# Patient Record
Sex: MALE | Race: WHITE | NOT HISPANIC OR LATINO | Employment: OTHER | URBAN - METROPOLITAN AREA
[De-identification: names, ages, dates, MRNs, and addresses within clinical notes are randomized per-mention and may not be internally consistent; named-entity substitution may affect disease eponyms.]

---

## 2021-02-02 ENCOUNTER — RECORDS - HEALTHEAST (OUTPATIENT)
Dept: LAB | Facility: CLINIC | Age: 86
End: 2021-02-02

## 2021-02-04 LAB — BACTERIA SPEC CULT: ABNORMAL

## 2021-05-25 ENCOUNTER — RECORDS - HEALTHEAST (OUTPATIENT)
Dept: ADMINISTRATIVE | Facility: CLINIC | Age: 86
End: 2021-05-25

## 2021-06-02 ENCOUNTER — RECORDS - HEALTHEAST (OUTPATIENT)
Dept: ADMINISTRATIVE | Facility: CLINIC | Age: 86
End: 2021-06-02

## 2021-07-07 ENCOUNTER — RECORDS - HEALTHEAST (OUTPATIENT)
Dept: LAB | Facility: CLINIC | Age: 86
End: 2021-07-07

## 2021-07-10 LAB — BACTERIA SPEC CULT: ABNORMAL

## 2021-07-23 ENCOUNTER — HOSPITAL ENCOUNTER (OUTPATIENT)
Facility: CLINIC | Age: 86
End: 2021-07-23
Attending: SPECIALIST | Admitting: SPECIALIST
Payer: MEDICARE

## 2021-07-23 DIAGNOSIS — Z11.59 ENCOUNTER FOR SCREENING FOR OTHER VIRAL DISEASES: ICD-10-CM

## 2021-07-29 ENCOUNTER — LAB REQUISITION (OUTPATIENT)
Dept: LAB | Facility: CLINIC | Age: 86
End: 2021-07-29
Payer: MEDICARE

## 2021-07-29 DIAGNOSIS — Z01.818 ENCOUNTER FOR OTHER PREPROCEDURAL EXAMINATION: ICD-10-CM

## 2021-07-29 LAB
ANION GAP SERPL CALCULATED.3IONS-SCNC: 9 MMOL/L (ref 5–18)
BUN SERPL-MCNC: 13 MG/DL (ref 8–28)
CALCIUM SERPL-MCNC: 9.3 MG/DL (ref 8.5–10.5)
CHLORIDE BLD-SCNC: 101 MMOL/L (ref 98–107)
CO2 SERPL-SCNC: 26 MMOL/L (ref 22–31)
CREAT SERPL-MCNC: 0.95 MG/DL (ref 0.7–1.3)
GFR SERPL CREATININE-BSD FRML MDRD: 73 ML/MIN/1.73M2
GLUCOSE BLD-MCNC: 80 MG/DL (ref 70–125)
POTASSIUM BLD-SCNC: 4.7 MMOL/L (ref 3.5–5)
SODIUM SERPL-SCNC: 136 MMOL/L (ref 136–145)

## 2021-07-29 PROCEDURE — 80048 BASIC METABOLIC PNL TOTAL CA: CPT | Mod: ORL | Performed by: NURSE PRACTITIONER

## 2021-08-02 ENCOUNTER — ANESTHESIA EVENT (OUTPATIENT)
Dept: SURGERY | Facility: HOSPITAL | Age: 86
End: 2021-08-02

## 2021-08-02 RX ORDER — LIDOCAINE 40 MG/G
CREAM TOPICAL
Status: CANCELLED | OUTPATIENT
Start: 2021-08-02

## 2021-08-02 RX ORDER — CEFAZOLIN SODIUM 2 G/100ML
2 INJECTION, SOLUTION INTRAVENOUS SEE ADMIN INSTRUCTIONS
Status: DISCONTINUED | OUTPATIENT
Start: 2021-08-03 | End: 2021-08-02 | Stop reason: HOSPADM

## 2021-08-02 RX ORDER — SODIUM CHLORIDE, SODIUM LACTATE, POTASSIUM CHLORIDE, CALCIUM CHLORIDE 600; 310; 30; 20 MG/100ML; MG/100ML; MG/100ML; MG/100ML
INJECTION, SOLUTION INTRAVENOUS CONTINUOUS
Status: CANCELLED | OUTPATIENT
Start: 2021-08-02

## 2021-08-02 RX ORDER — CEFAZOLIN SODIUM 2 G/100ML
2 INJECTION, SOLUTION INTRAVENOUS
Status: DISCONTINUED | OUTPATIENT
Start: 2021-08-03 | End: 2021-08-02 | Stop reason: HOSPADM

## 2021-08-03 ENCOUNTER — ANESTHESIA (OUTPATIENT)
Dept: SURGERY | Facility: HOSPITAL | Age: 86
End: 2021-08-03

## 2021-09-08 ENCOUNTER — LAB REQUISITION (OUTPATIENT)
Dept: LAB | Facility: CLINIC | Age: 86
End: 2021-09-08
Payer: MEDICARE

## 2021-09-08 DIAGNOSIS — R31.0 GROSS HEMATURIA: ICD-10-CM

## 2021-09-08 LAB
ANION GAP SERPL CALCULATED.3IONS-SCNC: 10 MMOL/L (ref 5–18)
BUN SERPL-MCNC: 16 MG/DL (ref 8–28)
CALCIUM SERPL-MCNC: 8.7 MG/DL (ref 8.5–10.5)
CHLORIDE BLD-SCNC: 103 MMOL/L (ref 98–107)
CO2 SERPL-SCNC: 27 MMOL/L (ref 22–31)
CREAT SERPL-MCNC: 0.87 MG/DL (ref 0.7–1.3)
GFR SERPL CREATININE-BSD FRML MDRD: 79 ML/MIN/1.73M2
GLUCOSE BLD-MCNC: 89 MG/DL (ref 70–125)
POTASSIUM BLD-SCNC: 4.4 MMOL/L (ref 3.5–5)
SODIUM SERPL-SCNC: 140 MMOL/L (ref 136–145)

## 2021-09-08 PROCEDURE — 80048 BASIC METABOLIC PNL TOTAL CA: CPT | Mod: ORL | Performed by: NURSE PRACTITIONER

## 2021-09-08 PROCEDURE — 87086 URINE CULTURE/COLONY COUNT: CPT | Mod: ORL | Performed by: NURSE PRACTITIONER

## 2021-09-11 LAB — BACTERIA UR CULT: ABNORMAL

## 2021-09-27 DIAGNOSIS — Z11.59 ENCOUNTER FOR SCREENING FOR OTHER VIRAL DISEASES: ICD-10-CM

## 2021-10-07 NOTE — OR NURSING
Writer spoke with Valentina. She is Denton's daughter and caregiver. She stated complete understanding of all pre op info and repeated back the arrival, and NPO times correctly. She was unable to verify med list d/t not being at home and didn't have the current list with her. She was asked to bring a current list for the PharmD DOS.

## 2021-10-11 ENCOUNTER — ANESTHESIA EVENT (OUTPATIENT)
Dept: SURGERY | Facility: CLINIC | Age: 86
End: 2021-10-11
Payer: MEDICARE

## 2021-10-11 ENCOUNTER — ANESTHESIA (OUTPATIENT)
Dept: SURGERY | Facility: CLINIC | Age: 86
End: 2021-10-11
Payer: MEDICARE

## 2021-10-11 ENCOUNTER — HOSPITAL ENCOUNTER (OUTPATIENT)
Facility: CLINIC | Age: 86
Discharge: HOME | End: 2021-10-11
Attending: SPECIALIST | Admitting: SPECIALIST
Payer: MEDICARE

## 2021-10-11 VITALS
BODY MASS INDEX: 21.01 KG/M2 | HEART RATE: 80 BPM | DIASTOLIC BLOOD PRESSURE: 81 MMHG | OXYGEN SATURATION: 95 % | SYSTOLIC BLOOD PRESSURE: 179 MMHG | WEIGHT: 138.6 LBS | HEIGHT: 68 IN | TEMPERATURE: 97.9 F | RESPIRATION RATE: 18 BRPM

## 2021-10-11 DIAGNOSIS — K40.30 INGUINAL HERNIA OF LEFT SIDE WITH OBSTRUCTION: Primary | ICD-10-CM

## 2021-10-11 PROCEDURE — 250N000011 HC RX IP 250 OP 636: Performed by: NURSE ANESTHETIST, CERTIFIED REGISTERED

## 2021-10-11 PROCEDURE — 250N000011 HC RX IP 250 OP 636: Performed by: SPECIALIST

## 2021-10-11 PROCEDURE — 360N000075 HC SURGERY LEVEL 2, PER MIN: Performed by: SPECIALIST

## 2021-10-11 PROCEDURE — 258N000003 HC RX IP 258 OP 636: Performed by: ANESTHESIOLOGY

## 2021-10-11 PROCEDURE — 370N000017 HC ANESTHESIA TECHNICAL FEE, PER MIN: Performed by: SPECIALIST

## 2021-10-11 PROCEDURE — 999N000141 HC STATISTIC PRE-PROCEDURE NURSING ASSESSMENT: Performed by: SPECIALIST

## 2021-10-11 PROCEDURE — 272N000001 HC OR GENERAL SUPPLY STERILE: Performed by: SPECIALIST

## 2021-10-11 PROCEDURE — 250N000009 HC RX 250: Performed by: NURSE ANESTHETIST, CERTIFIED REGISTERED

## 2021-10-11 PROCEDURE — C1781 MESH (IMPLANTABLE): HCPCS | Performed by: SPECIALIST

## 2021-10-11 PROCEDURE — 250N000013 HC RX MED GY IP 250 OP 250 PS 637: Performed by: SPECIALIST

## 2021-10-11 PROCEDURE — 710N000012 HC RECOVERY PHASE 2, PER MINUTE: Performed by: SPECIALIST

## 2021-10-11 PROCEDURE — 710N000010 HC RECOVERY PHASE 1, LEVEL 2, PER MIN: Performed by: SPECIALIST

## 2021-10-11 PROCEDURE — 250N000025 HC SEVOFLURANE, PER MIN: Performed by: SPECIALIST

## 2021-10-11 PROCEDURE — 250N000009 HC RX 250: Performed by: SPECIALIST

## 2021-10-11 DEVICE — MESH KNITTED POLYPROPYLENE 02X4" 0112650: Type: IMPLANTABLE DEVICE | Site: ABDOMEN | Status: FUNCTIONAL

## 2021-10-11 RX ORDER — FENTANYL CITRATE 50 UG/ML
25 INJECTION, SOLUTION INTRAMUSCULAR; INTRAVENOUS
Status: DISCONTINUED | OUTPATIENT
Start: 2021-10-11 | End: 2021-10-11 | Stop reason: HOSPADM

## 2021-10-11 RX ORDER — OXYCODONE AND ACETAMINOPHEN 5; 325 MG/1; MG/1
1 TABLET ORAL EVERY 6 HOURS PRN
Qty: 12 TABLET | Refills: 0 | Status: SHIPPED | OUTPATIENT
Start: 2021-10-11 | End: 2021-10-14

## 2021-10-11 RX ORDER — ONDANSETRON 4 MG/1
4 TABLET, ORALLY DISINTEGRATING ORAL EVERY 30 MIN PRN
Status: DISCONTINUED | OUTPATIENT
Start: 2021-10-11 | End: 2021-10-11 | Stop reason: HOSPADM

## 2021-10-11 RX ORDER — OXYCODONE HYDROCHLORIDE 5 MG/1
5 TABLET ORAL EVERY 4 HOURS PRN
Status: DISCONTINUED | OUTPATIENT
Start: 2021-10-11 | End: 2021-10-11 | Stop reason: HOSPADM

## 2021-10-11 RX ORDER — CEFAZOLIN SODIUM 2 G/100ML
2 INJECTION, SOLUTION INTRAVENOUS
Status: COMPLETED | OUTPATIENT
Start: 2021-10-11 | End: 2021-10-11

## 2021-10-11 RX ORDER — CEFAZOLIN SODIUM 2 G/100ML
2 INJECTION, SOLUTION INTRAVENOUS SEE ADMIN INSTRUCTIONS
Status: DISCONTINUED | OUTPATIENT
Start: 2021-10-11 | End: 2021-10-11 | Stop reason: HOSPADM

## 2021-10-11 RX ORDER — LIDOCAINE 40 MG/G
CREAM TOPICAL
Status: DISCONTINUED | OUTPATIENT
Start: 2021-10-11 | End: 2021-10-11 | Stop reason: HOSPADM

## 2021-10-11 RX ORDER — SODIUM CHLORIDE, SODIUM LACTATE, POTASSIUM CHLORIDE, CALCIUM CHLORIDE 600; 310; 30; 20 MG/100ML; MG/100ML; MG/100ML; MG/100ML
INJECTION, SOLUTION INTRAVENOUS CONTINUOUS
Status: DISCONTINUED | OUTPATIENT
Start: 2021-10-11 | End: 2021-10-11 | Stop reason: HOSPADM

## 2021-10-11 RX ORDER — OXYCODONE AND ACETAMINOPHEN 5; 325 MG/1; MG/1
1 TABLET ORAL ONCE
Status: COMPLETED | OUTPATIENT
Start: 2021-10-11 | End: 2021-10-11

## 2021-10-11 RX ORDER — MEPERIDINE HYDROCHLORIDE 25 MG/ML
12.5 INJECTION INTRAMUSCULAR; INTRAVENOUS; SUBCUTANEOUS
Status: DISCONTINUED | OUTPATIENT
Start: 2021-10-11 | End: 2021-10-11 | Stop reason: HOSPADM

## 2021-10-11 RX ORDER — ONDANSETRON 2 MG/ML
4 INJECTION INTRAMUSCULAR; INTRAVENOUS EVERY 30 MIN PRN
Status: DISCONTINUED | OUTPATIENT
Start: 2021-10-11 | End: 2021-10-11 | Stop reason: HOSPADM

## 2021-10-11 RX ORDER — LIDOCAINE HYDROCHLORIDE 10 MG/ML
INJECTION, SOLUTION INFILTRATION; PERINEURAL PRN
Status: DISCONTINUED | OUTPATIENT
Start: 2021-10-11 | End: 2021-10-11

## 2021-10-11 RX ORDER — SODIUM CHLORIDE, SODIUM LACTATE, POTASSIUM CHLORIDE, CALCIUM CHLORIDE 600; 310; 30; 20 MG/100ML; MG/100ML; MG/100ML; MG/100ML
INJECTION, SOLUTION INTRAVENOUS CONTINUOUS PRN
Status: DISCONTINUED | OUTPATIENT
Start: 2021-10-11 | End: 2021-10-11

## 2021-10-11 RX ORDER — ONDANSETRON 2 MG/ML
INJECTION INTRAMUSCULAR; INTRAVENOUS PRN
Status: DISCONTINUED | OUTPATIENT
Start: 2021-10-11 | End: 2021-10-11

## 2021-10-11 RX ORDER — MAGNESIUM HYDROXIDE 1200 MG/15ML
LIQUID ORAL PRN
Status: DISCONTINUED | OUTPATIENT
Start: 2021-10-11 | End: 2021-10-11 | Stop reason: HOSPADM

## 2021-10-11 RX ORDER — HYDROMORPHONE HCL IN WATER/PF 6 MG/30 ML
0.2 PATIENT CONTROLLED ANALGESIA SYRINGE INTRAVENOUS EVERY 5 MIN PRN
Status: DISCONTINUED | OUTPATIENT
Start: 2021-10-11 | End: 2021-10-11 | Stop reason: HOSPADM

## 2021-10-11 RX ORDER — PROPOFOL 10 MG/ML
INJECTION, EMULSION INTRAVENOUS PRN
Status: DISCONTINUED | OUTPATIENT
Start: 2021-10-11 | End: 2021-10-11

## 2021-10-11 RX ORDER — FENTANYL CITRATE 50 UG/ML
INJECTION, SOLUTION INTRAMUSCULAR; INTRAVENOUS PRN
Status: DISCONTINUED | OUTPATIENT
Start: 2021-10-11 | End: 2021-10-11

## 2021-10-11 RX ORDER — FENTANYL CITRATE 50 UG/ML
25 INJECTION, SOLUTION INTRAMUSCULAR; INTRAVENOUS EVERY 5 MIN PRN
Status: DISCONTINUED | OUTPATIENT
Start: 2021-10-11 | End: 2021-10-11 | Stop reason: HOSPADM

## 2021-10-11 RX ORDER — BUPIVACAINE HYDROCHLORIDE 2.5 MG/ML
INJECTION, SOLUTION INFILTRATION; PERINEURAL PRN
Status: DISCONTINUED | OUTPATIENT
Start: 2021-10-11 | End: 2021-10-11 | Stop reason: HOSPADM

## 2021-10-11 RX ORDER — DEXAMETHASONE SODIUM PHOSPHATE 4 MG/ML
INJECTION, SOLUTION INTRA-ARTICULAR; INTRALESIONAL; INTRAMUSCULAR; INTRAVENOUS; SOFT TISSUE PRN
Status: DISCONTINUED | OUTPATIENT
Start: 2021-10-11 | End: 2021-10-11

## 2021-10-11 RX ADMIN — ONDANSETRON 4 MG: 2 INJECTION INTRAMUSCULAR; INTRAVENOUS at 10:24

## 2021-10-11 RX ADMIN — FENTANYL CITRATE 50 MCG: 50 INJECTION, SOLUTION INTRAMUSCULAR; INTRAVENOUS at 10:43

## 2021-10-11 RX ADMIN — SODIUM CHLORIDE, POTASSIUM CHLORIDE, SODIUM LACTATE AND CALCIUM CHLORIDE: 600; 310; 30; 20 INJECTION, SOLUTION INTRAVENOUS at 08:45

## 2021-10-11 RX ADMIN — PROPOFOL 150 MG: 10 INJECTION, EMULSION INTRAVENOUS at 10:24

## 2021-10-11 RX ADMIN — LIDOCAINE HYDROCHLORIDE 5 ML: 10 INJECTION, SOLUTION INFILTRATION; PERINEURAL at 10:24

## 2021-10-11 RX ADMIN — OXYCODONE HYDROCHLORIDE AND ACETAMINOPHEN 1 TABLET: 5; 325 TABLET ORAL at 08:47

## 2021-10-11 RX ADMIN — CEFAZOLIN SODIUM 2 G: 2 INJECTION, SOLUTION INTRAVENOUS at 10:28

## 2021-10-11 RX ADMIN — FENTANYL CITRATE 50 MCG: 50 INJECTION, SOLUTION INTRAMUSCULAR; INTRAVENOUS at 10:24

## 2021-10-11 RX ADMIN — DEXAMETHASONE SODIUM PHOSPHATE 10 MG: 4 INJECTION, SOLUTION INTRA-ARTICULAR; INTRALESIONAL; INTRAMUSCULAR; INTRAVENOUS; SOFT TISSUE at 10:24

## 2021-10-11 ASSESSMENT — MIFFLIN-ST. JEOR: SCORE: 1285.25

## 2021-10-11 ASSESSMENT — COPD QUESTIONNAIRES: COPD: 1

## 2021-10-11 NOTE — OP NOTE
"[facility]  Operative Note    Pre-operative diagnosis: Obstructed inguinal hernia [K40.30]   Post-operative diagnosis * No post-op diagnosis entered *   Procedure: Procedure(s):  LEFT INGUINAL HERNIA REPAIR WITH MESH , excision spermatic cord lipoma   Surgeon: Puma Preston MD, MD   Assistants(s): None   Anesthesia: General    Estimated blood loss:  5 mL    Total IV fluids: (See anesthesia record)   Blood transfusion: No transfusion was given during surgery   Total urine output: (See anesthesia record)   Drains:  None   Specimens: * No specimens in log *    Implants: Implant Name Type Inv. Item Serial No.  Lot No. LRB No. Used Action   MESH KNITTED POLYPROPYLENE 02X4\" [id number] - [id number] Mesh MESH KNITTED POLYPROPYLENE 02X4\" [id number]  [facility] [id number] Left 1 Implanted       Findings:  Hernia   Complications: None.   Condition: Stable       Description of procedure: Patient was prepped draped in usual fashion after induction of a general anesthetic the left lower quadrant incision was made down to the external oblique the fascia was opened secured with several clamps the cord was then mobilized and comes with a Penrose drain there was a large direct inguinal hernia sac which was incarcerated the sac was freed from the posterior aspect of the cord and reduced there was also a 4 cm spermatic cord lipoma this was dissected off the cord bluntly and then amputated off and discarded the conjoined tendon and shelving edge of the inguinal ligament repaired with interrupted 0 Ethibond a patch of Marlex mesh was then tapered to the dimensions of the inguinal floor and sewn in onlay fashion with a running 3-0 Prolene the cord was returned to inguinal floor the external oblique and Eugenia's fascia closed with 2-0 Vicryl skin closed with 4-0 Vicryl.    Puma Preston MD, MD     "

## 2021-10-11 NOTE — ANESTHESIA CARE TRANSFER NOTE
Patient: Denton Hobbs    Procedure: Procedure(s):  LEFT INGUINAL HERNIA REPAIR WITH MESH       Diagnosis: Obstructed inguinal hernia [K40.30]  Diagnosis Additional Information: No value filed.    Anesthesia Type:   General     Note:    Oropharynx: oral airway in place  Level of Consciousness: drowsy  Oxygen Supplementation: face mask  Level of Supplemental Oxygen (L/min / FiO2): 6  Independent Airway: airway patency satisfactory and stable  Dentition: dentition unchanged  Vital Signs Stable: post-procedure vital signs reviewed and stable  Report to RN Given: handoff report given  Patient transferred to: PACU    Handoff Report: Identifed the Patient, Identified the Reponsible Provider, Reviewed the pertinent medical history, Discussed the surgical course, Reviewed Intra-OP anesthesia mangement and issues during anesthesia, Set expectations for post-procedure period and Allowed opportunity for questions and acknowledgement of understanding      Vitals:  Vitals Value Taken Time   BP     Temp     Pulse 62 10/11/21 1111   Resp 35 10/11/21 1111   SpO2 100 % 10/11/21 1111   Vitals shown include unvalidated device data.    Electronically Signed By: DONALD Smith CRNA  October 11, 2021  11:12 AM

## 2021-10-11 NOTE — ANESTHESIA PROCEDURE NOTES
Airway       Patient location during procedure: OR  Staff -        CRNA: Tyler Johnson APRN CRNA       Performed By: CRNAIndications and Patient Condition       Indications for airway management: paul-procedural        Final Airway Details       Final airway type: supraglottic airway    Supraglottic Airway Details        Type: LMA       LMA size: 5    Post intubation assessment        Placement verified by: capnometry, equal breath sounds and chest rise        Number of attempts at approach: 1       Ease of procedure: easy

## 2021-10-11 NOTE — ANESTHESIA POSTPROCEDURE EVALUATION
Patient: Denton Hobbs    Procedure: Procedure(s):  LEFT INGUINAL HERNIA REPAIR WITH MESH       Diagnosis:Obstructed inguinal hernia [K40.30]  Diagnosis Additional Information: No value filed.    Anesthesia Type:  General    Note:     Postop Pain Control: Uneventful            Sign Out: Well controlled pain   PONV: No   Neuro/Psych: Uneventful            Sign Out: Acceptable/Baseline neuro status   Airway/Respiratory: Uneventful            Sign Out: Acceptable/Baseline resp. status   CV/Hemodynamics: Uneventful            Sign Out: Acceptable CV status; No obvious hypovolemia; No obvious fluid overload   Other NRE: NONE   DID A NON-ROUTINE EVENT OCCUR? No           Last vitals:  Vitals Value Taken Time   /67 10/11/21 1150   Temp 36.6  C (97.9  F) 10/11/21 1110   Pulse 93 10/11/21 1201   Resp 26 10/11/21 1201   SpO2 100 % 10/11/21 1201   Vitals shown include unvalidated device data.    Electronically Signed By: Karson Epps MD  October 11, 2021  12:45 PM

## 2021-10-11 NOTE — ANESTHESIA PREPROCEDURE EVALUATION
Anesthesia Pre-Procedure Evaluation    Patient: Denton Hobbs   MRN: [id number] : 1935        Preoperative Diagnosis: Obstructed inguinal hernia [K40.30]    Procedure : Procedure(s):  LEFT INGUINAL HERNIA REPAIR WITH MESH          Past Medical History:   Diagnosis Date     Arthritis      COPD (chronic obstructive pulmonary disease) (H)      Hypertension      Prostate hypertrophy       Past Surgical History:   Procedure Laterality Date     GENITOURINARY SURGERY       TRANSRECTAL ULTRASONIC, TRANSURETHRAL RESECTION (TUR) OF PROSTATE CYST      helped temporarily      Allergies   Allergen Reactions     Sulfa Drugs Rash     Info obtained off of 10/4/21 Entira pre op.      Social History     Tobacco Use     Smoking status: Former Smoker     Packs/day: 2.00     Years: 25.00     Pack years: 50.00     Start date: 1990     Smokeless tobacco: Never Used   Substance Use Topics     Alcohol use: No     Comment: Alcoholic Drinks/day: Quit       Wt Readings from Last 1 Encounters:   10/11/21 62.9 kg (138 lb 9.6 oz)        Anesthesia Evaluation   Pt has not had prior anesthetic         ROS/MED HX  ENT/Pulmonary:     (+) COPD (stable),     Neurologic:  - neg neurologic ROS     Cardiovascular:     (+) hypertension-----    METS/Exercise Tolerance:     Hematologic:  - neg hematologic  ROS     Musculoskeletal:       GI/Hepatic: Comment: BPH      Renal/Genitourinary:  - neg Renal ROS     Endo:  - neg endo ROS     Psychiatric/Substance Use:       Infectious Disease:       Malignancy:       Other:            Physical Exam    Airway        Mallampati: II   TM distance: > 3 FB      Respiratory Devices and Support         Dental  no notable dental history         Cardiovascular   cardiovascular exam normal          Pulmonary   pulmonary exam normal                OUTSIDE LABS:  CBC: No results found for: WBC, HGB, HCT, PLT  BMP:   Lab Results   Component Value Date     2021     2021    POTASSIUM  4.4 09/08/2021    POTASSIUM 4.7 07/29/2021    CHLORIDE 103 09/08/2021    CHLORIDE 101 07/29/2021    CO2 27 09/08/2021    CO2 26 07/29/2021    BUN 16 09/08/2021    BUN 13 07/29/2021    CR 0.87 09/08/2021    CR 0.95 07/29/2021    GLC 89 09/08/2021    GLC 80 07/29/2021     COAGS: No results found for: PTT, INR, FIBR  POC: No results found for: BGM, HCG, HCGS  HEPATIC: No results found for: ALBUMIN, PROTTOTAL, ALT, AST, GGT, ALKPHOS, BILITOTAL, BILIDIRECT, FRANCESCO  OTHER:   Lab Results   Component Value Date    GINETTE 8.7 09/08/2021       Anesthesia Plan    ASA Status:  3   NPO Status:  NPO Appropriate    Anesthesia Type: General.     - Airway: LMA   Induction: Intravenous.           Consents    Anesthesia Plan(s) and associated risks, benefits, and realistic alternatives discussed. Questions answered and patient/representative(s) expressed understanding.     - Discussed with:  Patient         Postoperative Care    Pain management: IV analgesics, Oral pain medications.   PONV prophylaxis: Dexamethasone or Solumedrol, Ondansetron (or other 5HT-3)     Comments:                Karson Epps MD

## 2021-10-11 NOTE — BRIEF OP NOTE
"[facility]    Brief Operative Note    Pre-operative diagnosis: Obstructed inguinal hernia [K40.30]  Post-operative diagnosis same  Procedure: Procedure(s):  LEFT INGUINAL HERNIA REPAIR WITH MESH.  Excision spermatic cord lipoma  Surgeon: Surgeon(s) and Role:     * Puma Preston MD - Primary  Anesthesia: General   Estimated blood loss: 5 mL  Drains: None  Specimens: * No specimens in log *  Findings:   Incarcerated direct inguinal hernia  Complications: None.  Implants:   Implant Name Type Inv. Item Serial No.  Lot No. LRB No. Used Action   MESH KNITTED POLYPROPYLENE 02X4\" [id number] - [id number] Mesh MESH KNITTED POLYPROPYLENE 02X4\" [id number]  [place] [id number] Left 1 Implanted           "

## 2021-12-17 ENCOUNTER — LAB REQUISITION (OUTPATIENT)
Dept: LAB | Facility: CLINIC | Age: 86
End: 2021-12-17
Payer: MEDICARE

## 2021-12-17 ENCOUNTER — TRANSFERRED RECORDS (OUTPATIENT)
Dept: HEALTH INFORMATION MANAGEMENT | Facility: CLINIC | Age: 86
End: 2021-12-17
Payer: MEDICARE

## 2021-12-17 DIAGNOSIS — N39.0 URINARY TRACT INFECTION, SITE NOT SPECIFIED: ICD-10-CM

## 2021-12-17 PROCEDURE — 87186 SC STD MICRODIL/AGAR DIL: CPT | Mod: ORL,91 | Performed by: NURSE PRACTITIONER

## 2021-12-20 LAB
BACTERIA UR CULT: ABNORMAL
BACTERIA UR CULT: ABNORMAL

## 2021-12-30 ENCOUNTER — TRANSFERRED RECORDS (OUTPATIENT)
Dept: HEALTH INFORMATION MANAGEMENT | Facility: CLINIC | Age: 86
End: 2021-12-30
Payer: MEDICARE

## 2022-01-25 ENCOUNTER — MEDICAL CORRESPONDENCE (OUTPATIENT)
Dept: HEALTH INFORMATION MANAGEMENT | Facility: CLINIC | Age: 87
End: 2022-01-25
Payer: MEDICARE

## 2022-01-25 ENCOUNTER — TRANSCRIBE ORDERS (OUTPATIENT)
Dept: OTHER | Age: 87
End: 2022-01-25

## 2022-01-25 DIAGNOSIS — K40.90 RIGHT INGUINAL HERNIA: Primary | ICD-10-CM

## 2022-01-25 PROBLEM — R47.01 APHASIA: Status: ACTIVE | Noted: 2018-12-04

## 2022-01-25 PROBLEM — N30.90 CYSTITIS: Status: ACTIVE | Noted: 2018-12-04

## 2022-01-25 PROBLEM — R26.89 BALANCE PROBLEMS: Status: ACTIVE | Noted: 2018-12-04

## 2022-01-25 PROBLEM — Z97.8 INDWELLING FOLEY CATHETER PRESENT: Status: ACTIVE | Noted: 2018-12-07

## 2022-01-25 PROBLEM — I63.9 CVA (CEREBRAL VASCULAR ACCIDENT) (H): Status: ACTIVE | Noted: 2018-12-04

## 2022-01-25 PROBLEM — R97.20 ELEVATED PSA: Status: ACTIVE | Noted: 2018-12-04

## 2022-01-25 PROBLEM — R53.1 LEFT-SIDED WEAKNESS: Status: ACTIVE | Noted: 2018-12-04

## 2022-01-25 NOTE — PATIENT INSTRUCTIONS
Hernia education & surgery packet provided to pt.    Carlito OSMAN [facility]  Surgery Clinic [facility] [place]  [facility] - [address] [address]  Office: [phone]  Fax: [phone]

## 2022-01-27 ENCOUNTER — OFFICE VISIT (OUTPATIENT)
Dept: SURGERY | Facility: CLINIC | Age: 87
End: 2022-01-27
Attending: FAMILY MEDICINE
Payer: MEDICARE

## 2022-01-27 DIAGNOSIS — K40.90 NON-RECURRENT UNILATERAL INGUINAL HERNIA WITHOUT OBSTRUCTION OR GANGRENE: Primary | ICD-10-CM

## 2022-01-27 DIAGNOSIS — K40.90 RIGHT INGUINAL HERNIA: ICD-10-CM

## 2022-01-27 PROCEDURE — 99203 OFFICE O/P NEW LOW 30 MIN: CPT | Performed by: SURGERY

## 2022-01-27 RX ORDER — ACETAMINOPHEN 325 MG/1
975 TABLET ORAL ONCE
Status: CANCELLED | OUTPATIENT
Start: 2022-01-27 | End: 2022-01-27

## 2022-01-27 NOTE — PROGRESS NOTES
HPI:  Denton Hobbs is a 86 year old male who was referred to me by Jarad Prater for an inguinal hernia. He  presents today with complaints of an uncomfortable bulge in his right groin. He actually had a left-sided hernia repair just 3 months ago and is doing well with regard to that surgery.    Allergies:Sulfa drugs    Past Medical History:   Diagnosis Date     Arthritis      COPD (chronic obstructive pulmonary disease) (H)      CVA (cerebral vascular accident) (H) 12/4/2018     Hypertension      Prostate hypertrophy        Past Surgical History:   Procedure Laterality Date     GENITOURINARY SURGERY       HERNIORRHAPHY INGUINAL Left 10/11/2021    Procedure: LEFT INGUINAL HERNIA REPAIR WITH MESH;  Surgeon: Puma Preston MD;  Location: [facility] OR     TRANSRECTAL ULTRASONIC, TRANSURETHRAL RESECTION (TUR) OF PROSTATE CYST  2004    helped temporarily       CURRENT MEDS:  No current outpatient medications on file.       Family History   Problem Relation Age of Onset     No Known Problems Other         No heart, DM, HTN, or CA     No Known Problems Mother      No Known Problems Father         reports that he has quit smoking. He started smoking about 32 years ago. He has a 50.00 pack-year smoking history. He has never used smokeless tobacco. He reports that he does not drink alcohol and does not use drugs.    Review of Systems -   The 10 point review of systems  is within normal limits except for as mentioned above in the HPI.  General ROS: No complaints or constitutional symptoms  Skin: No complaints or symptoms   Hematologic/Lymphatic: No symptoms or complaints  Psychiatric: No symptoms or complaints  Endocrine: No excessive fatigue, no hypermetabolic symptoms reported  Respiratory ROS: no cough, shortness of breath, or wheezing  Cardiovascular ROS: no chest pain or dyspnea on exertion  Gastrointestinal ROS: As per HPI  Musculoskeletal ROS: no recent injuries reported  Neurological ROS: no focal  neurologic defects reported.        There were no vitals taken for this visit.  There is no height or weight on file to calculate BMI.    EXAM:  General : Alert, cooperative, appears stated age   Skin: Skin color, texture, turgor normal, no rashes or lesions   Lymphatic: No obvious adenopathy, no swelling   Eyes: No scleral icterus, pupils equal  HENT: no traumatic injury to the head or face, no gross abnormalities  Lungs: Normal respiratory effort, breath sounds equal bilaterally  Heart: Regular rate and rhythm  Abdomen: Visible and palpable right inguinal hernia. No evidence of recurrence on the left side  Musculoskeletal: No obvious swelling,  Neurologic: Grossly intact    Assessment/Plan:   1. Non-recurrent unilateral inguinal hernia without obstruction or gangrene    2. Right inguinal hernia        Denton Hobbs is a 86 year old male with a right inguinal hernia.  I have discussed the pathophysiology of inguinal hernias at length as well as the  surgical and non-operative management strategies.      In particular, the risks and benefits of laparoscopic vs. open inguinal hernia surgery were explained in detail which include, but are not limited to, bleeding, infection of the mesh, recurrence of the hernia, chronic pain, poor cosmesis, the need for reoperative intervention, the possibility of conversion from a laparoscopic approach to an open approach, subcutaneous emphysema, injury to vital structures,  blood clots, heart attack, stroke and death.  Additionally, the risks of observation were also discussed in detail which include, but are not limited to, chronic pain, enlargement of the hernia, incarceration, strangulation and death.      He understands everything that was discussed and has consented to proceed with surgery.   We will plan on scheduling a open right inguinal hernia repair at his desired date.       lFo Keyes MD, FACS  Office: [phone]  [facility]   General and Bariatric  Surgery

## 2022-01-28 ENCOUNTER — TELEPHONE (OUTPATIENT)
Dept: SURGERY | Facility: CLINIC | Age: 87
End: 2022-01-28
Payer: MEDICARE

## 2022-01-28 NOTE — TELEPHONE ENCOUNTER
VM was left for  Denton to let him know that a letter is being sent out to replace the the letter he took home with him on his visit of 1/27. A new surgery date for him of 2/23 at 2 pm arrival and covid on 2/19.     Please call surgery scheduling if he needs further assistance [phone].

## 2022-01-31 ENCOUNTER — HOSPITAL ENCOUNTER (OUTPATIENT)
Facility: AMBULATORY SURGERY CENTER | Age: 87
End: 2022-01-31
Attending: SURGERY
Payer: MEDICARE

## 2022-02-07 DIAGNOSIS — Z11.59 ENCOUNTER FOR SCREENING FOR OTHER VIRAL DISEASES: Primary | ICD-10-CM

## 2022-02-18 ENCOUNTER — TELEPHONE (OUTPATIENT)
Dept: SURGERY | Facility: CLINIC | Age: 87
End: 2022-02-18
Payer: MEDICARE

## 2022-02-18 NOTE — TELEPHONE ENCOUNTER
Daughter (Valentina) calling in regarding Covid test and possibly having to r/s surgery out to March/April.  If he takes covid and still does not feel well then  We will r/s or if covid comes back positive shedule out and he will need to be symptom free 3 days prior to his surgery.

## 2022-02-24 ENCOUNTER — HOSPITAL ENCOUNTER (OUTPATIENT)
Facility: AMBULATORY SURGERY CENTER | Age: 87
End: 2022-02-24
Attending: SURGERY
Payer: MEDICARE

## 2022-02-24 DIAGNOSIS — K40.90 NON-RECURRENT UNILATERAL INGUINAL HERNIA WITHOUT OBSTRUCTION OR GANGRENE: ICD-10-CM

## 2022-03-14 ENCOUNTER — LAB REQUISITION (OUTPATIENT)
Dept: LAB | Facility: CLINIC | Age: 87
End: 2022-03-14
Payer: MEDICARE

## 2022-03-14 DIAGNOSIS — M54.50 LOW BACK PAIN, UNSPECIFIED: ICD-10-CM

## 2022-03-14 DIAGNOSIS — D64.9 ANEMIA, UNSPECIFIED: ICD-10-CM

## 2022-03-14 LAB
BASOPHILS # BLD AUTO: 0.1 10E3/UL (ref 0–0.2)
BASOPHILS NFR BLD AUTO: 1 %
EOSINOPHIL # BLD AUTO: 0.1 10E3/UL (ref 0–0.7)
EOSINOPHIL NFR BLD AUTO: 2 %
ERYTHROCYTE [DISTWIDTH] IN BLOOD BY AUTOMATED COUNT: 17.4 % (ref 10–15)
HCT VFR BLD AUTO: 27.3 % (ref 40–53)
HGB BLD-MCNC: 7.8 G/DL (ref 13.3–17.7)
IMM GRANULOCYTES # BLD: 0 10E3/UL
IMM GRANULOCYTES NFR BLD: 0 %
IRON SATN MFR SERPL: 3 % (ref 20–50)
IRON SERPL-MCNC: 10 UG/DL (ref 42–175)
LYMPHOCYTES # BLD AUTO: 1.7 10E3/UL (ref 0.8–5.3)
LYMPHOCYTES NFR BLD AUTO: 21 %
MCH RBC QN AUTO: 20.5 PG (ref 26.5–33)
MCHC RBC AUTO-ENTMCNC: 28.6 G/DL (ref 31.5–36.5)
MCV RBC AUTO: 72 FL (ref 78–100)
MONOCYTES # BLD AUTO: 1 10E3/UL (ref 0–1.3)
MONOCYTES NFR BLD AUTO: 12 %
NEUTROPHILS # BLD AUTO: 5.3 10E3/UL (ref 1.6–8.3)
NEUTROPHILS NFR BLD AUTO: 64 %
NRBC # BLD AUTO: 0 10E3/UL
NRBC BLD AUTO-RTO: 0 /100
PLATELET # BLD AUTO: 667 10E3/UL (ref 150–450)
RBC # BLD AUTO: 3.81 10E6/UL (ref 4.4–5.9)
RETICS # AUTO: 0.05 10E6/UL (ref 0.01–0.11)
RETICS # AUTO: 0.06 10E6/UL (ref 0.01–0.11)
RETICS/RBC NFR AUTO: 1.2 % (ref 0.8–2.7)
RETICS/RBC NFR AUTO: 1.6 % (ref 0.8–2.7)
TIBC SERPL-MCNC: 341 UG/DL (ref 313–563)
TRANSFERRIN SERPL-MCNC: 273 MG/DL (ref 212–360)
WBC # BLD AUTO: 8.3 10E3/UL (ref 4–11)

## 2022-03-14 PROCEDURE — 85045 AUTOMATED RETICULOCYTE COUNT: CPT | Mod: ORL | Performed by: FAMILY MEDICINE

## 2022-03-14 PROCEDURE — 87086 URINE CULTURE/COLONY COUNT: CPT | Mod: ORL | Performed by: FAMILY MEDICINE

## 2022-03-14 PROCEDURE — 84466 ASSAY OF TRANSFERRIN: CPT | Mod: ORL | Performed by: FAMILY MEDICINE

## 2022-03-14 PROCEDURE — 85025 COMPLETE CBC W/AUTO DIFF WBC: CPT | Mod: ORL | Performed by: FAMILY MEDICINE

## 2022-03-15 LAB
PATH REPORT.COMMENTS IMP SPEC: NORMAL
PATH REPORT.COMMENTS IMP SPEC: NORMAL
PATH REPORT.FINAL DX SPEC: NORMAL
PATH REPORT.MICROSCOPIC SPEC OTHER STN: NORMAL

## 2022-03-15 PROCEDURE — 99207 BLOOD MORPHOLOGY PATHOLOGIST REVIEW: CPT | Performed by: PATHOLOGY

## 2022-03-16 LAB — BACTERIA UR CULT: NORMAL

## 2022-03-18 ENCOUNTER — TELEPHONE (OUTPATIENT)
Dept: SURGERY | Facility: CLINIC | Age: 87
End: 2022-03-18
Payer: MEDICARE

## 2022-03-18 NOTE — TELEPHONE ENCOUNTER
Spoke with Arminda today regarding Denton's surgery on 3/23. His surgery has to be canceled because he did not pass his pre op physical. He has a follow up visit with the PCP on 3/28.  We have postponed Denton's surgery to 4/13. Arminda will contact us after the PCP follow up on 3/28 to let us know if he was cleared or not. If he is cleared we will set up the covid testing at that time, if not we will postpone the surgery.    VANESA notified

## 2022-03-21 ENCOUNTER — HOSPITAL ENCOUNTER (OUTPATIENT)
Facility: AMBULATORY SURGERY CENTER | Age: 87
End: 2022-03-21
Attending: SURGERY
Payer: MEDICARE

## 2022-03-21 ENCOUNTER — LAB REQUISITION (OUTPATIENT)
Dept: LAB | Facility: CLINIC | Age: 87
End: 2022-03-21
Payer: MEDICARE

## 2022-03-21 DIAGNOSIS — Z01.812 ENCOUNTER FOR PREPROCEDURAL LABORATORY EXAMINATION: ICD-10-CM

## 2022-03-21 DIAGNOSIS — Z11.59 ENCOUNTER FOR SCREENING FOR OTHER VIRAL DISEASES: Primary | ICD-10-CM

## 2022-03-21 PROCEDURE — U0005 INFEC AGEN DETEC AMPLI PROBE: HCPCS | Mod: ORL | Performed by: FAMILY MEDICINE

## 2022-03-22 LAB — SARS-COV-2 RNA RESP QL NAA+PROBE: NEGATIVE

## 2022-03-28 ENCOUNTER — TRANSFERRED RECORDS (OUTPATIENT)
Dept: MULTI SPECIALTY CLINIC | Facility: CLINIC | Age: 87
End: 2022-03-28
Payer: MEDICARE

## 2022-03-29 ENCOUNTER — TRANSFERRED RECORDS (OUTPATIENT)
Dept: HEALTH INFORMATION MANAGEMENT | Facility: CLINIC | Age: 87
End: 2022-03-29
Payer: MEDICARE

## 2022-03-30 ENCOUNTER — MEDICAL CORRESPONDENCE (OUTPATIENT)
Dept: HEALTH INFORMATION MANAGEMENT | Facility: CLINIC | Age: 87
End: 2022-03-30
Payer: MEDICARE

## 2022-04-01 ENCOUNTER — TRANSFERRED RECORDS (OUTPATIENT)
Dept: HEALTH INFORMATION MANAGEMENT | Facility: CLINIC | Age: 87
End: 2022-04-01
Payer: MEDICARE

## 2022-04-06 ENCOUNTER — TRANSCRIBE ORDERS (OUTPATIENT)
Dept: OTHER | Age: 87
End: 2022-04-06

## 2022-04-06 ENCOUNTER — PATIENT OUTREACH (OUTPATIENT)
Dept: SURGERY | Facility: CLINIC | Age: 87
End: 2022-04-06
Payer: MEDICARE

## 2022-04-06 DIAGNOSIS — D13.2 DUODENAL ADENOMA: Primary | ICD-10-CM

## 2022-04-06 DIAGNOSIS — D13.30 ADENOMA OF SMALL INTESTINE: Primary | ICD-10-CM

## 2022-04-06 NOTE — PROGRESS NOTES
New Patient Oncology Nurse Navigator Note     Referring provider: Dr. 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      Clinical History (per Nurse review of records provided):      See book marked documents:       Referring MD office note    Pathology report    Imaging reports     Procedure reports     Lab Results   Component Value Date/Time    WBC 8.3 03/14/2022 03:54 PM          Past Medical History:   Diagnosis Date     Arthritis      COPD (chronic obstructive pulmonary disease) (H)      CVA (cerebral vascular accident) (H) 12/4/2018     Hypertension      Prostate hypertrophy        Past Surgical History:   Procedure Laterality Date     GENITOURINARY SURGERY       HERNIORRHAPHY INGUINAL Left 10/11/2021    Procedure: LEFT INGUINAL HERNIA REPAIR WITH MESH;  Surgeon: Puma Preston MD;  Location: [facility] OR     TRANSRECTAL ULTRASONIC, TRANSURETHRAL RESECTION (TUR) OF PROSTATE CYST  2004    helped temporarily       No current outpatient medications on file.           Allergies   Allergen Reactions     Sulfa Drugs Rash     Info obtained off of 10/4/21 Entira pre op.          Patient Active Problem List   Diagnosis     Orchitis and epididymitis     Benign prostatic hyperplasia with urinary retention     Sepsis (Leukocytosis, Tachycardia)     CVA (cerebral vascular accident) (H)     Left-sided weakness     Indwelling Cash catheter present     Elevated PSA     Cystitis     Balance problems     Aphasia     Non-recurrent unilateral inguinal hernia without obstruction or gangrene         Clinical Assessment / Barriers to Care (Per Nurse):    None at this time.     Records Location:     [facility] Everywhere     Records Needed:     ABDOMINAL IMAGING (CT SCANS, MRI, US)  DATING BACK TO  2014      Additional testing needed prior to consult:     NONE AT THIS TIME    Referral updates and Plan:       Consult with Surgical Oncology     04/06/2022 4:39 PM - Called and spoke with patients daughter regarding scheduling, informed her of plan for CT scan prior to consult. Informed her that we would coordinate this to be done on the same day as consult to minimize trips. She agreed with plan and is aware our scheduling team will reach out.     04/07/2022 9:36 AM - patients daughter called back and confirmed patient has had CT scan that included the abdomen and pelvis. She will call back with location of imaging, we will ensure outside imaging is obtained. CT scan scheduled at [place] will be canceled.       04/07/2022 10:07 AM - patients daughter called and reported that the imaging was done at [facility], message sent to records team to obtain.         Brook Epps, RN, BSN   Surgical Oncology New Patient Nurse Navigator  [facility]  [phone]

## 2022-04-07 ENCOUNTER — TELEPHONE (OUTPATIENT)
Dept: PLASTIC SURGERY | Facility: AMBULATORY SURGERY CENTER | Age: 87
End: 2022-04-07
Payer: MEDICARE

## 2022-04-07 ENCOUNTER — DOCUMENTATION ONLY (OUTPATIENT)
Dept: ONCOLOGY | Facility: CLINIC | Age: 87
End: 2022-04-07
Payer: MEDICARE

## 2022-04-07 NOTE — PROGRESS NOTES
Action April 7, 2022 12:29 PM ABT   Action Taken Image and imaging report request sent to [facility]     Action April 8, 2022 7:19 PM ABT   Action Taken Image from [facility] received and resolved to PACS, image report received and sent to HIM for upload

## 2022-04-07 NOTE — TELEPHONE ENCOUNTER
Called to follow up with Arminda regarding Denton's surgery on 4/13. We had to postpone his surgery due to health concerns, he was scheduled to follow up for a new pre op per Arminda on 3/28 and was going to call me after that appointment to let me know if he was cleared.    Unfortunately when I spoke with Arminda today she stated that he was not cleared and since we last spoke they found a mass on his intestines that will need to be removed. She stated that she believes the hernia will be repaired at the time of the mass excision.    Surgery for 4/13 with  has been canceled at this time and [facility] was notified.    I let [facility] know if there is any changes or if they do need Dr. Keyes to take care of the hernia to feel free to reach out to us.

## 2022-04-15 ENCOUNTER — OFFICE VISIT (OUTPATIENT)
Dept: SURGERY | Facility: CLINIC | Age: 87
End: 2022-04-15
Attending: INTERNAL MEDICINE
Payer: MEDICARE

## 2022-04-15 ENCOUNTER — TELEPHONE (OUTPATIENT)
Dept: GASTROENTEROLOGY | Facility: CLINIC | Age: 87
End: 2022-04-15

## 2022-04-15 VITALS
HEIGHT: 69 IN | SYSTOLIC BLOOD PRESSURE: 167 MMHG | BODY MASS INDEX: 20.94 KG/M2 | RESPIRATION RATE: 18 BRPM | WEIGHT: 141.4 LBS | DIASTOLIC BLOOD PRESSURE: 71 MMHG | HEART RATE: 94 BPM | OXYGEN SATURATION: 98 % | TEMPERATURE: 97 F

## 2022-04-15 DIAGNOSIS — D13.30 ADENOMA OF SMALL INTESTINE: ICD-10-CM

## 2022-04-15 PROCEDURE — G0463 HOSPITAL OUTPT CLINIC VISIT: HCPCS

## 2022-04-15 PROCEDURE — 99215 OFFICE O/P EST HI 40 MIN: CPT | Performed by: SURGERY

## 2022-04-15 ASSESSMENT — PAIN SCALES - GENERAL: PAINLEVEL: NO PAIN (0)

## 2022-04-15 NOTE — NURSING NOTE
"Oncology Rooming Note    April 15, 2022 9:37 AM   Denton Hobbs is a 86 year old male who presents for:    Chief Complaint   Patient presents with     New Patient     ADENOMA OF SMALL INTESTINE     Initial Vitals: BP (!) 167/71   Pulse 94   Temp 97  F (36.1  C) (Tympanic)   Resp 18   Ht 1.743 m (5' 8.62\")   Wt 64.1 kg (141 lb 6.4 oz)   SpO2 98%   BMI 21.11 kg/m   Estimated body mass index is 21.11 kg/m  as calculated from the following:    Height as of this encounter: 1.743 m (5' 8.62\").    Weight as of this encounter: 64.1 kg (141 lb 6.4 oz). Body surface area is 1.76 meters squared.  No Pain (0) Comment: Data Unavailable   No LMP for male patient.  Allergies reviewed: Yes  Medications reviewed: Yes    Medications: Medication refills not needed today.  Pharmacy name entered into Single[employer]: [id number] - [place], [place] - [address] [place] [address]    Clinical concerns: New patient.       Sanjuanita Aranda CMA            "

## 2022-04-15 NOTE — CONFIDENTIAL NOTE
Advanced Endoscopy     Referring provider: Pedro Ramirez MD    Referred to: Advanced End[facility] Provider [facility]     Provider Requested: Doc     Referral Received: 4/15/22     Records received: [facility] and [facility]    CT CAP (3/29/2022): IMPRESSION:  1. No CT evidence of acute process in the chest, abdomen, or pelvis.    EGD (3/28/2022):   FINDINGS:   - [place]: GE junction at 38 cm. Mild erythematous changes   - Stomach: Small hiatal hernia with Diaphragamatic hiatus at 42 cm. No abnormal mucosa folds.   - Duodenum: In the periampullar region there was a large 2-3 cm pedunculated mass that circumferentially narrowed the duodenal lumen. There was an ulcer at one location. Using a cold forceps the ulcer and surrounding tissue was biopsied. Given inability to locate the ampulla, biopsies were not obtained from every aspect of the mass.   IMPRESSION:   - Periampullary mass with associated ulcer. Multiple biopsies obtained. The ulcer does explain patient's anemia. The mass is not endoscopically resectable. 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     Pathology (3/28/2022): Adenomatous polyp with focal areas suspicious for high grade dysplasia.   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  This was felt to be endoscopically unresectable.  He has had recent cross sectional imaging without acute findings and the duodenal lesion is not clearly visible.  We had a discussion regarding the situation and the diagnosis.  At this point, there is always a concern that there is an underlying cancer or that this lesion could progress to malignancy.  Aside from that, untreated polyps (when large), can lead to further bleeding or blockage of the duodenum/bile duct.  So ideally, if these can be treated safely, then  that is the best option.  If this is truly not endoscopically resectable, the ideal treatment would be a Whipple resection as it would provide the most definitive treatment.  However, I discussed with the patient that I am very concerned that his outcome in terms of life quality following a Whipple would be poor.  This is in the face of the fact that we don't know for sure that this adenoma will be his life limiting issue.  In hearing my concerns, the patient decided that he would not want poor life quality as a result of a major surgery such as a Whipple.  Thus, we took that option off the table (mutually) at this time.  The alternative is that I could have our GI team review his case and determine if it was worth pursuing endoscopic resection, as that would be a good option if it were possible.  Thus, we will refer him to advanced GI for this purpose.  In the event endoscopic resection is not possible, then the main treatment options would be for palliation of symptoms if/when they developed (i.e. hemostasis, stenting, etc.).  The patient was in complete understanding and he wanted to move forward with the GI referral.    MD review date:   MD Decision for clinic consultation/Orders:            Referral updates/Patient contacted:

## 2022-04-15 NOTE — PROGRESS NOTES
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  He has been dealing with a symptomatic right inguinal hernia that he has been trying to have repaired.  During this time, he was found to be anemic, for which he underwent colonoscopy and EGD.  On EGD, he was found to have a large periampullary polyp with ulceration and bleeding.  This was not thought to be endoscopically resectable and biopsies were taken.  Pathology showed adenoma with a regional of high grade dysplasia.  Given these findings, he has been sent for recommendations on management.    Of Note: He is feeling relatively well, but sort of minimizes his symptoms.  His biggest life quality issue seems to be the inguinal hernia.  He endorses he is relatively healthy and does not have significant lung or cardiac problems.  He does use a walker and is limited in activity to some extent.  He has not had prior abdominal surgery and does not take blood thinning medications.  Another issue for him is that he will have to be the one caring for his wife in the near future, which may be a challenge with his current limitations.    BP (!) 167/71   Pulse 94   Temp 97  F (36.1  C) (Tympanic)   Resp 18   Ht 1.743 m (5' 8.62\")   Wt 64.1 kg (141 lb 6.4 oz)   SpO2 98%   BMI 21.11 kg/m      EGD (3/28/2022): FINDINGS:   - Esopahgus: GE junction at 38 cm. Mild erythematous changes   - Stomach: Small hiatal hernia with Diaphragamatic hiatus at 42 cm. No abnormal mucosa folds.   - Duodenum: In the periampullar region there was a large 2-3 cm pedunculated mass that circumferentially narrowed the duodenal lumen. There was an ulcer at one location. Using a cold forceps the ulcer and surrounding tissue was biopsied. Given inability to locate the ampulla, biopsies were not obtained from every aspect of the mass.   IMPRESSION:   - Periampullary mass with associated ulcer. Multiple biopsies obtained. The " ulcer does explain patient's anemia. The mass is not endoscopically resectable. 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    Pathology (3/28/2022): Adenomatous polyp with focal areas suspicious for high grade dysplasia.    CT CAP (3/29/2022): IMPRESSION:  1. No CT evidence of acute process in the chest, abdomen, or pelvis.    No labs in our system since his endoscopy.    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  This was felt to be endoscopically unresectable.  He has had recent cross sectional imaging without acute findings and the duodenal lesion is not clearly visible.  We had a discussion regarding the situation and the diagnosis.  At this point, there is always a concern that there is an underlying cancer or that this lesion could progress to malignancy.  Aside from that, untreated polyps (when large), can lead to further bleeding or blockage of the duodenum/bile duct.  So ideally, if these can be treated safely, then that is the best option.  If this is truly not endoscopically resectable, the ideal treatment would be a Whipple resection as it would provide the most definitive treatment.  However, I discussed with the patient that I am very concerned that his outcome in terms of life quality following a Whipple would be poor.  This is in the face of the fact that we don't know for sure that this adenoma will be his life limiting issue.  In hearing my concerns, the patient decided that he would not want poor life quality as a result of a major surgery such as a Whipple.  Thus, we took that option off the table (mutually) at this time.  The alternative is that I could have our GI team review his case and determine if it was worth pursuing endoscopic resection, as that would be a good option if it were possible.  Thus, we will refer him to advanced GI for this purpose.  In the event endoscopic resection is not  possible, then the main treatment options would be for palliation of symptoms if/when they developed (i.e. hemostasis, stenting, etc.).  The patient was in complete understanding and he wanted to move forward with the GI referral.  His questions were answered and he was in agreement with and understanding of the plan.    A total of 55 minutes were spent on this encounter including more than 50% in face to face time and the remainder in imaging review, chart review, documentation, and coordination of care.

## 2022-04-19 ENCOUNTER — PATIENT OUTREACH (OUTPATIENT)
Dept: GASTROENTEROLOGY | Facility: CLINIC | Age: 87
End: 2022-04-19
Payer: MEDICARE

## 2022-04-19 DIAGNOSIS — K31.7 POLYP OF DUODENUM: Primary | ICD-10-CM

## 2022-04-19 NOTE — CONFIDENTIAL NOTE
Called pt to discuss referral and Dr Roach's recommendations  MRI/MRCP and then can see in clinic    Pt daughter reports that pt recently had an MRCP abdomen at [facility] #[phone], upon calling [facility] they state he had a CT scan not an MRI.    Called daughter back and left VM to scheduled MRCP    Opted for in person clinic on 5/11 at 10:20am. Message routed to clinic coordinators    1305  Valentina called back, does voice concern about her father's anemia and whether the MRI will be able to assess for any site of bleeding. Last hgb in [facility] was 7.4 on 3/28, advised that he follow up with his PCP to see if more lab work should be done.     Daughters will arrange for MRCP at outside facility, [facility] in [place]. Will fax order and ask that this be scheduled one week prior to clinic.     Sharlene Carlos, RN, BSN,   [facility]  Care coordinator

## 2022-04-21 ENCOUNTER — DOCUMENTATION ONLY (OUTPATIENT)
Dept: GASTROENTEROLOGY | Facility: CLINIC | Age: 87
End: 2022-04-21
Payer: MEDICARE

## 2022-04-21 NOTE — CONFIDENTIAL NOTE
MRCP order faxed to [facility] #[phone]. Daughter Arminda will be calling [facility] to schedule    Sharlene Carlos RN, BSN,   [facility]  Care coordinator

## 2022-04-25 ENCOUNTER — TRANSFERRED RECORDS (OUTPATIENT)
Dept: HEALTH INFORMATION MANAGEMENT | Facility: CLINIC | Age: 87
End: 2022-04-25
Payer: MEDICARE

## 2022-05-11 ENCOUNTER — OFFICE VISIT (OUTPATIENT)
Dept: GASTROENTEROLOGY | Facility: CLINIC | Age: 87
End: 2022-05-11
Attending: SURGERY
Payer: MEDICARE

## 2022-05-11 ENCOUNTER — PATIENT OUTREACH (OUTPATIENT)
Dept: GASTROENTEROLOGY | Facility: CLINIC | Age: 87
End: 2022-05-11
Payer: MEDICARE

## 2022-05-11 VITALS
DIASTOLIC BLOOD PRESSURE: 73 MMHG | OXYGEN SATURATION: 99 % | BODY MASS INDEX: 20.59 KG/M2 | WEIGHT: 139 LBS | HEART RATE: 93 BPM | HEIGHT: 69 IN | SYSTOLIC BLOOD PRESSURE: 168 MMHG | TEMPERATURE: 98.1 F

## 2022-05-11 DIAGNOSIS — D50.0 IRON DEFICIENCY ANEMIA DUE TO CHRONIC BLOOD LOSS: Primary | ICD-10-CM

## 2022-05-11 DIAGNOSIS — D13.30 ADENOMA OF SMALL INTESTINE: ICD-10-CM

## 2022-05-11 PROCEDURE — G0463 HOSPITAL OUTPT CLINIC VISIT: HCPCS

## 2022-05-11 PROCEDURE — 99205 OFFICE O/P NEW HI 60 MIN: CPT | Performed by: INTERNAL MEDICINE

## 2022-05-11 RX ORDER — FERROUS SULFATE 325(65) MG
325 TABLET ORAL
Qty: 90 TABLET | Refills: 4 | Status: SHIPPED | OUTPATIENT
Start: 2022-05-11 | End: 2022-08-09

## 2022-05-11 ASSESSMENT — PAIN SCALES - GENERAL: PAINLEVEL: NO PAIN (0)

## 2022-05-11 NOTE — LETTER
5/11/2022         RE: Denton Hobbs  [address]  [address]  [facility] [address]        Dear Colleague,    Thank you for referring your patient, Denton Hobbs, to the [facility]. Please see a copy of my visit note below.    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 He is accompanied by his daughter. I reviewed his outside EGD report, pathology, and MRI/MRCP images. There appears to be a fairly large duodenal adenoma. On the EGD they could not tell if the major papilla was involved but by the MRI that does appear to be the case as well as extending circumferentially along the wall of the duodenum.     I explained the adenoma-adenocarcinoma sequence. I explained that thus far there is no overt evidence that this polyp has turned into an invasive malignancy. If we were to attempt endoscopic resection, I explained that first we will need to assess for obvious transformation into invasive malignancy both endoscopically and by EUS. Assuming no worrisome findings, we would then proceed with endoscopic resection which would include a papillectomy followed by biliary and pancreatic stenting. This carries an increased risk of pancreatitis, bleeding, and perforation. I explained that if the final pathology ultimately shows invasive adenocarcinoma then the resection was not curative. If we do achieve a curative resection, then the CBD/PD stents we placed will need to be removed in 1 month. I also suspect that the periampullary spread will need a fair amount of piecemeal resection which would then necessitate follow up surveillance endoscopy to ensure complete removal.      I then explained that the alternative to all this is to leave it alone and not take on the risks of the procedure. I explained that while the risks are much lower than surgery there are potential complications. There is also the issue that duodenal adenomas are generally slow growing lesions and given his advanced age it's difficult to say progression of this will lead to limiting his life expectancy. After discussion, Denton would like not to pursue endoscopic resection to avoid the potential risks and just monitor and palliate symptoms as they may occur.     In that respect, Denton continues to be anemic and fatigued from this. We will start PO iron supplementation. I explained the side effects associated and that it will turn his stools black. He is eager to get his inguinal hernia repaired and would like to recheck his Hgb after 2 weeks to see if it is going up. This is fine but I explained that it will likely take longer than that to see a significant improvement in his anemia. I told him I wasn't sure at what level his surgeon will be comfortable doing his hernia surgery but I suspect they will want it to be higher than his current Hgb of 7.8.     Incidentally, a 3.6 cm pancreatic cyst with internal debris in his pancreatic body was noted on his MRI. 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 This was seen on his 2014 CT and apparently a 2011 CT they refer to in that report. Patient is asymptomatic from this and I would therefore leave it alone.    RECOMMENDATIONS:  - No endoscopic resection of ampullary adenoma per patient's wishes  - Watchful waiting. If gastric outlet obstruction or jaundice, patient will contact us for palliative stenting.  - Start PO iron supplementation  - Recheck Hgb and Ferritin in 2 weeks (per pts wishes) and again in ~1 month  - Follow up in 1 month    Thank you for this consultation.  It was a pleasure to participate in the care of this patient; please  contact us with any further questions.  A total of 60 minutes was spent in face to face evaluation with this patient, of which was included chart review, history and exam, documentation, counseling, coordinating a management plan and further activities as noted above for this patient on the date of the encounter.     Franklin Roach MD  [facility]  Division of Gastroenterology and Hepatology  [facility] [address]  [address], [place] [address]    ________________________________________________________________  HPI:  Denton is an 86 year old male with COPD referred for recently diagnosed ampullary/periampullary adenoma. He is accompanied by his daughter. This was found upon work up of anemia which in turn was found on work up for surgical repair of a symptomatic right inguinal hernia. He underwent an EGD/colonoscopy by Dr. Krueger. He described a 2-3 cm pedunculated mass that circumferentially narrowed the duodenal lumen and was ulcerated in one location. 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 Per Dr. Krueger, he did not think it was endoscopically resectable. He note being overall fatigued and easily tired. His last Hgb check in March showed iron deficiency anemia with a Hgb of 7.8. he has not been on any iron supplementation. He denies seen blood in his stool and reports normal brown stool. He denies any abdominal pain. He thinks he might have lost some weight over the past year and typically is 150 lbs. Overall his appetite is normal and denies early satiety, nausea, vomiting. Denies jaundice. He saw Dr. Varner and they decided that a Whipple surgery is too high risk at his age and functional status.    PMHx:  Past Medical History:   Diagnosis Date     Arthritis      COPD (chronic obstructive pulmonary disease) (H)      CVA (cerebral vascular accident) (H) 12/4/2018     Hypertension      Prostate hypertrophy      Patient Active Problem  List   Diagnosis     Orchitis and epididymitis     Benign prostatic hyperplasia with urinary retention     Sepsis (Leukocytosis, Tachycardia)     CVA (cerebral vascular accident) (H)     Left-sided weakness     Indwelling Cash catheter present     Elevated PSA     Cystitis     Balance problems     Aphasia     Non-recurrent unilateral inguinal hernia without obstruction or gangrene       PSurgHx:  Past Surgical History:   Procedure Laterality Date     GENITOURINARY SURGERY       HERNIORRHAPHY INGUINAL Left 10/11/2021    Procedure: LEFT INGUINAL HERNIA REPAIR WITH MESH;  Surgeon: Puma Preston MD;  Location: [facility] OR     TRANSRECTAL ULTRASONIC, TRANSURETHRAL RESECTION (TUR) OF PROSTATE CYST  2004    helped temporarily       MEDS:  No current outpatient medications on file.     No current facility-administered medications for this visit.     ALLERGIES:    Allergies   Allergen Reactions     Sulfa Drugs Rash     Info obtained off of 10/4/21 Entira pre op.     FHx:  Family History   Problem Relation Age of Onset     No Known Problems Other         No heart, DM, HTN, or CA     No Known Problems Mother      No Known Problems Father        SOCIAL Hx:  Social History     Socioeconomic History     Marital status:      Spouse name: Not on file     Number of children: 6     Years of education: Not on file     Highest education level: Not on file   Occupational History     Not on file   Tobacco Use     Smoking status: Former Smoker     Packs/day: 2.00     Years: 25.00     Pack years: 50.00     Start date: 1/1/1990     Smokeless tobacco: Never Used   Substance and Sexual Activity     Alcohol use: No     Comment: Alcoholic Drinks/day: Quit 1990     Drug use: Never     Sexual activity: Not on file   Other Topics Concern     Parent/sibling w/ CABG, MI or angioplasty before 65F 55M? Not Asked   Social History Narrative     Not on file     Social Determinants of Health     Financial Resource Strain: Not on file  "  Food Insecurity: Not on file   Transportation Needs: Not on file   Physical Activity: Not on file   Stress: Not on file   Social Connections: Not on file   Intimate Partner Violence: Not on file   Housing Stability: Not on file       ROS: A comprehensive Review of Systems was asked and answered in the negative unless specifically commented upon in the HPI    Physical Exam  BP (!) 168/73   Pulse 93   Temp 98.1  F (36.7  C) (Oral)   Ht 1.743 m (5' 8.62\")   Wt 63 kg (139 lb)   SpO2 99%   BMI 20.75 kg/m    Body mass index is 20.75 kg/m .  Gen: A&Ox3, NAD  HEENT: Moist mucus membranes, no scleral icterus.  Lungs: no respiratory distress  Abd: soft, non-tender, non-distended.  No guarding/rigidity/rebound.  Skin: no jaundice, no stigmata of chronic liver disease  Ext: warm, dry, no evidence of edema    LABS:  Lab Requisition on 2022   Component Date Value Ref Range Status     SARS CoV2 PCR 2022 Negative  Negative Final    NEGATIVE: SARS-CoV-2 (COVID-19) RNA not detected, presumed negative.       Pathology (3/28/2022): Adenomatous polyp with focal areas suspicious for high grade dysplasia.    IMAGIN22 MRI:  - Irregular nodular thickening of the descending duodenum. No obvious invasion. No CBD/PD dilation  - 3.6 cm likely pancreatic pseudocyst in the body with internal debris    Endoscopies:  EGD (3/28/2022): FINDINGS:   - Esopahgus: GE junction at 38 cm. Mild erythematous changes   - Stomach: Small hiatal hernia with Diaphragamatic hiatus at 42 cm. No abnormal mucosa folds.   - Duodenum: In the periampullar region there was a large 2-3 cm pedunculated mass that circumferentially narrowed the duodenal lumen. There was an ulcer at one location. Using a cold forceps the ulcer and surrounding tissue was biopsied. Given inability to locate the ampulla, biopsies were not obtained from every aspect of the mass.   IMPRESSION:   - Periampullary mass with associated ulcer. Multiple biopsies obtained. The " ulcer does explain patient's anemia. The mass is not endoscopically resectable. 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        Sincerely,    Franklin Roach MD

## 2022-05-11 NOTE — PROGRESS NOTES
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 He is accompanied by his daughter. I reviewed his outside EGD report, pathology, and MRI/MRCP images. There appears to be a fairly large duodenal adenoma. On the EGD they could not tell if the major papilla was involved but by the MRI that does appear to be the case as well as extending circumferentially along the wall of the duodenum.     I explained the adenoma-adenocarcinoma sequence. I explained that thus far there is no overt evidence that this polyp has turned into an invasive malignancy. If we were to attempt endoscopic resection, I explained that first we will need to assess for obvious transformation into invasive malignancy both endoscopically and by EUS. Assuming no worrisome findings, we would then proceed with endoscopic resection which would include a papillectomy followed by biliary and pancreatic stenting. This carries an increased risk of pancreatitis, bleeding, and perforation. I explained that if the final pathology ultimately shows invasive adenocarcinoma then the resection was not curative. If we do achieve a curative resection, then the CBD/PD stents we placed will need to be removed in 1 month. I also suspect that the periampullary spread will need a fair amount of piecemeal resection which would then necessitate follow up surveillance endoscopy to ensure complete removal.     I then explained that the alternative to all this is to leave it alone and not take on the risks of the procedure. I explained that while the risks are much lower than surgery there are potential complications. There is also the issue that duodenal adenomas are  generally slow growing lesions and given his advanced age it's difficult to say progression of this will lead to limiting his life expectancy. After discussion, Denton would like not to pursue endoscopic resection to avoid the potential risks and just monitor and palliate symptoms as they may occur.     In that respect, Denton continues to be anemic and fatigued from this. We will start PO iron supplementation. I explained the side effects associated and that it will turn his stools black. He is eager to get his inguinal hernia repaired and would like to recheck his Hgb after 2 weeks to see if it is going up. This is fine but I explained that it will likely take longer than that to see a significant improvement in his anemia. I told him I wasn't sure at what level his surgeon will be comfortable doing his hernia surgery but I suspect they will want it to be higher than his current Hgb of 7.8.     Incidentally, a 3.6 cm pancreatic cyst with internal debris in his pancreatic body was noted on his MRI. 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 This was seen on his 2014 CT and apparently a 2011 CT they refer to in that report. Patient is asymptomatic from this and I would therefore leave it alone.    RECOMMENDATIONS:  - No endoscopic resection of ampullary adenoma per patient's wishes  - Watchful waiting. If gastric outlet obstruction or jaundice, patient will contact us for palliative stenting.  - Start PO iron supplementation  - Recheck Hgb and Ferritin in 2 weeks (per pts wishes) and again in ~1 month  - Follow up in 1 month    Thank you for this consultation.  It was a pleasure to participate in the care of this patient; please contact us with any further questions.  A total of 60 minutes was spent in face to face evaluation with this patient, of which was included chart review, history and exam, documentation, counseling, coordinating a management plan and further activities as noted above  for this patient on the date of the encounter.     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 He is accompanied by his daughter. This was found upon work up of anemia which in turn was found on work up for surgical repair of a symptomatic right inguinal hernia. He underwent an EGD/colonoscopy by Dr. Krueger. He described a 2-3 cm pedunculated mass that circumferentially narrowed the duodenal lumen and was ulcerated in one location. 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 Per Dr. Krueger, he did not think it was endoscopically resectable. He note being overall fatigued and easily tired. His last Hgb check in March showed iron deficiency anemia with a Hgb of 7.8. he has not been on any iron supplementation. He denies seen blood in his stool and reports normal brown stool. He denies any abdominal pain. He thinks he might have lost some weight over the past year and typically is 150 lbs. Overall his appetite is normal and denies early satiety, nausea, vomiting. Denies jaundice. He saw Dr. Varner and they decided that a Whipple surgery is too high risk at his age and functional status.    PMHx:  Past Medical History:   Diagnosis Date     Arthritis      COPD (chronic obstructive pulmonary disease) (H)      CVA (cerebral vascular accident) (H) 12/4/2018     Hypertension      Prostate hypertrophy      Patient Active Problem List   Diagnosis     Orchitis and epididymitis     Benign prostatic hyperplasia with urinary retention     Sepsis (Leukocytosis, Tachycardia)     CVA (cerebral vascular accident) (H)     Left-sided weakness     Indwelling Cash catheter present     Elevated PSA      Cystitis     Balance problems     Aphasia     Non-recurrent unilateral inguinal hernia without obstruction or gangrene       PSurgHx:  Past Surgical History:   Procedure Laterality Date     GENITOURINARY SURGERY       HERNIORRHAPHY INGUINAL Left 10/11/2021    Procedure: LEFT INGUINAL HERNIA REPAIR WITH MESH;  Surgeon: Puma Preston MD;  Location: [facility] OR     TRANSRECTAL ULTRASONIC, TRANSURETHRAL RESECTION (TUR) OF PROSTATE CYST  2004    helped temporarily       MEDS:  No current outpatient medications on file.     No current facility-administered medications for this visit.     ALLERGIES:    Allergies   Allergen Reactions     Sulfa Drugs Rash     Info obtained off of 10/4/21 Entira pre op.     FHx:  Family History   Problem Relation Age of Onset     No Known Problems Other         No heart, DM, HTN, or CA     No Known Problems Mother      No Known Problems Father        SOCIAL Hx:  Social History     Socioeconomic History     Marital status:      Spouse name: Not on file     Number of children: 6     Years of education: Not on file     Highest education level: Not on file   Occupational History     Not on file   Tobacco Use     Smoking status: Former Smoker     Packs/day: 2.00     Years: 25.00     Pack years: 50.00     Start date: 1/1/1990     Smokeless tobacco: Never Used   Substance and Sexual Activity     Alcohol use: No     Comment: Alcoholic Drinks/day: Quit 1990     Drug use: Never     Sexual activity: Not on file   Other Topics Concern     Parent/sibling w/ CABG, MI or angioplasty before 65F 55M? Not Asked   Social History Narrative     Not on file     Social Determinants of Health     Financial Resource Strain: Not on file   Food Insecurity: Not on file   Transportation Needs: Not on file   Physical Activity: Not on file   Stress: Not on file   Social Connections: Not on file   Intimate Partner Violence: Not on file   Housing Stability: Not on file       ROS: A comprehensive Review of  "Systems was asked and answered in the negative unless specifically commented upon in the HPI    Physical Exam  BP (!) 168/73   Pulse 93   Temp 98.1  F (36.7  C) (Oral)   Ht 1.743 m (5' 8.62\")   Wt 63 kg (139 lb)   SpO2 99%   BMI 20.75 kg/m    Body mass index is 20.75 kg/m .  Gen: A&Ox3, NAD  HEENT: Moist mucus membranes, no scleral icterus.  Lungs: no respiratory distress  Abd: soft, non-tender, non-distended.  No guarding/rigidity/rebound.  Skin: no jaundice, no stigmata of chronic liver disease  Ext: warm, dry, no evidence of edema    LABS:  Lab Requisition on 2022   Component Date Value Ref Range Status     SARS CoV2 PCR 2022 Negative  Negative Final    NEGATIVE: SARS-CoV-2 (COVID-19) RNA not detected, presumed negative.       Pathology (3/28/2022): Adenomatous polyp with focal areas suspicious for high grade dysplasia.    IMAGIN22 MRI:  - Irregular nodular thickening of the descending duodenum. No obvious invasion. No CBD/PD dilation  - 3.6 cm likely pancreatic pseudocyst in the body with internal debris    Endoscopies:  EGD (3/28/2022): FINDINGS:   - Esopahgus: GE junction at 38 cm. Mild erythematous changes   - Stomach: Small hiatal hernia with Diaphragamatic hiatus at 42 cm. No abnormal mucosa folds.   - Duodenum: In the periampullar region there was a large 2-3 cm pedunculated mass that circumferentially narrowed the duodenal lumen. There was an ulcer at one location. Using a cold forceps the ulcer and surrounding tissue was biopsied. Given inability to locate the ampulla, biopsies were not obtained from every aspect of the mass.   IMPRESSION:   - Periampullary mass with associated ulcer. Multiple biopsies obtained. The ulcer does explain patient's anemia. The mass is not endoscopically resectable. 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    "

## 2022-05-11 NOTE — NURSING NOTE
"Oncology Rooming Note    May 11, 2022 10:18 AM   Denton Hobbs is a 86 year old male who presents for:    Chief Complaint   Patient presents with     Oncology Clinic Visit     Adenoma of small intestine     Initial Vitals: BP (!) 168/73   Pulse 93   Temp 98.1  F (36.7  C) (Oral)   Ht 1.743 m (5' 8.62\")   Wt 63 kg (139 lb)   SpO2 99%   BMI 20.75 kg/m   Estimated body mass index is 20.75 kg/m  as calculated from the following:    Height as of this encounter: 1.743 m (5' 8.62\").    Weight as of this encounter: 63 kg (139 lb). Body surface area is 1.75 meters squared.  No Pain (0) Comment: Data Unavailable   No LMP for male patient.  Allergies reviewed: Yes  Medications reviewed: Yes    Medications: Medication refills not needed today.  Pharmacy name entered into [employer]: [id number] - [place], [place] - [address]    Clinical concerns: none       Bre Moser CMA            "

## 2022-05-11 NOTE — CONFIDENTIAL NOTE
Called daughter Valentina to discuss follow up to Dr Roach's clinic visit today.  CBC, ferritin in 2 weeks to be done locally at [facility]. Will fax orders to [phone]. And then again Another CBC and ferritin in 1 month with a clinic follow up with Dr Roach  Agreed upon 6/29 11am in person visit with Dr Roach, message routed to clinic coordinators. Lab appt arranged at the [facility] for 10:15am    Sharlene Carlos RN, BSN,   [facility]  Care coordinator

## 2022-05-16 NOTE — CONFIDENTIAL NOTE
Called daughter to update that [facility] is not taking the lab orders d/t pt is inactive in their system. Left VM for Valentina to address this, or to just schedule within  health system.

## 2022-05-18 NOTE — CONFIDENTIAL NOTE
Katiuska Berg called with update that [facility] has now changed the pt to active status and that the lab orders can be processed there now. Will refax orders to fax #[phone]

## 2022-05-27 ENCOUNTER — LAB REQUISITION (OUTPATIENT)
Dept: LAB | Facility: CLINIC | Age: 87
End: 2022-05-27
Payer: MEDICARE

## 2022-05-27 ENCOUNTER — TRANSFERRED RECORDS (OUTPATIENT)
Dept: HEALTH INFORMATION MANAGEMENT | Facility: CLINIC | Age: 87
End: 2022-05-27

## 2022-05-27 DIAGNOSIS — D50.0 IRON DEFICIENCY ANEMIA SECONDARY TO BLOOD LOSS (CHRONIC): ICD-10-CM

## 2022-05-27 LAB
BASOPHILS # BLD AUTO: 0.1 10E3/UL (ref 0–0.2)
BASOPHILS NFR BLD AUTO: 1 %
EOSINOPHIL # BLD AUTO: 0.1 10E3/UL (ref 0–0.7)
EOSINOPHIL NFR BLD AUTO: 1 %
ERYTHROCYTE [DISTWIDTH] IN BLOOD BY AUTOMATED COUNT: 26.5 % (ref 10–15)
FERRITIN SERPL-MCNC: 47 NG/ML (ref 27–300)
HCT VFR BLD AUTO: 35.4 % (ref 40–53)
HGB BLD-MCNC: 10 G/DL (ref 13.3–17.7)
IMM GRANULOCYTES # BLD: 0 10E3/UL
IMM GRANULOCYTES NFR BLD: 0 %
LYMPHOCYTES # BLD AUTO: 1.7 10E3/UL (ref 0.8–5.3)
LYMPHOCYTES NFR BLD AUTO: 19 %
MCH RBC QN AUTO: 21.8 PG (ref 26.5–33)
MCHC RBC AUTO-ENTMCNC: 28.2 G/DL (ref 31.5–36.5)
MCV RBC AUTO: 77 FL (ref 78–100)
MONOCYTES # BLD AUTO: 1 10E3/UL (ref 0–1.3)
MONOCYTES NFR BLD AUTO: 11 %
NEUTROPHILS # BLD AUTO: 6.3 10E3/UL (ref 1.6–8.3)
NEUTROPHILS NFR BLD AUTO: 68 %
NRBC # BLD AUTO: 0 10E3/UL
NRBC BLD AUTO-RTO: 0 /100
PLATELET # BLD AUTO: 428 10E3/UL (ref 150–450)
RBC # BLD AUTO: 4.58 10E6/UL (ref 4.4–5.9)
WBC # BLD AUTO: 9.3 10E3/UL (ref 4–11)

## 2022-05-27 PROCEDURE — 82728 ASSAY OF FERRITIN: CPT | Mod: ORL | Performed by: FAMILY MEDICINE

## 2022-05-27 PROCEDURE — 85025 COMPLETE CBC W/AUTO DIFF WBC: CPT | Mod: ORL | Performed by: FAMILY MEDICINE

## 2022-06-29 ENCOUNTER — LAB (OUTPATIENT)
Dept: LAB | Facility: CLINIC | Age: 87
End: 2022-06-29
Payer: MEDICARE

## 2022-06-29 ENCOUNTER — OFFICE VISIT (OUTPATIENT)
Dept: GASTROENTEROLOGY | Facility: CLINIC | Age: 87
End: 2022-06-29
Payer: MEDICARE

## 2022-06-29 VITALS
WEIGHT: 141 LBS | OXYGEN SATURATION: 97 % | DIASTOLIC BLOOD PRESSURE: 64 MMHG | BODY MASS INDEX: 21.05 KG/M2 | RESPIRATION RATE: 18 BRPM | SYSTOLIC BLOOD PRESSURE: 163 MMHG | HEART RATE: 75 BPM | TEMPERATURE: 97.9 F

## 2022-06-29 DIAGNOSIS — D50.0 IRON DEFICIENCY ANEMIA DUE TO CHRONIC BLOOD LOSS: ICD-10-CM

## 2022-06-29 DIAGNOSIS — D13.5 AMPULLARY ADENOMA: Primary | ICD-10-CM

## 2022-06-29 LAB
ERYTHROCYTE [DISTWIDTH] IN BLOOD BY AUTOMATED COUNT: ABNORMAL %
FERRITIN SERPL-MCNC: 25 NG/ML (ref 26–388)
HCT VFR BLD AUTO: 37.8 % (ref 40–53)
HGB BLD-MCNC: 11.3 G/DL (ref 13.3–17.7)
MCH RBC QN AUTO: 24.9 PG (ref 26.5–33)
MCHC RBC AUTO-ENTMCNC: 29.9 G/DL (ref 31.5–36.5)
MCV RBC AUTO: 83 FL (ref 78–100)
PLATELET # BLD AUTO: 386 10E3/UL (ref 150–450)
RBC # BLD AUTO: 4.53 10E6/UL (ref 4.4–5.9)
WBC # BLD AUTO: 8.3 10E3/UL (ref 4–11)

## 2022-06-29 PROCEDURE — G0463 HOSPITAL OUTPT CLINIC VISIT: HCPCS

## 2022-06-29 PROCEDURE — 36415 COLL VENOUS BLD VENIPUNCTURE: CPT | Performed by: PATHOLOGY

## 2022-06-29 PROCEDURE — 85027 COMPLETE CBC AUTOMATED: CPT | Performed by: PATHOLOGY

## 2022-06-29 PROCEDURE — 82728 ASSAY OF FERRITIN: CPT | Performed by: PATHOLOGY

## 2022-06-29 PROCEDURE — 99213 OFFICE O/P EST LOW 20 MIN: CPT | Performed by: INTERNAL MEDICINE

## 2022-06-29 ASSESSMENT — PAIN SCALES - GENERAL: PAINLEVEL: NO PAIN (0)

## 2022-06-29 NOTE — PROGRESS NOTES
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 He is accompanied by his daughter. Dr. Ramirez had seen the patient and decided surgery was too high risk. At his last visit, we also decided that endoscopic resection is too high risk given his age and general slow progression of these types of lesions. Denton is still in agreement that he would not like to pursue endoscopic resection to avoid the potential risks and just monitor and palliate symptoms as they may occur.     His iron deficiency anemia has almost completely resolved on oral iron supplementation and his fatigue has resolved with it. He should continue on oral iron supplementation indefinitely.     He is eager to get his inguinal hernia repaired. I will send a message to Dr. Keyes who he was previously schedule with to see if this can be done now.    Incidentally, a 3.6 cm pancreatic cyst with internal debris in his pancreatic body was noted on his MRI. 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 This was seen on his 2014 CT and apparently a 2011 CT they refer to in that report. Patient is asymptomatic from this and I would therefore leave it alone.    RECOMMENDATIONS:  - No endoscopic resection of ampullary adenoma per patient's wishes  - Watchful waiting. If gastric outlet obstruction or jaundice, patient will contact us for palliative stenting.  - Continue PO iron supplementation  - Follow up as needed    Thank you for this consultation.  It was a pleasure to participate in the care of this patient; please contact us with any further questions.  A total of 15 minutes was spent in face to face evaluation with this patient, of  which was included chart review, history and exam, documentation, counseling, coordinating a management plan and further activities as noted above for this patient on the date of the encounter.     Franklin Roach MD  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 He is accompanied by his daughter. Dr. Ramirez had seen the patient and decided surgery was too high risk. At his last visit, we also decided that endoscopic resection is too high risk given his age and general slow progression of these types of lesions. We started him on iron supplementation and he has done well with just PO once daily iron. His hgb has improved from 7.8 to 11.3 today.    He denies seen blood in his stool and reports normal brown stool. He denies any abdominal pain.  Overall his appetite is normal and denies early satiety, nausea, vomiting. Denies jaundice.    PMHx:  Past Medical History:   Diagnosis Date     Arthritis      COPD (chronic obstructive pulmonary disease) (H)      CVA (cerebral vascular accident) (H) 12/4/2018     Hypertension      Prostate hypertrophy      Patient Active Problem List   Diagnosis     Orchitis and epididymitis     Benign prostatic hyperplasia with urinary retention     Sepsis (Leukocytosis, Tachycardia)     CVA (cerebral vascular accident) (H)     Left-sided weakness     Indwelling Cash catheter present     Elevated PSA     Cystitis     Balance problems     Aphasia     Non-recurrent unilateral inguinal hernia without obstruction or gangrene       PSurgHx:  Past Surgical History:   Procedure Laterality Date     GENITOURINARY SURGERY       HERNIORRHAPHY INGUINAL Left  10/11/2021    Procedure: LEFT INGUINAL HERNIA REPAIR WITH MESH;  Surgeon: Puma Preston MD;  Location: [facility] OR     TRANSRECTAL ULTRASONIC, TRANSURETHRAL RESECTION (TUR) OF PROSTATE CYST  2004    helped temporarily       MEDS:  Current Outpatient Medications   Medication     ferrous sulfate (FEROSUL) 325 (65 Fe) MG tablet     No current facility-administered medications for this visit.     ALLERGIES:    Allergies   Allergen Reactions     Sulfa Drugs Rash     Info obtained off of 10/4/21 Entira pre op.     FHx:  Family History   Problem Relation Age of Onset     No Known Problems Other         No heart, DM, HTN, or CA     No Known Problems Mother      No Known Problems Father        SOCIAL Hx:  Social History     Socioeconomic History     Marital status:      Spouse name: Not on file     Number of children: 6     Years of education: Not on file     Highest education level: Not on file   Occupational History     Not on file   Tobacco Use     Smoking status: Former Smoker     Packs/day: 2.00     Years: 25.00     Pack years: 50.00     Start date: 1/1/1990     Smokeless tobacco: Never Used   Substance and Sexual Activity     Alcohol use: No     Comment: Alcoholic Drinks/day: Quit 1990     Drug use: Never     Sexual activity: Not on file   Other Topics Concern     Parent/sibling w/ CABG, MI or angioplasty before 65F 55M? Not Asked   Social History Narrative     Not on file     Social Determinants of Health     Financial Resource Strain: Not on file   Food Insecurity: Not on file   Transportation Needs: Not on file   Physical Activity: Not on file   Stress: Not on file   Social Connections: Not on file   Intimate Partner Violence: Not on file   Housing Stability: Not on file       ROS: A comprehensive Review of Systems was asked and answered in the negative unless specifically commented upon in the HPI    Physical Exam  BP (!) 163/64   Pulse 75   Temp 97.9  F (36.6  C) (Oral)   Resp 18   Wt 64 kg  (141 lb)   SpO2 97%   BMI 21.05 kg/m    Body mass index is 21.05 kg/m .  Gen: A&Ox3, NAD  HEENT: Moist mucus membranes, no scleral icterus.  Lungs: no respiratory distress  Abd: soft, non-tender, non-distended.  No guarding/rigidity/rebound.  Skin: no jaundice, no stigmata of chronic liver disease  Ext: warm, dry, no evidence of edema    LABS:  Lab Requisition on 2022   Component Date Value Ref Range Status     SARS CoV2 PCR 2022 Negative  Negative Final    NEGATIVE: SARS-CoV-2 (COVID-19) RNA not detected, presumed negative.     Lab on 2022   Component Date Value Ref Range Status     WBC Count 2022 8.3  4.0 - 11.0 10e3/uL Final     RBC Count 2022 4.53  4.40 - 5.90 10e6/uL Final     Hemoglobin 2022 11.3 (A) 13.3 - 17.7 g/dL Final     Hematocrit 2022 37.8 (A) 40.0 - 53.0 % Final     MCV 2022 83  78 - 100 fL Final     MCH 2022 24.9 (A) 26.5 - 33.0 pg Final     MCHC 2022 29.9 (A) 31.5 - 36.5 g/dL Final     RDW 2022    Final    Dimorphic Population - Unable to Calculate     Platelet Count 2022 386  150 - 450 10e3/uL Final     Ferritin 2022 25 (A) 26 - 388 ng/mL Final       Pathology (3/28/2022): Adenomatous polyp with focal areas suspicious for high grade dysplasia.    IMAGIN22 MRI:  - Irregular nodular thickening of the descending duodenum. No obvious invasion. No CBD/PD dilation  - 3.6 cm likely pancreatic pseudocyst in the body with internal debris    Endoscopies:  EGD (3/28/2022): FINDINGS:   - Esopahgus: GE junction at 38 cm. Mild erythematous changes   - Stomach: Small hiatal hernia with Diaphragamatic hiatus at 42 cm. No abnormal mucosa folds.   - Duodenum: In the periampullar region there was a large 2-3 cm pedunculated mass that circumferentially narrowed the duodenal lumen. There was an ulcer at one location. Using a cold forceps the ulcer and surrounding tissue was biopsied. Given inability to locate the ampulla, biopsies  were not obtained from every aspect of the mass.   IMPRESSION:   - Periampullary mass with associated ulcer. Multiple biopsies obtained. The ulcer does explain patient's anemia. The mass is not endoscopically resectable. 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

## 2022-06-30 ENCOUNTER — OFFICE VISIT (OUTPATIENT)
Dept: SURGERY | Facility: CLINIC | Age: 87
End: 2022-06-30
Payer: MEDICARE

## 2022-06-30 DIAGNOSIS — K40.90 NON-RECURRENT UNILATERAL INGUINAL HERNIA WITHOUT OBSTRUCTION OR GANGRENE: Primary | ICD-10-CM

## 2022-06-30 PROCEDURE — 99213 OFFICE O/P EST LOW 20 MIN: CPT | Performed by: SURGERY

## 2022-06-30 RX ORDER — CEFAZOLIN SODIUM/WATER 2 G/20 ML
2 SYRINGE (ML) INTRAVENOUS SEE ADMIN INSTRUCTIONS
Status: CANCELLED | OUTPATIENT
Start: 2022-08-19

## 2022-06-30 RX ORDER — ACETAMINOPHEN 325 MG/1
975 TABLET ORAL ONCE
Status: CANCELLED | OUTPATIENT
Start: 2022-08-19 | End: 2022-06-30

## 2022-06-30 RX ORDER — CEFAZOLIN SODIUM/WATER 2 G/20 ML
2 SYRINGE (ML) INTRAVENOUS
Status: CANCELLED | OUTPATIENT
Start: 2022-08-19

## 2022-06-30 NOTE — PROGRESS NOTES
HPI:  Denton Hobbs is a 86 year old male who was referred to me by Jarad Prater for an inguinal hernia. 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  The anemia is now under better control on iron supplementation, and the duodenal adenoma was deemed worthy of observation given his overall health status.  He notes that he is still dealing with pain in the right groin on a daily basis, having to frequently reduce it manually.  Is here for a renewed discussion regarding repair, as it has been almost 6 months since we last spoke.      Allergies:Sulfa drugs    Past Medical History:   Diagnosis Date     Arthritis      COPD (chronic obstructive pulmonary disease) (H)      CVA (cerebral vascular accident) (H) 12/4/2018     Hypertension      Prostate hypertrophy        Past Surgical History:   Procedure Laterality Date     GENITOURINARY SURGERY       HERNIORRHAPHY INGUINAL Left 10/11/2021    Procedure: LEFT INGUINAL HERNIA REPAIR WITH MESH;  Surgeon: Puma Preston MD;  Location: [facility] OR     TRANSRECTAL ULTRASONIC, TRANSURETHRAL RESECTION (TUR) OF PROSTATE CYST  2004    helped temporarily       CURRENT MEDS:  Current Outpatient Medications   Medication Sig Dispense Refill     ferrous sulfate (FEROSUL) 325 (65 Fe) MG tablet Take 1 tablet (325 mg) by mouth daily (with breakfast) 90 tablet 4       Family History   Problem Relation Age of Onset     No Known Problems Other         No heart, DM, HTN, or CA     No Known Problems Mother      No Known Problems Father         reports that he has quit smoking. He started smoking about 32 years ago. He has a 50.00 pack-year smoking history. He has never used smokeless tobacco. He reports that he does not drink alcohol and does not use drugs.    Review of Systems -   The 10 point review of systems  is within normal limits except for as mentioned above in the HPI.  General  ROS: No complaints or constitutional symptoms  Skin: No complaints or symptoms   Hematologic/Lymphatic: No symptoms or complaints  Psychiatric: No symptoms or complaints  Endocrine: No excessive fatigue, no hypermetabolic symptoms reported  Respiratory ROS: no cough, shortness of breath, or wheezing  Cardiovascular ROS: no chest pain or dyspnea on exertion  Gastrointestinal ROS: As per HPI  Musculoskeletal ROS: no recent injuries reported  Neurological ROS: no focal neurologic defects reported.        There were no vitals taken for this visit.  There is no height or weight on file to calculate BMI.    EXAM:  Deferred today.      Assessment/Plan:   1. Non-recurrent unilateral inguinal hernia without obstruction or gangrene        Denton Hobbs is a 86 year old male with a right inguinal hernia.  We again discussed the surgical and non-operative management strategies, particularly regarding an open inguinal hernia repair.  We discussed the risks and benefits, as well as his anticipated perioperative course.      He understands everything that was discussed and has consented to proceed with surgery.   We will plan on scheduling a open right inguinal hernia repair at his desired date.       Flo Keyes MD, FACS  Office: [phone]  [facility]   General and Bariatric Surgery

## 2022-06-30 NOTE — PATIENT INSTRUCTIONS
Hernia education & surgery packet provided to pt.    Carlito OSMAN [facility]  Surgery Clinic [facility]  [facility] Management Clinic - [address] [address]  Office: [phone]  Fax: [phone]

## 2022-06-30 NOTE — LETTER
6/30/2022         RE: Denton Hobbs  [address]  [address]  [facility] [address]        Dear Colleague,    Thank you for referring your patient, Denton Hobbs, to the [facility] AND [facility]ATR[facility] [place]. Please see a copy of my visit note below.    HPI:  Denton Hobbs is a 86 year old male who was referred to me by Jarad Prater for an inguinal hernia. 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  The anemia is now under better control on iron supplementation, and the duodenal adenoma was deemed worthy of observation given his overall health status.  He notes that he is still dealing with pain in the right groin on a daily basis, having to frequently reduce it manually.  Is here for a renewed discussion regarding repair, as it has been almost 6 months since we last spoke.      Allergies:Sulfa drugs    Past Medical History:   Diagnosis Date     Arthritis      COPD (chronic obstructive pulmonary disease) (H)      CVA (cerebral vascular accident) (H) 12/4/2018     Hypertension      Prostate hypertrophy        Past Surgical History:   Procedure Laterality Date     GENITOURINARY SURGERY       HERNIORRHAPHY INGUINAL Left 10/11/2021    Procedure: LEFT INGUINAL HERNIA REPAIR WITH MESH;  Surgeon: Puma Preston MD;  Location: [facility] OR     TRANSRECTAL ULTRASONIC, TRANSURETHRAL RESECTION (TUR) OF PROSTATE CYST  2004    helped temporarily       CURRENT MEDS:  Current Outpatient Medications   Medication Sig Dispense Refill     ferrous sulfate (FEROSUL) 325 (65 Fe) MG tablet Take 1 tablet (325 mg) by mouth daily (with breakfast) 90 tablet 4       Family History   Problem Relation Age of Onset     No Known Problems Other         No heart, DM, HTN, or CA     No Known Problems Mother      No Known Problems Father         reports that he has quit smoking. He started smoking about 32  years ago. He has a 50.00 pack-year smoking history. He has never used smokeless tobacco. He reports that he does not drink alcohol and does not use drugs.    Review of Systems -   The 10 point review of systems  is within normal limits except for as mentioned above in the HPI.  General ROS: No complaints or constitutional symptoms  Skin: No complaints or symptoms   Hematologic/Lymphatic: No symptoms or complaints  Psychiatric: No symptoms or complaints  Endocrine: No excessive fatigue, no hypermetabolic symptoms reported  Respiratory ROS: no cough, shortness of breath, or wheezing  Cardiovascular ROS: no chest pain or dyspnea on exertion  Gastrointestinal ROS: As per HPI  Musculoskeletal ROS: no recent injuries reported  Neurological ROS: no focal neurologic defects reported.        There were no vitals taken for this visit.  There is no height or weight on file to calculate BMI.    EXAM:  Deferred today.      Assessment/Plan:   1. Non-recurrent unilateral inguinal hernia without obstruction or gangrene        Denton Hobbs is a 86 year old male with a right inguinal hernia.  We again discussed the surgical and non-operative management strategies, particularly regarding an open inguinal hernia repair.  We discussed the risks and benefits, as well as his anticipated perioperative course.      He understands everything that was discussed and has consented to proceed with surgery.   We will plan on scheduling a open right inguinal hernia repair at his desired date.       Flo Keyes MD, FACS  Office: [phone]  [facility]   General and Bariatric Surgery      Again, thank you for allowing me to participate in the care of your patient.        Sincerely,        Flo Keyes MD

## 2022-07-07 ENCOUNTER — LAB REQUISITION (OUTPATIENT)
Dept: LAB | Facility: CLINIC | Age: 87
End: 2022-07-07
Payer: MEDICARE

## 2022-07-07 DIAGNOSIS — N30.01 ACUTE CYSTITIS WITH HEMATURIA: ICD-10-CM

## 2022-07-07 PROCEDURE — 87086 URINE CULTURE/COLONY COUNT: CPT | Mod: ORL | Performed by: FAMILY MEDICINE

## 2022-07-09 LAB — BACTERIA UR CULT: ABNORMAL

## 2022-07-21 ENCOUNTER — LAB REQUISITION (OUTPATIENT)
Dept: LAB | Facility: CLINIC | Age: 87
End: 2022-07-21
Payer: MEDICARE

## 2022-07-21 DIAGNOSIS — R31.9 HEMATURIA, UNSPECIFIED: ICD-10-CM

## 2022-07-21 PROCEDURE — 87086 URINE CULTURE/COLONY COUNT: CPT | Mod: ORL | Performed by: FAMILY MEDICINE

## 2022-07-23 LAB — BACTERIA UR CULT: NORMAL

## 2022-08-19 ENCOUNTER — ANESTHESIA (OUTPATIENT)
Dept: SURGERY | Facility: HOSPITAL | Age: 87
End: 2022-08-19
Payer: MEDICARE

## 2022-08-19 ENCOUNTER — HOSPITAL ENCOUNTER (OUTPATIENT)
Facility: HOSPITAL | Age: 87
Discharge: HOME | End: 2022-08-19
Attending: SURGERY | Admitting: SURGERY
Payer: MEDICARE

## 2022-08-19 ENCOUNTER — ANESTHESIA EVENT (OUTPATIENT)
Dept: SURGERY | Facility: HOSPITAL | Age: 87
End: 2022-08-19
Payer: MEDICARE

## 2022-08-19 VITALS
BODY MASS INDEX: 21.65 KG/M2 | DIASTOLIC BLOOD PRESSURE: 72 MMHG | RESPIRATION RATE: 16 BRPM | WEIGHT: 145 LBS | SYSTOLIC BLOOD PRESSURE: 162 MMHG | TEMPERATURE: 97.3 F | HEART RATE: 68 BPM | OXYGEN SATURATION: 96 %

## 2022-08-19 DIAGNOSIS — K40.90 NON-RECURRENT UNILATERAL INGUINAL HERNIA WITHOUT OBSTRUCTION OR GANGRENE: Primary | ICD-10-CM

## 2022-08-19 LAB
ATRIAL RATE - MUSE: 64 BPM
DIASTOLIC BLOOD PRESSURE - MUSE: NORMAL MMHG
INTERPRETATION ECG - MUSE: NORMAL
P AXIS - MUSE: 60 DEGREES
PR INTERVAL - MUSE: 212 MS
QRS DURATION - MUSE: 132 MS
QT - MUSE: 454 MS
QTC - MUSE: 468 MS
R AXIS - MUSE: -69 DEGREES
SYSTOLIC BLOOD PRESSURE - MUSE: NORMAL MMHG
T AXIS - MUSE: 95 DEGREES
VENTRICULAR RATE- MUSE: 64 BPM

## 2022-08-19 PROCEDURE — 258N000003 HC RX IP 258 OP 636: Performed by: STUDENT IN AN ORGANIZED HEALTH CARE EDUCATION/TRAINING PROGRAM

## 2022-08-19 PROCEDURE — 360N000075 HC SURGERY LEVEL 2, PER MIN: Performed by: SURGERY

## 2022-08-19 PROCEDURE — 250N000011 HC RX IP 250 OP 636: Performed by: STUDENT IN AN ORGANIZED HEALTH CARE EDUCATION/TRAINING PROGRAM

## 2022-08-19 PROCEDURE — 710N000009 HC RECOVERY PHASE 1, LEVEL 1, PER MIN: Performed by: SURGERY

## 2022-08-19 PROCEDURE — 250N000013 HC RX MED GY IP 250 OP 250 PS 637: Performed by: ANESTHESIOLOGY

## 2022-08-19 PROCEDURE — 93010 ELECTROCARDIOGRAM REPORT: CPT | Performed by: INTERNAL MEDICINE

## 2022-08-19 PROCEDURE — 250N000009 HC RX 250: Performed by: SURGERY

## 2022-08-19 PROCEDURE — 250N000025 HC SEVOFLURANE, PER MIN: Performed by: SURGERY

## 2022-08-19 PROCEDURE — 250N000013 HC RX MED GY IP 250 OP 250 PS 637: Performed by: SURGERY

## 2022-08-19 PROCEDURE — 258N000003 HC RX IP 258 OP 636: Performed by: ANESTHESIOLOGY

## 2022-08-19 PROCEDURE — C1781 MESH (IMPLANTABLE): HCPCS | Performed by: SURGERY

## 2022-08-19 PROCEDURE — 999N000141 HC STATISTIC PRE-PROCEDURE NURSING ASSESSMENT: Performed by: SURGERY

## 2022-08-19 PROCEDURE — 250N000011 HC RX IP 250 OP 636: Performed by: SURGERY

## 2022-08-19 PROCEDURE — 272N000001 HC OR GENERAL SUPPLY STERILE: Performed by: SURGERY

## 2022-08-19 PROCEDURE — 710N000012 HC RECOVERY PHASE 2, PER MINUTE: Performed by: SURGERY

## 2022-08-19 PROCEDURE — 370N000017 HC ANESTHESIA TECHNICAL FEE, PER MIN: Performed by: SURGERY

## 2022-08-19 PROCEDURE — 49505 PRP I/HERN INIT REDUC >5 YR: CPT | Mod: RT | Performed by: SURGERY

## 2022-08-19 PROCEDURE — 93005 ELECTROCARDIOGRAM TRACING: CPT

## 2022-08-19 PROCEDURE — 250N000009 HC RX 250: Performed by: STUDENT IN AN ORGANIZED HEALTH CARE EDUCATION/TRAINING PROGRAM

## 2022-08-19 DEVICE — BARD MESH, 2" X 4" (5 CM X 10 CM)
Type: IMPLANTABLE DEVICE | Site: ABDOMEN | Status: FUNCTIONAL
Brand: BARD

## 2022-08-19 RX ORDER — OXYCODONE HYDROCHLORIDE 5 MG/1
5 TABLET ORAL EVERY 4 HOURS PRN
Status: DISCONTINUED | OUTPATIENT
Start: 2022-08-19 | End: 2022-08-19 | Stop reason: HOSPADM

## 2022-08-19 RX ORDER — FENTANYL CITRATE 50 UG/ML
25 INJECTION, SOLUTION INTRAMUSCULAR; INTRAVENOUS
Status: DISCONTINUED | OUTPATIENT
Start: 2022-08-19 | End: 2022-08-19 | Stop reason: HOSPADM

## 2022-08-19 RX ORDER — PROPOFOL 10 MG/ML
INJECTION, EMULSION INTRAVENOUS PRN
Status: DISCONTINUED | OUTPATIENT
Start: 2022-08-19 | End: 2022-08-19

## 2022-08-19 RX ORDER — TRAMADOL HYDROCHLORIDE 50 MG/1
50 TABLET ORAL EVERY 6 HOURS PRN
Qty: 10 TABLET | Refills: 0 | Status: SHIPPED | OUTPATIENT
Start: 2022-08-19 | End: 2022-08-22

## 2022-08-19 RX ORDER — HALOPERIDOL 5 MG/ML
1 INJECTION INTRAMUSCULAR
Status: DISCONTINUED | OUTPATIENT
Start: 2022-08-19 | End: 2022-08-19 | Stop reason: HOSPADM

## 2022-08-19 RX ORDER — CEFAZOLIN SODIUM/WATER 2 G/20 ML
2 SYRINGE (ML) INTRAVENOUS
Status: COMPLETED | OUTPATIENT
Start: 2022-08-19 | End: 2022-08-19

## 2022-08-19 RX ORDER — ONDANSETRON 4 MG/1
4 TABLET, ORALLY DISINTEGRATING ORAL EVERY 30 MIN PRN
Status: DISCONTINUED | OUTPATIENT
Start: 2022-08-19 | End: 2022-08-19 | Stop reason: HOSPADM

## 2022-08-19 RX ORDER — LIDOCAINE HYDROCHLORIDE 20 MG/ML
INJECTION, SOLUTION INFILTRATION; PERINEURAL PRN
Status: DISCONTINUED | OUTPATIENT
Start: 2022-08-19 | End: 2022-08-19

## 2022-08-19 RX ORDER — MAGNESIUM HYDROXIDE 1200 MG/15ML
LIQUID ORAL PRN
Status: DISCONTINUED | OUTPATIENT
Start: 2022-08-19 | End: 2022-08-19 | Stop reason: HOSPADM

## 2022-08-19 RX ORDER — FENTANYL CITRATE 50 UG/ML
INJECTION, SOLUTION INTRAMUSCULAR; INTRAVENOUS PRN
Status: DISCONTINUED | OUTPATIENT
Start: 2022-08-19 | End: 2022-08-19

## 2022-08-19 RX ORDER — ACETAMINOPHEN 325 MG/1
975 TABLET ORAL ONCE
Status: DISCONTINUED | OUTPATIENT
Start: 2022-08-19 | End: 2022-08-19

## 2022-08-19 RX ORDER — ACETAMINOPHEN 325 MG/1
650 TABLET ORAL
Status: DISCONTINUED | OUTPATIENT
Start: 2022-08-19 | End: 2022-08-19 | Stop reason: HOSPADM

## 2022-08-19 RX ORDER — NALOXONE HYDROCHLORIDE 1 MG/ML
0.4 INJECTION INTRAMUSCULAR; INTRAVENOUS; SUBCUTANEOUS
Status: DISCONTINUED | OUTPATIENT
Start: 2022-08-19 | End: 2022-08-19 | Stop reason: HOSPADM

## 2022-08-19 RX ORDER — NALOXONE HYDROCHLORIDE 1 MG/ML
0.2 INJECTION INTRAMUSCULAR; INTRAVENOUS; SUBCUTANEOUS
Status: DISCONTINUED | OUTPATIENT
Start: 2022-08-19 | End: 2022-08-19 | Stop reason: HOSPADM

## 2022-08-19 RX ORDER — BUPIVACAINE HYDROCHLORIDE AND EPINEPHRINE 2.5; 5 MG/ML; UG/ML
INJECTION, SOLUTION INFILTRATION; PERINEURAL PRN
Status: DISCONTINUED | OUTPATIENT
Start: 2022-08-19 | End: 2022-08-19 | Stop reason: HOSPADM

## 2022-08-19 RX ORDER — FERROUS SULFATE 325(65) MG
2 TABLET ORAL
Status: ON HOLD | COMMUNITY
Start: 2022-08-11 | End: 2023-01-01

## 2022-08-19 RX ORDER — SODIUM CHLORIDE, SODIUM LACTATE, POTASSIUM CHLORIDE, CALCIUM CHLORIDE 600; 310; 30; 20 MG/100ML; MG/100ML; MG/100ML; MG/100ML
INJECTION, SOLUTION INTRAVENOUS CONTINUOUS
Status: DISCONTINUED | OUTPATIENT
Start: 2022-08-19 | End: 2022-08-19 | Stop reason: HOSPADM

## 2022-08-19 RX ORDER — CEFAZOLIN SODIUM/WATER 2 G/20 ML
2 SYRINGE (ML) INTRAVENOUS SEE ADMIN INSTRUCTIONS
Status: DISCONTINUED | OUTPATIENT
Start: 2022-08-19 | End: 2022-08-19 | Stop reason: HOSPADM

## 2022-08-19 RX ORDER — FENTANYL CITRATE 50 UG/ML
25 INJECTION, SOLUTION INTRAMUSCULAR; INTRAVENOUS EVERY 5 MIN PRN
Status: DISCONTINUED | OUTPATIENT
Start: 2022-08-19 | End: 2022-08-19 | Stop reason: HOSPADM

## 2022-08-19 RX ORDER — ONDANSETRON 2 MG/ML
4 INJECTION INTRAMUSCULAR; INTRAVENOUS EVERY 30 MIN PRN
Status: DISCONTINUED | OUTPATIENT
Start: 2022-08-19 | End: 2022-08-19 | Stop reason: HOSPADM

## 2022-08-19 RX ORDER — ONDANSETRON 2 MG/ML
INJECTION INTRAMUSCULAR; INTRAVENOUS PRN
Status: DISCONTINUED | OUTPATIENT
Start: 2022-08-19 | End: 2022-08-19

## 2022-08-19 RX ORDER — DEXAMETHASONE SODIUM PHOSPHATE 10 MG/ML
INJECTION, SOLUTION INTRAMUSCULAR; INTRAVENOUS PRN
Status: DISCONTINUED | OUTPATIENT
Start: 2022-08-19 | End: 2022-08-19

## 2022-08-19 RX ORDER — MEPERIDINE HYDROCHLORIDE 25 MG/ML
12.5 INJECTION INTRAMUSCULAR; INTRAVENOUS; SUBCUTANEOUS
Status: DISCONTINUED | OUTPATIENT
Start: 2022-08-19 | End: 2022-08-19 | Stop reason: HOSPADM

## 2022-08-19 RX ORDER — ACETAMINOPHEN 325 MG/1
975 TABLET ORAL ONCE
Status: DISCONTINUED | OUTPATIENT
Start: 2022-08-19 | End: 2022-08-19 | Stop reason: HOSPADM

## 2022-08-19 RX ORDER — OXYCODONE HYDROCHLORIDE 5 MG/1
5 TABLET ORAL
Status: DISCONTINUED | OUTPATIENT
Start: 2022-08-19 | End: 2022-08-19 | Stop reason: HOSPADM

## 2022-08-19 RX ORDER — LIDOCAINE 40 MG/G
CREAM TOPICAL
Status: DISCONTINUED | OUTPATIENT
Start: 2022-08-19 | End: 2022-08-19 | Stop reason: HOSPADM

## 2022-08-19 RX ORDER — HYDROMORPHONE HYDROCHLORIDE 1 MG/ML
0.2 INJECTION, SOLUTION INTRAMUSCULAR; INTRAVENOUS; SUBCUTANEOUS EVERY 5 MIN PRN
Status: DISCONTINUED | OUTPATIENT
Start: 2022-08-19 | End: 2022-08-19 | Stop reason: HOSPADM

## 2022-08-19 RX ADMIN — FENTANYL CITRATE 50 MCG: 50 INJECTION, SOLUTION INTRAMUSCULAR; INTRAVENOUS at 10:11

## 2022-08-19 RX ADMIN — PHENYLEPHRINE HYDROCHLORIDE 100 MCG: 10 INJECTION INTRAVENOUS at 10:11

## 2022-08-19 RX ADMIN — PHENYLEPHRINE HYDROCHLORIDE 100 MCG: 10 INJECTION INTRAVENOUS at 10:18

## 2022-08-19 RX ADMIN — LIDOCAINE HYDROCHLORIDE 40 MG: 20 INJECTION, SOLUTION INFILTRATION; PERINEURAL at 10:11

## 2022-08-19 RX ADMIN — OXYCODONE HYDROCHLORIDE 5 MG: 5 TABLET ORAL at 12:22

## 2022-08-19 RX ADMIN — DEXAMETHASONE SODIUM PHOSPHATE 10 MG: 10 INJECTION, SOLUTION INTRAMUSCULAR; INTRAVENOUS at 10:21

## 2022-08-19 RX ADMIN — PHENYLEPHRINE HYDROCHLORIDE 0.3 MCG/KG/MIN: 10 INJECTION INTRAVENOUS at 10:10

## 2022-08-19 RX ADMIN — PROPOFOL 150 MG: 10 INJECTION, EMULSION INTRAVENOUS at 10:11

## 2022-08-19 RX ADMIN — Medication 2 G: at 10:05

## 2022-08-19 RX ADMIN — ONDANSETRON 4 MG: 2 INJECTION INTRAMUSCULAR; INTRAVENOUS at 10:46

## 2022-08-19 RX ADMIN — SODIUM CHLORIDE, POTASSIUM CHLORIDE, SODIUM LACTATE AND CALCIUM CHLORIDE 100 ML/HR: 600; 310; 30; 20 INJECTION, SOLUTION INTRAVENOUS at 09:01

## 2022-08-19 ASSESSMENT — ACTIVITIES OF DAILY LIVING (ADL)
ADLS_ACUITY_SCORE: 35

## 2022-08-19 ASSESSMENT — COPD QUESTIONNAIRES
COPD: 1
CAT_SEVERITY: MILD

## 2022-08-19 ASSESSMENT — LIFESTYLE VARIABLES: TOBACCO_USE: 1

## 2022-08-19 NOTE — H&P
Denton Hobbs is a 86 year old male here for a right inguinal hernia.  History of COPD, anemia which are both under stable control.       Allergies:Sulfa drugs     Past Medical History        Past Medical History:   Diagnosis Date     Arthritis       COPD (chronic obstructive pulmonary disease) (H)       CVA (cerebral vascular accident) (H) 12/4/2018     Hypertension       Prostate hypertrophy              Past Surgical History         Past Surgical History:   Procedure Laterality Date     GENITOURINARY SURGERY         HERNIORRHAPHY INGUINAL Left 10/11/2021     Procedure: LEFT INGUINAL HERNIA REPAIR WITH MESH;  Surgeon: Puma Preston MD;  Location: [facility] Main OR     TRANSRECTAL ULTRASONIC, TRANSURETHRAL RESECTION (TUR) OF PROSTATE CYST   2004     helped temporarily            CURRENT MEDS:  Current Outpatient Prescriptions          Current Outpatient Medications   Medication Sig Dispense Refill     ferrous sulfate (FEROSUL) 325 (65 Fe) MG tablet Take 1 tablet (325 mg) by mouth daily (with breakfast) 90 tablet 4            Family History         Family History   Problem Relation Age of Onset     No Known Problems Other           No heart, DM, HTN, or CA     No Known Problems Mother       No Known Problems Father               reports that he has quit smoking. He started smoking about 32 years ago. He has a 50.00 pack-year smoking history. He has never used smokeless tobacco. He reports that he does not drink alcohol and does not use drugs.     Review of Systems -   The 10 point review of systems  is within normal limits except for as mentioned above in the HPI.  General ROS: No complaints or constitutional symptoms  Skin: No complaints or symptoms   Hematologic/Lymphatic: No symptoms or complaints  Psychiatric: No symptoms or complaints  Endocrine: No excessive fatigue, no hypermetabolic symptoms reported  Respiratory ROS: no cough, shortness of breath, or wheezing  Cardiovascular ROS: no chest pain or  dyspnea on exertion  Gastrointestinal ROS: As per HPI  Musculoskeletal ROS: no recent injuries reported  Neurological ROS: no focal neurologic defects reported.           EXAM:  Heart: regular rate and rhythm  Lungs: normalr espiratory effort       Assessment/Plan:   1. Non-recurrent unilateral inguinal hernia without obstruction or gangrene          Denton Hobbs is a 86 year old male ok for MAC, open inguinal hernia.     Flo Keyes MD, FACS  Office: [phone]  [facility]   General and Bariatric Surgery

## 2022-08-19 NOTE — ANESTHESIA PREPROCEDURE EVALUATION
Anesthesia Pre-Procedure Evaluation    Patient: Denton Hobbs   MRN: [id number] : 1935        Procedure : Procedure(s):  HERNIORRHAPHY, INGUINAL, OPEN, RIGHT          Past Medical History:   Diagnosis Date     Arthritis      COPD (chronic obstructive pulmonary disease) (H)      CVA (cerebral vascular accident) (H) 2018     Hypertension      Prostate hypertrophy       Past Surgical History:   Procedure Laterality Date     GENITOURINARY SURGERY       HERNIORRHAPHY INGUINAL Left 10/11/2021    Procedure: LEFT INGUINAL HERNIA REPAIR WITH MESH;  Surgeon: Puma Preston MD;  Location: [facility] Main OR     TRANSRECTAL ULTRASONIC, TRANSURETHRAL RESECTION (TUR) OF PROSTATE CYST      helped temporarily      Allergies   Allergen Reactions     Sulfa Drugs Rash     Info obtained off of 10/4/21 Entira pre op.      Social History     Tobacco Use     Smoking status: Former Smoker     Packs/day: 2.00     Years: 25.00     Pack years: 50.00     Start date: 1990     Smokeless tobacco: Never Used   Substance Use Topics     Alcohol use: No     Comment: Alcoholic Drinks/day: Quit       Wt Readings from Last 1 Encounters:   22 64 kg (141 lb)        Anesthesia Evaluation   Pt has had prior anesthetic. Type: General.    No history of anesthetic complications       ROS/MED HX  ENT/Pulmonary:  - neg pulmonary ROS   (+) tobacco use, Past use, mild,  COPD,     Neurologic:  - neg neurologic ROS   (+) CVA, with deficits, - residual left leg weakness.     Cardiovascular:  - neg cardiovascular ROS   (+) hypertension-----    METS/Exercise Tolerance:     Hematologic:  - neg hematologic  ROS   (+) anemia (iron-deficiency, resolved),     Musculoskeletal:  - neg musculoskeletal ROS     GI/Hepatic: Comment: Ampullary adenoma - neg GI/hepatic ROS     Renal/Genitourinary:  - neg Renal ROS   (+) BPH,     Endo:  - neg endo ROS     Psychiatric/Substance Use:  - neg psychiatric ROS     Infectious Disease:  - neg infectious  disease ROS     Malignancy:  - neg malignancy ROS     Other:  - neg other ROS          Physical Exam    Airway        Mallampati: II   TM distance: > 3 FB   Neck ROM: full   Mouth opening: > 3 cm    Respiratory Devices and Support         Dental         B=Bridge, C=Chipped, L=Loose, M=Missing    Cardiovascular   cardiovascular exam normal       Rhythm and rate: regular and normal     Pulmonary   pulmonary exam normal        breath sounds clear to auscultation           OUTSIDE LABS:  CBC:   Lab Results   Component Value Date    WBC 8.3 06/29/2022    WBC 9.3 05/27/2022    HGB 11.3 (L) 06/29/2022    HGB 10.0 (L) 05/27/2022    HCT 37.8 (L) 06/29/2022    HCT 35.4 (L) 05/27/2022     06/29/2022     05/27/2022     BMP:   Lab Results   Component Value Date     09/08/2021     07/29/2021    POTASSIUM 4.4 09/08/2021    POTASSIUM 4.7 07/29/2021    CHLORIDE 103 09/08/2021    CHLORIDE 101 07/29/2021    CO2 27 09/08/2021    CO2 26 07/29/2021    BUN 16 09/08/2021    BUN 13 07/29/2021    CR 0.87 09/08/2021    CR 0.95 07/29/2021    GLC 89 09/08/2021    GLC 80 07/29/2021     COAGS: No results found for: PTT, INR, FIBR  POC: No results found for: BGM, HCG, HCGS  HEPATIC: No results found for: ALBUMIN, PROTTOTAL, ALT, AST, GGT, ALKPHOS, BILITOTAL, BILIDIRECT, FRANCESCO  OTHER:   Lab Results   Component Value Date    GINETTE 8.7 09/08/2021       Anesthesia Plan    ASA Status:  3   NPO Status:  NPO Appropriate    Anesthesia Type: General.     - Airway: LMA   Induction: Propofol.   Maintenance: TIVA.        Consents    Anesthesia Plan(s) and associated risks, benefits, and realistic alternatives discussed. Questions answered and patient/representative(s) expressed understanding.     - Discussed: Risks, Benefits and Alternatives for the PROCEDURE were discussed     - Discussed with:  Patient         Postoperative Care    Pain management: Multi-modal analgesia.   PONV prophylaxis: Ondansetron (or other 5HT-3), Dexamethasone  or Solumedrol     Comments:    Other Comments: Reviewed anesthetic options and risks, including risk of dental trauma. Patient agrees to proceed.     Avoid versed due to age              Eric Lynn MD

## 2022-08-19 NOTE — ANESTHESIA PROCEDURE NOTES
Airway       Patient location during procedure: OR  Staff -        Anesthesiologist:  Marie Wade MD       CRNA: Taryn Juan APRN CRNA       Performed By: CRNA  Consent for Airway        Urgency: elective  Indications and Patient Condition       Indications for airway management: paul-procedural       Induction type:intravenous       Mask difficulty assessment: 0 - not attempted    Final Airway Details       Final airway type: supraglottic airway    Supraglottic Airway Details        Type: LMA       LMA size: 4    Post intubation assessment        Placement verified by: capnometry, equal breath sounds and chest rise        Number of attempts at approach: 1       Number of other approaches attempted: 0       Secured with: silk tape       Ease of procedure: easy       Dentition: Intact and Unchanged

## 2022-08-19 NOTE — ANESTHESIA CARE TRANSFER NOTE
Patient: Denton Hobbs    Procedure: Procedure(s):  HERNIORRHAPHY, INGUINAL, OPEN, RIGHT       Diagnosis: Non-recurrent unilateral inguinal hernia without obstruction or gangrene [K40.90]  Diagnosis Additional Information: No value filed.    Anesthesia Type:   General     Note:    Oropharynx: oropharynx clear of all foreign objects and spontaneously breathing  Level of Consciousness: drowsy and awake  Oxygen Supplementation: face mask  Level of Supplemental Oxygen (L/min / FiO2): 6  Independent Airway: airway patency satisfactory and stable  Dentition: dentition unchanged  Vital Signs Stable: post-procedure vital signs reviewed and stable  Report to RN Given: handoff report given  Patient transferred to: PACU    Handoff Report: Identifed the Patient, Identified the Reponsible Provider, Reviewed the pertinent medical history, Discussed the surgical course, Reviewed Intra-OP anesthesia mangement and issues during anesthesia, Set expectations for post-procedure period and Allowed opportunity for questions and acknowledgement of understanding      Vitals:  Vitals Value Taken Time   /74 08/19/22 1100   Temp 37.1  C (98.7  F) 08/19/22 1058   Pulse 64 08/19/22 1100   Resp 12 08/19/22 1100   SpO2 100 % 08/19/22 1100   Vitals shown include unvalidated device data.    Electronically Signed By: DONALD Boone CRNA  August 19, 2022  11:02 AM

## 2022-08-19 NOTE — ANESTHESIA POSTPROCEDURE EVALUATION
Patient: Denton Hobbs    Procedure: Procedure(s):  HERNIORRHAPHY, INGUINAL, OPEN, RIGHT       Anesthesia Type:  General    Note:  Disposition: Outpatient   Postop Pain Control: Uneventful            Sign Out: Well controlled pain   PONV: No   Neuro/Psych: Uneventful            Sign Out: Acceptable/Baseline neuro status   Airway/Respiratory: Uneventful            Sign Out: Acceptable/Baseline resp. status   CV/Hemodynamics: Uneventful            Sign Out: Acceptable CV status; No obvious hypovolemia; No obvious fluid overload   Other NRE: NONE   DID A NON-ROUTINE EVENT OCCUR? No           Last vitals:  Vitals Value Taken Time   /74 08/19/22 1130   Temp 36.3  C (97.3  F) 08/19/22 1130   Pulse 64 08/19/22 1136   Resp 29 08/19/22 1136   SpO2 97 % 08/19/22 1136   Vitals shown include unvalidated device data.    Electronically Signed By: Eric Lynn MD  August 19, 2022  12:33 PM

## 2022-08-19 NOTE — OP NOTE
[facility]  Operative Note    Pre-operative diagnosis: Non-recurrent unilateral inguinal hernia without obstruction or gangrene [K40.90]   Post-operative diagnosis Right inguinal hernia   Procedure: Procedure(s):  HERNIORRHAPHY, INGUINAL, OPEN, RIGHT   Surgeon: Flo Keyes MD   Assistants(s):  None   Anesthesia: Choice    Estimated blood loss: Less than 50 ml                Drains: None   Specimens: None       Findings: None.   Complications: None.           Description of procedure:  The patient was brought to the operating room where after initiation of monitored anesthetic care he was positioned with both arms out.  He was then prepped and draped in sterile fashion.  After procedural timeout, we began by injecting local anesthetic into the skin and right groin along the planned incision.  This was then sharply incised and dissection carried down to the external oblique fascia.  Additional local anesthetic was infiltrated below the external oblique fascia.  This was then sharply incised down to the level of the external inguinal ring and extending laterally towards the anterior superior iliac spine.  The inferior shelving edge was developed down to the pubic tubercle, where the spermatic cord was then encircled with a Penrose drain and retracted out of the operative field.  Cremasteric fibers were divided with electrocautery.  The indirect inguinal hernia sac was identified, stripped away from the spermatic cord and isolated back to the level of the internal inguinal ring.  The hernia sac was then dunked back into the intra-abdominal space.  After ensuring no bleeding, a large pore lightweight mesh was selected for repair.  This was affixed to the pubic tubercle and the inferior shelving edge in running fashion with a 2-0 Prolene suture.  A slit was made in the mesh to accommodate passage of the spermatic cord.  The medial and superior aspects of the mesh were then affixed to the  rectus musculature and conjoined tendon respectively with interrupted 2-0 Prolene sutures.  Lateral to the internal inguinal ring the 2 tails were reapproximated with interrupted 2-0 Prolene sutures and the tails were then tucked underneath the external oblique fascia.  The external oblique fascia was then closed over the spermatic cord with a 2-0 Vicryl suture.  The overlying fatty tissues were reapproximated with a running 3-0 Vicryl suture and the skin was closed with running 4-0 Vicryl subcuticular stitch.      Flo Keyes MD, FACS  Office: [phone]  [facility]   General and Bariatric Surgery

## 2022-08-19 NOTE — ANESTHESIA POSTPROCEDURE EVALUATION
Patient: Denton Hobbs    Procedure: Procedure(s):  HERNIORRHAPHY, INGUINAL, OPEN, RIGHT       Anesthesia Type:  General    Note:  Disposition: Outpatient   Postop Pain Control: Uneventful            Sign Out: Well controlled pain   PONV: No   Neuro/Psych: Uneventful            Sign Out: Acceptable/Baseline neuro status   Airway/Respiratory: Uneventful            Sign Out: Acceptable/Baseline resp. status   CV/Hemodynamics: Uneventful            Sign Out: Acceptable CV status; No obvious hypovolemia; No obvious fluid overload   Other NRE: NONE   DID A NON-ROUTINE EVENT OCCUR? No           Last vitals:  Vitals Value Taken Time   /74 08/19/22 1130   Temp 36.3  C (97.3  F) 08/19/22 1130   Pulse 64 08/19/22 1136   Resp 29 08/19/22 1136   SpO2 97 % 08/19/22 1136   Vitals shown include unvalidated device data.    Electronically Signed By: Eric Lynn MD  August 19, 2022  11:56 AM

## 2022-09-01 ENCOUNTER — VIRTUAL VISIT (OUTPATIENT)
Dept: SURGERY | Facility: CLINIC | Age: 87
End: 2022-09-01
Payer: MEDICARE

## 2022-09-01 DIAGNOSIS — Z48.89 ENCOUNTER FOR POSTOPERATIVE CARE: Primary | ICD-10-CM

## 2022-09-01 PROCEDURE — 99024 POSTOP FOLLOW-UP VISIT: CPT | Performed by: PHYSICIAN ASSISTANT

## 2022-09-01 NOTE — LETTER
"    9/1/2022         RE: Denton Hobbs  [address]  [address]  [facility] [address]        Dear Colleague,    Thank you for referring your patient, Denton Hobbs, to the [facility] SURGERY CLINIC AND BARIATRICS [facility]. Please see a copy of my visit note below.    The patient has been notified of following:     \"This telephone visit will be conducted via a call between you and your physician/provider. We have found that certain health care needs can be provided without the need for a physical exam.  This service lets us provide the care you need with a short phone conversation.  If a prescription is necessary we can send it directly to your pharmacy.  If lab work is needed we can place an order for that and you can then stop by our lab to have the test done at a later time.    Telephone visits are billed at different rates depending on your insurance coverage. During this emergency period, for some insurers they may be billed the same as an in-person visit.  Please reach out to your insurance provider with any questions.    If during the course of the call the physician/provider feels a telephone visit is not appropriate, you will not be charged for this service.\"    Patient has given verbal consent for Telephone visit?  Yes    What phone number would you like to be contacted at? home    How would you like to obtain your AVS? Mail a copy   HPI: 87-year-old male underwent open right inguinal hernia repair with Dr. Keyes on 8/19/2022.  Patient stated he is doing very well with minimal discomfort.  He states that he feels like he can run a mile and a half.  His wound is healing nicely but he does feel a bump under the skin.  No swelling, bruising or fevers.  No other concerns.    There were no vitals taken for this visit.    EXAM:  N/A    Assessment/Plan: . Doing well after surgery and should follow up as needed.    Mohan Bartholomew MPA-C              Again, thank you for allowing me to participate in " the care of your patient.        Sincerely,        Mohan Bartholomew PA-C

## 2022-09-01 NOTE — PROGRESS NOTES
"The patient has been notified of following:     \"This telephone visit will be conducted via a call between you and your physician/provider. We have found that certain health care needs can be provided without the need for a physical exam.  This service lets us provide the care you need with a short phone conversation.  If a prescription is necessary we can send it directly to your pharmacy.  If lab work is needed we can place an order for that and you can then stop by our lab to have the test done at a later time.    Telephone visits are billed at different rates depending on your insurance coverage. During this emergency period, for some insurers they may be billed the same as an in-person visit.  Please reach out to your insurance provider with any questions.    If during the course of the call the physician/provider feels a telephone visit is not appropriate, you will not be charged for this service.\"    Patient has given verbal consent for Telephone visit?  Yes    What phone number would you like to be contacted at? home    How would you like to obtain your AVS? Mail a copy   HPI: 87-year-old male underwent open right inguinal hernia repair with Dr. Keyes on 8/19/2022.  Patient stated he is doing very well with minimal discomfort.  He states that he feels like he can run a mile and a half.  His wound is healing nicely but he does feel a bump under the skin.  No swelling, bruising or fevers.  No other concerns.    There were no vitals taken for this visit.    EXAM:  N/A    Assessment/Plan: . Doing well after surgery and should follow up as needed.    Mohan Bartholomew MPA-C          "

## 2022-09-11 ENCOUNTER — APPOINTMENT (OUTPATIENT)
Dept: CT IMAGING | Facility: HOSPITAL | Age: 87
DRG: 356 | End: 2022-09-11
Attending: EMERGENCY MEDICINE
Payer: COMMERCIAL

## 2022-09-11 ENCOUNTER — APPOINTMENT (OUTPATIENT)
Dept: RADIOLOGY | Facility: HOSPITAL | Age: 87
DRG: 356 | End: 2022-09-11
Attending: EMERGENCY MEDICINE
Payer: COMMERCIAL

## 2022-09-11 ENCOUNTER — HOSPITAL ENCOUNTER (INPATIENT)
Facility: HOSPITAL | Age: 87
LOS: 6 days | Discharge: NURSING, REHAB OR LONG-TERM CARE | DRG: 356 | End: 2022-09-17
Attending: EMERGENCY MEDICINE | Admitting: INTERNAL MEDICINE
Payer: COMMERCIAL

## 2022-09-11 DIAGNOSIS — K92.2 UPPER GI BLEED: ICD-10-CM

## 2022-09-11 DIAGNOSIS — R52 PAIN: Primary | ICD-10-CM

## 2022-09-11 DIAGNOSIS — N40.0 ENLARGED PROSTATE: ICD-10-CM

## 2022-09-11 LAB
ABO/RH(D): NORMAL
ALBUMIN SERPL-MCNC: 3.1 G/DL (ref 3.5–5)
ALBUMIN UR-MCNC: NEGATIVE MG/DL
ALP SERPL-CCNC: 48 U/L (ref 45–120)
ALT SERPL W P-5'-P-CCNC: <9 U/L (ref 0–45)
ANION GAP SERPL CALCULATED.3IONS-SCNC: 5 MMOL/L (ref 5–18)
ANTIBODY SCREEN: NEGATIVE
APPEARANCE UR: CLEAR
APTT PPP: 29 SECONDS (ref 22–38)
AST SERPL W P-5'-P-CCNC: 10 U/L (ref 0–40)
BASOPHILS # BLD AUTO: 0.1 10E3/UL (ref 0–0.2)
BASOPHILS # BLD AUTO: 0.1 10E3/UL (ref 0–0.2)
BASOPHILS NFR BLD AUTO: 0 %
BASOPHILS NFR BLD AUTO: 1 %
BILIRUB DIRECT SERPL-MCNC: 0.1 MG/DL
BILIRUB SERPL-MCNC: 0.4 MG/DL (ref 0–1)
BILIRUB UR QL STRIP: NEGATIVE
BLD PROD TYP BPU: NORMAL
BLOOD COMPONENT TYPE: NORMAL
BUN SERPL-MCNC: 28 MG/DL (ref 8–28)
CALCIUM SERPL-MCNC: 8.3 MG/DL (ref 8.5–10.5)
CHLORIDE BLD-SCNC: 102 MMOL/L (ref 98–107)
CO2 SERPL-SCNC: 26 MMOL/L (ref 22–31)
CODING SYSTEM: NORMAL
COLOR UR AUTO: ABNORMAL
CREAT SERPL-MCNC: 0.79 MG/DL (ref 0.7–1.3)
CROSSMATCH: NORMAL
EOSINOPHIL # BLD AUTO: 0 10E3/UL (ref 0–0.7)
EOSINOPHIL # BLD AUTO: 0 10E3/UL (ref 0–0.7)
EOSINOPHIL NFR BLD AUTO: 0 %
EOSINOPHIL NFR BLD AUTO: 0 %
ERYTHROCYTE [DISTWIDTH] IN BLOOD BY AUTOMATED COUNT: 14.4 % (ref 10–15)
ERYTHROCYTE [DISTWIDTH] IN BLOOD BY AUTOMATED COUNT: 14.6 % (ref 10–15)
GFR SERPL CREATININE-BSD FRML MDRD: 87 ML/MIN/1.73M2
GLUCOSE BLD-MCNC: 94 MG/DL (ref 70–125)
GLUCOSE UR STRIP-MCNC: NEGATIVE MG/DL
HCT VFR BLD AUTO: 25.5 % (ref 40–53)
HCT VFR BLD AUTO: 29.1 % (ref 40–53)
HGB BLD-MCNC: 7.9 G/DL (ref 13.3–17.7)
HGB BLD-MCNC: 9.2 G/DL (ref 13.3–17.7)
HGB UR QL STRIP: ABNORMAL
HOLD SPECIMEN: NORMAL
IMM GRANULOCYTES # BLD: 0.1 10E3/UL
IMM GRANULOCYTES # BLD: 0.2 10E3/UL
IMM GRANULOCYTES NFR BLD: 1 %
IMM GRANULOCYTES NFR BLD: 1 %
INR PPP: 1.14 (ref 0.85–1.15)
ISSUE DATE AND TIME: NORMAL
KETONES UR STRIP-MCNC: NEGATIVE MG/DL
LEUKOCYTE ESTERASE UR QL STRIP: ABNORMAL
LIPASE SERPL-CCNC: 16 U/L (ref 0–52)
LYMPHOCYTES # BLD AUTO: 1.4 10E3/UL (ref 0.8–5.3)
LYMPHOCYTES # BLD AUTO: 1.8 10E3/UL (ref 0.8–5.3)
LYMPHOCYTES NFR BLD AUTO: 11 %
LYMPHOCYTES NFR BLD AUTO: 9 %
MCH RBC QN AUTO: 28.7 PG (ref 26.5–33)
MCH RBC QN AUTO: 29 PG (ref 26.5–33)
MCHC RBC AUTO-ENTMCNC: 31 G/DL (ref 31.5–36.5)
MCHC RBC AUTO-ENTMCNC: 31.6 G/DL (ref 31.5–36.5)
MCV RBC AUTO: 92 FL (ref 78–100)
MCV RBC AUTO: 93 FL (ref 78–100)
MONOCYTES # BLD AUTO: 1 10E3/UL (ref 0–1.3)
MONOCYTES # BLD AUTO: 1 10E3/UL (ref 0–1.3)
MONOCYTES NFR BLD AUTO: 6 %
MONOCYTES NFR BLD AUTO: 7 %
MUCOUS THREADS #/AREA URNS LPF: PRESENT /LPF
NEUTROPHILS # BLD AUTO: 12.5 10E3/UL (ref 1.6–8.3)
NEUTROPHILS # BLD AUTO: 14.1 10E3/UL (ref 1.6–8.3)
NEUTROPHILS NFR BLD AUTO: 82 %
NEUTROPHILS NFR BLD AUTO: 82 %
NITRATE UR QL: POSITIVE
NRBC # BLD AUTO: 0 10E3/UL
NRBC # BLD AUTO: 0 10E3/UL
NRBC BLD AUTO-RTO: 0 /100
NRBC BLD AUTO-RTO: 0 /100
PH UR STRIP: 7 [PH] (ref 5–7)
PLATELET # BLD AUTO: 458 10E3/UL (ref 150–450)
PLATELET # BLD AUTO: 503 10E3/UL (ref 150–450)
POTASSIUM BLD-SCNC: 5.2 MMOL/L (ref 3.5–5)
PROT SERPL-MCNC: 5.5 G/DL (ref 6–8)
RBC # BLD AUTO: 2.75 10E6/UL (ref 4.4–5.9)
RBC # BLD AUTO: 3.17 10E6/UL (ref 4.4–5.9)
RBC URINE: 32 /HPF
SARS-COV-2 RNA RESP QL NAA+PROBE: NEGATIVE
SODIUM SERPL-SCNC: 133 MMOL/L (ref 136–145)
SP GR UR STRIP: 1.03 (ref 1–1.03)
SPECIMEN EXPIRATION DATE: NORMAL
TROPONIN I SERPL-MCNC: 0.03 NG/ML (ref 0–0.29)
UNIT ABO/RH: NORMAL
UNIT NUMBER: NORMAL
UNIT STATUS: NORMAL
UNIT TYPE ISBT: 5100
UROBILINOGEN UR STRIP-MCNC: <2 MG/DL
WBC # BLD AUTO: 15 10E3/UL (ref 4–11)
WBC # BLD AUTO: 17 10E3/UL (ref 4–11)
WBC URINE: 16 /HPF

## 2022-09-11 PROCEDURE — 85610 PROTHROMBIN TIME: CPT | Performed by: EMERGENCY MEDICINE

## 2022-09-11 PROCEDURE — 250N000011 HC RX IP 250 OP 636: Performed by: EMERGENCY MEDICINE

## 2022-09-11 PROCEDURE — C9113 INJ PANTOPRAZOLE SODIUM, VIA: HCPCS | Performed by: EMERGENCY MEDICINE

## 2022-09-11 PROCEDURE — 96361 HYDRATE IV INFUSION ADD-ON: CPT

## 2022-09-11 PROCEDURE — 82248 BILIRUBIN DIRECT: CPT | Performed by: EMERGENCY MEDICINE

## 2022-09-11 PROCEDURE — 96366 THER/PROPH/DIAG IV INF ADDON: CPT

## 2022-09-11 PROCEDURE — 0T9B70Z DRAINAGE OF BLADDER WITH DRAINAGE DEVICE, VIA NATURAL OR ARTIFICIAL OPENING: ICD-10-PCS | Performed by: PHYSICIAN ASSISTANT

## 2022-09-11 PROCEDURE — 96376 TX/PRO/DX INJ SAME DRUG ADON: CPT

## 2022-09-11 PROCEDURE — 86901 BLOOD TYPING SEROLOGIC RH(D): CPT | Performed by: INTERNAL MEDICINE

## 2022-09-11 PROCEDURE — 86923 COMPATIBILITY TEST ELECTRIC: CPT | Performed by: EMERGENCY MEDICINE

## 2022-09-11 PROCEDURE — 86850 RBC ANTIBODY SCREEN: CPT | Performed by: INTERNAL MEDICINE

## 2022-09-11 PROCEDURE — 96365 THER/PROPH/DIAG IV INF INIT: CPT | Mod: 59

## 2022-09-11 PROCEDURE — 99223 1ST HOSP IP/OBS HIGH 75: CPT | Mod: AI | Performed by: INTERNAL MEDICINE

## 2022-09-11 PROCEDURE — 81001 URINALYSIS AUTO W/SCOPE: CPT | Performed by: INTERNAL MEDICINE

## 2022-09-11 PROCEDURE — C9803 HOPD COVID-19 SPEC COLLECT: HCPCS

## 2022-09-11 PROCEDURE — 258N000003 HC RX IP 258 OP 636: Performed by: EMERGENCY MEDICINE

## 2022-09-11 PROCEDURE — 86923 COMPATIBILITY TEST ELECTRIC: CPT | Performed by: ANESTHESIOLOGY

## 2022-09-11 PROCEDURE — 36415 COLL VENOUS BLD VENIPUNCTURE: CPT | Performed by: EMERGENCY MEDICINE

## 2022-09-11 PROCEDURE — G1010 CDSM STANSON: HCPCS

## 2022-09-11 PROCEDURE — 86923 COMPATIBILITY TEST ELECTRIC: CPT | Performed by: INTERNAL MEDICINE

## 2022-09-11 PROCEDURE — U0005 INFEC AGEN DETEC AMPLI PROBE: HCPCS | Performed by: EMERGENCY MEDICINE

## 2022-09-11 PROCEDURE — 85004 AUTOMATED DIFF WBC COUNT: CPT | Performed by: EMERGENCY MEDICINE

## 2022-09-11 PROCEDURE — 84484 ASSAY OF TROPONIN QUANT: CPT | Performed by: EMERGENCY MEDICINE

## 2022-09-11 PROCEDURE — 71045 X-RAY EXAM CHEST 1 VIEW: CPT

## 2022-09-11 PROCEDURE — 83690 ASSAY OF LIPASE: CPT | Performed by: EMERGENCY MEDICINE

## 2022-09-11 PROCEDURE — 85730 THROMBOPLASTIN TIME PARTIAL: CPT | Performed by: EMERGENCY MEDICINE

## 2022-09-11 PROCEDURE — 120N000001 HC R&B MED SURG/OB

## 2022-09-11 PROCEDURE — 93005 ELECTROCARDIOGRAM TRACING: CPT | Performed by: EMERGENCY MEDICINE

## 2022-09-11 PROCEDURE — 82310 ASSAY OF CALCIUM: CPT | Performed by: EMERGENCY MEDICINE

## 2022-09-11 PROCEDURE — P9016 RBC LEUKOCYTES REDUCED: HCPCS | Performed by: EMERGENCY MEDICINE

## 2022-09-11 PROCEDURE — 99285 EMERGENCY DEPT VISIT HI MDM: CPT | Mod: 25

## 2022-09-11 PROCEDURE — 258N000003 HC RX IP 258 OP 636: Performed by: INTERNAL MEDICINE

## 2022-09-11 PROCEDURE — 87088 URINE BACTERIA CULTURE: CPT | Performed by: INTERNAL MEDICINE

## 2022-09-11 RX ORDER — SODIUM CHLORIDE, SODIUM LACTATE, POTASSIUM CHLORIDE, CALCIUM CHLORIDE 600; 310; 30; 20 MG/100ML; MG/100ML; MG/100ML; MG/100ML
INJECTION, SOLUTION INTRAVENOUS CONTINUOUS
Status: DISCONTINUED | OUTPATIENT
Start: 2022-09-11 | End: 2022-09-11

## 2022-09-11 RX ORDER — ASPIRIN 81 MG/1
81 TABLET, CHEWABLE ORAL ONCE
Status: DISCONTINUED | OUTPATIENT
Start: 2022-09-11 | End: 2022-09-11

## 2022-09-11 RX ORDER — ONDANSETRON 2 MG/ML
4 INJECTION INTRAMUSCULAR; INTRAVENOUS EVERY 6 HOURS PRN
Status: DISCONTINUED | OUTPATIENT
Start: 2022-09-11 | End: 2022-09-17 | Stop reason: HOSPADM

## 2022-09-11 RX ORDER — LANOLIN ALCOHOL/MO/W.PET/CERES
1000 CREAM (GRAM) TOPICAL DAILY
COMMUNITY
End: 2023-01-01

## 2022-09-11 RX ORDER — AMOXICILLIN 250 MG
2 CAPSULE ORAL 2 TIMES DAILY PRN
Status: DISCONTINUED | OUTPATIENT
Start: 2022-09-11 | End: 2022-09-17 | Stop reason: HOSPADM

## 2022-09-11 RX ORDER — CALCIUM CARBONATE 500 MG/1
1000 TABLET, CHEWABLE ORAL 4 TIMES DAILY PRN
Status: DISCONTINUED | OUTPATIENT
Start: 2022-09-11 | End: 2022-09-17 | Stop reason: HOSPADM

## 2022-09-11 RX ORDER — IOPAMIDOL 755 MG/ML
100 INJECTION, SOLUTION INTRAVASCULAR ONCE
Status: COMPLETED | OUTPATIENT
Start: 2022-09-11 | End: 2022-09-11

## 2022-09-11 RX ORDER — LIDOCAINE 40 MG/G
CREAM TOPICAL
Status: DISCONTINUED | OUTPATIENT
Start: 2022-09-11 | End: 2022-09-17 | Stop reason: HOSPADM

## 2022-09-11 RX ORDER — AMOXICILLIN 250 MG
1 CAPSULE ORAL 2 TIMES DAILY PRN
Status: DISCONTINUED | OUTPATIENT
Start: 2022-09-11 | End: 2022-09-17 | Stop reason: HOSPADM

## 2022-09-11 RX ORDER — LANOLIN ALCOHOL/MO/W.PET/CERES
1000 CREAM (GRAM) TOPICAL DAILY
Status: DISCONTINUED | OUTPATIENT
Start: 2022-09-12 | End: 2022-09-17 | Stop reason: HOSPADM

## 2022-09-11 RX ORDER — ONDANSETRON 4 MG/1
4 TABLET, ORALLY DISINTEGRATING ORAL EVERY 6 HOURS PRN
Status: DISCONTINUED | OUTPATIENT
Start: 2022-09-11 | End: 2022-09-17 | Stop reason: HOSPADM

## 2022-09-11 RX ORDER — SODIUM CHLORIDE 9 MG/ML
INJECTION, SOLUTION INTRAVENOUS CONTINUOUS
Status: DISCONTINUED | OUTPATIENT
Start: 2022-09-11 | End: 2022-09-12

## 2022-09-11 RX ORDER — FERROUS SULFATE 325(65) MG
325 TABLET ORAL DAILY
Status: DISCONTINUED | OUTPATIENT
Start: 2022-09-12 | End: 2022-09-17 | Stop reason: HOSPADM

## 2022-09-11 RX ADMIN — SODIUM CHLORIDE 8 MG/HR: 9 INJECTION, SOLUTION INTRAVENOUS at 17:17

## 2022-09-11 RX ADMIN — IOPAMIDOL 100 ML: 755 INJECTION, SOLUTION INTRAVENOUS at 13:49

## 2022-09-11 RX ADMIN — SODIUM CHLORIDE 1000 ML: 9 INJECTION, SOLUTION INTRAVENOUS at 12:35

## 2022-09-11 RX ADMIN — PANTOPRAZOLE SODIUM 40 MG: 40 INJECTION, POWDER, FOR SOLUTION INTRAVENOUS at 12:29

## 2022-09-11 RX ADMIN — SODIUM CHLORIDE, POTASSIUM CHLORIDE, SODIUM LACTATE AND CALCIUM CHLORIDE: 600; 310; 30; 20 INJECTION, SOLUTION INTRAVENOUS at 17:17

## 2022-09-11 ASSESSMENT — ACTIVITIES OF DAILY LIVING (ADL)
ADLS_ACUITY_SCORE: 35
WALKING_OR_CLIMBING_STAIRS_DIFFICULTY: NO
CONCENTRATING,_REMEMBERING_OR_MAKING_DECISIONS_DIFFICULTY: NO
DOING_ERRANDS_INDEPENDENTLY_DIFFICULTY: YES
ADLS_ACUITY_SCORE: 35
CHANGE_IN_FUNCTIONAL_STATUS_SINCE_ONSET_OF_CURRENT_ILLNESS/INJURY: NO
ADLS_ACUITY_SCORE: 35
TOILETING_ISSUES: NO
DIFFICULTY_COMMUNICATING: NO
DIFFICULTY_EATING/SWALLOWING: NO
HEARING_DIFFICULTY_OR_DEAF: NO
ADLS_ACUITY_SCORE: 35
FALL_HISTORY_WITHIN_LAST_SIX_MONTHS: NO
WEAR_GLASSES_OR_BLIND: YES
ADLS_ACUITY_SCORE: 22
ADLS_ACUITY_SCORE: 35
DEPENDENT_IADLS:: TRANSPORTATION;SHOPPING
DRESSING/BATHING_DIFFICULTY: NO

## 2022-09-11 NOTE — ED PROVIDER NOTES
EMERGENCY DEPARTMENT ENCOUNTER      NAME: Denton Hobbs  AGE: 87 year old male  YOB: 1935  MRN: [id number]  EVALUATION DATE & TIME: 9/11/2022 12:06 PM    PCP: Jarad Prater    ED PROVIDER: Levon Donahue M.D.      Chief Complaint   Patient presents with     GI Bleeding         FINAL IMPRESSION:  1. Upper GI bleed    2. Enlarged prostate          ED COURSE & MEDICAL DECISION MAKING:    Pertinent Labs & Imaging studies reviewed. (See chart for details)  12:06 PM I met with the patient for the initial interview and physical examination. Discussed plan for treatment and workup in the ED.    12:10 PM Nurse paged STEMI overhead, plan to consult with cardiologist to discuss further plan for patient treatment  12:26 PM I spoke with Dr. uGevara, cardiology. Agrees to cancel STEMI Activation.   2:46 PM Staff reports patient while standing was lightheaded and dizzy and was put back on bed. ECG repeated   2:56 PM repeat exam benign, discussed findings and need for admission, tachycardia had improved.  3:04 PM I spoke with Dr. Humphreys, [facility]  3:35 PM I discussed the case with hospitalist, Dr Mccormack, who accepts the patient   5:20 PM was called to the room, patient had large tarry bowel movement.  He denied any new pain, slight tachycardia but otherwise blood pressure within acceptable range.  Order repeat hemoglobin and had nursing staff alert the hospitalist service.  5:45 PM I spoke with Danna Ellis PA-C, Urology   5:52 PM Repeat exam, benign. I discussed plan with patients' daughters who are now present at bedside  6:13 PM Consent for blood transfusion   6:32 PM updated hospitalist on patient drop in hemoglobin.      87 year old male presents to the Emergency Department for evaluation of bloody emesis, weakness. Patient appears non toxic with stable vitals signs, patient afebrile with no tachycardia or hypoxia, no increased work of breathing.  Lungs are clear and abdomen benign.  Per EMS report,  concern for ST elevation changes on ECG, we did obtain bedside ECG which reported criteria for STEMI and STEMI activation was initiated however I had doubt secondary to patient denying any chest pain or shortness of breath whatsoever, ST changes appear to be secondary to left bundle branch block.  I did urgently speak with cardiology who agreed with interpretation that ST changes secondary to left bundle branch block and agreed to cancel STEMI activation.  Patient endorses episode of dark black emesis concerning for upper GI bleed.  Review of the medical record shows patient has history of ampullary/periampullary adenoma, he takes no medications for anticoagulation but is on iron supplements secondary to anemia.  Suspect that these findings could be related to his dark emesis.  Abdomen is otherwise benign with no focal tenderness to suggest appendicitis, diverticulitis, pancreatitis or cholecystitis.  We will obtain screening labs, chest x-ray and CT imaging the abdomen pelvis.  Patient was given IV fluids and Protonix.    Reassessment: Troponin negative and again ECG showed likely ST changes secondary to left bundle branch block.  Patient denying chest pain again with negative troponin nothing to suggest cardiac ischemia.  Did note potassium of 5.2 but again no ECG changes when compared to prior.  COVID-negative, chest x-ray reported no acute concerning findings.  CT imaging concerning for wall thickening of second portion of the duodenum, concern for GI bleed likely due to peptic ulcer disease.  Discussed patient case with GI who recommends continue Protonix, IV fluids and likely GI intervention.  CT also reported severe enlargement of the prostate gland and we were unable to pass Cash catheter here, discussed patient case with urology who will come to the bedside and help place Cash catheter.  I was called to room as the patient had large tarry bowel movement.  Recheck hemoglobin went from 9.2-7.9.  Consented  the patient for PRBC transfusion I did place order for transfusion.  Discussed these new findings with hospital service.    At the conclusion of the encounter I discussed the results of all of the tests and the disposition. The questions were answered and return precautions provided. The patient or family acknowledged understanding and was agreeable with the care plan.         MEDICATIONS GIVEN IN THE EMERGENCY:  Medications   0.9% sodium chloride BOLUS (0 mLs Intravenous Stopped 9/11/22 1428)     Followed by   sodium chloride 0.9% infusion ( Intravenous Not Given 9/11/22 1630)   pantoprazole (PROTONIX) 80 mg in sodium chloride 0.9 % 100 mL infusion (8 mg/hr Intravenous New Bag 9/11/22 1717)   cyanocobalamin (VITAMIN B-12) tablet 1,000 mcg (has no administration in time range)   ferrous sulfate (FEROSUL) tablet 325 mg (has no administration in time range)   lidocaine 1 % 0.1-1 mL (has no administration in time range)   lidocaine (LMX4) cream (has no administration in time range)   sodium chloride (PF) 0.9% PF flush 3 mL (3 mLs Intracatheter Not Given 9/11/22 1629)   sodium chloride (PF) 0.9% PF flush 3 mL (has no administration in time range)   melatonin tablet 1 mg (has no administration in time range)   lactated ringers infusion ( Intravenous New Bag 9/11/22 1717)   senna-docusate (SENOKOT-S/PERICOLACE) 8.6-50 MG per tablet 1 tablet (has no administration in time range)     Or   senna-docusate (SENOKOT-S/PERICOLACE) 8.6-50 MG per tablet 2 tablet (has no administration in time range)   ondansetron (ZOFRAN ODT) ODT tab 4 mg (has no administration in time range)     Or   ondansetron (ZOFRAN) injection 4 mg (has no administration in time range)   calcium carbonate (TUMS) chewable tablet 1,000 mg (has no administration in time range)   pantoprazole (PROTONIX) IV push injection 40 mg (40 mg Intravenous Given 9/11/22 1229)   iopamidol (ISOVUE-370) solution 100 mL (100 mLs Intravenous Given 9/11/22 1349)       NEW  PRESCRIPTIONS STARTED AT TODAY'S ER VISIT  New Prescriptions    No medications on file            =================================================================    HPI    Patient information was obtained from: Patient     Use of Intrepreter: N/A        Denton Hobbs is a 87 year old male with a pertinent medical history of hypertension, COPD, and anemia who presents to this ED by ambulance for evaluation of blood in stools and in vomit.     Per EMS, patient called for blood present in both stool and vomit. The patient was found to have an irregular heartbeat.     Patient reports that he started to experience generalized weakness and diaphoresis at 9 AM before and after emptying his catheter. He notes that he had a single episode of vomiting with a large globule of black-tinged blood following a stomach ache that he attributed to spoiled food that he had eaten yesterday. Patient endorses shortness of breath upon physical exertion. He currently takes iron supplements for anemia. Patient denies chest pain, fever, or additional symptoms or complaints at this time.     REVIEW OF SYSTEMS   Constitutional:  Denies fever, chills. Positive for generalized weakness  Respiratory:  Denies productive cough. Positive for shortness of breath (upon exertion)  Cardiovascular:  Denies chest pain, palpitations  GI:  Denies nausea. Positive for abdominal pain (resolved), vomiting (single episode with black blood present), blood present in stool  Musculoskeletal:  Denies any new muscle/joint swelling  Skin:  Denies rash   Neurologic:  Denies focal weakness  All systems negative except as marked.     PAST MEDICAL HISTORY:  Past Medical History:   Diagnosis Date     Arthritis      COPD (chronic obstructive pulmonary disease) (H)      CVA (cerebral vascular accident) (H) 12/4/2018     Hypertension      Prostate hypertrophy        PAST SURGICAL HISTORY:  Past Surgical History:   Procedure Laterality Date     GENITOURINARY SURGERY        HERNIORRHAPHY INGUINAL Left 10/11/2021    Procedure: LEFT INGUINAL HERNIA REPAIR WITH MESH;  Surgeon: Puma Preston MD;  Location: [facility] OR     HERNIORRHAPHY INGUINAL Right 8/19/2022    Procedure: HERNIORRHAPHY, INGUINAL, OPEN, RIGHT;  Surgeon: Flo Keyes MD;  Location: [facility] OR     TRANSRECTAL ULTRASONIC, TRANSURETHRAL RESECTION (TUR) OF PROSTATE CYST  2004    helped temporarily         CURRENT MEDICATIONS:    Prior to Admission medications    Medication Sig Start Date End Date Taking? Authorizing Provider   FEROSUL 325 (65 Fe) MG tablet TAKE 1 TABLET (325 MG) BY MOUTH DAILY (WITH BREAKFAST) 8/11/22   Reported, Patient        ALLERGIES:  Allergies   Allergen Reactions     Sulfa Drugs Rash     Info obtained off of 10/4/21 Entira pre op.       FAMILY HISTORY:  Family History   Problem Relation Age of Onset     No Known Problems Other         No heart, DM, HTN, or CA     No Known Problems Mother      No Known Problems Father        SOCIAL HISTORY:   Social History     Socioeconomic History     Marital status:      Number of children: 6   Tobacco Use     Smoking status: Former Smoker     Packs/day: 2.00     Years: 25.00     Pack years: 50.00     Start date: 1/1/1990     Smokeless tobacco: Never Used   Substance and Sexual Activity     Alcohol use: No     Comment: Alcoholic Drinks/day: Quit 1990     Drug use: Never       VITALS:  Patient Vitals for the past 24 hrs:   BP Temp Temp src Pulse Resp SpO2 Height Weight   09/11/22 1815 (!) 148/67 -- -- 107 28 98 % -- --   09/11/22 1800 134/67 -- -- 118 (!) 31 96 % -- --   09/11/22 1745 122/60 -- -- 110 29 99 % -- --   09/11/22 1730 115/66 -- -- 112 25 (!) 87 % -- --   09/11/22 1715 (!) 140/101 -- -- 111 (!) 32 98 % -- --   09/11/22 1700 132/84 -- -- 105 27 96 % -- --   09/11/22 1645 (!) 164/76 -- -- (!) 123 (!) 32 100 % -- --   09/11/22 1515 117/58 -- -- 102 (!) 31 100 % -- --   09/11/22 1500 114/55 -- -- 93 27 97 % -- --   09/11/22 1445  "120/56 -- -- 98 29 98 % -- --   09/11/22 1437 113/54 -- -- 85 24 97 % -- --   09/11/22 1330 (!) 151/68 -- -- 76 23 96 % -- --   09/11/22 1325 -- -- -- 78 18 97 % -- --   09/11/22 1315 (!) 155/70 -- -- 78 20 99 % -- --   09/11/22 1300 133/61 -- -- 77 19 96 % -- --   09/11/22 1245 135/60 -- -- 79 18 96 % -- --   09/11/22 1230 135/60 -- -- 84 26 97 % -- --   09/11/22 1220 -- -- -- -- -- -- 1.727 m (5' 8\") --   09/11/22 1209 (!) 153/60 98  F (36.7  C) Temporal 95 18 99 % -- 65.8 kg (145 lb)        PHYSICAL EXAM    Constitutional:  Awake, alert, in no apparent distress   HENT:  Normocephalic, Atraumatic. Bilateral external ears normal. Oropharynx moist. Nose normal. Neck- Normal range of motion with no guarding, No midline cervical tenderness, Supple, No stridor.   Eyes:  PERRL, EOMI with no signs of entrapment, Conjunctiva normal, No discharge.   Respiratory:  Normal breath sounds, No respiratory distress, No wheezing.    Cardiovascular: Tachycardia, Normal rhythm, No appreciable rubs or gallops.   GI:  Soft, No tenderness, No distension, No palpable masses  Musculoskeletal:  Intact distal pulses, No edema. Good range of motion in all major joints. No tenderness to palpation or major deformities noted.  Integument: Patient appears pale, no erythema or rash  Neurologic:  Alert & oriented, Normal motor function, Normal sensory function, No focal deficits noted.   Psychiatric:  Affect normal, Judgment normal, Mood normal.     LAB:  All pertinent labs reviewed and interpreted.  Results for orders placed or performed during the hospital encounter of 09/11/22   XR Chest Port 1 View    Impression    IMPRESSION:     Lucent, hyperinflated lungs with flattening of diaphragmatic curvature consistent with obstructive lung disease/air-trapping. No superimposed airspace opacities or interstitial thickening.    The cardiac silhouette is not enlarged. Wall calcification of the aortic arch and descending thoracic aorta as before. No new " mediastinal contour abnormality.    No pleural effusion or pneumothorax.   CTA Abdomen Pelvis with Contrast    Impression    IMPRESSION:    1.  Focal wall thickening of the second portion of the duodenum. Arterial blush within the lumen and accumulation on portal venous phase images in this location consistent with a source of GI bleed likely due to peptic ulcer disease. GI consultation for   potential upper endoscopy is suggested.  2.  Severe enlargement of the prostate gland which indents the bladder base. Bladder wall thickening and patulous ureters, likely due to chronic outlet obstruction. No hydronephrosis.  3.  Cystic lesion in the superior pancreas body measuring 14 x 28 mm with clear communication to the main pancreas duct, consistent with IPMN. Based on size, recommend evaluation with endoscopic ultrasound in 3-6 months as per guidelines below      REFERENCE:  Revisions of international consensus [place] guidelines for the management of IPMN of the pancreas. Pancreatology 2017;17(5):738-753.    Largest cyst between 20-30 mm: EUS in 3-6 months and then annual surveillance alternating between MRI and EUS as appropriate.     Result Value Ref Range    INR 1.14 0.85 - 1.15   Partial thromboplastin time   Result Value Ref Range    aPTT 29 22 - 38 Seconds   Basic metabolic panel   Result Value Ref Range    Sodium 133 (L) 136 - 145 mmol/L    Potassium 5.2 (H) 3.5 - 5.0 mmol/L    Chloride 102 98 - 107 mmol/L    Carbon Dioxide (CO2) 26 22 - 31 mmol/L    Anion Gap 5 5 - 18 mmol/L    Urea Nitrogen 28 8 - 28 mg/dL    Creatinine 0.79 0.70 - 1.30 mg/dL    Calcium 8.3 (L) 8.5 - 10.5 mg/dL    Glucose 94 70 - 125 mg/dL    GFR Estimate 87 >60 mL/min/1.73m2   Result Value Ref Range    Troponin I 0.03 0.00 - 0.29 ng/mL   Extra Blue Top Tube   Result Value Ref Range    Hold Specimen JIC    Extra Red Top Tube   Result Value Ref Range    Hold Specimen JIC    Extra Green Top (Lithium Heparin) Tube   Result Value Ref Range    Hold  Specimen JIC    Extra Purple Top Tube   Result Value Ref Range    Hold Specimen JIC    Extra Green Top (Lithium Heparin) ON ICE   Result Value Ref Range    Hold Specimen JIC    CBC with platelets and differential   Result Value Ref Range    WBC Count 15.0 (H) 4.0 - 11.0 10e3/uL    RBC Count 3.17 (L) 4.40 - 5.90 10e6/uL    Hemoglobin 9.2 (L) 13.3 - 17.7 g/dL    Hematocrit 29.1 (L) 40.0 - 53.0 %    MCV 92 78 - 100 fL    MCH 29.0 26.5 - 33.0 pg    MCHC 31.6 31.5 - 36.5 g/dL    RDW 14.4 10.0 - 15.0 %    Platelet Count 458 (H) 150 - 450 10e3/uL    % Neutrophils 82 %    % Lymphocytes 9 %    % Monocytes 7 %    % Eosinophils 0 %    % Basophils 1 %    % Immature Granulocytes 1 %    NRBCs per 100 WBC 0 <1 /100    Absolute Neutrophils 12.5 (H) 1.6 - 8.3 10e3/uL    Absolute Lymphocytes 1.4 0.8 - 5.3 10e3/uL    Absolute Monocytes 1.0 0.0 - 1.3 10e3/uL    Absolute Eosinophils 0.0 0.0 - 0.7 10e3/uL    Absolute Basophils 0.1 0.0 - 0.2 10e3/uL    Absolute Immature Granulocytes 0.1 <=0.4 10e3/uL    Absolute NRBCs 0.0 10e3/uL   Result Value Ref Range    Lipase 16 0 - 52 U/L   Hepatic function panel   Result Value Ref Range    Bilirubin Total 0.4 0.0 - 1.0 mg/dL    Bilirubin Direct 0.1 <=0.5 mg/dL    Protein Total 5.5 (L) 6.0 - 8.0 g/dL    Albumin 3.1 (L) 3.5 - 5.0 g/dL    Alkaline Phosphatase 48 45 - 120 U/L    AST 10 0 - 40 U/L    ALT <9 0 - 45 U/L   Asymptomatic COVID-19 Virus (Coronavirus) by PCR Nasopharyngeal    Specimen: Nasopharyngeal; Swab   Result Value Ref Range    SARS CoV2 PCR Negative Negative   CBC with platelets and differential   Result Value Ref Range    WBC Count 17.0 (H) 4.0 - 11.0 10e3/uL    RBC Count 2.75 (L) 4.40 - 5.90 10e6/uL    Hemoglobin 7.9 (L) 13.3 - 17.7 g/dL    Hematocrit 25.5 (L) 40.0 - 53.0 %    MCV 93 78 - 100 fL    MCH 28.7 26.5 - 33.0 pg    MCHC 31.0 (L) 31.5 - 36.5 g/dL    RDW 14.6 10.0 - 15.0 %    Platelet Count 503 (H) 150 - 450 10e3/uL    % Neutrophils 82 %    % Lymphocytes 11 %    % Monocytes 6  %    % Eosinophils 0 %    % Basophils 0 %    % Immature Granulocytes 1 %    NRBCs per 100 WBC 0 <1 /100    Absolute Neutrophils 14.1 (H) 1.6 - 8.3 10e3/uL    Absolute Lymphocytes 1.8 0.8 - 5.3 10e3/uL    Absolute Monocytes 1.0 0.0 - 1.3 10e3/uL    Absolute Eosinophils 0.0 0.0 - 0.7 10e3/uL    Absolute Basophils 0.1 0.0 - 0.2 10e3/uL    Absolute Immature Granulocytes 0.2 <=0.4 10e3/uL    Absolute NRBCs 0.0 10e3/uL       RADIOLOGY:  CTA Abdomen Pelvis with Contrast   Final Result   IMPRESSION:      1.  Focal wall thickening of the second portion of the duodenum. Arterial blush within the lumen and accumulation on portal venous phase images in this location consistent with a source of GI bleed likely due to peptic ulcer disease. GI consultation for    potential upper endoscopy is suggested.   2.  Severe enlargement of the prostate gland which indents the bladder base. Bladder wall thickening and patulous ureters, likely due to chronic outlet obstruction. No hydronephrosis.   3.  Cystic lesion in the superior pancreas body measuring 14 x 28 mm with clear communication to the main pancreas duct, consistent with IPMN. Based on size, recommend evaluation with endoscopic ultrasound in 3-6 months as per guidelines below         REFERENCE:   Revisions of international consensus [place] guidelines for the management of IPMN of the pancreas. Pancreatology 2017;17(5):738-753.      Largest cyst between 20-30 mm: EUS in 3-6 months and then annual surveillance alternating between MRI and EUS as appropriate.         XR Chest Port 1 View   Final Result   IMPRESSION:       Lucent, hyperinflated lungs with flattening of diaphragmatic curvature consistent with obstructive lung disease/air-trapping. No superimposed airspace opacities or interstitial thickening.      The cardiac silhouette is not enlarged. Wall calcification of the aortic arch and descending thoracic aorta as before. No new mediastinal contour abnormality.      No pleural  effusion or pneumothorax.             EKG:    Sinus rhythm, left bundle branch block, interpreted as acute STEMI however ECG does not meet Sgarbossa's Criteria and ST changes appear similar to prior ECG of August 19, 2022, no other specific ischemic changes or concerning dysrhythmias    Repeat ECG shows sinus rhythm, no specific ST acute ischemic changes, no concerning dysrhythmias or interval prolongation    I have independently reviewed and interpreted the EKG(s) documented above.    PROCEDURES    CRITICAL CARE NOTE:  Indication: GI bleed, anemia  Interventions: Concern for endorgan dysfunction from GI bleed, anemia, noted persistent tachycardia.  Patient was treated with Protonix infusion, IV fluids and repeat hemoglobin showed drop so PRBC transfusion.  Treatments: PRBC transfusion, Protonix infusion, IV fluids, GI consultation  Critical Care time excluding procedures: 90 minutes          I, Solomon Dudley, am serving as a scribe to document services personally performed by Levon Donahue MD, based on my observation and the provider's statements to me. I, Levon Donahue MD attest that Solomon Dudley is acting in a scribe capacity, has observed my performance of the services and has documented them in accordance with my direction.    Levon Donahue M.D.  Emergency Medicine  [facility] EMERGENCY DEPARTMENT  [facility] [facility] [place] [address] [phone]  Dept: [phone]     Levon Donahue MD  09/11/22 [id number]

## 2022-09-11 NOTE — ED NOTES
Bed: JNED-02  Expected date: 9/11/22  Expected time: 12:01 PM  Means of arrival: Ambulance  Comments:  [facility]  87yoM  Vomiting blood

## 2022-09-11 NOTE — H&P
[facility]    History and Physical - Hospitalist Service       Date of Admission:  9/11/2022    Assessment & Plan      Denton Hobbs is 87 year old male admitted on 9/11/2022 due to hematemesis and lightheadedness.  CT angiogram showed duodenal bleed, severe prostatic enlargement and suspected IPMN.  He was placed on IV fluid and pantoprazole on admission.    Suspected upper GI bleed  Duodenal bleed  Hematemesis  CT angiogram of the abdomen revealed focal wall thickening of the second portion of the duodenum with arterial blush within the lumen and accumulation on portal venous phase images in this location consistent with a source of GI bleed likely due to peptic ulcer disease.     N.p.o. for now  Continue IV fluid  He received pantoprazole 40 mg in the ER IV; continue pantoprazole drip  Cash catheter Cash urinary retention and urine output monitoring  Possible EGD tomorrow  Follow-up GI consult    Addendum  Patient reportedly had 1 episode of large melanotic stool.  Hemoglobin dropped to 7.9.  1 unit of PRBC ordered in the ER.  Will continue to monitor hemoglobin and transfuse as needed    Acute blood loss anemia  Hemoglobin dropped from 11.3 on 6/29/22 to 9.2 admission.  Possibly due to GI bleed  Monitor hemoglobin and transfuse as needed; he is agreeable to blood transfusion if necessary  EGD tomorrow as per GI  Continue PTA iron supplement    BPH  Chronic urinary retention requiring self intermittent catheterization  CT showed severe enlargement of the prostate gland which indents the bladder base with bladder wall thickening and patulous ureters, likely due to chronic outlet obstruction     Place Cash catheter for urinary retention  Cash catheter care  Monitor urine output  Send urine for urinalysis given elevated white cell count.     Suspected IPMN  CT showed cystic lesion in the superior pancreas body measuring 14 x 28 mm with clear communication to the main pancreas duct,  "consistent with IPMN for which endoscopic ultrasound in 3-6 months is recommended.    Repeat endoscopic ultrasound in the next 3 to 6 months  Follow-up GI consult      CVA with residual left-sided weakness  He uses walker at baseline.  Hold antiplatelet and prophylactic anticoagulation for now  PT/OT       Diet: NPO for Medical/Clinical Reasons Except for: Meds, Ice Chips    DVT Prophylaxis: Pneumatic Compression Devices  Cash Catheter: Not present  Central Lines: None  Cardiac Monitoring: None  Code Status: Full Code    Clinically Significant Risk Factors Present on Admission             # Hypoalbuminemia: Albumin = 3.1 g/dL (Ref range: 3.5 - 5.0 g/dL) on admission, will monitor as appropriate              Disposition Plan      Expected Discharge Date: 09/14/2022                The patient's care was discussed with the Patient.    Annalee Mccormack MD  Hospitalist Service  [facility]  Securely message with the Vocera Web Console (learn more here)  Text page via [employer] Paging/Directory         ______________________________________________________________________    Chief Complaint   Lightheadedness, hematemesis and melena.    History is obtained from the patient    History of Present Illness   Denton Hobbs is 87 year old male with history of BPH with indwelling Cash catheter, CVA, with residual left-sided weakness who presents to the ER due to lightheadedness, hematemesis and melena.    He was in his usual state of health until around 9 AM today when he suddenly developed lightheadedness associated with diaphoresis while attempting to self catheterize himself.  He passed melanotic stool described as \"dark black softer than usual\" and also had 1 episode of hematemesis afterwards.  He takes iron supplement at baseline for chronic anemia.  He denies fever, however he endorsed chills as well as exertional dyspnea.  He denies chest pain, palpitation, or syncope.  He denies orthopnea, PND, " cough or leg swelling. .    In the ER, initial blood pressure was 117/78, pulse 102, respiratory rate 31, temperature 36.7  C and oxygen saturation 100% on room air.  Significant laboratory findings include sodium 133, potassium 5.2, WBC 15.0, hemoglobin 9.2 with MCV of 92 and platelet 458.  Chest x-ray showed lucent, hyperinflated lungs with flattening of diaphragmatic curvature consistent with obstructive lung disease/air-trapping.  EKG in ER showed left bundle branch block.  As per cardiologist no evidence of new myocardial ischemia on EKG.  CT angiogram of the abdomen revealed focal wall thickening of the second portion of the duodenum with arterial blush within the lumen and accumulation on portal venous phase images in this location consistent with a source of GI bleed likely due to peptic ulcer disease, severe enlargement of the prostate gland which indents the bladder base with bladder wall thickening and patulous ureters, likely due to chronic outlet obstruction as well as cystic lesion in the superior pancreas body measuring 14 x 28 mm with clear communication to the main pancreas duct, consistent with IPMN for which endoscopic ultrasound in 3-6 months is recommended.     He received normal saline 1 L bolus followed by 125 mL/h, aspirin, IV pantoprazole.  He wants to be full code.  Review of Systems    The 10 point Review of Systems is negative other than noted in the HPI     Past Medical History    I have reviewed this patient's medical history and updated it with pertinent information if needed.   Past Medical History:   Diagnosis Date     Arthritis      COPD (chronic obstructive pulmonary disease) (H)      CVA (cerebral vascular accident) (H) 12/4/2018     Hypertension      Prostate hypertrophy        Past Surgical History   I have reviewed this patient's surgical history and updated it with pertinent information if needed.  Past Surgical History:   Procedure Laterality Date     GENITOURINARY SURGERY        HERNIORRHAPHY INGUINAL Left 10/11/2021    Procedure: LEFT INGUINAL HERNIA REPAIR WITH MESH;  Surgeon: Puma Preston MD;  Location: [facility] OR     HERNIORRHAPHY INGUINAL Right 8/19/2022    Procedure: HERNIORRHAPHY, INGUINAL, OPEN, RIGHT;  Surgeon: Flo Keyes MD;  Location: [facility] OR     TRANSRECTAL ULTRASONIC, TRANSURETHRAL RESECTION (TUR) OF PROSTATE CYST  2004    helped temporarily       Social History   I have reviewed this patient's social history and updated it with pertinent information if needed.  Social History     Tobacco Use     Smoking status: Former Smoker     Packs/day: 2.00     Years: 25.00     Pack years: 50.00     Start date: 1/1/1990     Smokeless tobacco: Never Used   Substance Use Topics     Alcohol use: No     Comment: Alcoholic Drinks/day: Quit 1990     Drug use: Never       Family History   I have reviewed this patient's family history and updated it with pertinent information if needed.  Family History   Problem Relation Age of Onset     No Known Problems Other         No heart, DM, HTN, or CA     No Known Problems Mother      No Known Problems Father        Prior to Admission Medications   Prior to Admission Medications   Prescriptions Last Dose Informant Patient Reported? Taking?   FEROSUL 325 (65 Fe) MG tablet 9/10/2022 at Unknown time  Yes Yes   Sig: TAKE 1 TABLET (325 MG) BY MOUTH DAILY (WITH BREAKFAST)   cyanocobalamin (VITAMIN B-12) 1000 MCG tablet 9/10/2022 at Unknown time  Yes Yes   Sig: Take 1,000 mcg by mouth daily      Facility-Administered Medications: None     Allergies   Allergies   Allergen Reactions     Sulfa Drugs Rash     Info obtained off of 10/4/21 [facility] [place] pre op.       Physical Exam   Vital Signs: Temp: 98  F (36.7  C) Temp src: Temporal BP: 117/58 Pulse: 102   Resp: (!) 31 SpO2: 100 %      Weight: 145 lbs 0 oz    General appearance: Awake, Alert, Cooperative, not in any obvious distress and appears stated age   HEENT: Normocephalic,  atraumatic, conjunctiva clear without icterus and ears without discharge  Lungs: Clear to auscultation bilaterally, no wheezing, good air exchange, normal work of breathing  Cardiovascular: Regular Rate and Rythm, normal apical impulse, normal S1 and S2, no lower extremity edema bilaterally  Abdomen: Soft, mild periumbilical tenderness+, active bowel sounds  Skin: Skin color, texture normal and bruising or bleeding. No rashes or lesions over face, neck, arms and legs, turgor normal.  Musculoskeletal: No bony deformities or joint tenderness. Normal ROM upon flexion & extension.   Neurologic: Alert & Oriented X 3, Facial symmetry preserved and upper & lower extremities moving well with symmetry  Psychiatric: Calm, normal eye contact and normal affect      Data   Data reviewed today: I reviewed all medications, new labs and imaging results over the last 24 hours. I personally reviewed the EKG tracing showing LBBB.    Recent Results (from the past 24 hour(s))   XR Chest Port 1 View    Narrative    EXAM: XR CHEST PORT 1 VIEW  LOCATION: [facility]  DATE/TIME: 9/11/2022 12:32 PM    INDICATION: ST elevation myocardial infarction  COMPARISON: CT of the chest 03/29/2022      Impression    IMPRESSION:     Lucent, hyperinflated lungs with flattening of diaphragmatic curvature consistent with obstructive lung disease/air-trapping. No superimposed airspace opacities or interstitial thickening.    The cardiac silhouette is not enlarged. Wall calcification of the aortic arch and descending thoracic aorta as before. No new mediastinal contour abnormality.    No pleural effusion or pneumothorax.   CTA Abdomen Pelvis with Contrast    Narrative    EXAM: CTA ABDOMEN PELVIS WITH CONTRAST  LOCATION: [facility]  DATE/TIME: 9/11/2022 1:58 PM    INDICATION: Bloody emesis, concern for GI bleed  COMPARISON: CT of the chest, abdomen, and pelvis 03/29/2022  TECHNIQUE: CT angiogram abdomen pelvis  during arterial phase of injection of IV contrast. 2D and 3D MIP reconstructions were performed by the CT technologist. Dose reduction techniques were used.  CONTRAST: 100ml Isovue 370    FINDINGS:  ANGIOGRAM ABDOMEN/PELVIS: Mixed attenuation atheroma is present in the distal descending thoracic aorta but no plaque ulceration. Tortuous abdominal aorta with diffuse plaque. There is fusiform enlargement of the abdominal aorta which measures up to 2.8   cm in diameter. Atheromatous plaque extends to the iliac arteries but there is no iliac or common femoral aneurysm or flow-limiting stenosis. Celiac axis, SMA, and small caliber IVON are patent. There is predominantly noncalcified atheroma producing   luminal irregularity in the SMA and celiac axis. No proximal mesenteric branch truncation. Single right and left renal arteries. There is mild narrowing of the proximal left renal artery.    Focal endoluminal contrast within the second portion of the duodenum on the arterial phase images and accumulation on the portal venous phase consistent with a source of acute GI bleed. The wall of the duodenum at this level is thickened.    LOWER CHEST: Emphysema in the imaged lung bases associated with intervening interstitial opacities particularly in the posterior costophrenic sulci. No acute abnormalities in the basal lungs or pleural spaces.    HEPATOBILIARY: Normal.    PANCREAS: There is a 14 x 28 mm cyst arising from the superior pancreas body which has a clear communication with the pancreas duct consistent with an IPMN. The pancreas duct particularly in the body has mild dilation but tapers normally towards the   pancreas tail.    SPLEEN: Normal.    ADRENAL GLANDS: Normal.    KIDNEYS/BLADDER: No significant mass, stones, or hydronephrosis. There are simple or benign cysts. No follow up is needed. Both ureters are patulous. No ureteral calculi. There is diffuse bladder wall thickening. Small left lateral bladder  diverticulum.    BOWEL: Nondistended stomach. Duodenal abnormalities as described above. The third and fourth portions of the duodenum are normal caliber without wall thickening. Remainder of the small bowel is normal caliber. Sigmoid colonic diverticula are present. No   colonic wall thickening.    LYMPH NODES: Normal.    PELVIC ORGANS: Severely enlarged prostate gland which is heterogeneous attenuation. Transverse measurement of the prostate is 5.6 cm. Prostate gland indents the bladder base.    MUSCULOSKELETAL: Disc space narrowing is greatest at L2-L3 with there is slight retrolisthesis of L2 on L3. Degenerative osteophytes of the lower thoracic and lumbar spine. Unilateral left L5 pars defect.      Impression    IMPRESSION:    1.  Focal wall thickening of the second portion of the duodenum. Arterial blush within the lumen and accumulation on portal venous phase images in this location consistent with a source of GI bleed likely due to peptic ulcer disease. GI consultation for   potential upper endoscopy is suggested.  2.  Severe enlargement of the prostate gland which indents the bladder base. Bladder wall thickening and patulous ureters, likely due to chronic outlet obstruction. No hydronephrosis.  3.  Cystic lesion in the superior pancreas body measuring 14 x 28 mm with clear communication to the main pancreas duct, consistent with IPMN. Based on size, recommend evaluation with endoscopic ultrasound in 3-6 months as per guidelines below      REFERENCE:  Revisions of international consensus [place] guidelines for the management of IPMN of the pancreas. Pancreatology 2017;17(5):738-753.    Largest cyst between 20-30 mm: EUS in 3-6 months and then annual surveillance alternating between MRI and EUS as appropriate.

## 2022-09-11 NOTE — PHARMACY-ADMISSION MEDICATION HISTORY
Pharmacy Note - Admission Medication History    Pertinent Provider Information: none     ______________________________________________________________________    Prior To Admission (PTA) med list completed and updated in EMR.       PTA Med List   Medication Sig Last Dose     cyanocobalamin (VITAMIN B-12) 1000 MCG tablet Take 1,000 mcg by mouth daily 9/10/2022 at Unknown time     FEROSUL 325 (65 Fe) MG tablet TAKE 1 TABLET (325 MG) BY MOUTH DAILY (WITH BREAKFAST) 9/10/2022 at Unknown time       Information source(s): Patient and CareEverywhere/[facility]  Method of interview communication: in-person    Summary of Changes to PTA Med List  New: vitamin B12  Discontinued: none  Changed: none    Patient was asked about OTC/herbal products specifically.  PTA med list reflects this.    In the past week, patient estimated taking medication this percent of the time:  greater than 90%.    Allergies were reviewed, assessed, and updated with the patient.      Patient does not use any multi-dose medications prior to admission.    The information provided in this note is only as accurate as the sources available at the time of the update(s).    Thank you for the opportunity to participate in the care of this patient.    Jerri Ojeda  9/11/2022 12:43 PM

## 2022-09-11 NOTE — CONSULTS
"Care Management Initial Consult    General Information  Assessment completed with: Children, Arminda daughter via phone  Type of CM/SW Visit: Initial Assessment    Primary Care Provider verified and updated as needed: Yes   Readmission within the last 30 days: no previous admission in last 30 days      Reason for Consult: discharge planning  Advance Care Planning: Advance Care Planning Reviewed: no concerns identified          Communication Assessment  Patient's communication style: spoken language (English or Bilingual)                          Living Environment:   People in home: alone     Current living Arrangements: apartment (\"elevator access\")      Able to return to prior arrangements: yes       Family/Social Support:  Care provided by: self  Provides care for: no one     Children          Description of Support System: Supportive    Support Assessment: Adequate family and caregiver support, Adequate social supports, Patient communicates needs well met    Current Resources:   Patient receiving home care services: No     Community Resources: DME (self cath supplies)  Equipment currently used at home: walker, rolling (\"4ww\")  Supplies currently used at home: Incontinence Supplies, Other (\"self cath supplies, glasses\")    Employment/Financial:  Employment Status: retired     Employment/ Comments: \"no  benefits\"  Financial Concerns:     Referral to Financial Worker: No       Lifestyle & Psychosocial Needs:  Social Determinants of Health     Tobacco Use: Medium Risk     Smoking Tobacco Use: Former Smoker     Smokeless Tobacco Use: Never Used   Alcohol Use: Not on file   Financial Resource Strain: Not on file   Food Insecurity: Not on file   Transportation Needs: Not on file   Physical Activity: Not on file   Stress: Not on file   Social Connections: Not on file   Intimate Partner Violence: Not on file   Depression: Not on file   Housing Stability: Not on file       Functional Status:  Prior to " "admission patient needed assistance:   Dependent ADLs:: Ambulation-walker, Independent  Dependent IADLs:: Transportation, Shopping (\"daughter helps with transportation\")       Mental Health Status:          Chemical Dependency Status:                Values/Beliefs:  Spiritual, Cultural Beliefs, Buddhism Practices, Values that affect care:                 Additional Information:  Denton lives in an apartment alone. He is independent with most ADLs. He does not drive and his daughter helps with transportation and shopping needs.    He self caths at home. He uses a 4ww for mobility.    Unknown discharge needs at this time.    Daughter to transport at discharge.    Octavia Garcia RN      "

## 2022-09-11 NOTE — CONSULTS
[facility] DIGESTIVE HEALTH CONSULTATION    Denton Hobbs     [address]  [facility] [address]  87 year old male  Admission Date/Time: 9/11/2022 12:06 PM    Primary Care Provider:  Jarad Prater    Requesting Physician: Annalee Mccormack MD      CHIEF COMPLAINT:   Gi bleed    REASON FOR THE CONSULT:  Anemia, GI bleed    HPI:     This is an 88 yo man presenting with lightheadedness.  He usually straight caths himself and felt lightheaded and woozy when doing this.  He had an emesis this am which he feels was dark.  He also had a formed stool.  Nursing did not feel bloody. No output otherwise since 9 am    Pt has a complicated history.  He had anemia worked up prior to a hernia surgery.  EGD showed advanced adenoma with dysplasia.  Evaluated at tertiary hospital.  [place] too risky to remove.      Has felt generally well.  No nausea vomiting.  Usually no black or bloody stool.    No fevers or chills.  He has not been using NSAIDs      At this time, he denies chest pain, sob, cough, fevers, chills.      As stated no nausea or black stool since 9 am     CT angio showed focal wall thickening duodenum and bleed. Cystic pancreas lesion, IPMN     REVIEW OF SYSTEMS: 13 point review of systems is negative except that noted above.    PAST MEDICAL HISTORY:   Past Medical History:   Diagnosis Date     Arthritis      COPD (chronic obstructive pulmonary disease) (H)      CVA (cerebral vascular accident) (H) 12/4/2018     Hypertension      Prostate hypertrophy        PAST SURGICAL HISTORY:   Past Surgical History:   Procedure Laterality Date     GENITOURINARY SURGERY       HERNIORRHAPHY INGUINAL Left 10/11/2021    Procedure: LEFT INGUINAL HERNIA REPAIR WITH MESH;  Surgeon: Puma Preston MD;  Location: [facility] OR     HERNIORRHAPHY INGUINAL Right 8/19/2022    Procedure: HERNIORRHAPHY, INGUINAL, OPEN, RIGHT;  Surgeon: Flo Keyes MD;  Location: [facility] OR     TRANSRECTAL ULTRASONIC, TRANSURETHRAL  RESECTION (TUR) OF PROSTATE CYST  2004    helped temporarily       FAMILY HISTORY:   Family History   Problem Relation Age of Onset     No Known Problems Other         No heart, DM, HTN, or CA     No Known Problems Mother      No Known Problems Father        SOCIAL HISTORY:   Social History     Tobacco Use     Smoking status: Former Smoker     Packs/day: 2.00     Years: 25.00     Pack years: 50.00     Start date: 1/1/1990     Smokeless tobacco: Never Used   Substance Use Topics     Alcohol use: No     Comment: Alcoholic Drinks/day: Quit 1990        MEDS:  (Not in a hospital admission)      ALLERGIES/SENSITIVITIES: Sulfa drugs    MEDICATIONS:  Current Outpatient Medications   Medication Sig Dispense Refill     cyanocobalamin (VITAMIN B-12) 1000 MCG tablet Take 1,000 mcg by mouth daily       FEROSUL 325 (65 Fe) MG tablet TAKE 1 TABLET (325 MG) BY MOUTH DAILY (WITH BREAKFAST)         PHYSICAL EXAM:  Temp:  [98  F (36.7  C)] 98  F (36.7  C)  Pulse:  [] 102  Resp:  [18-31] 31  BP: (113-155)/(54-70) 117/58  SpO2:  [96 %-100 %] 100 %  Body mass index is 22.05 kg/m .  GEN: Well developed, well nourished 87 year old in no acute distress.  Pale  HEENT: sclera anicteric, moist mucous membranes.   LYMPH: No cervical lymphadenopathy  PULM: Nonlabored breathing. Breath sounds equal.   CARDIO: Regular rate  GI: Non-distended. Soft.  Non-tender to palpation.  No guarding. No rebound tenderness.  EXT: warm, no lower extremity edema  NEURO: Alert. No focal defects.   PSYCH: Mental status appropriate, mood and affect normal.    SKIN: No rashes  MSK: No joint abnormalities    LABORATORY DATA:  CBC RESULTS:   Recent Labs   Lab Test 09/11/22  1216   WBC 15.0*   RBC 3.17*   HGB 9.2*   HCT 29.1*   MCV 92   MCH 29.0   MCHC 31.6   RDW 14.4   *        CMP Results:   Recent Labs   Lab Test 09/11/22  1216   *   POTASSIUM 5.2*   CHLORIDE 102   CO2 26   ANIONGAP 5   GLC 94   BUN 28   CR 0.79   BILITOTAL 0.4   ALKPHOS 48   ALT  <9   AST 10        INR Results:   Recent Labs   Lab Test 09/11/22  1216   INR 1.14          RELEVANT IMAGING:      *as above    ASSESSMENT:     Abnormal imaging  Gi bleed  Suspect ? Adenoma.  Uncertain why he would also have PUD as no NSAIDs etc but certainly could  Malignancy should also be considered  Would normally EGD, but better with MAC  Stable at present with no recent bleed despite findings    PLAN:    IV PPI  IV fluids  Resuscitation Suspect will need transfusion  Will egd if urgent bleeding tonight  Hopefully can set up more electively.    Approximately *42 minutes of total time was spent providing patient care, including patient evaluation, reviewing documentation/test results and .           Stacy Humphreys MD  Thank you for the opportunity to participate in the care of this patient.   Please feel free to call me with any questions or concerns.  Phone number [phone].            CC: [facility], Jarad Prater

## 2022-09-11 NOTE — ED TRIAGE NOTES
Pt presents with shortness of breath with exertion and noted to have blood in stools and had a black colored vomit.  Pt is on iron.  Pt states that 0900 had a episode of diapiresis.        Triage Assessment     Row Name 09/11/22 1205       Triage Assessment (Adult)    Airway WDL WDL       Respiratory WDL    Respiratory WDL WDL       Skin Circulation/Temperature WDL    Skin Circulation/Temperature WDL WDL       Cardiac WDL    Cardiac WDL rhythm    Cardiac Rhythm radial pulse irregular       Peripheral/Neurovascular WDL    Peripheral Neurovascular WDL WDL       Cognitive/Neuro/Behavioral WDL    Cognitive/Neuro/Behavioral WDL WDL

## 2022-09-12 ENCOUNTER — ANESTHESIA EVENT (OUTPATIENT)
Dept: SURGERY | Facility: HOSPITAL | Age: 87
DRG: 356 | End: 2022-09-12
Payer: COMMERCIAL

## 2022-09-12 ENCOUNTER — ANESTHESIA (OUTPATIENT)
Dept: SURGERY | Facility: HOSPITAL | Age: 87
DRG: 356 | End: 2022-09-12
Payer: COMMERCIAL

## 2022-09-12 LAB
ALBUMIN SERPL-MCNC: 2.3 G/DL (ref 3.5–5)
ALP SERPL-CCNC: 35 U/L (ref 45–120)
ALT SERPL W P-5'-P-CCNC: <9 U/L (ref 0–45)
ANION GAP SERPL CALCULATED.3IONS-SCNC: 5 MMOL/L (ref 5–18)
AST SERPL W P-5'-P-CCNC: 9 U/L (ref 0–40)
BASOPHILS # BLD AUTO: 0.1 10E3/UL (ref 0–0.2)
BASOPHILS NFR BLD AUTO: 0 %
BILIRUB SERPL-MCNC: 1.1 MG/DL (ref 0–1)
BLD PROD TYP BPU: NORMAL
BLOOD COMPONENT TYPE: NORMAL
BUN SERPL-MCNC: 33 MG/DL (ref 8–28)
CALCIUM SERPL-MCNC: 7.4 MG/DL (ref 8.5–10.5)
CALCIUM, IONIZED MEASURED: 1.07 MMOL/L (ref 1.11–1.3)
CHLORIDE BLD-SCNC: 111 MMOL/L (ref 98–107)
CO2 SERPL-SCNC: 21 MMOL/L (ref 22–31)
CODING SYSTEM: NORMAL
CREAT SERPL-MCNC: 0.8 MG/DL (ref 0.7–1.3)
CROSSMATCH: NORMAL
EOSINOPHIL # BLD AUTO: 0 10E3/UL (ref 0–0.7)
EOSINOPHIL NFR BLD AUTO: 0 %
ERYTHROCYTE [DISTWIDTH] IN BLOOD BY AUTOMATED COUNT: 14.9 % (ref 10–15)
ERYTHROCYTE [DISTWIDTH] IN BLOOD BY AUTOMATED COUNT: 15.5 % (ref 10–15)
GFR SERPL CREATININE-BSD FRML MDRD: 87 ML/MIN/1.73M2
GLUCOSE BLD-MCNC: 113 MG/DL (ref 70–125)
HCT VFR BLD AUTO: 23.3 % (ref 40–53)
HCT VFR BLD AUTO: 25.2 % (ref 40–53)
HGB BLD-MCNC: 6.1 G/DL (ref 13.3–17.7)
HGB BLD-MCNC: 6.5 G/DL (ref 13.3–17.7)
HGB BLD-MCNC: 7.7 G/DL (ref 13.3–17.7)
HGB BLD-MCNC: 8.1 G/DL (ref 13.3–17.7)
HOLD SPECIMEN: NORMAL
IMM GRANULOCYTES # BLD: 0.2 10E3/UL
IMM GRANULOCYTES NFR BLD: 1 %
ION CA PH 7.4: 0.98 MMOL/L (ref 1.11–1.3)
ISSUE DATE AND TIME: NORMAL
LACTATE SERPL-SCNC: 1.4 MMOL/L (ref 0.7–2)
LYMPHOCYTES # BLD AUTO: 1.9 10E3/UL (ref 0.8–5.3)
LYMPHOCYTES NFR BLD AUTO: 11 %
MAGNESIUM SERPL-MCNC: 1.7 MG/DL (ref 1.8–2.6)
MCH RBC QN AUTO: 30 PG (ref 26.5–33)
MCH RBC QN AUTO: 30.2 PG (ref 26.5–33)
MCHC RBC AUTO-ENTMCNC: 32.1 G/DL (ref 31.5–36.5)
MCHC RBC AUTO-ENTMCNC: 33 G/DL (ref 31.5–36.5)
MCV RBC AUTO: 91 FL (ref 78–100)
MCV RBC AUTO: 93 FL (ref 78–100)
MONOCYTES # BLD AUTO: 2 10E3/UL (ref 0–1.3)
MONOCYTES NFR BLD AUTO: 11 %
NEUTROPHILS # BLD AUTO: 14.5 10E3/UL (ref 1.6–8.3)
NEUTROPHILS NFR BLD AUTO: 77 %
NRBC # BLD AUTO: 0 10E3/UL
NRBC BLD AUTO-RTO: 0 /100
PH: 7.24 (ref 7.35–7.45)
PLATELET # BLD AUTO: 277 10E3/UL (ref 150–450)
PLATELET # BLD AUTO: 351 10E3/UL (ref 150–450)
POTASSIUM BLD-SCNC: 4.6 MMOL/L (ref 3.5–5)
PROCALCITONIN SERPL-MCNC: 0.06 NG/ML (ref 0–0.49)
PROT SERPL-MCNC: 3.8 G/DL (ref 6–8)
RBC # BLD AUTO: 2.55 10E6/UL (ref 4.4–5.9)
RBC # BLD AUTO: 2.7 10E6/UL (ref 4.4–5.9)
SODIUM SERPL-SCNC: 137 MMOL/L (ref 136–145)
UNIT ABO/RH: NORMAL
UNIT NUMBER: NORMAL
UNIT STATUS: NORMAL
UNIT TYPE ISBT: 5100
UPPER GI ENDOSCOPY: NORMAL
WBC # BLD AUTO: 18.4 10E3/UL (ref 4–11)
WBC # BLD AUTO: 19.7 10E3/UL (ref 4–11)

## 2022-09-12 PROCEDURE — 36415 COLL VENOUS BLD VENIPUNCTURE: CPT | Performed by: INTERNAL MEDICINE

## 2022-09-12 PROCEDURE — 99233 SBSQ HOSP IP/OBS HIGH 50: CPT | Performed by: INTERNAL MEDICINE

## 2022-09-12 PROCEDURE — 85018 HEMOGLOBIN: CPT | Performed by: ANESTHESIOLOGY

## 2022-09-12 PROCEDURE — 250N000011 HC RX IP 250 OP 636: Performed by: INTERNAL MEDICINE

## 2022-09-12 PROCEDURE — 36415 COLL VENOUS BLD VENIPUNCTURE: CPT | Performed by: ANESTHESIOLOGY

## 2022-09-12 PROCEDURE — 85018 HEMOGLOBIN: CPT | Performed by: INTERNAL MEDICINE

## 2022-09-12 PROCEDURE — 710N000010 HC RECOVERY PHASE 1, LEVEL 2, PER MIN: Performed by: INTERNAL MEDICINE

## 2022-09-12 PROCEDURE — 278N000051 HC OR IMPLANT GENERAL: Performed by: INTERNAL MEDICINE

## 2022-09-12 PROCEDURE — 272N000001 HC OR GENERAL SUPPLY STERILE: Performed by: INTERNAL MEDICINE

## 2022-09-12 PROCEDURE — 999N000141 HC STATISTIC PRE-PROCEDURE NURSING ASSESSMENT: Performed by: INTERNAL MEDICINE

## 2022-09-12 PROCEDURE — 258N000003 HC RX IP 258 OP 636: Performed by: ANESTHESIOLOGY

## 2022-09-12 PROCEDURE — 250N000011 HC RX IP 250 OP 636: Performed by: NURSE ANESTHETIST, CERTIFIED REGISTERED

## 2022-09-12 PROCEDURE — 82330 ASSAY OF CALCIUM: CPT | Performed by: INTERNAL MEDICINE

## 2022-09-12 PROCEDURE — 83605 ASSAY OF LACTIC ACID: CPT | Performed by: INTERNAL MEDICINE

## 2022-09-12 PROCEDURE — 0W3P8ZZ CONTROL BLEEDING IN GASTROINTESTINAL TRACT, VIA NATURAL OR ARTIFICIAL OPENING ENDOSCOPIC: ICD-10-PCS | Performed by: INTERNAL MEDICINE

## 2022-09-12 PROCEDURE — 250N000011 HC RX IP 250 OP 636: Performed by: ANESTHESIOLOGY

## 2022-09-12 PROCEDURE — 250N000025 HC SEVOFLURANE, PER MIN: Performed by: INTERNAL MEDICINE

## 2022-09-12 PROCEDURE — P9045 ALBUMIN (HUMAN), 5%, 250 ML: HCPCS | Performed by: NURSE ANESTHETIST, CERTIFIED REGISTERED

## 2022-09-12 PROCEDURE — 83735 ASSAY OF MAGNESIUM: CPT | Performed by: INTERNAL MEDICINE

## 2022-09-12 PROCEDURE — C9113 INJ PANTOPRAZOLE SODIUM, VIA: HCPCS | Performed by: EMERGENCY MEDICINE

## 2022-09-12 PROCEDURE — 370N000017 HC ANESTHESIA TECHNICAL FEE, PER MIN: Performed by: INTERNAL MEDICINE

## 2022-09-12 PROCEDURE — 80053 COMPREHEN METABOLIC PANEL: CPT | Performed by: INTERNAL MEDICINE

## 2022-09-12 PROCEDURE — P9016 RBC LEUKOCYTES REDUCED: HCPCS | Performed by: INTERNAL MEDICINE

## 2022-09-12 PROCEDURE — 120N000001 HC R&B MED SURG/OB

## 2022-09-12 PROCEDURE — 84145 PROCALCITONIN (PCT): CPT | Performed by: INTERNAL MEDICINE

## 2022-09-12 PROCEDURE — 87040 BLOOD CULTURE FOR BACTERIA: CPT | Performed by: INTERNAL MEDICINE

## 2022-09-12 PROCEDURE — P9016 RBC LEUKOCYTES REDUCED: HCPCS | Performed by: ANESTHESIOLOGY

## 2022-09-12 PROCEDURE — 250N000009 HC RX 250: Performed by: NURSE ANESTHETIST, CERTIFIED REGISTERED

## 2022-09-12 PROCEDURE — 258N000003 HC RX IP 258 OP 636: Performed by: EMERGENCY MEDICINE

## 2022-09-12 PROCEDURE — 250N000011 HC RX IP 250 OP 636: Performed by: EMERGENCY MEDICINE

## 2022-09-12 PROCEDURE — 258N000003 HC RX IP 258 OP 636: Performed by: NURSE ANESTHETIST, CERTIFIED REGISTERED

## 2022-09-12 PROCEDURE — 360N000075 HC SURGERY LEVEL 2, PER MIN: Performed by: INTERNAL MEDICINE

## 2022-09-12 RX ORDER — ONDANSETRON 2 MG/ML
INJECTION INTRAMUSCULAR; INTRAVENOUS PRN
Status: DISCONTINUED | OUTPATIENT
Start: 2022-09-12 | End: 2022-09-12

## 2022-09-12 RX ORDER — CEFTRIAXONE 2 G/1
2 INJECTION, POWDER, FOR SOLUTION INTRAMUSCULAR; INTRAVENOUS EVERY 24 HOURS
Status: DISCONTINUED | OUTPATIENT
Start: 2022-09-12 | End: 2022-09-17 | Stop reason: HOSPADM

## 2022-09-12 RX ORDER — LIDOCAINE 40 MG/G
CREAM TOPICAL
Status: DISCONTINUED | OUTPATIENT
Start: 2022-09-12 | End: 2022-09-12 | Stop reason: HOSPADM

## 2022-09-12 RX ORDER — PROPOFOL 10 MG/ML
INJECTION, EMULSION INTRAVENOUS PRN
Status: DISCONTINUED | OUTPATIENT
Start: 2022-09-12 | End: 2022-09-12

## 2022-09-12 RX ORDER — ONDANSETRON 4 MG/1
4 TABLET, ORALLY DISINTEGRATING ORAL EVERY 30 MIN PRN
Status: DISCONTINUED | OUTPATIENT
Start: 2022-09-12 | End: 2022-09-12 | Stop reason: HOSPADM

## 2022-09-12 RX ORDER — CALCIUM CHLORIDE 100 MG/ML
INJECTION INTRAVENOUS; INTRAVENTRICULAR PRN
Status: DISCONTINUED | OUTPATIENT
Start: 2022-09-12 | End: 2022-09-12

## 2022-09-12 RX ORDER — MAGNESIUM SULFATE 4 G/50ML
4 INJECTION INTRAVENOUS ONCE
Status: COMPLETED | OUTPATIENT
Start: 2022-09-12 | End: 2022-09-13

## 2022-09-12 RX ORDER — FENTANYL CITRATE 50 UG/ML
25 INJECTION, SOLUTION INTRAMUSCULAR; INTRAVENOUS EVERY 5 MIN PRN
Status: DISCONTINUED | OUTPATIENT
Start: 2022-09-12 | End: 2022-09-12 | Stop reason: HOSPADM

## 2022-09-12 RX ORDER — ONDANSETRON 2 MG/ML
4 INJECTION INTRAMUSCULAR; INTRAVENOUS EVERY 30 MIN PRN
Status: DISCONTINUED | OUTPATIENT
Start: 2022-09-12 | End: 2022-09-12 | Stop reason: HOSPADM

## 2022-09-12 RX ORDER — KETAMINE HYDROCHLORIDE 10 MG/ML
INJECTION INTRAMUSCULAR; INTRAVENOUS PRN
Status: DISCONTINUED | OUTPATIENT
Start: 2022-09-12 | End: 2022-09-12

## 2022-09-12 RX ORDER — SODIUM CHLORIDE, SODIUM LACTATE, POTASSIUM CHLORIDE, CALCIUM CHLORIDE 600; 310; 30; 20 MG/100ML; MG/100ML; MG/100ML; MG/100ML
INJECTION, SOLUTION INTRAVENOUS CONTINUOUS
Status: DISCONTINUED | OUTPATIENT
Start: 2022-09-12 | End: 2022-09-12 | Stop reason: HOSPADM

## 2022-09-12 RX ORDER — MAGNESIUM SULFATE HEPTAHYDRATE 40 MG/ML
2 INJECTION, SOLUTION INTRAVENOUS ONCE
Status: DISCONTINUED | OUTPATIENT
Start: 2022-09-12 | End: 2022-09-12

## 2022-09-12 RX ORDER — LIDOCAINE HYDROCHLORIDE 10 MG/ML
INJECTION, SOLUTION INFILTRATION; PERINEURAL PRN
Status: DISCONTINUED | OUTPATIENT
Start: 2022-09-12 | End: 2022-09-12

## 2022-09-12 RX ORDER — GLYCOPYRROLATE 0.2 MG/ML
INJECTION, SOLUTION INTRAMUSCULAR; INTRAVENOUS PRN
Status: DISCONTINUED | OUTPATIENT
Start: 2022-09-12 | End: 2022-09-12

## 2022-09-12 RX ADMIN — KETAMINE HYDROCHLORIDE 50 MG: 10 INJECTION, SOLUTION INTRAMUSCULAR; INTRAVENOUS at 18:11

## 2022-09-12 RX ADMIN — PROPOFOL 100 MG: 10 INJECTION, EMULSION INTRAVENOUS at 18:11

## 2022-09-12 RX ADMIN — SODIUM CHLORIDE 8 MG/HR: 9 INJECTION, SOLUTION INTRAVENOUS at 13:27

## 2022-09-12 RX ADMIN — PHENYLEPHRINE HYDROCHLORIDE 100 MCG: 10 INJECTION INTRAVENOUS at 18:22

## 2022-09-12 RX ADMIN — SODIUM CHLORIDE, POTASSIUM CHLORIDE, SODIUM LACTATE AND CALCIUM CHLORIDE: 600; 310; 30; 20 INJECTION, SOLUTION INTRAVENOUS at 20:15

## 2022-09-12 RX ADMIN — SODIUM CHLORIDE 8 MG/HR: 9 INJECTION, SOLUTION INTRAVENOUS at 22:55

## 2022-09-12 RX ADMIN — LIDOCAINE HYDROCHLORIDE 2 ML: 10 INJECTION, SOLUTION INFILTRATION; PERINEURAL at 18:11

## 2022-09-12 RX ADMIN — ALBUMIN HUMAN: 0.05 INJECTION, SOLUTION INTRAVENOUS at 18:22

## 2022-09-12 RX ADMIN — GLYCOPYRROLATE 0.1 MG: 0.2 INJECTION, SOLUTION INTRAMUSCULAR; INTRAVENOUS at 18:11

## 2022-09-12 RX ADMIN — SUGAMMADEX 200 MG: 100 INJECTION, SOLUTION INTRAVENOUS at 18:37

## 2022-09-12 RX ADMIN — MAGNESIUM SULFATE HEPTAHYDRATE 4 G: 80 INJECTION, SOLUTION INTRAVENOUS at 20:11

## 2022-09-12 RX ADMIN — SODIUM CHLORIDE: 9 INJECTION, SOLUTION INTRAVENOUS at 00:21

## 2022-09-12 RX ADMIN — SODIUM CHLORIDE 8 MG/HR: 9 INJECTION, SOLUTION INTRAVENOUS at 03:00

## 2022-09-12 RX ADMIN — SODIUM CHLORIDE: 9 INJECTION, SOLUTION INTRAVENOUS at 13:28

## 2022-09-12 RX ADMIN — Medication 1 UNITS: at 18:23

## 2022-09-12 RX ADMIN — CEFTRIAXONE 2 G: 2 INJECTION, POWDER, FOR SOLUTION INTRAMUSCULAR; INTRAVENOUS at 14:57

## 2022-09-12 RX ADMIN — CALCIUM CHLORIDE 500 MG: 100 INJECTION INTRAVENOUS; INTRAVENTRICULAR at 18:35

## 2022-09-12 RX ADMIN — Medication 1 UNITS: at 18:22

## 2022-09-12 RX ADMIN — ROCURONIUM BROMIDE 50 MG: 50 INJECTION, SOLUTION INTRAVENOUS at 18:11

## 2022-09-12 RX ADMIN — ONDANSETRON 4 MG: 2 INJECTION INTRAMUSCULAR; INTRAVENOUS at 18:32

## 2022-09-12 ASSESSMENT — ACTIVITIES OF DAILY LIVING (ADL)
ADLS_ACUITY_SCORE: 28
ADLS_ACUITY_SCORE: 26
ADLS_ACUITY_SCORE: 26
ADLS_ACUITY_SCORE: 28
ADLS_ACUITY_SCORE: 28
ADLS_ACUITY_SCORE: 26
ADLS_ACUITY_SCORE: 28
ADLS_ACUITY_SCORE: 28
ADLS_ACUITY_SCORE: 26

## 2022-09-12 ASSESSMENT — COPD QUESTIONNAIRES: COPD: 1

## 2022-09-12 ASSESSMENT — LIFESTYLE VARIABLES: TOBACCO_USE: 1

## 2022-09-12 NOTE — ANESTHESIA CARE TRANSFER NOTE
Patient: Denton Hobbs    Procedure: Procedure(s):  ESOPHAGOGASTRODUODENOSCOPY (EGD)       Diagnosis: Upper GI bleed [K92.2]  Diagnosis Additional Information: No value filed.    Anesthesia Type:   General     Note:    Oropharynx: oropharynx clear of all foreign objects  Level of Consciousness: awake  Oxygen Supplementation: face mask  Level of Supplemental Oxygen (L/min / FiO2): 6  Independent Airway: airway patency satisfactory and stable  Dentition: dentition unchanged  Vital Signs Stable: post-procedure vital signs reviewed and stable  Report to RN Given: handoff report given  Patient transferred to: PACU    Handoff Report: Identifed the Patient, Identified the Reponsible Provider, Reviewed the pertinent medical history, Discussed the surgical course, Reviewed Intra-OP anesthesia mangement and issues during anesthesia, Set expectations for post-procedure period and Allowed opportunity for questions and acknowledgement of understanding      Vitals:  Vitals Value Taken Time   /77 1856   Temp 97f    Pulse 88    Resp 16    SpO2 97%        Electronically Signed By: DONALD Edmonds CRNA  September 12, 2022  6:55 PM

## 2022-09-12 NOTE — PROGRESS NOTES
[facility] Progress Note    Subjective:  Melena/hematochezia overnight. No pain. Appetite good. Required 1 U PRBC transfusion. 4 point ROS otherwise neg.    Objective:  Vital signs in last 24 hours  Temp:  [97.6  F (36.4  C)-98.2  F (36.8  C)] 97.6  F (36.4  C)  Pulse:  [] 97  Resp:  [16-32] 16  BP: ()/() 121/56  SpO2:  [87 %-100 %] 98 % O2 Device: None (Room air)      Gen: A&Ox3, NAD  Eyes: No icterus  Pulmonary: CTA bilaterally  Cardiovascular: RRR,S1, S2  Gastrointestinal: Soft, NTTP, BS+, no rebound or guarding  Extrem: PPI, no c/c/e    LABORATORY    ELECTROLYTE PANEL   Recent Labs   Lab 09/11/22  1216   *   POTASSIUM 5.2*   CHLORIDE 102   CO2 26   GLC 94   CR 0.79   BUN 28      HEMATOLOGY PANEL   Recent Labs   Lab 09/12/22  0617 09/12/22  0047 09/11/22  1723 09/11/22  1216   HGB 7.7* 6.5* 7.9* 9.2*   MCV 91  --  93 92   WBC 18.4*  --  17.0* 15.0*     --  503* 458*   INR  --   --   --  1.14      LIVER AND PANCREAS PANEL   Recent Labs   Lab 09/11/22  1216   AST 10   ALT <9   ALKPHOS 48   BILITOTAL 0.4   LIPASE 16     IMAGING STUDIES    XR Chest Port 1 View    Result Date: 9/11/2022  EXAM: XR CHEST PORT 1 VIEW LOCATION: [facility] DATE/TIME: 9/11/2022 12:32 PM INDICATION: ST elevation myocardial infarction COMPARISON: CT of the chest 03/29/2022     IMPRESSION: Lucent, hyperinflated lungs with flattening of diaphragmatic curvature consistent with obstructive lung disease/air-trapping. No superimposed airspace opacities or interstitial thickening. The cardiac silhouette is not enlarged. Wall calcification of the aortic arch and descending thoracic aorta as before. No new mediastinal contour abnormality. No pleural effusion or pneumothorax.    I have reviewed the current diagnostic and laboratory tests.              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 One episode of hematemesis, now with hematochezia/melean, suggestive of brisk upper.      May be related to adenoma vs PUD. CT read suggestive of PUD but may just be adenoma. If this is adenoma hemorrhage, management options may be limited. Plan for EGD today.     - tele  - PPI drip  - NPO  - EGD today in ER  - trend hemoglobin Q8, transfuse to keep >7    Approximately 30 minutes of total time was spent providing patient care including patient evaluation, reviewing documentation/test results, and .                                                  Conner Navarrete MD  Thank you for the opportunity to participate in the care of this patient.   Please feel free to call me with any questions or concerns.  Phone number [phone] or if after 5PM [phone].

## 2022-09-12 NOTE — ANESTHESIA PROCEDURE NOTES
Airway       Patient location during procedure: OR       Procedure Start/Stop Times: 9/12/2022 6:15 PM  Staff -        CRNA: Drea Ortiz APRN CRNA       Performed By: CRNAIndications and Patient Condition       Indications for airway management: paul-procedural       Induction type:intravenous       Mask difficulty assessment: 2 - vent by mask + OA or adjuvant +/- NMBA    Final Airway Details       Final airway type: endotracheal airway       Successful airway: ETT - single  Endotracheal Airway Details        ETT size (mm): 8.0       Cuffed: yes       Successful intubation technique: direct laryngoscopy       DL Blade Type: MAC 3       Grade View of Cords: 1       Adjucts: stylet and tooth guard       Position: Right       Measured from: lips       Secured at (cm): 22       Bite block used: None    Post intubation assessment        Placement verified by: capnometry, equal breath sounds and chest rise        Number of attempts at approach: 1       Number of other approaches attempted: 0       Secured with: silk tape       Ease of procedure: easy       Dentition: Intact and Unchanged    Medication(s) Administered   Medication Administration Time: 9/12/2022 6:15 PM

## 2022-09-12 NOTE — ED NOTES
"[facility] ED Handoff Report    ED Chief Complaint: GI bleed    ED Diagnosis:  (K92.2) Upper GI bleed  Comment: Multiple bloody stools & emesis  Plan: Waiting for blood..    (N40.0) Enlarged prostate  Comment:   Plan: Catheter placed by urology.       PMH:    Past Medical History:   Diagnosis Date     Arthritis      COPD (chronic obstructive pulmonary disease) (H)      CVA (cerebral vascular accident) (H) 12/4/2018     Hypertension      Prostate hypertrophy         Code Status:  Full Code     Falls Risk: Yes Band: Applied    Current Living Situation/Residence: lives with a significant other. Currently spouse in SNF from car accident. Patient alone in house. Daughter is nearby and helps patient.    Elimination Status: Continent: Yes     Activity Level: SBA w/ walker    Patients Preferred Language:  English     Needed: No    Vital Signs:  /53   Pulse 102   Temp 98  F (36.7  C) (Temporal)   Resp 26   Ht 1.727 m (5' 8\")   Wt 65.8 kg (145 lb)   SpO2 97%   BMI 22.05 kg/m       Cardiac Rhythm: Sinus tach    Pain Score: 0/10    Is the Patient Confused:  No    Last Food or Drink: 09/11/22     Focused Assessment:  Patient here for GI bleed. Has bloody stools and emesis. Hgb dropped full point and blood is ordered. Patient self caths at home but was unable to preform; catheter placed by urology in the ER tonight.     Tests Performed: Done: Labs and Imaging    Treatments Provided: IVF, Catheter, labs    Family Dynamics/Concerns: Yes; please explain: Pt currently lives alone with spouse in SNF from car accident. Daughter helps pt.     Family Updated On Visitor Policy: Yes    Plan of Care Communicated to Family: Yes    Who Was Updated about Plan of Care: Daughter    Belongings Checklist Done and Signed by Patient: Yes    Medications sent with patient: none    Covid: asymptomatic , negative    Additional Information:     RN: Allen Olivares RN   9/11/2022 7:29 PM     "

## 2022-09-12 NOTE — CONSULTS
[facility] CONSULTATION    Type of Consult: inpatient  Place of Service: [facility]   Reason for consult: Urinary retention, difficult catheter placement  Request for consult by: Dr. Mccormack    History of present illness:   Denton Hobbs is a 87 year old male that is admitted with a suspected upper GI bleed. Urology is being consulted for assistance with Cash catheter placement for management of urinary retention. History obtained through patient and chart review.     Patient presented to the emergency department today due to hematemesis and lightheadedness.  In the ED, vitals stable.  Labs notable for WBC 15-->17, hemoglobin 9.2-->7.9, creatinine 0.79, potassium 5.2.  CTA abdomen/pelvis urologically notable for severe enlargement of the prostate gland which indents the bladder base.  Bladder wall thickening and patulous ureters due to chronic outlet obstruction.  No hydronephrosis.  Performs straight catheterizations multiple times daily due to urinary retention issue secondary to his large prostate.  He has not been able to self cath since 9 AM this morning due to his current illness.  Unable to void in the emergency department.  Multiple ER staff attempted to place Cash catheter but were unsuccessful.  Urology consulted for assistance with catheter placement.  Patient currently admitted for further work-up of his suspected upper GI bleed, GI consulted.    Patient states he has been self catheterizing for 10 years now due to his large prostate.  He has not had any issues with self cathing at home.  He is not able to void at all in between catheterizations.  Patient states he does not follow with a urologist.  I do not see him with an our system.  Patient does not take finasteride or Flomax, as he has not been interested in these medications.  No known prostate cancer or elevated PSAs.  No history of recurrent UTIs.    At this time, patient feels weak and feels like his bladder is  full.    Past Medical History:  Past Medical History:   Diagnosis Date     Arthritis      COPD (chronic obstructive pulmonary disease) (H)      CVA (cerebral vascular accident) (H) 12/4/2018     Hypertension      Prostate hypertrophy        Past Surgical History:  Past Surgical History:   Procedure Laterality Date     GENITOURINARY SURGERY       HERNIORRHAPHY INGUINAL Left 10/11/2021    Procedure: LEFT INGUINAL HERNIA REPAIR WITH MESH;  Surgeon: Puma Preston MD;  Location: [facility] OR     HERNIORRHAPHY INGUINAL Right 8/19/2022    Procedure: HERNIORRHAPHY, INGUINAL, OPEN, RIGHT;  Surgeon: Flo Keyes MD;  Location: [facility] OR     TRANSRECTAL ULTRASONIC, TRANSURETHRAL RESECTION (TUR) OF PROSTATE CYST  2004    helped temporarily       Social History:  Social History     Socioeconomic History     Marital status:      Spouse name: Not on file     Number of children: 6     Years of education: Not on file     Highest education level: Not on file   Occupational History     Not on file   Tobacco Use     Smoking status: Former Smoker     Packs/day: 2.00     Years: 25.00     Pack years: 50.00     Start date: 1/1/1990     Smokeless tobacco: Never Used   Substance and Sexual Activity     Alcohol use: No     Comment: Alcoholic Drinks/day: Quit 1990     Drug use: Never     Sexual activity: Not on file   Other Topics Concern     Parent/sibling w/ CABG, MI or angioplasty before 65F 55M? Not Asked   Social History Narrative     Not on file     Social Determinants of Health     Financial Resource Strain: Not on file   Food Insecurity: Not on file   Transportation Needs: Not on file   Physical Activity: Not on file   Stress: Not on file   Social Connections: Not on file   Intimate Partner Violence: Not on file   Housing Stability: Not on file       Medications:  Current Facility-Administered Medications   Medication     calcium carbonate (TUMS) chewable tablet 1,000 mg     [START ON 9/12/2022]  cyanocobalamin (VITAMIN B-12) tablet 1,000 mcg     [START ON 9/12/2022] ferrous sulfate (FEROSUL) tablet 325 mg     lactated ringers infusion     lidocaine (LMX4) cream     lidocaine 1 % 0.1-1 mL     melatonin tablet 1 mg     ondansetron (ZOFRAN ODT) ODT tab 4 mg    Or     ondansetron (ZOFRAN) injection 4 mg     pantoprazole (PROTONIX) 80 mg in sodium chloride 0.9 % 100 mL infusion     senna-docusate (SENOKOT-S/PERICOLACE) 8.6-50 MG per tablet 1 tablet    Or     senna-docusate (SENOKOT-S/PERICOLACE) 8.6-50 MG per tablet 2 tablet     sodium chloride (PF) 0.9% PF flush 3 mL     sodium chloride (PF) 0.9% PF flush 3 mL     sodium chloride 0.9% infusion     Current Outpatient Medications   Medication     cyanocobalamin (VITAMIN B-12) 1000 MCG tablet     FEROSUL 325 (65 Fe) MG tablet       Allergies:   Allergies   Allergen Reactions     Sulfa Drugs Rash     Info obtained off of 10/4/21 [facility] pre op.       Review of Systems:   A full 12 point review of systems was taken and is negative aside from what is noted above in the HPI    Physical Exam:  Temp:  [98  F (36.7  C)] 98  F (36.7  C)  Pulse:  [] 98  Resp:  [16-32] 16  BP: ()/() 104/51  SpO2:  [87 %-100 %] 98 %  General: NAD, alert, cooperative, frail and pale  Head: normocephalic, without abnormality / atraumatic  Abdomen: soft, non tender, non distended. Some suprapubic fullness/tenderness. No CVA tenderness noted  Geniturinary: Circumcised penis with no external lesions. Normal urethral meatus.   Skin: no rashes or lesions  Musculoskeletal: moves all extremities equally; no calf edema or tenderness  Psychological: alert and oriented, answers questions appropriately      Labs:   Creatinine   Date Value Ref Range Status   09/11/2022 0.79 0.70 - 1.30 mg/dL Final       Lab Results   Component Value Date    WBC 17.0 09/11/2022     Lab Results   Component Value Date    HGB 7.9 09/11/2022     Lab Results   Component Value Date     09/11/2022     Lab  Results: personally reviewed     Imaging:  EXAM: CTA ABDOMEN PELVIS WITH CONTRAST  LOCATION: [facility]  DATE/TIME: 9/11/2022 1:58 PM     INDICATION: Bloody emesis, concern for GI bleed  COMPARISON: CT of the chest, abdomen, and pelvis 03/29/2022  TECHNIQUE: CT angiogram abdomen pelvis during arterial phase of injection of IV contrast. 2D and 3D MIP reconstructions were performed by the CT technologist. Dose reduction techniques were used.  CONTRAST: 100ml Isovue 370     FINDINGS:  ANGIOGRAM ABDOMEN/PELVIS: Mixed attenuation atheroma is present in the distal descending thoracic aorta but no plaque ulceration. Tortuous abdominal aorta with diffuse plaque. There is fusiform enlargement of the abdominal aorta which measures up to 2.8   cm in diameter. Atheromatous plaque extends to the iliac arteries but there is no iliac or common femoral aneurysm or flow-limiting stenosis. Celiac axis, SMA, and small caliber IVON are patent. There is predominantly noncalcified atheroma producing   luminal irregularity in the SMA and celiac axis. No proximal mesenteric branch truncation. Single right and left renal arteries. There is mild narrowing of the proximal left renal artery.     Focal endoluminal contrast within the second portion of the duodenum on the arterial phase images and accumulation on the portal venous phase consistent with a source of acute GI bleed. The wall of the duodenum at this level is thickened.     LOWER CHEST: Emphysema in the imaged lung bases associated with intervening interstitial opacities particularly in the posterior costophrenic sulci. No acute abnormalities in the basal lungs or pleural spaces.     HEPATOBILIARY: Normal.     PANCREAS: There is a 14 x 28 mm cyst arising from the superior pancreas body which has a clear communication with the pancreas duct consistent with an IPMN. The pancreas duct particularly in the body has mild dilation but tapers normally towards the    pancreas tail.     SPLEEN: Normal.     ADRENAL GLANDS: Normal.     KIDNEYS/BLADDER: No significant mass, stones, or hydronephrosis. There are simple or benign cysts. No follow up is needed. Both ureters are patulous. No ureteral calculi. There is diffuse bladder wall thickening. Small left lateral bladder diverticulum.     BOWEL: Nondistended stomach. Duodenal abnormalities as described above. The third and fourth portions of the duodenum are normal caliber without wall thickening. Remainder of the small bowel is normal caliber. Sigmoid colonic diverticula are present. No   colonic wall thickening.     LYMPH NODES: Normal.     PELVIC ORGANS: Severely enlarged prostate gland which is heterogeneous attenuation. Transverse measurement of the prostate is 5.6 cm. Prostate gland indents the bladder base.     MUSCULOSKELETAL: Disc space narrowing is greatest at L2-L3 with there is slight retrolisthesis of L2 on L3. Degenerative osteophytes of the lower thoracic and lumbar spine. Unilateral left L5 pars defect.                                                                      IMPRESSION:     1.  Focal wall thickening of the second portion of the duodenum. Arterial blush within the lumen and accumulation on portal venous phase images in this location consistent with a source of GI bleed likely due to peptic ulcer disease. GI consultation for   potential upper endoscopy is suggested.  2.  Severe enlargement of the prostate gland which indents the bladder base. Bladder wall thickening and patulous ureters, likely due to chronic outlet obstruction. No hydronephrosis.  3.  Cystic lesion in the superior pancreas body measuring 14 x 28 mm with clear communication to the main pancreas duct, consistent with IPMN. Based on size, recommend evaluation with endoscopic ultrasound in 3-6 months as per guidelines below       I have personally reviewed the imaging reports above.     Assessment / Plan : Denton Hobbs is being seen by  [facility] for urinary retention, assistance with mraino catheter placement.     - 87-year-old male admitted with a suspected upper GI bleed.   - History of severely enlarged prostate causing urinary retention, which is managed with self caths multiple times daily. Patient has been doing this for 10 years but does not follow with a urologist.  - Marino catheter requested for admission given current GI illness. Patient in acute retention and ER staff unable to place a marino.   - I successfully placed an 18fr coude catheter at the bedside with great urine output following.   - Maintain marino catheter during hospitalization. Okay to remove marino and resume self cath once patient has recovered from illness and is ready to discharge home.  - Patient not interested in finasteride or Flomax at this time.  - Will arrange for follow up in our office in the next couple of weeks to discuss management of very enlarged prostate (seen on CT today).   - Urology will sign off at this time. Please reach out with any further questions.        Thank you for consulting [facility] regarding this patient's care. Please contact us with questions or concerns.     Danna Ellis PA-C  [facility]   [phone]      [facility] - CATHETER PLACEMENT PROCEDURE NOTE      Procedure:  The patient's urethra was prepped with Betadine solution. A 18 Fr coude marino catheter was liberally lubricated and inserted with minimal difficulty due to enlarged prostate. The catheter successfully reached the bladder and the catheter balloon was inflated with 10 cc of sterile saline. Clear yellow colored urine returned. The patient appeared to tolerate the procedure minimal discomfort.  The catheter was connected to a gravity drainage bag and secured to the Right thigh.     Volume of urine drained following placement: 1000 cc    Plan for Catheter: May remove and resume self catheterization once recovered from illness and nearing  discharge..      Please contact [facility] with any questions or concerns [phone].      Danna Ellis PA-C  [facility]   [phone]

## 2022-09-12 NOTE — SIGNIFICANT EVENT
Significant Event Note    Time of event: 1:24 AM September 12, 2022    Description of event:  Patient mated earlier today with GI bleed, suspected from duodenal ulcer.   Return to bleeding, hemoglobin dropped from 9.2-7.9 in 5 hours, received 1 unit PRBCs.  Posttransfusion hemoglobin 6.5 at 00:47, with transient hypotension, which rebounds quickly.  Plan:  Ordered 1 unit per BC, hemoglobin check after transfusion, or sooner, if signs of active GIB.    Discussed with: bedside nurse    Tiffany Roque MD

## 2022-09-12 NOTE — ANESTHESIA PREPROCEDURE EVALUATION
Anesthesia Pre-Procedure Evaluation    Patient: Denton Hobbs   MRN: [id number] : 1935        Procedure : Procedure(s):  ESOPHAGOGASTRODUODENOSCOPY (EGD)          Past Medical History:   Diagnosis Date     Arthritis      COPD (chronic obstructive pulmonary disease) (H)      CVA (cerebral vascular accident) (H) 2018     Hypertension      Prostate hypertrophy       Past Surgical History:   Procedure Laterality Date     GENITOURINARY SURGERY       HERNIORRHAPHY INGUINAL Left 10/11/2021    Procedure: LEFT INGUINAL HERNIA REPAIR WITH MESH;  Surgeon: Puma Preston MD;  Location: [facility] OR     HERNIORRHAPHY INGUINAL Right 2022    Procedure: HERNIORRHAPHY, INGUINAL, OPEN, RIGHT;  Surgeon: Flo Keyes MD;  Location: [facility] [place] OR     TRANSRECTAL ULTRASONIC, TRANSURETHRAL RESECTION (TUR) OF PROSTATE CYST      helped temporarily      Allergies   Allergen Reactions     Sulfa Drugs Rash     Info obtained off of 10/4/21 [facility] pre op.      Social History     Tobacco Use     Smoking status: Former Smoker     Packs/day: 2.00     Years: 25.00     Pack years: 50.00     Start date: 1990     Smokeless tobacco: Never Used   Substance Use Topics     Alcohol use: No     Comment: Alcoholic Drinks/day: Quit       Wt Readings from Last 1 Encounters:   22 65.8 kg (145 lb)        Anesthesia Evaluation   Pt has had prior anesthetic.     No history of anesthetic complications       ROS/MED HX  ENT/Pulmonary:     (+) tobacco use, Past use, 50  Pack-Year Hx,  COPD,     Neurologic:     (+) CVA (residual left sided weakness),     Cardiovascular:     (+) hypertension-----    METS/Exercise Tolerance:  Comment: Walks with a walker at baseline.  Denies SOB or CP.   Hematologic:     (+) anemia (Acute blood loss),     Musculoskeletal:       GI/Hepatic: Comment: Upper GI bleed     (-) GERD   Renal/Genitourinary:     (+) BPH (self cath),     Endo:       Psychiatric/Substance Use:       Infectious  Disease:       Malignancy:       Other:            Physical Exam    Airway        Mallampati: II   TM distance: > 3 FB   Neck ROM: full   Mouth opening: > 3 cm    Respiratory Devices and Support         Dental     Comment: Fair        Cardiovascular          Rhythm and rate: irregular and tachycardia     Pulmonary   pulmonary exam normal                OUTSIDE LABS:  CBC:   Lab Results   Component Value Date    WBC 18.4 (H) 09/12/2022    WBC 17.0 (H) 09/11/2022    HGB 7.7 (L) 09/12/2022    HGB 6.5 (LL) 09/12/2022    HCT 23.3 (L) 09/12/2022    HCT 25.5 (L) 09/11/2022     09/12/2022     (H) 09/11/2022     BMP:   Lab Results   Component Value Date     09/12/2022     (L) 09/11/2022    POTASSIUM 4.6 09/12/2022    POTASSIUM 5.2 (H) 09/11/2022    CHLORIDE 111 (H) 09/12/2022    CHLORIDE 102 09/11/2022    CO2 21 (L) 09/12/2022    CO2 26 09/11/2022    BUN 33 (H) 09/12/2022    BUN 28 09/11/2022    CR 0.80 09/12/2022    CR 0.79 09/11/2022     09/12/2022    GLC 94 09/11/2022     COAGS:   Lab Results   Component Value Date    PTT 29 09/11/2022    INR 1.14 09/11/2022     POC: No results found for: BGM, HCG, HCGS  HEPATIC:   Lab Results   Component Value Date    ALBUMIN 2.3 (L) 09/12/2022    PROTTOTAL 3.8 (L) 09/12/2022    ALT <9 09/12/2022    AST 9 09/12/2022    ALKPHOS 35 (L) 09/12/2022    BILITOTAL 1.1 (H) 09/12/2022     OTHER:   Lab Results   Component Value Date    LACT 1.4 09/12/2022    GINETTE 7.4 (L) 09/12/2022    MAG 1.7 (L) 09/12/2022    LIPASE 16 09/11/2022       Anesthesia Plan    ASA Status:  4   NPO Status:  NPO Appropriate    Anesthesia Type: General.     - Airway: ETT   Induction: Intravenous, Etomidate.           Consents    Anesthesia Plan(s) and associated risks, benefits, and realistic alternatives discussed. Questions answered and patient/representative(s) expressed understanding.     - Discussed: Risks, Benefits and Alternatives for BOTH SEDATION and the PROCEDURE were discussed      - Discussed with:  Patient      - Extended Intubation/Ventilatory Support Discussed: No.      - Patient is DNR/DNI Status: No    Use of blood products discussed: Yes.     - Discussed with: Patient.     - Consented: consented to blood products            Reason for refusal: other.     Postoperative Care       PONV prophylaxis: Ondansetron (or other 5HT-3)     Comments:    Other Comments: HgB 6.1    Transfuse x 2 PRBC in preop, recheck HgB, Additional PRBC on standby in OR.                Leesa Grey MD

## 2022-09-12 NOTE — PLAN OF CARE
Problem: Plan of Care - These are the overarching goals to be used throughout the patient stay.    Goal: Plan of Care Review/Shift Note  Description: The Plan of Care Review/Shift note should be completed every shift.  The Outcome Evaluation is a brief statement about your assessment that the patient is improving, declining, or no change.  This information will be displayed automatically on your shift note.  Outcome: Ongoing, Progressing     Problem: Plan of Care - These are the overarching goals to be used throughout the patient stay.    Goal: Absence of Hospital-Acquired Illness or Injury  Intervention: Identify and Manage Fall Risk  Recent Flowsheet Documentation  Taken 9/12/2022 0252 by Jacey Gruber RN  Safety Promotion/Fall Prevention:   activity supervised   assistive device/personal items within reach   clutter free environment maintained   Goal Outcome Evaluation:  Pt tolerating IV fluids without adverse effect. Pantoprazole gtt tolerated. Received 2u blood without adverse effect. HGB trending down. Remains NPO with ice chips. Cash remains patent. Pt utilizes call light appropriately. Plan for EGD today

## 2022-09-13 ENCOUNTER — APPOINTMENT (OUTPATIENT)
Dept: PHYSICAL THERAPY | Facility: HOSPITAL | Age: 87
DRG: 356 | End: 2022-09-13
Attending: INTERNAL MEDICINE
Payer: COMMERCIAL

## 2022-09-13 ENCOUNTER — APPOINTMENT (OUTPATIENT)
Dept: INTERVENTIONAL RADIOLOGY/VASCULAR | Facility: HOSPITAL | Age: 87
DRG: 356 | End: 2022-09-13
Attending: NURSE PRACTITIONER
Payer: COMMERCIAL

## 2022-09-13 ENCOUNTER — APPOINTMENT (OUTPATIENT)
Dept: OCCUPATIONAL THERAPY | Facility: HOSPITAL | Age: 87
DRG: 356 | End: 2022-09-13
Attending: INTERNAL MEDICINE
Payer: COMMERCIAL

## 2022-09-13 LAB
ALBUMIN SERPL-MCNC: 2 G/DL (ref 3.5–5)
ALP SERPL-CCNC: 29 U/L (ref 45–120)
ALT SERPL W P-5'-P-CCNC: <9 U/L (ref 0–45)
ANION GAP SERPL CALCULATED.3IONS-SCNC: 1 MMOL/L (ref 5–18)
AST SERPL W P-5'-P-CCNC: 12 U/L (ref 0–40)
BILIRUB SERPL-MCNC: 0.5 MG/DL (ref 0–1)
BLD PROD TYP BPU: NORMAL
BLD PROD TYP BPU: NORMAL
BLOOD COMPONENT TYPE: NORMAL
BLOOD COMPONENT TYPE: NORMAL
BUN SERPL-MCNC: 32 MG/DL (ref 8–28)
CALCIUM SERPL-MCNC: 7.1 MG/DL (ref 8.5–10.5)
CALCIUM, IONIZED MEASURED: 1.13 MMOL/L (ref 1.11–1.3)
CHLORIDE BLD-SCNC: 115 MMOL/L (ref 98–107)
CO2 SERPL-SCNC: 22 MMOL/L (ref 22–31)
CODING SYSTEM: NORMAL
CODING SYSTEM: NORMAL
CREAT SERPL-MCNC: 0.84 MG/DL (ref 0.7–1.3)
CROSSMATCH: NORMAL
CROSSMATCH: NORMAL
ERYTHROCYTE [DISTWIDTH] IN BLOOD BY AUTOMATED COUNT: 15.7 % (ref 10–15)
GFR SERPL CREATININE-BSD FRML MDRD: 85 ML/MIN/1.73M2
GLUCOSE BLD-MCNC: 121 MG/DL (ref 70–125)
HCT VFR BLD AUTO: 21.8 % (ref 40–53)
HGB BLD-MCNC: 6.7 G/DL (ref 13.3–17.7)
HGB BLD-MCNC: 7.2 G/DL (ref 13.3–17.7)
HGB BLD-MCNC: 9.8 G/DL (ref 13.3–17.7)
ION CA PH 7.4: 1.07 MMOL/L (ref 1.11–1.3)
ISSUE DATE AND TIME: NORMAL
ISSUE DATE AND TIME: NORMAL
MAGNESIUM SERPL-MCNC: 2.1 MG/DL (ref 1.8–2.6)
MCH RBC QN AUTO: 30.6 PG (ref 26.5–33)
MCHC RBC AUTO-ENTMCNC: 33 G/DL (ref 31.5–36.5)
MCV RBC AUTO: 93 FL (ref 78–100)
PH: 7.3 (ref 7.35–7.45)
PLATELET # BLD AUTO: 207 10E3/UL (ref 150–450)
POTASSIUM BLD-SCNC: 4.1 MMOL/L (ref 3.5–5)
PROT SERPL-MCNC: 3.6 G/DL (ref 6–8)
RBC # BLD AUTO: 2.35 10E6/UL (ref 4.4–5.9)
SODIUM SERPL-SCNC: 138 MMOL/L (ref 136–145)
UNIT ABO/RH: NORMAL
UNIT ABO/RH: NORMAL
UNIT NUMBER: NORMAL
UNIT NUMBER: NORMAL
UNIT STATUS: NORMAL
UNIT STATUS: NORMAL
UNIT TYPE ISBT: 5100
UNIT TYPE ISBT: 5100
WBC # BLD AUTO: 18.4 10E3/UL (ref 4–11)

## 2022-09-13 PROCEDURE — 272N000500 HC NEEDLE CR2

## 2022-09-13 PROCEDURE — P9016 RBC LEUKOCYTES REDUCED: HCPCS | Performed by: INTERNAL MEDICINE

## 2022-09-13 PROCEDURE — 37244 VASC EMBOLIZE/OCCLUDE BLEED: CPT

## 2022-09-13 PROCEDURE — 272N000653 HC COIL/EMBOLIC DEVICE CR8

## 2022-09-13 PROCEDURE — 258N000003 HC RX IP 258 OP 636: Performed by: EMERGENCY MEDICINE

## 2022-09-13 PROCEDURE — 99233 SBSQ HOSP IP/OBS HIGH 50: CPT | Performed by: INTERNAL MEDICINE

## 2022-09-13 PROCEDURE — 250N000011 HC RX IP 250 OP 636: Performed by: INTERNAL MEDICINE

## 2022-09-13 PROCEDURE — C1769 GUIDE WIRE: HCPCS

## 2022-09-13 PROCEDURE — 97535 SELF CARE MNGMENT TRAINING: CPT | Mod: GO

## 2022-09-13 PROCEDURE — 272N000566 HC SHEATH CR3

## 2022-09-13 PROCEDURE — 258N000001 HC RX 258: Performed by: INTERNAL MEDICINE

## 2022-09-13 PROCEDURE — 76937 US GUIDE VASCULAR ACCESS: CPT

## 2022-09-13 PROCEDURE — 272N000117 HC CATH CR2

## 2022-09-13 PROCEDURE — 04L53DZ OCCLUSION OF SUPERIOR MESENTERIC ARTERY WITH INTRALUMINAL DEVICE, PERCUTANEOUS APPROACH: ICD-10-PCS | Performed by: RADIOLOGY

## 2022-09-13 PROCEDURE — 97162 PT EVAL MOD COMPLEX 30 MIN: CPT | Mod: GP

## 2022-09-13 PROCEDURE — 82330 ASSAY OF CALCIUM: CPT | Performed by: INTERNAL MEDICINE

## 2022-09-13 PROCEDURE — 85027 COMPLETE CBC AUTOMATED: CPT | Performed by: INTERNAL MEDICINE

## 2022-09-13 PROCEDURE — 97165 OT EVAL LOW COMPLEX 30 MIN: CPT | Mod: GO

## 2022-09-13 PROCEDURE — 250N000011 HC RX IP 250 OP 636: Performed by: EMERGENCY MEDICINE

## 2022-09-13 PROCEDURE — 75774 ARTERY X-RAY EACH VESSEL: CPT

## 2022-09-13 PROCEDURE — 82040 ASSAY OF SERUM ALBUMIN: CPT | Performed by: INTERNAL MEDICINE

## 2022-09-13 PROCEDURE — 120N000001 HC R&B MED SURG/OB

## 2022-09-13 PROCEDURE — 36248 INS CATH ABD/L-EXT ART ADDL: CPT

## 2022-09-13 PROCEDURE — 99152 MOD SED SAME PHYS/QHP 5/>YRS: CPT

## 2022-09-13 PROCEDURE — 250N000011 HC RX IP 250 OP 636: Performed by: RADIOLOGY

## 2022-09-13 PROCEDURE — 36415 COLL VENOUS BLD VENIPUNCTURE: CPT | Performed by: INTERNAL MEDICINE

## 2022-09-13 PROCEDURE — 85018 HEMOGLOBIN: CPT | Performed by: INTERNAL MEDICINE

## 2022-09-13 PROCEDURE — 80053 COMPREHEN METABOLIC PANEL: CPT | Performed by: INTERNAL MEDICINE

## 2022-09-13 PROCEDURE — 255N000002 HC RX 255 OP 636: Performed by: INTERNAL MEDICINE

## 2022-09-13 PROCEDURE — 36247 INS CATH ABD/L-EXT ART 3RD: CPT

## 2022-09-13 PROCEDURE — C9113 INJ PANTOPRAZOLE SODIUM, VIA: HCPCS | Performed by: EMERGENCY MEDICINE

## 2022-09-13 PROCEDURE — 83735 ASSAY OF MAGNESIUM: CPT | Performed by: INTERNAL MEDICINE

## 2022-09-13 PROCEDURE — 250N000009 HC RX 250: Performed by: RADIOLOGY

## 2022-09-13 RX ORDER — NALOXONE HYDROCHLORIDE 0.4 MG/ML
0.4 INJECTION, SOLUTION INTRAMUSCULAR; INTRAVENOUS; SUBCUTANEOUS
Status: DISCONTINUED | OUTPATIENT
Start: 2022-09-13 | End: 2022-09-17 | Stop reason: HOSPADM

## 2022-09-13 RX ORDER — ACETAMINOPHEN 325 MG/1
650 TABLET ORAL EVERY 4 HOURS PRN
Status: DISCONTINUED | OUTPATIENT
Start: 2022-09-13 | End: 2022-09-17 | Stop reason: HOSPADM

## 2022-09-13 RX ORDER — IODIXANOL 320 MG/ML
200 INJECTION, SOLUTION INTRAVASCULAR ONCE
Status: COMPLETED | OUTPATIENT
Start: 2022-09-13 | End: 2022-09-13

## 2022-09-13 RX ORDER — FENTANYL CITRATE 50 UG/ML
25-50 INJECTION, SOLUTION INTRAMUSCULAR; INTRAVENOUS EVERY 5 MIN PRN
Status: DISCONTINUED | OUTPATIENT
Start: 2022-09-13 | End: 2022-09-14

## 2022-09-13 RX ORDER — FLUMAZENIL 0.1 MG/ML
0.2 INJECTION, SOLUTION INTRAVENOUS
Status: DISCONTINUED | OUTPATIENT
Start: 2022-09-13 | End: 2022-09-17 | Stop reason: HOSPADM

## 2022-09-13 RX ORDER — NALOXONE HYDROCHLORIDE 0.4 MG/ML
0.2 INJECTION, SOLUTION INTRAMUSCULAR; INTRAVENOUS; SUBCUTANEOUS
Status: DISCONTINUED | OUTPATIENT
Start: 2022-09-13 | End: 2022-09-17 | Stop reason: HOSPADM

## 2022-09-13 RX ORDER — ACETAMINOPHEN 650 MG/1
650 SUPPOSITORY RECTAL EVERY 4 HOURS PRN
Status: DISCONTINUED | OUTPATIENT
Start: 2022-09-13 | End: 2022-09-17 | Stop reason: HOSPADM

## 2022-09-13 RX ORDER — HEPARIN SODIUM 200 [USP'U]/100ML
1 INJECTION, SOLUTION INTRAVENOUS CONTINUOUS PRN
Status: DISCONTINUED | OUTPATIENT
Start: 2022-09-13 | End: 2022-09-17 | Stop reason: HOSPADM

## 2022-09-13 RX ADMIN — LIDOCAINE HYDROCHLORIDE 10 ML: 10 INJECTION, SOLUTION EPIDURAL; INFILTRATION; INTRACAUDAL; PERINEURAL at 13:45

## 2022-09-13 RX ADMIN — MIDAZOLAM HYDROCHLORIDE 0.5 MG: 1 INJECTION, SOLUTION INTRAMUSCULAR; INTRAVENOUS at 13:46

## 2022-09-13 RX ADMIN — SODIUM CHLORIDE 8 MG/HR: 9 INJECTION, SOLUTION INTRAVENOUS at 09:20

## 2022-09-13 RX ADMIN — FENTANYL CITRATE 25 MCG: 50 INJECTION, SOLUTION INTRAMUSCULAR; INTRAVENOUS at 13:48

## 2022-09-13 RX ADMIN — CEFTRIAXONE 2 G: 2 INJECTION, POWDER, FOR SOLUTION INTRAMUSCULAR; INTRAVENOUS at 15:17

## 2022-09-13 RX ADMIN — DEXTROSE AND SODIUM CHLORIDE: 5; 450 INJECTION, SOLUTION INTRAVENOUS at 01:20

## 2022-09-13 RX ADMIN — SODIUM CHLORIDE 8 MG/HR: 9 INJECTION, SOLUTION INTRAVENOUS at 18:32

## 2022-09-13 RX ADMIN — IODIXANOL 80 ML: 320 INJECTION, SOLUTION INTRAVASCULAR at 14:33

## 2022-09-13 RX ADMIN — DEXTROSE AND SODIUM CHLORIDE: 5; 450 INJECTION, SOLUTION INTRAVENOUS at 15:16

## 2022-09-13 ASSESSMENT — ACTIVITIES OF DAILY LIVING (ADL)
ADLS_ACUITY_SCORE: 28
ADLS_ACUITY_SCORE: 24
ADLS_ACUITY_SCORE: 28
ADLS_ACUITY_SCORE: 26
ADLS_ACUITY_SCORE: 28
ADLS_ACUITY_SCORE: 26
ADLS_ACUITY_SCORE: 28

## 2022-09-13 NOTE — PROCEDURES
[facility]    Procedure: IR Procedure Note    Date/Time: 9/13/2022 2:38 PM  Performed by: Amaury Joyce MD  Authorized by: Amaury Joyce MD       UNIVERSAL PROTOCOL   Site Marked: NA  Prior Images Obtained and Reviewed:  Yes  Required items: Required blood products, implants, devices and special equipment available    Patient identity confirmed:  Verbally with patient, arm band, provided demographic data and hospital-assigned identification number  Patient was reevaluated immediately before administering moderate or deep sedation or anesthesia  Confirmation Checklist:  Patient's identity using two indicators, relevant allergies, procedure was appropriate and matched the consent or emergent situation and correct equipment/implants were available  Time out: Immediately prior to the procedure a time out was called    Universal Protocol: the Joint Commission Universal Protocol was followed    Preparation: Patient was prepped and draped in usual sterile fashion    ESBL (mL):  10     ANESTHESIA    Anesthesia: Local infiltration  Local Anesthetic:  Lidocaine 1% without epinephrine      SEDATION  Patient Sedated: Yes    Sedation Type:  Moderate (conscious) sedation  Sedation:  Fentanyl and midazolam  Vital signs: Vital signs monitored during sedation    See dictated procedure note for full details.  Findings: R CFA 5F sheath. Angiography of the celiac, GDA, SMA, and inferior pancreaticoduodenal arteries performed. Selective embolization of 4 arteries feeding the area of the endoscopic clips, performed using 2 mm microNester coils.     Specimens: none    Complications: None    Condition: Stable      PROCEDURE    Patient Tolerance:  Patient tolerated the procedure well with no immediate complications  Length of time physician/provider present for 1:1 monitoring during sedation: 60

## 2022-09-13 NOTE — PROGRESS NOTES
09/13/22 1005   Quick Adds   Type of Visit Initial PT Evaluation       Present no   Living Environment   People in Home spouse   Current Living Arrangements apartment   Home Accessibility no concerns  (elevator)   Transportation Anticipated family or friend will provide   Self-Care   Current Activity Tolerance fair   Equipment Currently Used at Home other (see comments)  (4WW)   Activity/Exercise/Self-Care Comment pt he does do the stairs int he building for exercise   General Information   Onset of Illness/Injury or Date of Surgery 09/11/22   Referring Physician Antonio James   Patient/Family Therapy Goals Statement (PT) none stated   Pertinent History of Current Problem (include personal factors and/or comorbidities that impact the POC) admit hematemesis, lightheaded found to have upper GI bleed. pt has h/o COPD, CVA, HTN   General Observations pt in bed   Cognition   Affect/Mental Status (Cognition) WFL   Range of Motion (ROM)   ROM Comment BLE WFL   Strength (Manual Muscle Testing)   Strength Comments BLE WFL   Bed Mobility   Impairments Contributing to Impaired Bed Mobility decreased strength   Assistive Device (Bed Mobility) bed rails;other (see comments)  (HOB elevated)   Comment, (Bed Mobility) no reports of dizziness   Transfers   Transfer Safety Concerns Noted other (see comments)  (dizziness)   Impairments Contributing to Impaired Transfers decreased strength;impaired balance   Comment, (Transfers) sit<>stand CGA/minAx1   Gait/Stairs (Locomotion)   Clarke Level (Gait) minimum assist (75% patient effort)   Assistive Device (Gait) walker, front-wheeled   Distance in Feet (Required for LE Total Joints) 8'   Pattern (Gait) step-to   Deviations/Abnormal Patterns (Gait) weight shifting decreased   Comment, (Gait/Stairs) 1 small LOB minAx1 to recover   Balance   Balance Comments unsteady   Clinical Impression   Criteria for Skilled Therapeutic Intervention Yes, treatment indicated    PT Diagnosis (PT) decreased functional mobility   Influenced by the following impairments fatigue, decreased strength and balance   Functional limitations due to impairments trasnfers and gait   Clinical Presentation (PT Evaluation Complexity) Stable/Uncomplicated   Clinical Presentation Rationale presents as medically diagnosed   Clinical Decision Making (Complexity) moderate complexity   Planned Therapy Interventions (PT) bed mobility training;gait training;transfer training;strengthening;balance training   Anticipated Equipment Needs at Discharge (PT)   (pt has 4WW a koby)   Risk & Benefits of therapy have been explained evaluation/treatment results reviewed   PT Discharge Planning   PT Discharge Recommendation (DC Rec) Transitional Care Facility;home with assist;home with home care physical therapy   PT Rationale for DC Rec pt demostrated decreased and endurence for functional mobility recommend TCU for further strengthening and mobiity training, if home would recommend home A for mobility and IADLS, home PT   Total Evaluation Time   Total Evaluation Time (Minutes) 15   Physical Therapy Goals   PT Frequency Daily   PT Predicted Duration/Target Date for Goal Attainment 09/19/22   PT Goals Bed Mobility;Transfers;Gait   PT: Bed Mobility Independent;Supine to/from sit   PT: Transfers Supervision/stand-by assist;Sit to/from stand;Bed to/from chair;Assistive device   PT: Gait 100 feet  (CGA, 4WW)

## 2022-09-13 NOTE — PROGRESS NOTES
A/P    87 year old male admitted on 9/11/2022 due to hematemesis and lightheadedness.  CT angiogram showed duodenal bleed, severe prostatic enlargement and suspected IPMN.  He was placed on IV fluid and pantoprazole on admission.     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  EGD (3/2022) for iron deficiency anemia showed periampullar region there was a large 2-3 cm pedunculated mass that circumferentially narrowed the duodenal lumen with ulceration.  Pathology of duodenum, biopsy showed adenomatous polyp with focal areas suspicious for high-grade dysplasia.  CT angiogram of the abdomen/pelvis (9/12/22) revealed focal wall thickening of the second portion of the duodenum with arterial blush within the lumen and accumulation on portal venous phase images in this location consistent with a source of GI bleed likely due to peptic ulcer disease. Celiac, SMA, IVON patent.    -EGD (9/12/22) showed normal esophagus and stomach.  large fungating, infiltrative and polypoid mass with bleeding was found in the second portion of the duodenum. For hemostasis, three hemostatic clips were successfully placed.No bleeding at end of procedure.  But post procedure appears to have restarted bleeding given ongoing melena and dropping Hgb.  -IR consulted (9/13/22) and performed Mesenteric Angiogram which showed no contrast extravasation. Based on the CTA and endoscopic findings, 4 pancreaticoduodenal branches were superselectively embolized.   -PPI gtt  -NPO  -defer diet to GI/IR on when to start    Duodenal adenoma:  Refer to above regarding 3/2022 pathology.  No biopsies taken during EGD this admission.  This is etiology for bleeding due to ulceration.  -will d/w oncology if any options such as radiation a consideration since he has previously been evaluated by surgery and no surgical interventions possible or recommended.     Acute blood loss anemia:  Due to #1.  Hemoglobin dropped from  11.3 on 6/29/22 to 9.2 admission.  Has received 6U PRBC transfusions most recent 2U PRBC's issued this morning for Hgb 6.7  -serial Hgb q6  -check Hgb 1hr after transfusions complete  -transfuse for Hgb < 7-8  -hold PO iron and B12 for now     Acute on Chronic urinary retention requiring chronic self intermittent catheterizations, BPH: CT showed severe enlargement of the prostate gland which indents the bladder base with bladder wall thickening and patulous ureters, likely due to chronic outlet obstruction   -Urology placed an 18fr coude catheter at the bedside in ED  - Maintain marino catheter during hospitalization.  Remove marino and resume self cath once patient has recovered from illness and is ready to discharge home.  - Patient not interested in finasteride or Flomax at this time.  - Urology will arrange for follow up in their office in the next couple of weeks to discuss management of very enlarged prostate (seen CT today).     Possible sepsis:  WBC 15->17->18.4.  Normal 6/29/22.  Reactive from acute blood loss vs possible infection from GI bleed vs UTI  -BC's x2 NGTD  -await UC  -IV CTX    UTI in setting of chronic recurrent UTI's and chronic SIC's: prior UC's Klebsiella oxytoca, E. Coli  -await UC finalization  -IV CTX    Suspected IPMN  -CT showed cystic lesion in the superior pancreas body measuring 14 x 28 mm with clear communication to the main pancreas duct, consistent with IPMN for which endoscopic ultrasound in 3-6 months is recommended.  -Endoscopic ultrasound as outpatient in 3-6 months. Will defer to GI.     CVA with residual left-sided weakness  -He uses walker at baseline.  -Hold antiplatelet and prophylactic anticoagulation for now  -PT/OT     Hypomagnesemia:  -replaced    Acute hyperkalemia: resolved    H/O colon polyps:  Colonoscopy (3/2022) - Cecum, ascending and transverse colon, polyp, biopsy - Sessile serrated lesion, tubular adenoma and fragments of colonic mucosa with prominent lymphoid  aggregate.  Descending, sigmoid and rectum, polyp, biopsy - Tubular adenomas. Hyperplastic polyps.    Full code    2d stay at minimum longer    SCD's    Daughter at bedside    Pt/ot        S:  Afebrile. Events overnight noted    O:  Temp: 98.9  F (37.2  C) Temp src: Oral BP: 136/61 Pulse: 87   Resp: 18 SpO2: 99 % O2 Device: None (Room air)    gen nad  cv rrr  Lungs cta  abd bs+, nd, no significant ttp  Neuro a&o    Lab/imaging reviewed

## 2022-09-13 NOTE — ANESTHESIA POSTPROCEDURE EVALUATION
Patient: Denton Hobbs    Procedure: Procedure(s):  ESOPHAGOGASTRODUODENOSCOPY (EGD)       Anesthesia Type:  General    Note:  Disposition: Inpatient   Postop Pain Control: Uneventful            Sign Out: Well controlled pain   PONV: No   Neuro/Psych: Uneventful            Sign Out: Acceptable/Baseline neuro status   Airway/Respiratory: Uneventful            Sign Out: Acceptable/Baseline resp. status   CV/Hemodynamics: Uneventful            Sign Out: Acceptable CV status; No obvious hypovolemia; No obvious fluid overload   Other NRE: NONE   DID A NON-ROUTINE EVENT OCCUR? No           Last vitals:  Vitals Value Taken Time   /63 09/12/22 1945   Temp 36.8  C (98.3  F) 09/12/22 1845   Pulse 89 09/12/22 1949   Resp 16 09/12/22 1949   SpO2 100 % 09/12/22 1949   Vitals shown include unvalidated device data.    Electronically Signed By: Kaylee Mcconnell MD  September 12, 2022  7:50 PM

## 2022-09-13 NOTE — CONSULTS
Interventional Radiology - Consultation/Chart Review Note:  Inpatient - [facility]: Interventional Radiology   [phone]  9/13/2022    Reason for Consult:  GI bleed   Requesting Provider:  Conner Navarrete MD    HPI:  Denton Hobbs is a 87 year old male with a history of a large ampullary adenoma, which was evaluated for endoscopic and surgical resection with the decision against both options in favor of ongoing monitoring who presented with GI hemorrhage. Per chart review patient has had an episode of hematemesis, now with hematochezia/melean, suggestive of brisk upper. Per GI; bleed may be related to adenoma vs PUD. CT read suggestive of PUD but may just be adenoma. If this is adenoma hemorrhage, management options may be limited. S/p EGD with clips on adenoma 9/12/22.     IR consulted for emoblization of bleeding duodenal polyp which is still bleeding despite endoscopic attempt at hemostasis (clips on adenoma).     Now with hgb dropping to 6.7 (from 8.1 last evening); patient currently being transfused.       IMAGING:    EXAM: CTA ABDOMEN PELVIS WITH CONTRAST  LOCATION: [facility]  DATE/TIME: 9/11/2022 1:58 PM     INDICATION: Bloody emesis, concern for GI bleed  COMPARISON: CT of the chest, abdomen, and pelvis 03/29/2022  TECHNIQUE: CT angiogram abdomen pelvis during arterial phase of injection of IV contrast. 2D and 3D MIP reconstructions were performed by the CT technologist. Dose reduction techniques were used.  CONTRAST: 100ml Isovue 370     FINDINGS:  ANGIOGRAM ABDOMEN/PELVIS: Mixed attenuation atheroma is present in the distal descending thoracic aorta but no plaque ulceration. Tortuous abdominal aorta with diffuse plaque. There is fusiform enlargement of the abdominal aorta which measures up to 2.8   cm in diameter. Atheromatous plaque extends to the iliac arteries but there is no iliac or common femoral aneurysm or flow-limiting stenosis. Celiac axis, SMA,  and small caliber IVON are patent. There is predominantly noncalcified atheroma producing   luminal irregularity in the SMA and celiac axis. No proximal mesenteric branch truncation. Single right and left renal arteries. There is mild narrowing of the proximal left renal artery.     Focal endoluminal contrast within the second portion of the duodenum on the arterial phase images and accumulation on the portal venous phase consistent with a source of acute GI bleed. The wall of the duodenum at this level is thickened.     LOWER CHEST: Emphysema in the imaged lung bases associated with intervening interstitial opacities particularly in the posterior costophrenic sulci. No acute abnormalities in the basal lungs or pleural spaces.     HEPATOBILIARY: Normal.     PANCREAS: There is a 14 x 28 mm cyst arising from the superior pancreas body which has a clear communication with the pancreas duct consistent with an IPMN. The pancreas duct particularly in the body has mild dilation but tapers normally towards the   pancreas tail.     SPLEEN: Normal.     ADRENAL GLANDS: Normal.     KIDNEYS/BLADDER: No significant mass, stones, or hydronephrosis. There are simple or benign cysts. No follow up is needed. Both ureters are patulous. No ureteral calculi. There is diffuse bladder wall thickening. Small left lateral bladder diverticulum.     BOWEL: Nondistended stomach. Duodenal abnormalities as described above. The third and fourth portions of the duodenum are normal caliber without wall thickening. Remainder of the small bowel is normal caliber. Sigmoid colonic diverticula are present. No   colonic wall thickening.     LYMPH NODES: Normal.     PELVIC ORGANS: Severely enlarged prostate gland which is heterogeneous attenuation. Transverse measurement of the prostate is 5.6 cm. Prostate gland indents the bladder base.     MUSCULOSKELETAL: Disc space narrowing is greatest at L2-L3 with there is slight retrolisthesis of L2 on L3.  Degenerative osteophytes of the lower thoracic and lumbar spine. Unilateral left L5 pars defect.                                                                      IMPRESSION:     1.  Focal wall thickening of the second portion of the duodenum. Arterial blush within the lumen and accumulation on portal venous phase images in this location consistent with a source of GI bleed likely due to peptic ulcer disease. GI consultation for   potential upper endoscopy is suggested.  2.  Severe enlargement of the prostate gland which indents the bladder base. Bladder wall thickening and patulous ureters, likely due to chronic outlet obstruction. No hydronephrosis.  3.  Cystic lesion in the superior pancreas body measuring 14 x 28 mm with clear communication to the main pancreas duct, consistent with IPMN. Based on size, recommend evaluation with endoscopic ultrasound in 3-6 months as per guidelines below        NPO Status:  midnight  Anticoagulation/Antiplatelets/Bleeding tendencies:  none  Antibiotics:  None for IR    REVIEW OF SYSTEMS:  A comprehensive 10-point review of systems was performed. All systems were reviewed and negative with exception to those reported in the HPI.     PAST MEDICAL HISTORY:  Past Medical History:   Diagnosis Date     Arthritis      COPD (chronic obstructive pulmonary disease) (H)      CVA (cerebral vascular accident) (H) 12/4/2018     Hypertension      Prostate hypertrophy      Patient Active Problem List    Diagnosis Date Noted     Upper GI bleed 09/11/2022     Priority: Medium     Enlarged prostate 09/11/2022     Priority: Medium     Non-recurrent unilateral inguinal hernia without obstruction or gangrene 02/24/2022     Priority: Medium     Added automatically from request for surgery [id number]       Indwelling Cash catheter present 12/07/2018     Priority: Medium     CVA (cerebral vascular accident) (H) 12/04/2018     Priority: Medium     Left-sided weakness 12/04/2018     Priority: Medium      Elevated PSA 12/04/2018     Priority: Medium     Cystitis 12/04/2018     Priority: Medium     Balance problems 12/04/2018     Priority: Medium     Aphasia 12/04/2018     Priority: Medium     Orchitis and epididymitis 07/19/2014     Priority: Medium     Benign prostatic hyperplasia with urinary retention 07/19/2014     Priority: Medium     Sepsis (Leukocytosis, Tachycardia) 07/19/2014     Priority: Medium         PAST SURGICAL HISTORY:  Past Surgical History:   Procedure Laterality Date     ESOPHAGOSCOPY, GASTROSCOPY, DUODENOSCOPY (EGD), COMBINED N/A 9/12/2022    Procedure: ESOPHAGOGASTRODUODENOSCOPY (EGD);  Surgeon: Conner Navarrete MD;  Location: [facility] [place], [place] OR     GENITOURINARY SURGERY       HERNIORRHAPHY INGUINAL Left 10/11/2021    Procedure: LEFT INGUINAL HERNIA REPAIR WITH MESH;  Surgeon: Puma Preston MD;  Location: [facility] OR     HERNIORRHAPHY INGUINAL Right 8/19/2022    Procedure: HERNIORRHAPHY, INGUINAL, OPEN, RIGHT;  Surgeon: Flo Keyes MD;  Location: [facility] [place] [place] OR     TRANSRECTAL ULTRASONIC, TRANSURETHRAL RESECTION (TUR) OF PROSTATE CYST  2004    helped temporarily       ALLERGIES:  Allergies   Allergen Reactions     Sulfa Drugs Rash     Info obtained off of 10/4/21 [facility] pre op.        MEDICATIONS:  Current Facility-Administered Medications   Medication     0.9% sodium chloride BOLUS     0.9% sodium chloride BOLUS     0.9% sodium chloride BOLUS     acetaminophen (TYLENOL) tablet 650 mg    Or     acetaminophen (TYLENOL) Suppository 650 mg     calcium carbonate (TUMS) chewable tablet 1,000 mg     cefTRIAXone (ROCEPHIN) 2 g vial to attach to  ml bag for ADULTS or NS 50 ml bag for PEDS     [Held by provider] cyanocobalamin (VITAMIN B-12) tablet 1,000 mcg     dextrose 5% and 0.45% NaCl infusion     [Held by provider] ferrous sulfate (FEROSUL) tablet 325 mg     lidocaine (LMX4) cream     lidocaine 1 % 0.1-1 mL     melatonin tablet 1 mg     ondansetron (ZOFRAN ODT) ODT tab 4  "mg    Or     ondansetron (ZOFRAN) injection 4 mg     pantoprazole (PROTONIX) 80 mg in sodium chloride 0.9 % 100 mL infusion     senna-docusate (SENOKOT-S/PERICOLACE) 8.6-50 MG per tablet 1 tablet    Or     senna-docusate (SENOKOT-S/PERICOLACE) 8.6-50 MG per tablet 2 tablet     sodium chloride (PF) 0.9% PF flush 3 mL     sodium chloride (PF) 0.9% PF flush 3 mL        LABS:  INR   Date Value Ref Range Status   09/11/2022 1.14 0.85 - 1.15 Final      Hemoglobin   Date Value Ref Range Status   09/13/2022 6.7 (LL) 13.3 - 17.7 g/dL Final   ]  Platelet Count   Date Value Ref Range Status   09/13/2022 207 150 - 450 10e3/uL Final     Creatinine   Date Value Ref Range Status   09/13/2022 0.84 0.70 - 1.30 mg/dL Final     Potassium   Date Value Ref Range Status   09/13/2022 4.1 3.5 - 5.0 mmol/L Final         EXAM:  /61 (BP Location: Right arm)   Pulse 87   Temp 98.9  F (37.2  C) (Oral)   Resp 18   Ht 1.727 m (5' 8\")   Wt 65.8 kg (145 lb)   SpO2 99%   BMI 22.05 kg/m    Per IR MD    Pre-Sedation Assessment:  Mallampati Airway Classification: per IR MD  Previous reaction to anesthesia/sedation:  unknown  Sedation plan based on assessment: Moderate (conscious) sedation  ASA Classification: Class 3 - SEVERE SYSTEMIC DISEASE, DEFINITE FUNCTIONAL LIMITATIONS.   Code Status/Advanced care directive: Full Code       ASSESSMENT:  GI bleed    PLAN:    Visceral angiogram with sedation and embolization.        Thank you for kindly for this consultation.       Chart review and note written by:  DONALD Hollingsworth CNP  Interventional Radiology  [phone]      Physical exam and consent to be obtained by: IR MD Dr. Joyce        "

## 2022-09-13 NOTE — PRE-PROCEDURE
GENERAL PRE-PROCEDURE:   Procedure:  Mesenteric angiogram with embolization  Date/Time:  9/13/2022 1:15 PM    Written consent obtained?: Yes    Risks and benefits: Risks, benefits and alternatives were discussed    DC Plan: Appropriate discharge home plan in place for patients who are going home after procedure   Consent given by:  Patient  Patient states understanding of procedure being performed: Yes    Patient's understanding of procedure matches consent: Yes    Procedure consent matches procedure scheduled: Yes    Expected level of sedation:  Moderate  Appropriately NPO:  Yes  ASA Class:  3  Mallampati  :  Grade 2- soft palate, base of uvula, tonsillar pillars, and portion of posterior pharyngeal wall visible  Lungs:  Lungs clear with good breath sounds bilaterally  Heart:  Normal heart sounds and rate  History & Physical reviewed:  History and physical reviewed and no updates needed  Statement of review:  I have reviewed the lab findings, diagnostic data, medications, and the plan for sedation

## 2022-09-13 NOTE — PROGRESS NOTES
Care Management Follow Up    Length of Stay (days): 2    Expected Discharge Date: 09/14/2022     Concerns to be Addressed: TBD      Patient plan of care discussed at interdisciplinary rounds: Yes    Anticipated Discharge Disposition: TBD     Anticipated Discharge Services: TBD   Anticipated Discharge DME:  None at this time    Education Provided on the Discharge Plan: Per Care Team   Patient/Family in Agreement with the Plan:  Yes    Referrals Placed by CM/SW:    Private pay costs discussed: Not applicable    Additional Information:  Chart reviewed.    HMS updated. Patient is getting follow my intervention RAD, along with GI. Patient is currently getting transfused 2 units of blood.     OT recs home with assist.  PT recs home with A-1, or TCU.    RNCM reached out to pts daughter Valentina, and left VM regarding wishes. She said she could help the patient with things througout the day, but could not be there 24 hours a day.    With the patient receiving blood the daughter would also like to the patient's progression medically before making decision, would possibly like a reassessment from PT tomorrow (9/14) after receiving blood.     CM will continue to follow plan of care, review recommendations, and assist with any discharge needs anticipated.       Madonna Wilson RN

## 2022-09-13 NOTE — PROGRESS NOTES
09/13/22 0955   Quick Adds   Type of Visit Initial Occupational Therapy Evaluation       Present no   Living Environment   People in Home spouse   Current Living Arrangements apartment   Home Accessibility no concerns   Transportation Anticipated family or friend will provide   Self-Care   Current Activity Tolerance fair   Equipment Currently Used at Home walker, rolling   Fall history within last six months no   Instrumental Activities of Daily Living (IADL)   IADL Comments Pt is independent with dressing and bathing, has assist with cleaning, cooking, laundry, shopping.  Pt does not drive.   General Information   Onset of Illness/Injury or Date of Surgery 09/11/22   Referring Physician [place]   Patient/Family Therapy Goal Statement (OT) home   Existing Precautions/Restrictions fall   Cognitive Status Examination   Orientation Status orientation to person, place and time   Visual Perception   Visual Impairment/Limitations WFL   Sensory   Sensory Quick Adds No deficits were identified   Range of Motion Comprehensive   General Range of Motion no range of motion deficits identified   Strength Comprehensive (MMT)   Comment, General Manual Muscle Testing (MMT) Assessment generalized weakness B UE's   Coordination   Upper Extremity Coordination No deficits were identified   Bed Mobility   Comment (Bed Mobility) Min A   Transfers   Transfer Comments CGA with FWW   Balance   Balance Comments CGA   Activities of Daily Living   BADL Assessment/Intervention   (unable to cross feet up for dressing)   Clinical Impression   Criteria for Skilled Therapeutic Interventions Met (OT) Yes, treatment indicated   OT Diagnosis Impaired ADL independence   OT Problem List-Impairments impacting ADL balance;flexibility;pain;mobility;strength   Assessment of Occupational Performance 3-5 Performance Deficits   Clinical Decision Making Complexity (OT) low complexity   Risk & Benefits of therapy have been explained  evaluation/treatment results reviewed;participants included;patient;daughter   Clinical Impression Comments Pt seen bedside for OT eval and treatment.  Pt demonstrates decreased actitivty tolerance for ADLs and moiblity.  OT to continue to address.  Recommend home with assist pending progress.   OT Discharge Planning   OT Discharge Recommendation (DC Rec) home with assist   OT Rationale for DC Rec Pt will need Min A iwth ADLs and CGA with moiblity.   Total Evaluation Time (Minutes)   Total Evaluation Time (Minutes) 15   OT Goals   Therapy Frequency (OT) Daily   OT Predicted Duration/Target Date for Goal Attainment 09/20/22   OT Goals Lower Body Dressing;Transfers;Toilet Transfer/Toileting   OT: Lower Body Dressing Modified independent   OT: Transfer Modified independent   OT: Toilet Transfer/Toileting Modified independent

## 2022-09-13 NOTE — SEDATION DOCUMENTATION
Patient Name: Denton Hobbs  Medical Record Number: [id number]  Today's Date: 9/13/2022    Procedure: Mesenteric angiogram with embolization  Proceduralist: Dr Joyce    Procedure Start: 1343  Procedure end: 1431  Sedation medications administered: 0.5 mg midazolam and 25 mcg fentanyl   Sedation time: 45 minutes  2 units of PRBC administered during procedure.    Report given to:JOSH Cash     Other Notes: Pt arrived to IR room 1 from Pre/post bay 3. Consent reviewed. Pt denies any questions or concerns regarding procedure. Pt positioned supine and monitored per protocol. Pt tolerated procedure without any noted complications. VSS on flowsheet. Pt transferred back to inpatient room

## 2022-09-13 NOTE — PROGRESS NOTES
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     EGD completed 9/12/22- redemonstration of duodenal adenoma with overlying ulcer which was bleeding. This may represent malignancy with invasion, although biopsies have not shown malignancy.      He was previously valuated by Ruth Ramirez and Doc at [facility] for surgical and endoscopic resection, respectively, with the conclusion that neither management options were recommended. I discussed his current case with Dr Roach 9/13, reaching the same conclusion.     Ongoing management options include embolization, endoscopic includes an over-the-scope clip or hemospray. Given the location and quality of the adenoma I worry an over-the-scope clip would not be successful. Hemospray would be a last-ditch temporary effort.    - tele  - PPI drip  - NPO  - IR consult, appreciate recommendations regarding embolization  - trend hemoglobin Q8, transfuse to keep >7    Approximately 45 minutes of total time was spent providing patient care including patient evaluation, reviewing documentation/test results, and .                                                 Conner Navarrete MD  Thank you for the opportunity to participate in the care of this patient.   Please feel free to call me with any questions or concerns.  Phone number [phone] or if after 5PM [phone].              ---      Subjective:  No pain. Hematochezia today. Hgb trended down to 6.7 from 8.1 yesterday. Feels well otherwise and appetite is good. 4 point ROS otherwise negative.     Daughter Morena is at bedside and madison Montgomery is on the phone.     Objective:  Vital signs in last 24 hours  Temp:  [98  F (36.7  C)-99.6  F (37.6  C)] 98.9  F (37.2  C)  Pulse:  [] 87  Resp:   [14-21] 18  BP: (109-169)/(52-73) 136/61  FiO2 (%):  [2 %] 2 %  SpO2:  [93 %-100 %] 99 % O2 Device: None (Room air)      Gen: A&Ox3, NAD  Eyes: No icterus  Pulmonary: CTA bilaterally  Cardiovascular: RRR,S1, S2  Gastrointestinal: Soft, NTTP, BS+, no rebound or guarding  Extrem: PPI, no c/c/e      LABORATORY    ELECTROLYTE PANEL   Recent Labs   Lab 09/13/22  0531 09/12/22  0617 09/11/22  1216    137 133*   POTASSIUM 4.1 4.6 5.2*   CHLORIDE 115* 111* 102   CO2 22 21* 26    113 94   CR 0.84 0.80 0.79   BUN 32* 33* 28      HEMATOLOGY PANEL   Recent Labs   Lab 09/13/22  0938 09/13/22 0531 09/12/22  1707 09/12/22  1425 09/12/22  0617 09/11/22  1723 09/11/22  1216   HGB 6.7* 7.2* 8.1*   < > 7.7*   < > 9.2*   MCV  --  93 93  --  91   < > 92   WBC  --  18.4* 19.7*  --  18.4*   < > 15.0*   PLT  --  207 277  --  351   < > 458*   INR  --   --   --   --   --   --  1.14    < > = values in this interval not displayed.      LIVER AND PANCREAS PANEL   Recent Labs   Lab 09/13/22 0531 09/12/22  0617 09/11/22  1216   AST 12 9 10   ALT <9 <9 <9   ALKPHOS 29* 35* 48   BILITOTAL 0.5 1.1* 0.4   LIPASE  --   --  16     IMAGING STUDIES    No new imaging studies.     I have reviewed the current diagnostic and laboratory tests.

## 2022-09-14 ENCOUNTER — APPOINTMENT (OUTPATIENT)
Dept: PHYSICAL THERAPY | Facility: HOSPITAL | Age: 87
DRG: 356 | End: 2022-09-14
Payer: COMMERCIAL

## 2022-09-14 LAB
BACTERIA UR CULT: ABNORMAL
CREAT SERPL-MCNC: 0.69 MG/DL (ref 0.7–1.3)
ERYTHROCYTE [DISTWIDTH] IN BLOOD BY AUTOMATED COUNT: 15.5 % (ref 10–15)
GFR SERPL CREATININE-BSD FRML MDRD: >90 ML/MIN/1.73M2
HCT VFR BLD AUTO: 23.7 % (ref 40–53)
HGB BLD-MCNC: 7.8 G/DL (ref 13.3–17.7)
HGB BLD-MCNC: 7.9 G/DL (ref 13.3–17.7)
HGB BLD-MCNC: 9.1 G/DL (ref 13.3–17.7)
INR PPP: 1.19 (ref 0.85–1.15)
MAGNESIUM SERPL-MCNC: 1.7 MG/DL (ref 1.8–2.6)
MCH RBC QN AUTO: 30.7 PG (ref 26.5–33)
MCHC RBC AUTO-ENTMCNC: 32.9 G/DL (ref 31.5–36.5)
MCV RBC AUTO: 93 FL (ref 78–100)
PLATELET # BLD AUTO: 174 10E3/UL (ref 150–450)
POTASSIUM BLD-SCNC: 3.5 MMOL/L (ref 3.5–5)
RBC # BLD AUTO: 2.54 10E6/UL (ref 4.4–5.9)
WBC # BLD AUTO: 13.3 10E3/UL (ref 4–11)

## 2022-09-14 PROCEDURE — 82565 ASSAY OF CREATININE: CPT | Performed by: INTERNAL MEDICINE

## 2022-09-14 PROCEDURE — 85610 PROTHROMBIN TIME: CPT | Performed by: INTERNAL MEDICINE

## 2022-09-14 PROCEDURE — 120N000001 HC R&B MED SURG/OB

## 2022-09-14 PROCEDURE — 250N000011 HC RX IP 250 OP 636: Performed by: EMERGENCY MEDICINE

## 2022-09-14 PROCEDURE — 250N000011 HC RX IP 250 OP 636: Performed by: INTERNAL MEDICINE

## 2022-09-14 PROCEDURE — 83735 ASSAY OF MAGNESIUM: CPT | Performed by: INTERNAL MEDICINE

## 2022-09-14 PROCEDURE — 250N000013 HC RX MED GY IP 250 OP 250 PS 637: Performed by: INTERNAL MEDICINE

## 2022-09-14 PROCEDURE — C9113 INJ PANTOPRAZOLE SODIUM, VIA: HCPCS | Performed by: EMERGENCY MEDICINE

## 2022-09-14 PROCEDURE — 97116 GAIT TRAINING THERAPY: CPT | Mod: GP

## 2022-09-14 PROCEDURE — 258N000003 HC RX IP 258 OP 636: Performed by: EMERGENCY MEDICINE

## 2022-09-14 PROCEDURE — 84132 ASSAY OF SERUM POTASSIUM: CPT | Performed by: INTERNAL MEDICINE

## 2022-09-14 PROCEDURE — 85018 HEMOGLOBIN: CPT | Performed by: INTERNAL MEDICINE

## 2022-09-14 PROCEDURE — 85027 COMPLETE CBC AUTOMATED: CPT | Performed by: INTERNAL MEDICINE

## 2022-09-14 PROCEDURE — 36415 COLL VENOUS BLD VENIPUNCTURE: CPT | Performed by: INTERNAL MEDICINE

## 2022-09-14 PROCEDURE — 97110 THERAPEUTIC EXERCISES: CPT | Mod: GP

## 2022-09-14 PROCEDURE — C9113 INJ PANTOPRAZOLE SODIUM, VIA: HCPCS | Performed by: INTERNAL MEDICINE

## 2022-09-14 PROCEDURE — 99233 SBSQ HOSP IP/OBS HIGH 50: CPT | Performed by: INTERNAL MEDICINE

## 2022-09-14 PROCEDURE — 258N000003 HC RX IP 258 OP 636: Performed by: INTERNAL MEDICINE

## 2022-09-14 RX ORDER — MAGNESIUM SULFATE HEPTAHYDRATE 40 MG/ML
2 INJECTION, SOLUTION INTRAVENOUS ONCE
Status: COMPLETED | OUTPATIENT
Start: 2022-09-14 | End: 2022-09-14

## 2022-09-14 RX ORDER — POTASSIUM CHLORIDE 1.5 G/1.58G
20 POWDER, FOR SOLUTION ORAL ONCE
Status: COMPLETED | OUTPATIENT
Start: 2022-09-14 | End: 2022-09-14

## 2022-09-14 RX ADMIN — CEFTRIAXONE 2 G: 2 INJECTION, POWDER, FOR SOLUTION INTRAMUSCULAR; INTRAVENOUS at 14:59

## 2022-09-14 RX ADMIN — PANTOPRAZOLE SODIUM 40 MG: 40 INJECTION, POWDER, FOR SOLUTION INTRAVENOUS at 20:52

## 2022-09-14 RX ADMIN — MAGNESIUM SULFATE IN WATER 2 G: 40 INJECTION, SOLUTION INTRAVENOUS at 11:02

## 2022-09-14 RX ADMIN — PHYTONADIONE 0.5 MG: 2 INJECTION, EMULSION INTRAMUSCULAR; INTRAVENOUS; SUBCUTANEOUS at 23:57

## 2022-09-14 RX ADMIN — SODIUM CHLORIDE 8 MG/HR: 9 INJECTION, SOLUTION INTRAVENOUS at 06:17

## 2022-09-14 RX ADMIN — POTASSIUM CHLORIDE 20 MEQ: 1.5 POWDER, FOR SOLUTION ORAL at 11:08

## 2022-09-14 ASSESSMENT — ACTIVITIES OF DAILY LIVING (ADL)
ADLS_ACUITY_SCORE: 24

## 2022-09-14 NOTE — PROGRESS NOTES
A/P    87 year old male admitted on 9/11/2022 due to hematemesis and lightheadedness.  CT angiogram showed duodenal bleed, severe prostatic enlargement and suspected IPMN.  He was placed on IV fluid and pantoprazole on admission.     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  EGD (3/2022) for iron deficiency anemia showed periampullar region there was a large 2-3 cm pedunculated mass that circumferentially narrowed the duodenal lumen with ulceration.  Pathology of duodenum, biopsy showed adenomatous polyp with focal areas suspicious for high-grade dysplasia.  CT angiogram of the abdomen/pelvis (9/12/22) revealed focal wall thickening of the second portion of the duodenum with arterial blush within the lumen and accumulation on portal venous phase images in this location consistent with a source of GI bleed likely due to peptic ulcer disease. Celiac, SMA, IVON patent.    -EGD (9/12/22) showed normal esophagus and stomach.  large fungating, infiltrative and polypoid mass with bleeding was found in the second portion of the duodenum. For hemostasis, three hemostatic clips were successfully placed.No bleeding at end of procedure.  But post procedure appears to have restarted bleeding given ongoing melena and dropping Hgb.  IR consulted (9/13/22) and performed Mesenteric Angiogram which showed no contrast extravasation. Based on the CTA and endoscopic findings, 4 pancreaticoduodenal branches were superselectively embolized.  Hgb down today and some melena (?old blood or not given reduced Hgb) so monitor.  -PPI IV BID  -regular diet  - Hgb at 8pm tonight and in a.m.  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  No biopsies taken during EGD this admission.  This is etiology for bleeding due to ulceration.  -will d/w oncology if any options such as radiation a consideration since he has previously been evaluated by  surgery and no surgical interventions possible or recommended according to GI discussion with them.     Acute blood loss anemia:  Due to #1.  S/P 6U PRBC transfusions this admission.  -Hgb 8pm and a.m.  -transfuse for Hgb < 7-8  -hold PO iron and B12 for now     Acute on Chronic urinary retention requiring chronic self intermittent catheterizations, BPH: CT showed severe enlargement of the prostate gland which indents the bladder base with bladder wall thickening and patulous ureters, likely due to chronic outlet obstruction   -Urology placed an 18fr coude catheter at the bedside in ED  - Maintain marino catheter during hospitalization.  Remove marino and resume self cath once patient has recovered from illness and is ready to discharge home.  - Patient not interested in finasteride or Flomax at this time.  - Urology will arrange for follow up in their office in the next couple of weeks to discuss management of very enlarged prostate    Possible sepsis:  WBC 15->17->18.4->13.3.  Normal 6/29/22.  Reactive from acute blood loss and UTI.  BC's x 2 NGTD  -IV CTX    Acute on chronic recurrent Klebsiella oxytoca UTI in setting of chronic SIC's at home: prior UC's Klebsiella oxytoca, E. Coli  -IV CTX    Suspected IPMN  -CT showed cystic lesion in the superior pancreas body measuring 14 x 28 mm with clear communication to the main pancreas duct, consistent with IPMN for which endoscopic ultrasound in 3-6 months is recommended.  -Endoscopic ultrasound as outpatient in 3-6 months. Will defer to GI.     CVA with residual left-sided weakness  -He uses walker at baseline.  -admission med rec shows no antiplatelets or statins chronically.  Given GI bleeding issues is probably why  -PT/OT     Hypomagnesemia:  -replaced    Acute hyperkalemia: resolved    H/O colon polyps:  Colonoscopy (3/2022) - Cecum, ascending and transverse colon, polyp, biopsy - Sessile serrated lesion, tubular adenoma and fragments of colonic mucosa with  prominent lymphoid aggregate.  Descending, sigmoid and rectum, polyp, biopsy - Tubular adenomas. Hyperplastic polyps.    Full code    1-2 day stay pending Hgb stability as above    SCD's        S:  Afebrile. Events overnight noted    O:  Temp: 98.4  F (36.9  C) Temp src: Oral BP: 133/60 Pulse: 81   Resp: 20 SpO2: 96 % O2 Device: None (Room air) Oxygen Delivery: 2 LPM  gen nad  cv rrr  Lungs cta  abd bs+, nd, no significant ttp  Neuro a&o    Lab/imaging reviewed

## 2022-09-14 NOTE — PROGRESS NOTES
[facility] Digestive Health Progress Note    Assessment and recommendations:  88 yo man with large ampullary adenoma which was evaluated for endoscopic and surgical resection with the decision against both options in favor of ongoing monitoring who presents for GI hemorrhage.      EGD completed 9/12/22- redemonstration of duodenal adenoma with overlying ulcer which was bleeding. This may represent malignancy with invasion, although biopsies have not shown malignancy.     He underwent visceral angiography 9/13/22 for ongoing hemorrhage. No extravasation, empiric embolization of 4 pancreaticoduodenal branches.    We will continue to monitor in setting of ongoing bleeding (albeit slowing, ?old blood) and decreasing hemoglobin.    - tele  - PPI IV BID  - ADAT  - trend hemoglobin Q12, transfuse to keep >7    Approximately 20 minutes of total time was spent providing patient care including patient evaluation, reviewing documentation/test results, and .                                                 Conner Navarrete MD  Thank you for the opportunity to participate in the care of this patient.   Please feel free to call me with any questions or concerns.  Phone number [phone] or if after 5PM [phone].              ---      Subjective:  Melena/hematochezia x2 today but feels better today after angiogram with embolization yesterday. Appetite is good. 4 point ROS otherwise negative.    Objective:  Vital signs in last 24 hours  Temp:  [97.8  F (36.6  C)-99.1  F (37.3  C)] 98.4  F (36.9  C)  Pulse:  [79-97] 80  Resp:  [12-23] 17  BP: ()/(54-67) 129/58  SpO2:  [95 %-100 %] 95 % O2 Device: None (Room air)      Gen: A&Ox3, NAD  Eyes: No icterus  Pulmonary: CTA bilaterally  Cardiovascular: RRR,S1, S2  Gastrointestinal: Soft, NTTP, BS+, no rebound or guarding  Extrem: PPI, no c/c/e    LABORATORY    ELECTROLYTE PANEL   Recent Labs   Lab 09/14/22  0618 09/13/22  0531 09/12/22  0617 09/11/22  1216   NA  --  138 137  133*   POTASSIUM 3.5 4.1 4.6 5.2*   CHLORIDE  --  115* 111* 102   CO2  --  22 21* 26   GLC  --  121 113 94   CR 0.69* 0.84 0.80 0.79   BUN  --  32* 33* 28      HEMATOLOGY PANEL   Recent Labs   Lab 09/14/22  0618 09/14/22  0106 09/13/22  1908 09/13/22  0938 09/13/22  0531 09/12/22  1707 09/11/22  1723 09/11/22  1216   HGB 7.8* 9.1* 9.8*   < > 7.2* 8.1*   < > 9.2*   MCV 93  --   --   --  93 93   < > 92   WBC 13.3*  --   --   --  18.4* 19.7*   < > 15.0*     --   --   --  207 277   < > 458*   INR  --   --   --   --   --   --   --  1.14    < > = values in this interval not displayed.      LIVER AND PANCREAS PANEL   Recent Labs   Lab 09/13/22  0531 09/12/22  0617 09/11/22  1216   AST 12 9 10   ALT <9 <9 <9   ALKPHOS 29* 35* 48   BILITOTAL 0.5 1.1* 0.4   LIPASE  --   --  16     IMAGING STUDIES    Visceral Angiogram 9/13/22  IMPRESSION:    1.  No contrast extravasation. Based on the CTA and endoscopic findings, 4 pancreaticoduodenal branches were superselectively embolized, as described above.    I have reviewed the current diagnostic and laboratory tests.

## 2022-09-14 NOTE — PROGRESS NOTES
Care Management Follow Up    Length of Stay (days): 3    Expected Discharge Date: 09/15/2022     Concerns to be Addressed: Patient needs poss placement  , GI still following     Patient plan of care discussed at interdisciplinary rounds: Yes    Anticipated Discharge Disposition: Home with home care vs TCU      Anticipated Discharge Services:  Home with home care vs TCU   Anticipated Discharge DME:  TBD    Education Provided on the Discharge Plan: Per Care Team    Patient/Family in Agreement with the Plan:  Yes    Referrals Placed by CM/SW:    Private pay costs discussed: Not applicable    Additional Information:  Chart reviewed.   PT/OT still recs home with A-1 help. The daughter expressed to writer yesterday, that should would probably not be able to do that.     TCU was then suggested, RNCM left a voicemail for the daughter, and patient wants daughters input. Awaiting a call back.    CM will continue to follow plan of care, review recommendations, and assist with any discharge needs anticipated.       Madonna Wilson RN

## 2022-09-15 ENCOUNTER — APPOINTMENT (OUTPATIENT)
Dept: OCCUPATIONAL THERAPY | Facility: HOSPITAL | Age: 87
DRG: 356 | End: 2022-09-15
Payer: COMMERCIAL

## 2022-09-15 ENCOUNTER — APPOINTMENT (OUTPATIENT)
Dept: PHYSICAL THERAPY | Facility: HOSPITAL | Age: 87
DRG: 356 | End: 2022-09-15
Payer: COMMERCIAL

## 2022-09-15 LAB
CREAT SERPL-MCNC: 0.64 MG/DL (ref 0.7–1.3)
GFR SERPL CREATININE-BSD FRML MDRD: >90 ML/MIN/1.73M2
HGB BLD-MCNC: 7.7 G/DL (ref 13.3–17.7)
POTASSIUM BLD-SCNC: 3.5 MMOL/L (ref 3.5–5)

## 2022-09-15 PROCEDURE — 97535 SELF CARE MNGMENT TRAINING: CPT | Mod: GO

## 2022-09-15 PROCEDURE — 84132 ASSAY OF SERUM POTASSIUM: CPT | Performed by: INTERNAL MEDICINE

## 2022-09-15 PROCEDURE — 97116 GAIT TRAINING THERAPY: CPT | Mod: GP

## 2022-09-15 PROCEDURE — 36415 COLL VENOUS BLD VENIPUNCTURE: CPT | Performed by: INTERNAL MEDICINE

## 2022-09-15 PROCEDURE — 250N000011 HC RX IP 250 OP 636: Performed by: INTERNAL MEDICINE

## 2022-09-15 PROCEDURE — 97110 THERAPEUTIC EXERCISES: CPT | Mod: GP

## 2022-09-15 PROCEDURE — 82565 ASSAY OF CREATININE: CPT | Performed by: INTERNAL MEDICINE

## 2022-09-15 PROCEDURE — 85018 HEMOGLOBIN: CPT | Performed by: INTERNAL MEDICINE

## 2022-09-15 PROCEDURE — C9113 INJ PANTOPRAZOLE SODIUM, VIA: HCPCS | Performed by: INTERNAL MEDICINE

## 2022-09-15 PROCEDURE — 97530 THERAPEUTIC ACTIVITIES: CPT | Mod: GO

## 2022-09-15 PROCEDURE — 99233 SBSQ HOSP IP/OBS HIGH 50: CPT | Performed by: INTERNAL MEDICINE

## 2022-09-15 PROCEDURE — 120N000001 HC R&B MED SURG/OB

## 2022-09-15 RX ADMIN — PANTOPRAZOLE SODIUM 40 MG: 40 INJECTION, POWDER, FOR SOLUTION INTRAVENOUS at 08:08

## 2022-09-15 RX ADMIN — PANTOPRAZOLE SODIUM 40 MG: 40 INJECTION, POWDER, FOR SOLUTION INTRAVENOUS at 20:16

## 2022-09-15 RX ADMIN — CEFTRIAXONE 2 G: 2 INJECTION, POWDER, FOR SOLUTION INTRAMUSCULAR; INTRAVENOUS at 16:18

## 2022-09-15 ASSESSMENT — ACTIVITIES OF DAILY LIVING (ADL)
ADLS_ACUITY_SCORE: 28
ADLS_ACUITY_SCORE: 24
ADLS_ACUITY_SCORE: 28
ADLS_ACUITY_SCORE: 24
ADLS_ACUITY_SCORE: 26
ADLS_ACUITY_SCORE: 24
ADLS_ACUITY_SCORE: 28

## 2022-09-15 NOTE — PROGRESS NOTES
A/P    87 year old male admitted on 9/11/2022 due to hematemesis and lightheadedness.  CT angiogram showed duodenal bleed, severe prostatic enlargement and suspected IPMN.  He was placed on IV fluid and pantoprazole on admission.     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  EGD (3/2022) for iron deficiency anemia showed periampullar region there was a large 2-3 cm pedunculated mass that circumferentially narrowed the duodenal lumen with ulceration.  Pathology of duodenum, biopsy showed adenomatous polyp with focal areas suspicious for high-grade dysplasia.  CT angiogram of the abdomen/pelvis (9/12/22) revealed focal wall thickening of the second portion of the duodenum with arterial blush within the lumen and accumulation on portal venous phase images in this location consistent with a source of GI bleed likely due to peptic ulcer disease. Celiac, SMA, IVON patent.    -EGD (9/12/22) showed normal esophagus and stomach.  large fungating, infiltrative and polypoid mass with bleeding was found in the second portion of the duodenum. For hemostasis, three hemostatic clips were successfully placed.No bleeding at end of procedure.  But post procedure appears to have restarted bleeding given ongoing melena and dropping Hgb.  IR consulted (9/13/22) and performed Mesenteric Angiogram which showed no contrast extravasation. Based on the CTA and endoscopic findings, 4 pancreaticoduodenal branches were superselectively embolized.    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  No biopsies taken during EGD this admission.  This is etiology for bleeding due to ulceration.  -will d/w oncology if any options such as radiation a consideration since he has previously been evaluated by surgery and no surgical interventions possible or recommended according to GI discussion  with them.     Acute blood loss anemia:  Due to #1.  S/P 6U PRBC transfusions this admission.Bleeding resolved  -Hgb a.m.  -transfuse for Hgb < 7-8  -hold PO iron and B12 for now     Acute on Chronic urinary retention requiring chronic self intermittent catheterizations, BPH: CT showed severe enlargement of the prostate gland which indents the bladder base with bladder wall thickening and patulous ureters, likely due to chronic outlet obstruction   -Urology placed an 18fr coude catheter at the bedside in ED  - Maintain marino catheter during hospitalization.  Remove marino and resume self cath once patient has recovered from illness and is ready to discharge home.  - Patient not interested in finasteride or Flomax at this time.  - Urology will arrange for follow up in their office in the next couple of weeks to discuss management of very enlarged prostate    Possible sepsis:  WBC 15->17->18.4->13.3.  Normal 6/29/22.  Reactive from acute blood loss and UTI.  BC's x 2 NGTD  -IV CTXPO cefdinir on discharge for UTI    Acute on chronic recurrent Klebsiella oxytoca UTI in setting of chronic SIC's at home: prior UC's Klebsiella oxytoca, E. Coli  -IV CTX. PO cefdinir on d/c    Suspected IPMN  -CT showed cystic lesion in the superior pancreas body measuring 14 x 28 mm with clear communication to the main pancreas duct, consistent with IPMN for which endoscopic ultrasound in 3-6 months is recommended.  -Endoscopic ultrasound as outpatient in 3-6 months. Will defer to GI.     CVA with residual left-sided weakness  -He uses walker at baseline.  -admission med rec shows no antiplatelets or statins chronically.  Given GI bleeding issues is probably why  -PT/OT     Hypomagnesemia:  -replaced    Acute hyperkalemia: resolved    H/O colon polyps:  Colonoscopy (3/2022) - Cecum, ascending and transverse colon, polyp, biopsy - Sessile serrated lesion, tubular adenoma and fragments of colonic mucosa with prominent lymphoid aggregate.   Descending, sigmoid and rectum, polyp, biopsy - Tubular adenomas. Hyperplastic polyps.    Full code    discharge pending TCU avail    SCD's        S:  Afebrile. Events overnight noted    O:  Temp: 98  F (36.7  C) Temp src: Oral BP: (!) 145/65 Pulse: 95   Resp: 18 SpO2: 100 % O2 Device: None (Room air)    gen nad  cv rrr  Lungs cta  abd bs+, nd, no significant ttp  Neuro a&o    Lab/imaging reviewed

## 2022-09-15 NOTE — PLAN OF CARE
Goal Outcome Evaluation:             Pt had additional bm this evening and is dark brown.  Pt tolerating diet, denies pain.  Pt assist standby, steady on feet, up in the chair most of the day and shifts independently.

## 2022-09-15 NOTE — PROGRESS NOTES
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      EGD completed 9/12/22- redemonstration of duodenal adenoma with overlying ulcer which was bleeding. This may represent malignancy with invasion, although biopsies have not shown malignancy.      He underwent visceral angiography 9/13/22 for ongoing hemorrhage. No extravasation, empiric embolization of 4 pancreaticoduodenal branches.     Hemoglobin is stable and last BM was brown per last nursing reports, favoring cessation of bleeding.      - pantoprazole 40mg BID PO indefinitely    GI will sign off. Please call with questions or concerns.     Approximately 20 minutes of total time was spent providing patient care including patient evaluation, reviewing documentation/test results, and .                                                 Conner Navarrete MD  Thank you for the opportunity to participate in the care of this patient.   Please feel free to call me with any questions or concerns.  Phone number [phone] or if after 5PM [phone].              ---      Subjective:  BM overnight black but last was brown. No pain. Appetite good. Tolerating diet. Worried about appropriate level of care at home. 4 point ROS otherwis     Objective:  Vital signs in last 24 hours  Temp:  [97.9  F (36.6  C)-98.6  F (37  C)] 97.9  F (36.6  C)  Pulse:  [76-92] 86  Resp:  [17-18] 18  BP: (129-173)/(58-73) 160/67  SpO2:  [93 %-95 %] 93 % O2 Device: None (Room air)      Gen: A&Ox3, NAD  Eyes: No icterus  Pulmonary: CTA bilaterally  Cardiovascular: RRR,S1, S2  Gastrointestinal: Soft, NTTP, BS+, no rebound or guarding  Extrem: PPI, no c/c/e      LABORATORY    ELECTROLYTE PANEL   Recent Labs   Lab 09/15/22  0611 09/14/22  0618 09/13/22  0531  09/12/22  0617 09/11/22  1216   NA  --   --  138 137 133*   POTASSIUM 3.5 3.5 4.1 4.6 5.2*   CHLORIDE  --   --  115* 111* 102   CO2  --   --  22 21* 26   GLC  --   --  121 113 94   CR 0.64* 0.69* 0.84 0.80 0.79   BUN  --   --  32* 33* 28      HEMATOLOGY PANEL   Recent Labs   Lab 09/15/22  0611 09/14/22  2014 09/14/22  1625 09/14/22 0618 09/13/22  0938 09/13/22  0531 09/12/22  1707 09/11/22  1723 09/11/22  1216   HGB 7.7* 7.9*  --  7.8*   < > 7.2* 8.1*   < > 9.2*   MCV  --   --   --  93  --  93 93   < > 92   WBC  --   --   --  13.3*  --  18.4* 19.7*   < > 15.0*   PLT  --   --   --  174  --  207 277   < > 458*   INR  --   --  1.19*  --   --   --   --   --  1.14    < > = values in this interval not displayed.      LIVER AND PANCREAS PANEL   Recent Labs   Lab 09/13/22 0531 09/12/22 0617 09/11/22  1216   AST 12 9 10   ALT <9 <9 <9   ALKPHOS 29* 35* 48   BILITOTAL 0.5 1.1* 0.4   LIPASE  --   --  16     IMAGING STUDIES    No new imaging results.    I have reviewed the current diagnostic and laboratory tests.

## 2022-09-16 ENCOUNTER — APPOINTMENT (OUTPATIENT)
Dept: OCCUPATIONAL THERAPY | Facility: HOSPITAL | Age: 87
DRG: 356 | End: 2022-09-16
Payer: COMMERCIAL

## 2022-09-16 ENCOUNTER — APPOINTMENT (OUTPATIENT)
Dept: PHYSICAL THERAPY | Facility: HOSPITAL | Age: 87
DRG: 356 | End: 2022-09-16
Payer: COMMERCIAL

## 2022-09-16 LAB — HGB BLD-MCNC: 8.3 G/DL (ref 13.3–17.7)

## 2022-09-16 PROCEDURE — 97110 THERAPEUTIC EXERCISES: CPT | Mod: GP

## 2022-09-16 PROCEDURE — 85018 HEMOGLOBIN: CPT | Performed by: INTERNAL MEDICINE

## 2022-09-16 PROCEDURE — 250N000013 HC RX MED GY IP 250 OP 250 PS 637: Performed by: GENERAL PRACTICE

## 2022-09-16 PROCEDURE — 97535 SELF CARE MNGMENT TRAINING: CPT | Mod: GO

## 2022-09-16 PROCEDURE — 120N000001 HC R&B MED SURG/OB

## 2022-09-16 PROCEDURE — C9113 INJ PANTOPRAZOLE SODIUM, VIA: HCPCS | Performed by: INTERNAL MEDICINE

## 2022-09-16 PROCEDURE — 97530 THERAPEUTIC ACTIVITIES: CPT | Mod: GO

## 2022-09-16 PROCEDURE — 250N000011 HC RX IP 250 OP 636: Performed by: INTERNAL MEDICINE

## 2022-09-16 PROCEDURE — 99233 SBSQ HOSP IP/OBS HIGH 50: CPT | Performed by: GENERAL PRACTICE

## 2022-09-16 PROCEDURE — 97116 GAIT TRAINING THERAPY: CPT | Mod: GP

## 2022-09-16 PROCEDURE — 36415 COLL VENOUS BLD VENIPUNCTURE: CPT | Performed by: INTERNAL MEDICINE

## 2022-09-16 RX ORDER — PANTOPRAZOLE SODIUM 40 MG/1
40 TABLET, DELAYED RELEASE ORAL
Status: DISCONTINUED | OUTPATIENT
Start: 2022-09-16 | End: 2022-09-17 | Stop reason: HOSPADM

## 2022-09-16 RX ADMIN — PANTOPRAZOLE SODIUM 40 MG: 40 TABLET, DELAYED RELEASE ORAL at 18:14

## 2022-09-16 RX ADMIN — CEFTRIAXONE 2 G: 2 INJECTION, POWDER, FOR SOLUTION INTRAMUSCULAR; INTRAVENOUS at 13:30

## 2022-09-16 RX ADMIN — PANTOPRAZOLE SODIUM 40 MG: 40 INJECTION, POWDER, FOR SOLUTION INTRAVENOUS at 08:55

## 2022-09-16 ASSESSMENT — ACTIVITIES OF DAILY LIVING (ADL)
ADLS_ACUITY_SCORE: 24
ADLS_ACUITY_SCORE: 24
ADLS_ACUITY_SCORE: 29
ADLS_ACUITY_SCORE: 28
ADLS_ACUITY_SCORE: 24
ADLS_ACUITY_SCORE: 29
ADLS_ACUITY_SCORE: 28
ADLS_ACUITY_SCORE: 29

## 2022-09-16 NOTE — PROGRESS NOTES
.7:55 AM-SW received voicemail from 09/15/22 @ 8:06 PM from pt's daughter Valentina, who indicated that she was returning a call from 09/14/22 seeking a return call.     KESHAV Olivas, [facility]    09/16/2022   7:58 AM

## 2022-09-16 NOTE — PLAN OF CARE
Problem: Plan of Care - These are the overarching goals to be used throughout the patient stay.    Goal: Optimal Comfort and Wellbeing  Intervention: Monitor Pain and Promote Comfort  Recent Flowsheet Documentation  Taken 9/16/2022 0846 by Tasia Castro RN  Pain Management Interventions: emotional support     Problem: Bleeding (Gastrointestinal Bleeding)  Goal: Hemostasis  Outcome: Ongoing, Progressing   Goal Outcome Evaluation:  Patient denies pain alert and oriented, forgetful at times.  Assist of 1 with gait belt and walker to bathroom.  Large Dark brown formed bowel movement noted  Ambulating on the hallways with PT. Up in the chair for meals. Good appetite.  Cash patient draining getachew urine of good output.

## 2022-09-16 NOTE — PROGRESS NOTES
CLINICAL NUTRITION SERVICES    Reviewed nutrition risk factors due to LOS. Pt is tolerating diet, eating well per nursing documentation. No nutrition issues identified at this time. RD will follow via rounds at this time, unless consulted.pt states he's eating 100% of all of his meals

## 2022-09-16 NOTE — PROGRESS NOTES
[facility] Daily Progress Note    Assessment/Plan:    87 year old male admitted on 9/11/2022 due to hematemesis and lightheadedness.  CT angiogram showed duodenal bleed, severe prostatic enlargement and suspected IPMN.  He was placed on IV fluid and pantoprazole on admission.    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  EGD (3/2022) for iron deficiency anemia showed periampullar region there was a large 2-3 cm pedunculated mass that circumferentially narrowed the duodenal lumen with ulceration.  Pathology of duodenum, biopsy showed adenomatous polyp with focal areas suspicious for high-grade dysplasia.  CT angiogram of the abdomen/pelvis (9/12/22) revealed focal wall thickening of the second portion of the duodenum with arterial blush within the lumen and accumulation on portal venous phase images in this location consistent with a source of GI bleed likely due to peptic ulcer disease. Celiac, SMA, IVON patent.    -EGD (9/12/22) showed normal esophagus and stomach.  large fungating, infiltrative and polypoid mass with bleeding was found in the second portion of the duodenum. For hemostasis, three hemostatic clips were successfully placed.No bleeding at end of procedure.  But post procedure appears to have restarted bleeding given ongoing melena and dropping Hgb.  IR consulted (9/13/22) and performed Mesenteric Angiogram which showed no contrast extravasation. Based on the CTA and endoscopic findings, 4 pancreaticoduodenal branches were superselectively embolized.    -Hgb stable 8.3  -Pantoprazole 40 twice daily indefinitely  -regular diet  -GI signed off    Acute blood loss anemia:  Due to #1.  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  No biopsies taken during EGD this admission.  This is  etiology for bleeding due to ulceration.  -will d/w oncology if any options such as radiation a consideration since he has previously been evaluated by surgery and no surgical interventions possible or recommended according to GI discussion with them.    Acute on Chronic urinary retention requiring chronic self intermittent catheterizations, BPH: CT showed severe enlargement of the prostate gland which indents the bladder base with bladder wall thickening and patulous ureters, likely due to chronic outlet obstruction   -Urology placed an 18fr coude catheter at the bedside in ED  - Maintain marino catheter during hospitalization.  Remove marino and resume self cath once patient has recovered from illness and is ready to discharge home.  - Patient not interested in finasteride or Flomax at this time.  - Urology will arrange for follow up in their office in the next couple of weeks to discuss management of very enlarged prostate    Possible sepsis:  WBC 15->17->18.4->13.3.  Normal 6/29/22.  Reactive from acute blood loss and UTI.  BC's x 2 NGTD  -IV CTXPO cefdinir on discharge for UTI    CVA with residual left-sided weakness  -He uses walker at baseline.  -admission med rec shows no antiplatelets or statins chronically.  Given GI bleeding issues is probably why  -PT/OT     Diet: Regular Diet Adult  DVT Prophylaxis: SCDs  Code Status: Full Code    Active Problems:    Upper GI bleed    Enlarged prostate     LOS: 5 days     Barriers to discharge: none  Discharge Disposition: TCU pending to [facility]    Subjective:    No complaints today    cefTRIAXone  2 g Intravenous Q24H     [Held by provider] cyanocobalamin  1,000 mcg Oral Daily     [Held by provider] ferrous sulfate  325 mg Oral Daily     pantoprazole  40 mg Intravenous BID     sodium chloride (PF)  3 mL Intracatheter Q8H       Objective:  Vital signs in last 24 hours:  Temp:  [97.8  F (36.6  C)-99.2  F (37.3  C)] 97.8  F (36.6  C)  Pulse:  [76-86] 86  Resp:   [16-18] 18  BP: (124-169)/(58-73) 124/58  SpO2:  [94 %-96 %] 95 %  Weight:   Weight:   @THISENCWEIGHTS(1)@  Weight change:   Body mass index is 22.05 kg/m .    Intake/Output last 3 shifts:  I/O last 3 completed shifts:  In: 618 [P.O.:490; I.V.:128]  Out: 2750 [Urine:2750]  Intake/Output this shift:  No intake/output data recorded.    Review of Systems:   As per subjective, all others negative.    Physical Exam:    GENERAL:  Alert, appears comfortable, in no acute distress, appears stated age   HEAD:  Normocephalic, without obvious abnormality, atraumatic   EYES:  PERRL, conjunctiva/corneas clear, no scleral icterus, EOM's intact   NOSE: Nares normal, septum midline, mucosa normal, no drainage   THROAT: Lips, mucosa, and tongue normal; teeth and gums normal, mouth moist   NECK: Supple, symmetrical, trachea midline   BACK:   Symmetric, no curvature, ROM normal   LUNGS:   Clear to auscultation bilaterally, no rales, rhonchi, or wheezing, symmetric chest rise on inhalation, respirations unlabored   CHEST WALL:  No tenderness or deformity   HEART:  Regular rate and rhythm, S1 and S2 normal, no murmur, rub, or gallop    ABDOMEN:   Soft, non-tender, bowel sounds active all four quadrants, no masses, no organomegaly, no rebound or guarding   EXTREMITIES: Extremities normal, atraumatic, no cyanosis or edema    SKIN: Dry to touch, no exanthems in the visualized areas   NEURO: Alert, oriented x3, moves all four extremities freely, non-focal   PSYCH: Cooperative, behavior is appropriate      Cardiographics:   I personally reviewed.      Imaging:  Personally Reviewed.  Results for orders placed or performed during the hospital encounter of 09/11/22   XR Chest Port 1 View    Impression    IMPRESSION:     Lucent, hyperinflated lungs with flattening of diaphragmatic curvature consistent with obstructive lung disease/air-trapping. No superimposed airspace opacities or interstitial thickening.    The cardiac silhouette is not  enlarged. Wall calcification of the aortic arch and descending thoracic aorta as before. No new mediastinal contour abnormality.    No pleural effusion or pneumothorax.   CTA Abdomen Pelvis with Contrast    Impression    IMPRESSION:    1.  Focal wall thickening of the second portion of the duodenum. Arterial blush within the lumen and accumulation on portal venous phase images in this location consistent with a source of GI bleed likely due to peptic ulcer disease. GI consultation for   potential upper endoscopy is suggested.  2.  Severe enlargement of the prostate gland which indents the bladder base. Bladder wall thickening and patulous ureters, likely due to chronic outlet obstruction. No hydronephrosis.  3.  Cystic lesion in the superior pancreas body measuring 14 x 28 mm with clear communication to the main pancreas duct, consistent with IPMN. Based on size, recommend evaluation with endoscopic ultrasound in 3-6 months as per guidelines below      REFERENCE:  Revisions of international consensus [place] guidelines for the management of IPMN of the pancreas. Pancreatology 2017;17(5):738-753.    Largest cyst between 20-30 mm: EUS in 3-6 months and then annual surveillance alternating between MRI and EUS as appropriate.     IR Visceral Angiogram    Impression    IMPRESSION:    1.  No contrast extravasation. Based on the CTA and endoscopic findings, 4 pancreaticoduodenal branches were superselectively embolized, as described above.    PLAN: Postprocedure observation.       Lab Results:  Personally Reviewed.   Recent Labs   Lab 09/16/22  0555 09/15/22  0611 09/14/22 2014 09/14/22 0618 09/13/22 0938 09/13/22 0531 09/12/22  1707   WBC  --   --   --  13.3*  --  18.4* 19.7*   HGB 8.3* 7.7* 7.9* 7.8*   < > 7.2* 8.1*   HCT  --   --   --  23.7*  --  21.8* 25.2*   PLT  --   --   --  174  --  207 277    < > = values in this interval not displayed.     Recent Labs   Lab 09/13/22  0531 09/12/22 0617 09/11/22  1216   NA  138 137 133*   CO2 22 21* 26   BUN 32* 33* 28   ALBUMIN 2.0* 2.3* 3.1*   ALKPHOS 29* 35* 48   ALT <9 <9 <9   AST 12 9 10     Recent Labs   Lab 09/14/22  1625 09/11/22  1216   INR 1.19* 1.14       I reviewed all labs and imaging studies as of this date and I reviewed all current inpatient medications and updated them    Johanny Alaniz MD

## 2022-09-16 NOTE — PROGRESS NOTES
Care Management Follow Up    Length of Stay (days): 5    Expected Discharge Date: 09/16/2022     Concerns to be Addressed:       Patient plan of care discussed at interdisciplinary rounds: Yes    Anticipated Discharge Disposition: TCU     Anticipated Discharge Services:  Post acute therapy   Anticipated Discharge DME: none      Patient/family educated on Medicare website which has current facility and service quality ratings:  yes  Education Provided on the Discharge Plan:  yes  Patient/Family in Agreement with the Plan:  yes    Referrals Placed by CM/SW:  TCU  Private pay costs discussed: Not applicable    Additional Information:  ASHLEY spoke with pt dtr Dereje and discussed discharge planning to include recs for TCU . Valentina agreeable and requested referrals to [facility] [place] [address] as pt wife currently there and other facilities in [facility].     ASHLEY spoke with pt and updated him on above conversation and recs for TCU. Pt stated that he will do whatever his dtr feels is the best plan.     Referrals faxed.       CHAPINCITO Bridges

## 2022-09-17 ENCOUNTER — APPOINTMENT (OUTPATIENT)
Dept: OCCUPATIONAL THERAPY | Facility: HOSPITAL | Age: 87
DRG: 356 | End: 2022-09-17
Payer: COMMERCIAL

## 2022-09-17 VITALS
DIASTOLIC BLOOD PRESSURE: 62 MMHG | HEART RATE: 87 BPM | WEIGHT: 145 LBS | TEMPERATURE: 99 F | HEIGHT: 68 IN | BODY MASS INDEX: 21.98 KG/M2 | OXYGEN SATURATION: 94 % | RESPIRATION RATE: 16 BRPM | SYSTOLIC BLOOD PRESSURE: 150 MMHG

## 2022-09-17 PROBLEM — N39.0 URINARY TRACT INFECTION: Status: ACTIVE | Noted: 2022-09-17

## 2022-09-17 PROBLEM — K92.2 UPPER GI BLEED: Status: RESOLVED | Noted: 2022-09-11 | Resolved: 2022-09-17

## 2022-09-17 PROBLEM — D13.5 AMPULLARY ADENOMA: Status: ACTIVE | Noted: 2022-09-17

## 2022-09-17 LAB
BACTERIA BLD CULT: NO GROWTH
BACTERIA BLD CULT: NO GROWTH

## 2022-09-17 PROCEDURE — 97110 THERAPEUTIC EXERCISES: CPT | Mod: GO

## 2022-09-17 PROCEDURE — 97535 SELF CARE MNGMENT TRAINING: CPT | Mod: GO

## 2022-09-17 PROCEDURE — 250N000011 HC RX IP 250 OP 636: Performed by: INTERNAL MEDICINE

## 2022-09-17 PROCEDURE — 99239 HOSP IP/OBS DSCHRG MGMT >30: CPT | Performed by: INTERNAL MEDICINE

## 2022-09-17 PROCEDURE — 250N000013 HC RX MED GY IP 250 OP 250 PS 637: Performed by: GENERAL PRACTICE

## 2022-09-17 RX ORDER — ACETAMINOPHEN 325 MG/1
650 TABLET ORAL EVERY 4 HOURS PRN
Refills: 0
Start: 2022-09-17 | End: 2023-01-01

## 2022-09-17 RX ORDER — PANTOPRAZOLE SODIUM 40 MG/1
40 TABLET, DELAYED RELEASE ORAL
Qty: 60 TABLET | Refills: 1 | Status: SHIPPED | OUTPATIENT
Start: 2022-09-17

## 2022-09-17 RX ADMIN — PANTOPRAZOLE SODIUM 40 MG: 40 TABLET, DELAYED RELEASE ORAL at 06:56

## 2022-09-17 RX ADMIN — CEFTRIAXONE 2 G: 2 INJECTION, POWDER, FOR SOLUTION INTRAMUSCULAR; INTRAVENOUS at 13:28

## 2022-09-17 ASSESSMENT — ACTIVITIES OF DAILY LIVING (ADL)
ADLS_ACUITY_SCORE: 30
ADLS_ACUITY_SCORE: 30
ADLS_ACUITY_SCORE: 29
ADLS_ACUITY_SCORE: 29
ADLS_ACUITY_SCORE: 30
ADLS_ACUITY_SCORE: 30
ADLS_ACUITY_SCORE: 29
ADLS_ACUITY_SCORE: 29

## 2022-09-17 NOTE — PLAN OF CARE
Goal Outcome Evaluation:    Plan of Care Reviewed With: patient   Patient discharge instruction educated. Picked up by daughter and escorted  in wheel chair by the writer to front door of the hospital.

## 2022-09-17 NOTE — PLAN OF CARE
Problem: Adjustment to Illness (Gastrointestinal Bleeding)  Goal: Optimal Coping with Acute Illness  Outcome: Ongoing, Progressing     Problem: Bleeding (Gastrointestinal Bleeding)  Goal: Hemostasis  Outcome: Ongoing, Progressing    Pt denies pain. Pt reported some nausea overnight but declined any interventions. Pt slept overnight. Cash patent and draining urine. Stool black/brown overnight. No signs of bleeding. VS unchanged.    Obdulia Reynaga RN

## 2022-09-17 NOTE — PLAN OF CARE
Physical Therapy Discharge Summary    Reason for therapy discharge:    Discharged to home.    Progress towards therapy goal(s). See goals on Care Plan in [facility] electronic health record for goal details.  Goals met    Therapy recommendation(s):    Home with family assist.

## 2022-09-17 NOTE — PROGRESS NOTES
Pt was accepted to [facility] for tomorrow at 1300 according to Morena from admissions ([phone]) and CM was just going to update MD and family when MD came to update writer that pt has decided to go home with help from daughter.  ELIO called [facility] and gave them the update.

## 2022-09-17 NOTE — PLAN OF CARE
Occupational Therapy Discharge Summary    Reason for therapy discharge:    Discharged to home.    Progress towards therapy goal(s). See [facility] on Care [facility] for goal details.  Goals partially met.  Barriers to achieving goals:   discharge from facility.    Therapy recommendation(s):    Recommend assist as needed with ADLs

## 2022-09-17 NOTE — DISCHARGE SUMMARY
"[facility]    Discharge Summary  Hospitalist    Date of Admission:  9/11/2022  Date of Discharge:  9/17/2022  Discharging Provider: Derek Leach MD, MD    Discharge Diagnoses   Principal Problem:    Ampullary adenoma w Associated UGI Bleed     Acute Blood Loss Anemia  Active Problems:    BPH w Retention -- Straight Cath's     Klebsiella UTI    Hx of Prior CVA      History of Present Illness   87 year old male with history of BPH, CVA, with residual left-sided weakness who presents to the ER due to lightheadedness, hematemesis and melena.     He was in his usual state of health until around 9 AM today when he suddenly developed lightheadedness associated with diaphoresis while attempting to self catheterize himself.  He passed melanotic stool described as \"dark black softer than usual\" and also had 1 episode of hematemesis afterwards.  He takes iron supplement at baseline for chronic anemia.  He denies fever, however he endorsed chills as well as exertional dyspnea.  He denies chest pain, palpitation, or syncope.  He denies orthopnea, PND, cough or leg swelling. .     In the ER, initial blood pressure was 117/78, pulse 102, respiratory rate 31, temperature 36.7  C and oxygen saturation 100% on room air.  Significant laboratory findings include sodium 133, potassium 5.2, WBC 15.0, hemoglobin 9.2 with MCV of 92 and platelet 458.  Chest x-ray showed lucent, hyperinflated lungs with flattening of diaphragmatic curvature consistent with obstructive lung disease/air-trapping.  EKG in ER showed left bundle branch block.  As per cardiologist no evidence of new myocardial ischemia on EKG.  CT angiogram of the abdomen revealed focal wall thickening of the second portion of the duodenum with arterial blush within the lumen and accumulation on portal venous phase images in this location consistent with a source of GI bleed likely due to peptic ulcer disease, severe enlargement of the prostate " gland which indents the bladder base with bladder wall thickening and patulous ureters, likely due to chronic outlet obstruction as well as cystic lesion in the superior pancreas body measuring 14 x 28 mm with clear communication to the main pancreas duct, consistent with IPMN for which endoscopic ultrasound in 3-6 months is recommended.      Hospital Course   Admitted to medical floor, treated with IV fluids and PPI drip, seen by [facility], did have EGD which showed large duodenal adenoma which may be malignant had an ulcerated area with oozing and clot. Three clips were placed but seemed ineffective.  Was seen by IR and had mesenteric angiogram with embolization of branch of superior mesenteric artery.     Hgb stable at discharge.  He had marino placed here but will resume self-cathing at home.  He did have possible UTI on admit with UC showing >100K Klebsiella, but did get Ceftriaxone 1 gm IV daily x 5 days, and could check UA and UC, and CBC on follow-up in 1 week. He will continue on Protonix 40 mg bid and follow-up with [facility].     Derek Leach MD  Pager: [phone]  Cell Phone:  [phone]       Significant Results and Procedures   As above    Pending Results   These results will be followed up by Dr. Leach  Unresulted Labs Ordered in the Past 30 Days of this Admission     Date and Time Order Name Status Description    9/12/2022 11:59 AM Blood Culture Peripheral Blood Preliminary     9/12/2022 11:59 AM Blood Culture Peripheral Blood Preliminary           Code Status   Full Code       Primary Care Physician   YOUSIF IBARRA    Physical Exam   Temp: 99  F (37.2  C) Temp src: Oral BP: (!) 150/62 Pulse: 87   Resp: 16 SpO2: 94 % O2 Device: None (Room air) Oxygen Delivery: 1 LPM  Vitals:    09/11/22 1209   Weight: 65.8 kg (145 lb)     Vital Signs with Ranges  Temp:  [97.8  F (36.6  C)-99.1  F (37.3  C)] 99  F (37.2  C)  Pulse:  [79-88] 87  Resp:  [16-18] 16  BP: (124-166)/(58-71) 150/62  SpO2:  [92 %-96 %]  94 %  I/O last 3 completed shifts:  In: 1118 [P.O.:990; I.V.:128]  Out: 4400 [Urine:4400]    Exam on discharge:   Lungs clear  CV with reg S1S2  Abd non-tender    Discharge Disposition   Discharged to home  Condition at discharge: Good    Consultations This Hospital Stay   CARE MANAGEMENT / SOCIAL WORK IP CONSULT  GASTROENTEROLOGY IP CONSULT  PHYSICAL THERAPY ADULT IP CONSULT  OCCUPATIONAL THERAPY ADULT IP CONSULT  GASTROENTEROLOGY IP CONSULT  INTERVENTIONAL RADIOLOGY ADULT/PEDS IP CONSULT    Time Spent on this Encounter   I spent a total of 35 minutes discharging this patient.     Discharge Orders      Reason for your hospital stay    UGI bleeding related to a tumor in small intestine.     Follow-up and recommended labs and tests     Follow up with primary care provider, YOUSIF IBARRA, in 5 days, check Hgb and UA and UC then.     Activity    Your activity upon discharge: activity as tolerated     Discharge Instructions    Call Dr. Ardon if any medical questions at Cell Phone [phone].     Diet    Follow this diet upon discharge: Orders Placed This Encounter      Regular Diet Adult     Discharge Medications   Current Discharge Medication List      START taking these medications    Details   acetaminophen (TYLENOL) 325 MG tablet Take 2 tablets (650 mg) by mouth every 4 hours as needed for mild pain  Refills: 0    Associated Diagnoses: Pain      pantoprazole (PROTONIX) 40 MG EC tablet Take 1 tablet (40 mg) by mouth 2 times daily (before meals)  Qty: 60 tablet, Refills: 1    Associated Diagnoses: Upper GI bleed         CONTINUE these medications which have NOT CHANGED    Details   cyanocobalamin (VITAMIN B-12) 1000 MCG tablet Take 1,000 mcg by mouth daily      FEROSUL 325 (65 Fe) MG tablet TAKE 1 TABLET (325 MG) BY MOUTH DAILY (WITH BREAKFAST)           Allergies   Allergies   Allergen Reactions     Sulfa Drugs Rash     Info obtained off of 10/4/21 [facility] pre op.     Data   Most Recent 3 CBC's:Recent Labs   Lab  Test 09/16/22  0555 09/15/22  0611 09/14/22 2014 09/14/22  0618 09/13/22  0938 09/13/22  0531 09/12/22  1707   WBC  --   --   --  13.3*  --  18.4* 19.7*   HGB 8.3* 7.7* 7.9* 7.8*   < > 7.2* 8.1*   MCV  --   --   --  93  --  93 93   PLT  --   --   --  174  --  207 277    < > = values in this interval not displayed.      Most Recent 3 BMP's:  Recent Labs   Lab Test 09/15/22  0611 09/14/22  0618 09/13/22  0531 09/12/22  0617 09/11/22  1216   NA  --   --  138 137 133*   POTASSIUM 3.5 3.5 4.1 4.6 5.2*   CHLORIDE  --   --  115* 111* 102   CO2  --   --  22 21* 26   BUN  --   --  32* 33* 28   CR 0.64* 0.69* 0.84 0.80 0.79   ANIONGAP  --   --  1* 5 5   GINETTE  --   --  7.1* 7.4* 8.3*   GLC  --   --  121 113 94     Most Recent 2 LFT's:  Recent Labs   Lab Test 09/13/22 0531 09/12/22  0617   AST 12 9   ALT <9 <9   ALKPHOS 29* 35*   BILITOTAL 0.5 1.1*     Most Recent INR's and Anticoagulation Dosing History:  Anticoagulation Dose History     Recent Dosing and Labs Latest Ref Rng & Units 9/11/2022 9/14/2022    INR 0.85 - 1.15 1.14 1.19(H)        Most Recent 3 Troponin's:No lab results found.  Most Recent Cholesterol Panel:No lab results found.  Most Recent 6 Bacteria Isolates From Any Culture (See EPIC Reports for Culture Details):No lab results found.  Most Recent TSH, T4 and A1c Labs:No lab results found.

## 2022-09-19 LAB
ATRIAL RATE - MUSE: 89 BPM
ATRIAL RATE - MUSE: 89 BPM
DIASTOLIC BLOOD PRESSURE - MUSE: 54 MMHG
DIASTOLIC BLOOD PRESSURE - MUSE: 60 MMHG
INTERPRETATION ECG - MUSE: NORMAL
INTERPRETATION ECG - MUSE: NORMAL
P AXIS - MUSE: 74 DEGREES
P AXIS - MUSE: 77 DEGREES
PR INTERVAL - MUSE: 194 MS
PR INTERVAL - MUSE: 196 MS
QRS DURATION - MUSE: 120 MS
QRS DURATION - MUSE: 120 MS
QT - MUSE: 380 MS
QT - MUSE: 384 MS
QTC - MUSE: 462 MS
QTC - MUSE: 467 MS
R AXIS - MUSE: -49 DEGREES
R AXIS - MUSE: -67 DEGREES
SYSTOLIC BLOOD PRESSURE - MUSE: 113 MMHG
SYSTOLIC BLOOD PRESSURE - MUSE: 153 MMHG
T AXIS - MUSE: 88 DEGREES
T AXIS - MUSE: 92 DEGREES
VENTRICULAR RATE- MUSE: 89 BPM
VENTRICULAR RATE- MUSE: 89 BPM

## 2022-09-20 ENCOUNTER — PATIENT OUTREACH (OUTPATIENT)
Dept: CARE COORDINATION | Facility: CLINIC | Age: 87
End: 2022-09-20

## 2022-09-20 NOTE — PROGRESS NOTES
Clinic Care Coordination Contact  Care Team Conversations    Patient identified for care management outreach, however patient is not on a value based contract so cannot complete outreach. Will escalate to clinic staff if specific needs or resources are indicated.    LIZETT Ang  Social Work Care Coordinator - [facility]  Care Coordination  [email or web address]  Cell Phone: [phone]  Gender pronouns: she/her  Employed by [facility]

## 2022-09-23 ENCOUNTER — LAB REQUISITION (OUTPATIENT)
Dept: LAB | Facility: CLINIC | Age: 87
End: 2022-09-23
Payer: COMMERCIAL

## 2022-09-23 DIAGNOSIS — Z87.440 PERSONAL HISTORY OF URINARY (TRACT) INFECTIONS: ICD-10-CM

## 2022-09-23 PROCEDURE — 87086 URINE CULTURE/COLONY COUNT: CPT | Mod: ORL | Performed by: FAMILY MEDICINE

## 2022-09-25 LAB — BACTERIA UR CULT: NO GROWTH

## 2022-11-03 ENCOUNTER — LAB REQUISITION (OUTPATIENT)
Dept: LAB | Facility: CLINIC | Age: 87
End: 2022-11-03
Payer: COMMERCIAL

## 2022-11-03 DIAGNOSIS — I10 ESSENTIAL (PRIMARY) HYPERTENSION: ICD-10-CM

## 2022-11-03 PROCEDURE — 80048 BASIC METABOLIC PNL TOTAL CA: CPT | Performed by: FAMILY MEDICINE

## 2022-11-04 LAB
ANION GAP SERPL CALCULATED.3IONS-SCNC: 18 MMOL/L (ref 7–15)
BUN SERPL-MCNC: 14 MG/DL (ref 8–23)
CALCIUM SERPL-MCNC: 10.2 MG/DL (ref 8.8–10.2)
CHLORIDE SERPL-SCNC: 101 MMOL/L (ref 98–107)
CREAT SERPL-MCNC: 1.03 MG/DL (ref 0.67–1.17)
DEPRECATED HCO3 PLAS-SCNC: 19 MMOL/L (ref 22–29)
GFR SERPL CREATININE-BSD FRML MDRD: 71 ML/MIN/1.73M2
GLUCOSE SERPL-MCNC: 84 MG/DL (ref 70–99)
POTASSIUM SERPL-SCNC: 4.9 MMOL/L (ref 3.4–5.3)
SODIUM SERPL-SCNC: 138 MMOL/L (ref 136–145)

## 2023-01-01 ENCOUNTER — LAB REQUISITION (OUTPATIENT)
Dept: LAB | Facility: CLINIC | Age: 88
End: 2023-01-01
Payer: COMMERCIAL

## 2023-01-01 ENCOUNTER — HOSPITAL ENCOUNTER (EMERGENCY)
Facility: HOSPITAL | Age: 88
Discharge: HOME | End: 2023-09-10
Attending: EMERGENCY MEDICINE | Admitting: EMERGENCY MEDICINE
Payer: COMMERCIAL

## 2023-01-01 ENCOUNTER — TELEPHONE (OUTPATIENT)
Dept: ONCOLOGY | Facility: HOSPITAL | Age: 88
End: 2023-01-01
Payer: COMMERCIAL

## 2023-01-01 ENCOUNTER — MEDICAL CORRESPONDENCE (OUTPATIENT)
Dept: HEALTH INFORMATION MANAGEMENT | Facility: CLINIC | Age: 88
End: 2023-01-01

## 2023-01-01 ENCOUNTER — APPOINTMENT (OUTPATIENT)
Dept: CT IMAGING | Facility: HOSPITAL | Age: 88
End: 2023-01-01
Attending: EMERGENCY MEDICINE
Payer: COMMERCIAL

## 2023-01-01 ENCOUNTER — ONCOLOGY VISIT (OUTPATIENT)
Dept: ONCOLOGY | Facility: HOSPITAL | Age: 88
End: 2023-01-01
Attending: INTERNAL MEDICINE
Payer: COMMERCIAL

## 2023-01-01 ENCOUNTER — APPOINTMENT (OUTPATIENT)
Dept: CT IMAGING | Facility: CLINIC | Age: 88
DRG: 690 | End: 2023-01-01
Attending: HOSPITALIST
Payer: COMMERCIAL

## 2023-01-01 ENCOUNTER — HOSPITAL ENCOUNTER (EMERGENCY)
Facility: HOSPITAL | Age: 88
Discharge: OTHER INSTITUTION | End: 2023-08-06
Attending: EMERGENCY MEDICINE | Admitting: EMERGENCY MEDICINE
Payer: COMMERCIAL

## 2023-01-01 ENCOUNTER — HOSPITAL ENCOUNTER (OUTPATIENT)
Facility: HOSPITAL | Age: 88
Setting detail: OBSERVATION
Discharge: NURSING, REHAB OR LONG-TERM CARE | End: 2023-11-14
Attending: EMERGENCY MEDICINE | Admitting: STUDENT IN AN ORGANIZED HEALTH CARE EDUCATION/TRAINING PROGRAM
Payer: COMMERCIAL

## 2023-01-01 ENCOUNTER — HOSPITAL ENCOUNTER (INPATIENT)
Facility: CLINIC | Age: 88
LOS: 2 days | Discharge: HOME | DRG: 812 | End: 2023-06-02
Attending: EMERGENCY MEDICINE | Admitting: FAMILY MEDICINE
Payer: COMMERCIAL

## 2023-01-01 ENCOUNTER — LAB REQUISITION (OUTPATIENT)
Dept: LAB | Facility: CLINIC | Age: 88
End: 2023-01-01

## 2023-01-01 ENCOUNTER — PATIENT OUTREACH (OUTPATIENT)
Dept: ONCOLOGY | Facility: CLINIC | Age: 88
End: 2023-01-01
Payer: COMMERCIAL

## 2023-01-01 ENCOUNTER — HOSPITAL ENCOUNTER (INPATIENT)
Facility: CLINIC | Age: 88
LOS: 2 days | Discharge: HOME | DRG: 690 | End: 2023-08-08
Attending: HOSPITALIST | Admitting: HOSPITALIST
Payer: COMMERCIAL

## 2023-01-01 ENCOUNTER — HOSPITAL ENCOUNTER (EMERGENCY)
Facility: CLINIC | Age: 88
Discharge: HOME | End: 2023-06-19
Attending: EMERGENCY MEDICINE | Admitting: EMERGENCY MEDICINE
Payer: COMMERCIAL

## 2023-01-01 ENCOUNTER — INFUSION THERAPY VISIT (OUTPATIENT)
Dept: INFUSION THERAPY | Facility: HOSPITAL | Age: 88
End: 2023-01-01
Attending: INTERNAL MEDICINE
Payer: COMMERCIAL

## 2023-01-01 ENCOUNTER — INFUSION THERAPY VISIT (OUTPATIENT)
Dept: INFUSION THERAPY | Facility: HOSPITAL | Age: 88
End: 2023-01-01
Payer: COMMERCIAL

## 2023-01-01 ENCOUNTER — HEALTH MAINTENANCE LETTER (OUTPATIENT)
Age: 88
End: 2023-01-01

## 2023-01-01 ENCOUNTER — ANCILLARY PROCEDURE (OUTPATIENT)
Dept: ULTRASOUND IMAGING | Facility: HOSPITAL | Age: 88
End: 2023-01-01
Attending: EMERGENCY MEDICINE
Payer: COMMERCIAL

## 2023-01-01 ENCOUNTER — HOSPITAL ENCOUNTER (EMERGENCY)
Facility: HOSPITAL | Age: 88
Discharge: HOME | End: 2023-06-23
Attending: EMERGENCY MEDICINE | Admitting: EMERGENCY MEDICINE
Payer: COMMERCIAL

## 2023-01-01 ENCOUNTER — TRANSCRIBE ORDERS (OUTPATIENT)
Dept: OTHER | Age: 88
End: 2023-01-01

## 2023-01-01 ENCOUNTER — TRANSFERRED RECORDS (OUTPATIENT)
Dept: HEALTH INFORMATION MANAGEMENT | Facility: CLINIC | Age: 88
End: 2023-01-01

## 2023-01-01 ENCOUNTER — HOSPITAL ENCOUNTER (OUTPATIENT)
Facility: HOSPITAL | Age: 88
Setting detail: OBSERVATION
Discharge: HOME | End: 2023-10-01
Attending: EMERGENCY MEDICINE | Admitting: STUDENT IN AN ORGANIZED HEALTH CARE EDUCATION/TRAINING PROGRAM
Payer: COMMERCIAL

## 2023-01-01 ENCOUNTER — NURSING HOME VISIT (OUTPATIENT)
Dept: GERIATRICS | Facility: CLINIC | Age: 88
End: 2023-01-01
Payer: MEDICARE

## 2023-01-01 ENCOUNTER — APPOINTMENT (OUTPATIENT)
Dept: RADIOLOGY | Facility: HOSPITAL | Age: 88
End: 2023-01-01
Attending: EMERGENCY MEDICINE
Payer: COMMERCIAL

## 2023-01-01 ENCOUNTER — HOSPITAL ENCOUNTER (EMERGENCY)
Facility: HOSPITAL | Age: 88
Discharge: HOME | End: 2023-10-30
Attending: EMERGENCY MEDICINE | Admitting: EMERGENCY MEDICINE
Payer: COMMERCIAL

## 2023-01-01 VITALS
HEIGHT: 68 IN | OXYGEN SATURATION: 97 % | BODY MASS INDEX: 19.81 KG/M2 | SYSTOLIC BLOOD PRESSURE: 141 MMHG | RESPIRATION RATE: 20 BRPM | HEART RATE: 77 BPM | DIASTOLIC BLOOD PRESSURE: 63 MMHG | TEMPERATURE: 97.7 F | WEIGHT: 130.73 LBS

## 2023-01-01 VITALS
TEMPERATURE: 97.9 F | HEART RATE: 66 BPM | OXYGEN SATURATION: 97 % | RESPIRATION RATE: 18 BRPM | DIASTOLIC BLOOD PRESSURE: 69 MMHG | SYSTOLIC BLOOD PRESSURE: 156 MMHG

## 2023-01-01 VITALS
TEMPERATURE: 99 F | WEIGHT: 143 LBS | HEART RATE: 80 BPM | RESPIRATION RATE: 28 BRPM | OXYGEN SATURATION: 97 % | HEIGHT: 68 IN | SYSTOLIC BLOOD PRESSURE: 145 MMHG | DIASTOLIC BLOOD PRESSURE: 63 MMHG | BODY MASS INDEX: 21.67 KG/M2

## 2023-01-01 VITALS
SYSTOLIC BLOOD PRESSURE: 169 MMHG | HEIGHT: 68 IN | DIASTOLIC BLOOD PRESSURE: 75 MMHG | RESPIRATION RATE: 18 BRPM | WEIGHT: 143 LBS | HEART RATE: 73 BPM | BODY MASS INDEX: 21.67 KG/M2 | TEMPERATURE: 97.8 F | OXYGEN SATURATION: 96 %

## 2023-01-01 VITALS
SYSTOLIC BLOOD PRESSURE: 122 MMHG | DIASTOLIC BLOOD PRESSURE: 62 MMHG | RESPIRATION RATE: 20 BRPM | BODY MASS INDEX: 20.09 KG/M2 | TEMPERATURE: 97.8 F | HEIGHT: 67 IN | OXYGEN SATURATION: 95 % | WEIGHT: 128 LBS | HEART RATE: 82 BPM

## 2023-01-01 VITALS
TEMPERATURE: 97.5 F | OXYGEN SATURATION: 97 % | HEIGHT: 68 IN | WEIGHT: 130 LBS | DIASTOLIC BLOOD PRESSURE: 65 MMHG | RESPIRATION RATE: 18 BRPM | HEART RATE: 72 BPM | BODY MASS INDEX: 19.7 KG/M2 | SYSTOLIC BLOOD PRESSURE: 144 MMHG

## 2023-01-01 VITALS
OXYGEN SATURATION: 98 % | HEART RATE: 75 BPM | SYSTOLIC BLOOD PRESSURE: 143 MMHG | TEMPERATURE: 98.2 F | RESPIRATION RATE: 18 BRPM | BODY MASS INDEX: 21.74 KG/M2 | WEIGHT: 143 LBS | DIASTOLIC BLOOD PRESSURE: 66 MMHG

## 2023-01-01 VITALS
OXYGEN SATURATION: 97 % | RESPIRATION RATE: 16 BRPM | HEART RATE: 73 BPM | SYSTOLIC BLOOD PRESSURE: 146 MMHG | DIASTOLIC BLOOD PRESSURE: 52 MMHG | TEMPERATURE: 98.2 F

## 2023-01-01 VITALS
DIASTOLIC BLOOD PRESSURE: 60 MMHG | HEART RATE: 83 BPM | HEIGHT: 67 IN | RESPIRATION RATE: 16 BRPM | TEMPERATURE: 98.5 F | SYSTOLIC BLOOD PRESSURE: 134 MMHG | WEIGHT: 132.06 LBS | BODY MASS INDEX: 20.73 KG/M2 | OXYGEN SATURATION: 96 %

## 2023-01-01 VITALS
SYSTOLIC BLOOD PRESSURE: 152 MMHG | HEART RATE: 66 BPM | DIASTOLIC BLOOD PRESSURE: 65 MMHG | HEIGHT: 68 IN | TEMPERATURE: 98.6 F | OXYGEN SATURATION: 97 % | WEIGHT: 145 LBS | RESPIRATION RATE: 16 BRPM | BODY MASS INDEX: 21.98 KG/M2

## 2023-01-01 VITALS
DIASTOLIC BLOOD PRESSURE: 64 MMHG | SYSTOLIC BLOOD PRESSURE: 145 MMHG | HEART RATE: 68 BPM | OXYGEN SATURATION: 99 % | RESPIRATION RATE: 16 BRPM | TEMPERATURE: 98 F

## 2023-01-01 VITALS
SYSTOLIC BLOOD PRESSURE: 138 MMHG | HEART RATE: 70 BPM | RESPIRATION RATE: 16 BRPM | OXYGEN SATURATION: 96 % | TEMPERATURE: 98.1 F | DIASTOLIC BLOOD PRESSURE: 65 MMHG

## 2023-01-01 VITALS
TEMPERATURE: 98.2 F | SYSTOLIC BLOOD PRESSURE: 139 MMHG | BODY MASS INDEX: 21.98 KG/M2 | HEIGHT: 68 IN | OXYGEN SATURATION: 97 % | HEART RATE: 77 BPM | RESPIRATION RATE: 18 BRPM | WEIGHT: 145 LBS | DIASTOLIC BLOOD PRESSURE: 63 MMHG

## 2023-01-01 VITALS
HEIGHT: 68 IN | WEIGHT: 139 LBS | SYSTOLIC BLOOD PRESSURE: 141 MMHG | BODY MASS INDEX: 21.07 KG/M2 | OXYGEN SATURATION: 96 % | RESPIRATION RATE: 20 BRPM | TEMPERATURE: 98.1 F | DIASTOLIC BLOOD PRESSURE: 62 MMHG | HEART RATE: 83 BPM

## 2023-01-01 VITALS
OXYGEN SATURATION: 98 % | SYSTOLIC BLOOD PRESSURE: 118 MMHG | BODY MASS INDEX: 20 KG/M2 | WEIGHT: 132 LBS | TEMPERATURE: 97.6 F | HEART RATE: 72 BPM | DIASTOLIC BLOOD PRESSURE: 49 MMHG | RESPIRATION RATE: 22 BRPM | HEIGHT: 68 IN

## 2023-01-01 VITALS
DIASTOLIC BLOOD PRESSURE: 50 MMHG | RESPIRATION RATE: 18 BRPM | SYSTOLIC BLOOD PRESSURE: 136 MMHG | TEMPERATURE: 97.7 F | HEART RATE: 68 BPM | OXYGEN SATURATION: 97 %

## 2023-01-01 VITALS
DIASTOLIC BLOOD PRESSURE: 63 MMHG | RESPIRATION RATE: 16 BRPM | SYSTOLIC BLOOD PRESSURE: 136 MMHG | OXYGEN SATURATION: 95 % | HEART RATE: 71 BPM | TEMPERATURE: 98 F

## 2023-01-01 VITALS
SYSTOLIC BLOOD PRESSURE: 143 MMHG | OXYGEN SATURATION: 95 % | DIASTOLIC BLOOD PRESSURE: 62 MMHG | RESPIRATION RATE: 18 BRPM | TEMPERATURE: 97.8 F | HEART RATE: 68 BPM

## 2023-01-01 DIAGNOSIS — D13.5 ADENOMA OF AMPULLA OF VATER: ICD-10-CM

## 2023-01-01 DIAGNOSIS — R31.0 GROSS HEMATURIA: Primary | ICD-10-CM

## 2023-01-01 DIAGNOSIS — D13.5 AMPULLARY ADENOMA: ICD-10-CM

## 2023-01-01 DIAGNOSIS — D50.0 IRON DEFICIENCY ANEMIA DUE TO CHRONIC BLOOD LOSS: ICD-10-CM

## 2023-01-01 DIAGNOSIS — D64.9 ANEMIA, UNSPECIFIED TYPE: ICD-10-CM

## 2023-01-01 DIAGNOSIS — N30.01 ACUTE CYSTITIS WITH HEMATURIA: Primary | ICD-10-CM

## 2023-01-01 DIAGNOSIS — R30.0 DYSURIA: ICD-10-CM

## 2023-01-01 DIAGNOSIS — D13.2 DUODENAL ADENOMA: Primary | ICD-10-CM

## 2023-01-01 DIAGNOSIS — R16.0 LIVER MASS: ICD-10-CM

## 2023-01-01 DIAGNOSIS — K92.2 CHRONIC GI BLEEDING: ICD-10-CM

## 2023-01-01 DIAGNOSIS — D50.0 IRON DEFICIENCY ANEMIA DUE TO CHRONIC BLOOD LOSS: Primary | ICD-10-CM

## 2023-01-01 DIAGNOSIS — R33.8 BENIGN PROSTATIC HYPERPLASIA WITH URINARY RETENTION: ICD-10-CM

## 2023-01-01 DIAGNOSIS — D64.9 ANEMIA, UNSPECIFIED TYPE: Primary | ICD-10-CM

## 2023-01-01 DIAGNOSIS — Z11.1 ENCOUNTER FOR SCREENING FOR RESPIRATORY TUBERCULOSIS: ICD-10-CM

## 2023-01-01 DIAGNOSIS — R62.7 FAILURE TO THRIVE IN ADULT: ICD-10-CM

## 2023-01-01 DIAGNOSIS — C78.7 MALIGNANT NEOPLASM METASTATIC TO LIVER (H): ICD-10-CM

## 2023-01-01 DIAGNOSIS — U07.1 COVID-19: ICD-10-CM

## 2023-01-01 DIAGNOSIS — D64.9 SYMPTOMATIC ANEMIA: ICD-10-CM

## 2023-01-01 DIAGNOSIS — D13.5 AMPULLARY ADENOMA: Primary | ICD-10-CM

## 2023-01-01 DIAGNOSIS — K86.2 PANCREATIC CYST: ICD-10-CM

## 2023-01-01 DIAGNOSIS — E80.6 HYPERBILIRUBINEMIA: ICD-10-CM

## 2023-01-01 DIAGNOSIS — R63.0 ANOREXIA: ICD-10-CM

## 2023-01-01 DIAGNOSIS — K83.1 BILIARY OBSTRUCTION (H): ICD-10-CM

## 2023-01-01 DIAGNOSIS — D64.9 CHRONIC ANEMIA: ICD-10-CM

## 2023-01-01 DIAGNOSIS — Z51.5 HOSPICE CARE PATIENT: ICD-10-CM

## 2023-01-01 DIAGNOSIS — Z51.5 END OF LIFE CARE: Primary | ICD-10-CM

## 2023-01-01 DIAGNOSIS — D50.9 ANEMIA, IRON DEFICIENCY: Primary | ICD-10-CM

## 2023-01-01 DIAGNOSIS — Z85.89 HX OF KNOWN METASTASIS TO LIVER: ICD-10-CM

## 2023-01-01 DIAGNOSIS — I50.9 NEW ONSET OF CONGESTIVE HEART FAILURE (H): ICD-10-CM

## 2023-01-01 DIAGNOSIS — K85.90 ACUTE PANCREATITIS, UNSPECIFIED COMPLICATION STATUS, UNSPECIFIED PANCREATITIS TYPE: ICD-10-CM

## 2023-01-01 DIAGNOSIS — R31.21 ASYMPTOMATIC MICROSCOPIC HEMATURIA: ICD-10-CM

## 2023-01-01 DIAGNOSIS — D50.9 IRON DEFICIENCY ANEMIA, UNSPECIFIED IRON DEFICIENCY ANEMIA TYPE: ICD-10-CM

## 2023-01-01 DIAGNOSIS — Z87.440 PERSONAL HISTORY OF URINARY (TRACT) INFECTIONS: ICD-10-CM

## 2023-01-01 DIAGNOSIS — N40.1 BENIGN PROSTATIC HYPERPLASIA WITH URINARY RETENTION: ICD-10-CM

## 2023-01-01 DIAGNOSIS — R60.1 ANASARCA: ICD-10-CM

## 2023-01-01 DIAGNOSIS — R74.8 ALKALINE PHOSPHATASE ELEVATION: ICD-10-CM

## 2023-01-01 DIAGNOSIS — R53.83 OTHER FATIGUE: ICD-10-CM

## 2023-01-01 DIAGNOSIS — R31.9 HEMATURIA, UNSPECIFIED TYPE: ICD-10-CM

## 2023-01-01 LAB
ABO/RH(D): NORMAL
ALBUMIN SERPL BCG-MCNC: 2.5 G/DL (ref 3.5–5.2)
ALBUMIN SERPL BCG-MCNC: 2.6 G/DL (ref 3.5–5.2)
ALBUMIN SERPL BCG-MCNC: 2.9 G/DL (ref 3.5–5.2)
ALBUMIN SERPL BCG-MCNC: 2.9 G/DL (ref 3.5–5.2)
ALBUMIN SERPL BCG-MCNC: 3.1 G/DL (ref 3.5–5.2)
ALBUMIN SERPL BCG-MCNC: 3.4 G/DL (ref 3.5–5.2)
ALBUMIN SERPL-MCNC: 2.4 G/DL (ref 3.5–5)
ALBUMIN SERPL-MCNC: 3.1 G/DL (ref 3.5–5)
ALBUMIN UR-MCNC: 30 MG/DL
ALBUMIN UR-MCNC: ABNORMAL G/DL
ALBUMIN UR-MCNC: NEGATIVE MG/DL
ALP SERPL-CCNC: 159 U/L (ref 40–129)
ALP SERPL-CCNC: 230 U/L (ref 40–129)
ALP SERPL-CCNC: 260 U/L (ref 45–120)
ALP SERPL-CCNC: 330 U/L (ref 40–129)
ALP SERPL-CCNC: 39 U/L (ref 45–120)
ALP SERPL-CCNC: 43 U/L (ref 40–129)
ALP SERPL-CCNC: 787 U/L (ref 40–129)
ALP SERPL-CCNC: 849 U/L (ref 40–129)
ALT SERPL W P-5'-P-CCNC: 11 U/L (ref 0–70)
ALT SERPL W P-5'-P-CCNC: 117 U/L (ref 0–70)
ALT SERPL W P-5'-P-CCNC: 128 U/L (ref 0–70)
ALT SERPL W P-5'-P-CCNC: 14 U/L (ref 0–70)
ALT SERPL W P-5'-P-CCNC: 32 U/L (ref 0–45)
ALT SERPL W P-5'-P-CCNC: 42 U/L (ref 0–70)
ALT SERPL W P-5'-P-CCNC: 56 U/L (ref 0–70)
ALT SERPL W P-5'-P-CCNC: 71 U/L (ref 0–45)
ANION GAP SERPL CALCULATED.3IONS-SCNC: 12 MMOL/L (ref 7–15)
ANION GAP SERPL CALCULATED.3IONS-SCNC: 14 MMOL/L (ref 7–15)
ANION GAP SERPL CALCULATED.3IONS-SCNC: 4 MMOL/L (ref 5–18)
ANION GAP SERPL CALCULATED.3IONS-SCNC: 5 MMOL/L (ref 5–18)
ANION GAP SERPL CALCULATED.3IONS-SCNC: 5 MMOL/L (ref 7–15)
ANION GAP SERPL CALCULATED.3IONS-SCNC: 8 MMOL/L (ref 7–15)
ANION GAP SERPL CALCULATED.3IONS-SCNC: 9 MMOL/L (ref 7–15)
ANTIBODY SCREEN: NEGATIVE
APPEARANCE UR: ABNORMAL
APPEARANCE UR: ABNORMAL
APPEARANCE UR: CLEAR
APTT PPP: 32 SECONDS (ref 22–38)
AST SERPL W P-5'-P-CCNC: 13 U/L (ref 0–40)
AST SERPL W P-5'-P-CCNC: 142 U/L (ref 0–45)
AST SERPL W P-5'-P-CCNC: 144 U/L (ref 0–45)
AST SERPL W P-5'-P-CCNC: 20 U/L (ref 0–45)
AST SERPL W P-5'-P-CCNC: 31 U/L (ref 0–45)
AST SERPL W P-5'-P-CCNC: 34 U/L (ref 0–45)
AST SERPL W P-5'-P-CCNC: 42 U/L (ref 0–45)
AST SERPL W P-5'-P-CCNC: 54 U/L (ref 0–40)
ATRIAL RATE - MUSE: 74 BPM
ATRIAL RATE - MUSE: 82 BPM
BACTERIA #/AREA URNS HPF: ABNORMAL /HPF
BACTERIA #/AREA URNS HPF: ABNORMAL /HPF
BACTERIA UR CULT: ABNORMAL
BACTERIA UR CULT: NO GROWTH
BASO+EOS+MONOS # BLD AUTO: ABNORMAL 10*3/UL
BASO+EOS+MONOS NFR BLD AUTO: ABNORMAL %
BASOPHILS # BLD AUTO: 0 10E3/UL (ref 0–0.2)
BASOPHILS # BLD AUTO: 0 10E3/UL (ref 0–0.2)
BASOPHILS # BLD AUTO: 0.1 10E3/UL (ref 0–0.2)
BASOPHILS NFR BLD AUTO: 0 %
BASOPHILS NFR BLD AUTO: 1 %
BILIRUB DIRECT SERPL-MCNC: 0.1 MG/DL
BILIRUB DIRECT SERPL-MCNC: 0.44 MG/DL (ref 0–0.3)
BILIRUB DIRECT SERPL-MCNC: 3.01 MG/DL (ref 0–0.3)
BILIRUB DIRECT SERPL-MCNC: 6.69 MG/DL (ref 0–0.3)
BILIRUB SERPL-MCNC: 0.2 MG/DL
BILIRUB SERPL-MCNC: 0.3 MG/DL
BILIRUB SERPL-MCNC: 0.3 MG/DL (ref 0–1)
BILIRUB SERPL-MCNC: 0.4 MG/DL
BILIRUB SERPL-MCNC: 0.7 MG/DL (ref 0–1)
BILIRUB SERPL-MCNC: 0.8 MG/DL
BILIRUB SERPL-MCNC: 3.4 MG/DL
BILIRUB SERPL-MCNC: 8.1 MG/DL
BILIRUB UR QL STRIP: ABNORMAL
BILIRUB UR QL STRIP: ABNORMAL
BILIRUB UR QL STRIP: NEGATIVE
BLD PROD TYP BPU: NORMAL
BLOOD COMPONENT TYPE: NORMAL
BUN SERPL-MCNC: 11 MG/DL (ref 8–28)
BUN SERPL-MCNC: 11 MG/DL (ref 8–28)
BUN SERPL-MCNC: 12 MG/DL (ref 8–28)
BUN SERPL-MCNC: 12 MG/DL (ref 8–28)
BUN SERPL-MCNC: 15.1 MG/DL (ref 8–23)
BUN SERPL-MCNC: 15.1 MG/DL (ref 8–23)
BUN SERPL-MCNC: 15.8 MG/DL (ref 8–23)
BUN SERPL-MCNC: 17.7 MG/DL (ref 8–23)
BUN SERPL-MCNC: 19.7 MG/DL (ref 8–23)
BUN SERPL-MCNC: 20.3 MG/DL (ref 8–23)
BUN SERPL-MCNC: 24.5 MG/DL (ref 8–23)
CALCIUM SERPL-MCNC: 8 MG/DL (ref 8.5–10.5)
CALCIUM SERPL-MCNC: 8 MG/DL (ref 8.5–10.5)
CALCIUM SERPL-MCNC: 8.1 MG/DL (ref 8.5–10.5)
CALCIUM SERPL-MCNC: 8.1 MG/DL (ref 8.8–10.2)
CALCIUM SERPL-MCNC: 8.2 MG/DL (ref 8.8–10.2)
CALCIUM SERPL-MCNC: 8.2 MG/DL (ref 8.8–10.2)
CALCIUM SERPL-MCNC: 8.3 MG/DL (ref 8.8–10.2)
CALCIUM SERPL-MCNC: 8.3 MG/DL (ref 8.8–10.2)
CALCIUM SERPL-MCNC: 8.4 MG/DL (ref 8.5–10.5)
CALCIUM SERPL-MCNC: 8.7 MG/DL (ref 8.8–10.2)
CALCIUM SERPL-MCNC: 8.7 MG/DL (ref 8.8–10.2)
CHLORIDE BLD-SCNC: 100 MMOL/L (ref 98–107)
CHLORIDE BLD-SCNC: 103 MMOL/L (ref 98–107)
CHLORIDE BLD-SCNC: 104 MMOL/L (ref 98–107)
CHLORIDE BLD-SCNC: 104 MMOL/L (ref 98–107)
CHLORIDE SERPL-SCNC: 100 MMOL/L (ref 98–107)
CHLORIDE SERPL-SCNC: 100 MMOL/L (ref 98–107)
CHLORIDE SERPL-SCNC: 101 MMOL/L (ref 98–107)
CHLORIDE SERPL-SCNC: 95 MMOL/L (ref 98–107)
CHLORIDE SERPL-SCNC: 97 MMOL/L (ref 98–107)
CHLORIDE SERPL-SCNC: 99 MMOL/L (ref 98–107)
CHLORIDE SERPL-SCNC: 99 MMOL/L (ref 98–107)
CO2 SERPL-SCNC: 26 MMOL/L (ref 22–31)
CO2 SERPL-SCNC: 27 MMOL/L (ref 22–31)
CODING SYSTEM: NORMAL
COLOR UR AUTO: ABNORMAL
CREAT SERPL-MCNC: 0.77 MG/DL (ref 0.7–1.3)
CREAT SERPL-MCNC: 0.79 MG/DL (ref 0.67–1.17)
CREAT SERPL-MCNC: 0.82 MG/DL (ref 0.7–1.3)
CREAT SERPL-MCNC: 0.89 MG/DL (ref 0.7–1.3)
CREAT SERPL-MCNC: 0.91 MG/DL (ref 0.67–1.17)
CREAT SERPL-MCNC: 0.92 MG/DL (ref 0.7–1.3)
CREAT SERPL-MCNC: 0.94 MG/DL (ref 0.67–1.17)
CREAT SERPL-MCNC: 0.97 MG/DL (ref 0.67–1.17)
CREAT SERPL-MCNC: 0.98 MG/DL (ref 0.67–1.17)
CREAT SERPL-MCNC: 1.05 MG/DL (ref 0.67–1.17)
CREAT SERPL-MCNC: 1.36 MG/DL (ref 0.67–1.17)
CROSSMATCH: NORMAL
D DIMER PPP FEU-MCNC: 1.24 UG/ML FEU (ref 0–0.5)
DEPRECATED HCO3 PLAS-SCNC: 19 MMOL/L (ref 22–29)
DEPRECATED HCO3 PLAS-SCNC: 22 MMOL/L (ref 22–29)
DEPRECATED HCO3 PLAS-SCNC: 24 MMOL/L (ref 22–29)
DEPRECATED HCO3 PLAS-SCNC: 25 MMOL/L (ref 22–29)
DEPRECATED HCO3 PLAS-SCNC: 26 MMOL/L (ref 22–29)
DEPRECATED HCO3 PLAS-SCNC: 26 MMOL/L (ref 22–29)
DEPRECATED HCO3 PLAS-SCNC: 27 MMOL/L (ref 22–29)
DIASTOLIC BLOOD PRESSURE - MUSE: 58 MMHG
DIASTOLIC BLOOD PRESSURE - MUSE: 63 MMHG
EGFRCR SERPLBLD CKD-EPI 2021: 51 ML/MIN/1.73M2
EGFRCR SERPLBLD CKD-EPI 2021: 76 ML/MIN/1.73M2
EGFRCR SERPLBLD CKD-EPI 2021: 79 ML/MIN/1.73M2
EGFRCR SERPLBLD CKD-EPI 2021: 82 ML/MIN/1.73M2
EGFRCR SERPLBLD CKD-EPI 2021: 87 ML/MIN/1.73M2
EOSINOPHIL # BLD AUTO: 0 10E3/UL (ref 0–0.7)
EOSINOPHIL # BLD AUTO: 0.1 10E3/UL (ref 0–0.7)
EOSINOPHIL # BLD AUTO: 0.2 10E3/UL (ref 0–0.7)
EOSINOPHIL NFR BLD AUTO: 0 %
EOSINOPHIL NFR BLD AUTO: 1 %
EOSINOPHIL NFR BLD AUTO: 2 %
EOSINOPHIL NFR BLD AUTO: 3 %
ERYTHROCYTE [DISTWIDTH] IN BLOOD BY AUTOMATED COUNT: 13.2 % (ref 10–15)
ERYTHROCYTE [DISTWIDTH] IN BLOOD BY AUTOMATED COUNT: 13.6 % (ref 10–15)
ERYTHROCYTE [DISTWIDTH] IN BLOOD BY AUTOMATED COUNT: 13.8 % (ref 10–15)
ERYTHROCYTE [DISTWIDTH] IN BLOOD BY AUTOMATED COUNT: 13.8 % (ref 10–15)
ERYTHROCYTE [DISTWIDTH] IN BLOOD BY AUTOMATED COUNT: 13.9 % (ref 10–15)
ERYTHROCYTE [DISTWIDTH] IN BLOOD BY AUTOMATED COUNT: 13.9 % (ref 10–15)
ERYTHROCYTE [DISTWIDTH] IN BLOOD BY AUTOMATED COUNT: 14 % (ref 10–15)
ERYTHROCYTE [DISTWIDTH] IN BLOOD BY AUTOMATED COUNT: 14.1 % (ref 10–15)
ERYTHROCYTE [DISTWIDTH] IN BLOOD BY AUTOMATED COUNT: 14.3 % (ref 10–15)
ERYTHROCYTE [DISTWIDTH] IN BLOOD BY AUTOMATED COUNT: 14.3 % (ref 10–15)
ERYTHROCYTE [DISTWIDTH] IN BLOOD BY AUTOMATED COUNT: 14.4 % (ref 10–15)
ERYTHROCYTE [DISTWIDTH] IN BLOOD BY AUTOMATED COUNT: 14.6 % (ref 10–15)
ERYTHROCYTE [DISTWIDTH] IN BLOOD BY AUTOMATED COUNT: 14.9 % (ref 10–15)
ERYTHROCYTE [DISTWIDTH] IN BLOOD BY AUTOMATED COUNT: 15.4 % (ref 10–15)
ERYTHROCYTE [DISTWIDTH] IN BLOOD BY AUTOMATED COUNT: 15.4 % (ref 10–15)
ERYTHROCYTE [DISTWIDTH] IN BLOOD BY AUTOMATED COUNT: 15.5 % (ref 10–15)
ERYTHROCYTE [DISTWIDTH] IN BLOOD BY AUTOMATED COUNT: 15.5 % (ref 10–15)
ERYTHROCYTE [DISTWIDTH] IN BLOOD BY AUTOMATED COUNT: 15.9 % (ref 10–15)
ERYTHROCYTE [DISTWIDTH] IN BLOOD BY AUTOMATED COUNT: 16.8 % (ref 10–15)
ERYTHROCYTE [DISTWIDTH] IN BLOOD BY AUTOMATED COUNT: 17.1 % (ref 10–15)
ERYTHROCYTE [DISTWIDTH] IN BLOOD BY AUTOMATED COUNT: 17.3 % (ref 10–15)
ERYTHROCYTE [DISTWIDTH] IN BLOOD BY AUTOMATED COUNT: 17.7 % (ref 10–15)
ERYTHROCYTE [DISTWIDTH] IN BLOOD BY AUTOMATED COUNT: 17.8 % (ref 10–15)
ERYTHROCYTE [DISTWIDTH] IN BLOOD BY AUTOMATED COUNT: 17.8 % (ref 10–15)
ERYTHROCYTE [DISTWIDTH] IN BLOOD BY AUTOMATED COUNT: 18.1 % (ref 10–15)
ERYTHROCYTE [DISTWIDTH] IN BLOOD BY AUTOMATED COUNT: 18.4 % (ref 10–15)
ERYTHROCYTE [DISTWIDTH] IN BLOOD BY AUTOMATED COUNT: 19 % (ref 10–15)
FERRITIN SERPL-MCNC: 14 NG/ML (ref 31–409)
FERRITIN SERPL-MCNC: 20 NG/ML (ref 31–409)
FERRITIN SERPL-MCNC: 23 NG/ML (ref 31–409)
FOLATE SERPL-MCNC: 14.5 NG/ML (ref 4.6–34.8)
GAMMA INTERFERON BACKGROUND BLD IA-ACNC: 0.01 IU/ML
GFR SERPL CREATININE-BSD FRML MDRD: 69 ML/MIN/1.73M2
GFR SERPL CREATININE-BSD FRML MDRD: 76 ML/MIN/1.73M2
GFR SERPL CREATININE-BSD FRML MDRD: 82 ML/MIN/1.73M2
GFR SERPL CREATININE-BSD FRML MDRD: 84 ML/MIN/1.73M2
GFR SERPL CREATININE-BSD FRML MDRD: 86 ML/MIN/1.73M2
GFR SERPL CREATININE-BSD FRML MDRD: 87 ML/MIN/1.73M2
GLUCOSE BLD-MCNC: 88 MG/DL (ref 70–125)
GLUCOSE BLD-MCNC: 91 MG/DL (ref 70–125)
GLUCOSE BLD-MCNC: 94 MG/DL (ref 70–125)
GLUCOSE BLD-MCNC: 95 MG/DL (ref 70–125)
GLUCOSE SERPL-MCNC: 101 MG/DL (ref 70–99)
GLUCOSE SERPL-MCNC: 109 MG/DL (ref 70–99)
GLUCOSE SERPL-MCNC: 114 MG/DL (ref 70–99)
GLUCOSE SERPL-MCNC: 153 MG/DL (ref 70–99)
GLUCOSE SERPL-MCNC: 73 MG/DL (ref 70–99)
GLUCOSE SERPL-MCNC: 84 MG/DL (ref 70–99)
GLUCOSE SERPL-MCNC: 98 MG/DL (ref 70–99)
GLUCOSE UR STRIP-MCNC: ABNORMAL MG/DL
GLUCOSE UR STRIP-MCNC: NEGATIVE MG/DL
GLUCOSE UR STRIP-MCNC: NEGATIVE MG/DL
HAPTOGLOB SERPL-MCNC: 119 MG/DL (ref 32–197)
HCT VFR BLD AUTO: 21.2 % (ref 40–53)
HCT VFR BLD AUTO: 21.2 % (ref 40–53)
HCT VFR BLD AUTO: 21.6 % (ref 40–53)
HCT VFR BLD AUTO: 22.5 % (ref 40–53)
HCT VFR BLD AUTO: 22.6 % (ref 40–53)
HCT VFR BLD AUTO: 22.9 % (ref 40–53)
HCT VFR BLD AUTO: 23.2 % (ref 40–53)
HCT VFR BLD AUTO: 23.6 % (ref 40–53)
HCT VFR BLD AUTO: 25.3 % (ref 40–53)
HCT VFR BLD AUTO: 25.8 % (ref 40–53)
HCT VFR BLD AUTO: 26.1 % (ref 40–53)
HCT VFR BLD AUTO: 26.3 % (ref 40–53)
HCT VFR BLD AUTO: 26.5 % (ref 40–53)
HCT VFR BLD AUTO: 26.7 % (ref 40–53)
HCT VFR BLD AUTO: 26.9 % (ref 40–53)
HCT VFR BLD AUTO: 27.3 % (ref 40–53)
HCT VFR BLD AUTO: 27.5 % (ref 40–53)
HCT VFR BLD AUTO: 28.3 % (ref 40–53)
HCT VFR BLD AUTO: 28.3 % (ref 40–53)
HCT VFR BLD AUTO: 28.5 % (ref 40–53)
HCT VFR BLD AUTO: 28.5 % (ref 40–53)
HCT VFR BLD AUTO: 28.6 % (ref 40–53)
HCT VFR BLD AUTO: 28.6 % (ref 40–53)
HCT VFR BLD AUTO: 29 % (ref 40–53)
HCT VFR BLD AUTO: 29.9 % (ref 40–53)
HEMOCCULT STL QL: NEGATIVE
HGB BLD-MCNC: 5.9 G/DL (ref 13.3–17.7)
HGB BLD-MCNC: 6.2 G/DL (ref 13.3–17.7)
HGB BLD-MCNC: 6.3 G/DL (ref 13.3–17.7)
HGB BLD-MCNC: 6.7 G/DL (ref 13.3–17.7)
HGB BLD-MCNC: 6.7 G/DL (ref 13.3–17.7)
HGB BLD-MCNC: 6.9 G/DL (ref 13.3–17.7)
HGB BLD-MCNC: 7.1 G/DL (ref 13.3–17.7)
HGB BLD-MCNC: 7.1 G/DL (ref 13.3–17.7)
HGB BLD-MCNC: 7.3 G/DL (ref 13.3–17.7)
HGB BLD-MCNC: 7.3 G/DL (ref 13.3–17.7)
HGB BLD-MCNC: 7.5 G/DL (ref 13.3–17.7)
HGB BLD-MCNC: 7.8 G/DL (ref 13.3–17.7)
HGB BLD-MCNC: 7.8 G/DL (ref 13.3–17.7)
HGB BLD-MCNC: 7.9 G/DL (ref 13.3–17.7)
HGB BLD-MCNC: 8 G/DL (ref 13.3–17.7)
HGB BLD-MCNC: 8 G/DL (ref 13.3–17.7)
HGB BLD-MCNC: 8.1 G/DL (ref 13.3–17.7)
HGB BLD-MCNC: 8.1 G/DL (ref 13.3–17.7)
HGB BLD-MCNC: 8.3 G/DL (ref 13.3–17.7)
HGB BLD-MCNC: 8.3 G/DL (ref 13.3–17.7)
HGB BLD-MCNC: 8.4 G/DL (ref 13.3–17.7)
HGB BLD-MCNC: 8.5 G/DL (ref 13.3–17.7)
HGB BLD-MCNC: 8.7 G/DL (ref 13.3–17.7)
HGB BLD-MCNC: 8.8 G/DL (ref 13.3–17.7)
HGB BLD-MCNC: 8.8 G/DL (ref 13.3–17.7)
HGB BLD-MCNC: 8.9 G/DL (ref 13.3–17.7)
HGB BLD-MCNC: 8.9 G/DL (ref 13.3–17.7)
HGB BLD-MCNC: 9 G/DL (ref 13.3–17.7)
HGB BLD-MCNC: 9.2 G/DL (ref 13.3–17.7)
HGB BLD-MCNC: 9.2 G/DL (ref 13.3–17.7)
HGB UR QL STRIP: ABNORMAL
HGB UR QL STRIP: NEGATIVE
HGB UR QL STRIP: NEGATIVE
HOLD SPECIMEN: NORMAL
HYALINE CASTS: 6 /LPF
IMM GRANULOCYTES # BLD: 0 10E3/UL
IMM GRANULOCYTES # BLD: 0.1 10E3/UL
IMM GRANULOCYTES NFR BLD: 0 %
IMM GRANULOCYTES NFR BLD: 1 %
INR PPP: 1.13 (ref 0.85–1.15)
INR PPP: 1.16 (ref 0.85–1.15)
INTERPRETATION ECG - MUSE: NORMAL
INTERPRETATION ECG - MUSE: NORMAL
IRON BINDING CAPACITY (ROCHE): 315 UG/DL (ref 240–430)
IRON BINDING CAPACITY (ROCHE): 317 UG/DL (ref 240–430)
IRON SATN MFR SERPL: 36 % (ref 15–46)
IRON SATN MFR SERPL: 6 % (ref 15–46)
IRON SERPL-MCNC: 113 UG/DL (ref 61–157)
IRON SERPL-MCNC: 20 UG/DL (ref 61–157)
ISSUE DATE AND TIME: NORMAL
KETONES UR STRIP-MCNC: 10 MG/DL
KETONES UR STRIP-MCNC: ABNORMAL MG/DL
KETONES UR STRIP-MCNC: NEGATIVE MG/DL
LEUKOCYTE ESTERASE UR QL STRIP: ABNORMAL
LIPASE SERPL-CCNC: 1040 U/L (ref 13–60)
LIPASE SERPL-CCNC: 210 U/L (ref 13–60)
LIPASE SERPL-CCNC: 360 U/L (ref 13–60)
LYMPHOCYTES # BLD AUTO: 0.5 10E3/UL (ref 0.8–5.3)
LYMPHOCYTES # BLD AUTO: 0.6 10E3/UL (ref 0.8–5.3)
LYMPHOCYTES # BLD AUTO: 0.6 10E3/UL (ref 0.8–5.3)
LYMPHOCYTES # BLD AUTO: 0.7 10E3/UL (ref 0.8–5.3)
LYMPHOCYTES # BLD AUTO: 0.8 10E3/UL (ref 0.8–5.3)
LYMPHOCYTES # BLD AUTO: 0.9 10E3/UL (ref 0.8–5.3)
LYMPHOCYTES # BLD AUTO: 1 10E3/UL (ref 0.8–5.3)
LYMPHOCYTES # BLD AUTO: 1.1 10E3/UL (ref 0.8–5.3)
LYMPHOCYTES # BLD AUTO: 1.1 10E3/UL (ref 0.8–5.3)
LYMPHOCYTES # BLD AUTO: 1.3 10E3/UL (ref 0.8–5.3)
LYMPHOCYTES NFR BLD AUTO: 10 %
LYMPHOCYTES NFR BLD AUTO: 10 %
LYMPHOCYTES NFR BLD AUTO: 11 %
LYMPHOCYTES NFR BLD AUTO: 12 %
LYMPHOCYTES NFR BLD AUTO: 13 %
LYMPHOCYTES NFR BLD AUTO: 14 %
LYMPHOCYTES NFR BLD AUTO: 14 %
LYMPHOCYTES NFR BLD AUTO: 16 %
LYMPHOCYTES NFR BLD AUTO: 5 %
LYMPHOCYTES NFR BLD AUTO: 6 %
LYMPHOCYTES NFR BLD AUTO: 9 %
LYMPHOCYTES NFR BLD AUTO: 9 %
M TB IFN-G BLD-IMP: ABNORMAL
M TB IFN-G CD4+ BCKGRND COR BLD-ACNC: 0.03 IU/ML
MCH RBC QN AUTO: 23.7 PG (ref 26.5–33)
MCH RBC QN AUTO: 24.8 PG (ref 26.5–33)
MCH RBC QN AUTO: 25.1 PG (ref 26.5–33)
MCH RBC QN AUTO: 25.4 PG (ref 26.5–33)
MCH RBC QN AUTO: 25.6 PG (ref 26.5–33)
MCH RBC QN AUTO: 26 PG (ref 26.5–33)
MCH RBC QN AUTO: 26.2 PG (ref 26.5–33)
MCH RBC QN AUTO: 26.3 PG (ref 26.5–33)
MCH RBC QN AUTO: 26.5 PG (ref 26.5–33)
MCH RBC QN AUTO: 26.6 PG (ref 26.5–33)
MCH RBC QN AUTO: 26.8 PG (ref 26.5–33)
MCH RBC QN AUTO: 26.9 PG (ref 26.5–33)
MCH RBC QN AUTO: 27 PG (ref 26.5–33)
MCH RBC QN AUTO: 27.2 PG (ref 26.5–33)
MCH RBC QN AUTO: 27.6 PG (ref 26.5–33)
MCH RBC QN AUTO: 27.7 PG (ref 26.5–33)
MCH RBC QN AUTO: 28.1 PG (ref 26.5–33)
MCH RBC QN AUTO: 28.1 PG (ref 26.5–33)
MCH RBC QN AUTO: 28.2 PG (ref 26.5–33)
MCH RBC QN AUTO: 28.4 PG (ref 26.5–33)
MCH RBC QN AUTO: 28.5 PG (ref 26.5–33)
MCH RBC QN AUTO: 29 PG (ref 26.5–33)
MCH RBC QN AUTO: 29 PG (ref 26.5–33)
MCH RBC QN AUTO: 29.1 PG (ref 26.5–33)
MCH RBC QN AUTO: 29.1 PG (ref 26.5–33)
MCH RBC QN AUTO: 29.2 PG (ref 26.5–33)
MCH RBC QN AUTO: 29.4 PG (ref 26.5–33)
MCHC RBC AUTO-ENTMCNC: 27.8 G/DL (ref 31.5–36.5)
MCHC RBC AUTO-ENTMCNC: 28.9 G/DL (ref 31.5–36.5)
MCHC RBC AUTO-ENTMCNC: 29.1 G/DL (ref 31.5–36.5)
MCHC RBC AUTO-ENTMCNC: 29.2 G/DL (ref 31.5–36.5)
MCHC RBC AUTO-ENTMCNC: 29.2 G/DL (ref 31.5–36.5)
MCHC RBC AUTO-ENTMCNC: 29.3 G/DL (ref 31.5–36.5)
MCHC RBC AUTO-ENTMCNC: 29.3 G/DL (ref 31.5–36.5)
MCHC RBC AUTO-ENTMCNC: 29.4 G/DL (ref 31.5–36.5)
MCHC RBC AUTO-ENTMCNC: 29.6 G/DL (ref 31.5–36.5)
MCHC RBC AUTO-ENTMCNC: 29.7 G/DL (ref 31.5–36.5)
MCHC RBC AUTO-ENTMCNC: 29.8 G/DL (ref 31.5–36.5)
MCHC RBC AUTO-ENTMCNC: 30.1 G/DL (ref 31.5–36.5)
MCHC RBC AUTO-ENTMCNC: 30.1 G/DL (ref 31.5–36.5)
MCHC RBC AUTO-ENTMCNC: 30.2 G/DL (ref 31.5–36.5)
MCHC RBC AUTO-ENTMCNC: 30.3 G/DL (ref 31.5–36.5)
MCHC RBC AUTO-ENTMCNC: 30.4 G/DL (ref 31.5–36.5)
MCHC RBC AUTO-ENTMCNC: 30.5 G/DL (ref 31.5–36.5)
MCHC RBC AUTO-ENTMCNC: 30.6 G/DL (ref 31.5–36.5)
MCHC RBC AUTO-ENTMCNC: 30.6 G/DL (ref 31.5–36.5)
MCHC RBC AUTO-ENTMCNC: 30.9 G/DL (ref 31.5–36.5)
MCHC RBC AUTO-ENTMCNC: 31.6 G/DL (ref 31.5–36.5)
MCHC RBC AUTO-ENTMCNC: 31.6 G/DL (ref 31.5–36.5)
MCHC RBC AUTO-ENTMCNC: 32.2 G/DL (ref 31.5–36.5)
MCHC RBC AUTO-ENTMCNC: 32.5 G/DL (ref 31.5–36.5)
MCHC RBC AUTO-ENTMCNC: 32.6 G/DL (ref 31.5–36.5)
MCV RBC AUTO: 81 FL (ref 78–100)
MCV RBC AUTO: 82 FL (ref 78–100)
MCV RBC AUTO: 82 FL (ref 78–100)
MCV RBC AUTO: 84 FL (ref 78–100)
MCV RBC AUTO: 85 FL (ref 78–100)
MCV RBC AUTO: 86 FL (ref 78–100)
MCV RBC AUTO: 87 FL (ref 78–100)
MCV RBC AUTO: 88 FL (ref 78–100)
MCV RBC AUTO: 89 FL (ref 78–100)
MCV RBC AUTO: 89 FL (ref 78–100)
MCV RBC AUTO: 91 FL (ref 78–100)
MCV RBC AUTO: 91 FL (ref 78–100)
MCV RBC AUTO: 92 FL (ref 78–100)
MCV RBC AUTO: 94 FL (ref 78–100)
MCV RBC AUTO: 94 FL (ref 78–100)
MCV RBC AUTO: 95 FL (ref 78–100)
MCV RBC AUTO: 97 FL (ref 78–100)
MCV RBC AUTO: 98 FL (ref 78–100)
MITOGEN IGNF BCKGRD COR BLD-ACNC: 0 IU/ML
MITOGEN IGNF BCKGRD COR BLD-ACNC: 0 IU/ML
MONOCYTES # BLD AUTO: 0.8 10E3/UL (ref 0–1.3)
MONOCYTES # BLD AUTO: 0.9 10E3/UL (ref 0–1.3)
MONOCYTES # BLD AUTO: 1 10E3/UL (ref 0–1.3)
MONOCYTES # BLD AUTO: 1.2 10E3/UL (ref 0–1.3)
MONOCYTES # BLD AUTO: 1.2 10E3/UL (ref 0–1.3)
MONOCYTES # BLD AUTO: 1.3 10E3/UL (ref 0–1.3)
MONOCYTES # BLD AUTO: 1.6 10E3/UL (ref 0–1.3)
MONOCYTES NFR BLD AUTO: 10 %
MONOCYTES NFR BLD AUTO: 11 %
MONOCYTES NFR BLD AUTO: 12 %
MONOCYTES NFR BLD AUTO: 13 %
MONOCYTES NFR BLD AUTO: 14 %
MONOCYTES NFR BLD AUTO: 16 %
MONOCYTES NFR BLD AUTO: 17 %
NEUTROPHILS # BLD AUTO: 10.6 10E3/UL (ref 1.6–8.3)
NEUTROPHILS # BLD AUTO: 3.7 10E3/UL (ref 1.6–8.3)
NEUTROPHILS # BLD AUTO: 4.2 10E3/UL (ref 1.6–8.3)
NEUTROPHILS # BLD AUTO: 4.5 10E3/UL (ref 1.6–8.3)
NEUTROPHILS # BLD AUTO: 4.7 10E3/UL (ref 1.6–8.3)
NEUTROPHILS # BLD AUTO: 4.7 10E3/UL (ref 1.6–8.3)
NEUTROPHILS # BLD AUTO: 4.9 10E3/UL (ref 1.6–8.3)
NEUTROPHILS # BLD AUTO: 5.1 10E3/UL (ref 1.6–8.3)
NEUTROPHILS # BLD AUTO: 5.2 10E3/UL (ref 1.6–8.3)
NEUTROPHILS # BLD AUTO: 5.2 10E3/UL (ref 1.6–8.3)
NEUTROPHILS # BLD AUTO: 5.4 10E3/UL (ref 1.6–8.3)
NEUTROPHILS # BLD AUTO: 5.4 10E3/UL (ref 1.6–8.3)
NEUTROPHILS # BLD AUTO: 5.5 10E3/UL (ref 1.6–8.3)
NEUTROPHILS # BLD AUTO: 5.7 10E3/UL (ref 1.6–8.3)
NEUTROPHILS # BLD AUTO: 5.8 10E3/UL (ref 1.6–8.3)
NEUTROPHILS # BLD AUTO: 5.9 10E3/UL (ref 1.6–8.3)
NEUTROPHILS # BLD AUTO: 5.9 10E3/UL (ref 1.6–8.3)
NEUTROPHILS # BLD AUTO: 6.5 10E3/UL (ref 1.6–8.3)
NEUTROPHILS # BLD AUTO: 6.6 10E3/UL (ref 1.6–8.3)
NEUTROPHILS # BLD AUTO: 6.8 10E3/UL (ref 1.6–8.3)
NEUTROPHILS # BLD AUTO: 7 10E3/UL (ref 1.6–8.3)
NEUTROPHILS NFR BLD AUTO: 67 %
NEUTROPHILS NFR BLD AUTO: 67 %
NEUTROPHILS NFR BLD AUTO: 69 %
NEUTROPHILS NFR BLD AUTO: 69 %
NEUTROPHILS NFR BLD AUTO: 70 %
NEUTROPHILS NFR BLD AUTO: 70 %
NEUTROPHILS NFR BLD AUTO: 71 %
NEUTROPHILS NFR BLD AUTO: 72 %
NEUTROPHILS NFR BLD AUTO: 74 %
NEUTROPHILS NFR BLD AUTO: 74 %
NEUTROPHILS NFR BLD AUTO: 75 %
NEUTROPHILS NFR BLD AUTO: 76 %
NEUTROPHILS NFR BLD AUTO: 78 %
NEUTROPHILS NFR BLD AUTO: 82 %
NITRATE UR QL: ABNORMAL
NITRATE UR QL: NEGATIVE
NITRATE UR QL: POSITIVE
NRBC # BLD AUTO: 0 10E3/UL
NRBC BLD AUTO-RTO: 0 /100
NT-PROBNP SERPL-MCNC: 5373 PG/ML (ref 0–1800)
P AXIS - MUSE: 64 DEGREES
P AXIS - MUSE: 74 DEGREES
PATH REPORT.COMMENTS IMP SPEC: NORMAL
PATH REPORT.COMMENTS IMP SPEC: NORMAL
PATH REPORT.FINAL DX SPEC: NORMAL
PATH REPORT.RELEVANT HX SPEC: NORMAL
PH UR STRIP: 6 [PH] (ref 5–7)
PH UR STRIP: 7 [PH] (ref 5–7)
PH UR STRIP: ABNORMAL [PH]
PLATELET # BLD AUTO: 300 10E3/UL (ref 150–450)
PLATELET # BLD AUTO: 361 10E3/UL (ref 150–450)
PLATELET # BLD AUTO: 370 10E3/UL (ref 150–450)
PLATELET # BLD AUTO: 372 10E3/UL (ref 150–450)
PLATELET # BLD AUTO: 381 10E3/UL (ref 150–450)
PLATELET # BLD AUTO: 389 10E3/UL (ref 150–450)
PLATELET # BLD AUTO: 392 10E3/UL (ref 150–450)
PLATELET # BLD AUTO: 394 10E3/UL (ref 150–450)
PLATELET # BLD AUTO: 398 10E3/UL (ref 150–450)
PLATELET # BLD AUTO: 409 10E3/UL (ref 150–450)
PLATELET # BLD AUTO: 411 10E3/UL (ref 150–450)
PLATELET # BLD AUTO: 411 10E3/UL (ref 150–450)
PLATELET # BLD AUTO: 412 10E3/UL (ref 150–450)
PLATELET # BLD AUTO: 423 10E3/UL (ref 150–450)
PLATELET # BLD AUTO: 425 10E3/UL (ref 150–450)
PLATELET # BLD AUTO: 451 10E3/UL (ref 150–450)
PLATELET # BLD AUTO: 453 10E3/UL (ref 150–450)
PLATELET # BLD AUTO: 461 10E3/UL (ref 150–450)
PLATELET # BLD AUTO: 466 10E3/UL (ref 150–450)
PLATELET # BLD AUTO: 467 10E3/UL (ref 150–450)
PLATELET # BLD AUTO: 480 10E3/UL (ref 150–450)
PLATELET # BLD AUTO: 487 10E3/UL (ref 150–450)
PLATELET # BLD AUTO: 507 10E3/UL (ref 150–450)
PLATELET # BLD AUTO: 512 10E3/UL (ref 150–450)
PLATELET # BLD AUTO: 516 10E3/UL (ref 150–450)
PLATELET # BLD AUTO: 537 10E3/UL (ref 150–450)
PLATELET # BLD AUTO: 579 10E3/UL (ref 150–450)
POTASSIUM BLD-SCNC: 4.3 MMOL/L (ref 3.5–5)
POTASSIUM BLD-SCNC: 4.4 MMOL/L (ref 3.5–5)
POTASSIUM BLD-SCNC: 4.5 MMOL/L (ref 3.5–5)
POTASSIUM BLD-SCNC: 4.8 MMOL/L (ref 3.5–5)
POTASSIUM SERPL-SCNC: 4 MMOL/L (ref 3.4–5.3)
POTASSIUM SERPL-SCNC: 4.1 MMOL/L (ref 3.4–5.3)
POTASSIUM SERPL-SCNC: 4.3 MMOL/L (ref 3.4–5.3)
POTASSIUM SERPL-SCNC: 4.4 MMOL/L (ref 3.4–5.3)
POTASSIUM SERPL-SCNC: 4.5 MMOL/L (ref 3.4–5.3)
PR INTERVAL - MUSE: 186 MS
PR INTERVAL - MUSE: 210 MS
PROT SERPL-MCNC: 4.8 G/DL (ref 6.4–8.3)
PROT SERPL-MCNC: 4.8 G/DL (ref 6.4–8.3)
PROT SERPL-MCNC: 4.8 G/DL (ref 6–8)
PROT SERPL-MCNC: 4.9 G/DL (ref 6.4–8.3)
PROT SERPL-MCNC: 5.4 G/DL (ref 6.4–8.3)
PROT SERPL-MCNC: 5.4 G/DL (ref 6.4–8.3)
PROT SERPL-MCNC: 5.4 G/DL (ref 6–8)
PROT SERPL-MCNC: 5.6 G/DL (ref 6.4–8.3)
QRS DURATION - MUSE: 120 MS
QRS DURATION - MUSE: 128 MS
QT - MUSE: 408 MS
QT - MUSE: 438 MS
QTC - MUSE: 476 MS
QTC - MUSE: 486 MS
QUANTIFERON MITOGEN: 0.04 IU/ML
QUANTIFERON NIL TUBE: 0.01 IU/ML
QUANTIFERON TB1 TUBE: 0.01 IU/ML
QUANTIFERON TB2 TUBE: 0.01
R AXIS - MUSE: -67 DEGREES
R AXIS - MUSE: -69 DEGREES
RADIOLOGIST FLAGS: ABNORMAL
RBC # BLD AUTO: 2.37 10E6/UL (ref 4.4–5.9)
RBC # BLD AUTO: 2.42 10E6/UL (ref 4.4–5.9)
RBC # BLD AUTO: 2.44 10E6/UL (ref 4.4–5.9)
RBC # BLD AUTO: 2.45 10E6/UL (ref 4.4–5.9)
RBC # BLD AUTO: 2.49 10E6/UL (ref 4.4–5.9)
RBC # BLD AUTO: 2.5 10E6/UL (ref 4.4–5.9)
RBC # BLD AUTO: 2.52 10E6/UL (ref 4.4–5.9)
RBC # BLD AUTO: 2.59 10E6/UL (ref 4.4–5.9)
RBC # BLD AUTO: 2.67 10E6/UL (ref 4.4–5.9)
RBC # BLD AUTO: 2.7 10E6/UL (ref 4.4–5.9)
RBC # BLD AUTO: 2.74 10E6/UL (ref 4.4–5.9)
RBC # BLD AUTO: 2.77 10E6/UL (ref 4.4–5.9)
RBC # BLD AUTO: 2.81 10E6/UL (ref 4.4–5.9)
RBC # BLD AUTO: 2.85 10E6/UL (ref 4.4–5.9)
RBC # BLD AUTO: 2.86 10E6/UL (ref 4.4–5.9)
RBC # BLD AUTO: 2.9 10E6/UL (ref 4.4–5.9)
RBC # BLD AUTO: 2.99 10E6/UL (ref 4.4–5.9)
RBC # BLD AUTO: 3 10E6/UL (ref 4.4–5.9)
RBC # BLD AUTO: 3.07 10E6/UL (ref 4.4–5.9)
RBC # BLD AUTO: 3.11 10E6/UL (ref 4.4–5.9)
RBC # BLD AUTO: 3.16 10E6/UL (ref 4.4–5.9)
RBC # BLD AUTO: 3.24 10E6/UL (ref 4.4–5.9)
RBC # BLD AUTO: 3.36 10E6/UL (ref 4.4–5.9)
RBC # BLD AUTO: 3.38 10E6/UL (ref 4.4–5.9)
RBC # BLD AUTO: 3.38 10E6/UL (ref 4.4–5.9)
RBC # BLD AUTO: 3.47 10E6/UL (ref 4.4–5.9)
RBC # BLD AUTO: 3.51 10E6/UL (ref 4.4–5.9)
RBC URINE: 0 /HPF
RBC URINE: 2 /HPF
RBC URINE: >182 /HPF
RETICS # AUTO: 0.13 10E6/UL (ref 0.01–0.11)
RETICS # AUTO: 0.14 10E6/UL (ref 0.01–0.11)
RETICS # AUTO: 0.15 10E6/UL (ref 0.01–0.11)
RETICS/RBC NFR AUTO: 4.7 % (ref 0.8–2.7)
RETICS/RBC NFR AUTO: 5.6 % (ref 0.8–2.7)
RETICS/RBC NFR AUTO: 6.1 % (ref 0.8–2.7)
SARS-COV-2 RNA RESP QL NAA+PROBE: NEGATIVE
SARS-COV-2 RNA RESP QL NAA+PROBE: NEGATIVE
SODIUM SERPL-SCNC: 128 MMOL/L (ref 135–145)
SODIUM SERPL-SCNC: 131 MMOL/L (ref 135–145)
SODIUM SERPL-SCNC: 131 MMOL/L (ref 136–145)
SODIUM SERPL-SCNC: 132 MMOL/L (ref 136–145)
SODIUM SERPL-SCNC: 132 MMOL/L (ref 136–145)
SODIUM SERPL-SCNC: 133 MMOL/L (ref 135–145)
SODIUM SERPL-SCNC: 133 MMOL/L (ref 135–145)
SODIUM SERPL-SCNC: 134 MMOL/L (ref 136–145)
SODIUM SERPL-SCNC: 135 MMOL/L (ref 136–145)
SP GR UR STRIP: 1.01 (ref 1–1.03)
SP GR UR STRIP: 1.02 (ref 1–1.03)
SP GR UR STRIP: ABNORMAL
SPECIMEN EXPIRATION DATE: NORMAL
SYSTOLIC BLOOD PRESSURE - MUSE: 131 MMHG
SYSTOLIC BLOOD PRESSURE - MUSE: 140 MMHG
T AXIS - MUSE: 89 DEGREES
T AXIS - MUSE: 90 DEGREES
TROPONIN T SERPL HS-MCNC: 31 NG/L
TROPONIN T SERPL HS-MCNC: 34 NG/L
TROPONIN T SERPL HS-MCNC: 35 NG/L
TROPONIN T SERPL HS-MCNC: 40 NG/L
TROPONIN T SERPL HS-MCNC: 43 NG/L
UNIT ABO/RH: NORMAL
UNIT NUMBER: NORMAL
UNIT STATUS: NORMAL
UNIT TYPE ISBT: 5100
UNIT TYPE ISBT: 9500
UROBILINOGEN UR STRIP-MCNC: 2 MG/DL
UROBILINOGEN UR STRIP-MCNC: <2 MG/DL
UROBILINOGEN UR STRIP-MCNC: ABNORMAL MG/DL
VENTRICULAR RATE- MUSE: 74 BPM
VENTRICULAR RATE- MUSE: 82 BPM
VIT B12 SERPL-MCNC: 891 PG/ML (ref 232–1245)
WBC # BLD AUTO: 12.9 10E3/UL (ref 4–11)
WBC # BLD AUTO: 16.5 10E3/UL (ref 4–11)
WBC # BLD AUTO: 19.6 10E3/UL (ref 4–11)
WBC # BLD AUTO: 5.5 10E3/UL (ref 4–11)
WBC # BLD AUTO: 6.3 10E3/UL (ref 4–11)
WBC # BLD AUTO: 6.4 10E3/UL (ref 4–11)
WBC # BLD AUTO: 6.6 10E3/UL (ref 4–11)
WBC # BLD AUTO: 6.7 10E3/UL (ref 4–11)
WBC # BLD AUTO: 6.8 10E3/UL (ref 4–11)
WBC # BLD AUTO: 6.8 10E3/UL (ref 4–11)
WBC # BLD AUTO: 6.9 10E3/UL (ref 4–11)
WBC # BLD AUTO: 6.9 10E3/UL (ref 4–11)
WBC # BLD AUTO: 7.2 10E3/UL (ref 4–11)
WBC # BLD AUTO: 7.3 10E3/UL (ref 4–11)
WBC # BLD AUTO: 7.3 10E3/UL (ref 4–11)
WBC # BLD AUTO: 7.5 10E3/UL (ref 4–11)
WBC # BLD AUTO: 7.5 10E3/UL (ref 4–11)
WBC # BLD AUTO: 7.6 10E3/UL (ref 4–11)
WBC # BLD AUTO: 7.8 10E3/UL (ref 4–11)
WBC # BLD AUTO: 7.9 10E3/UL (ref 4–11)
WBC # BLD AUTO: 8.6 10E3/UL (ref 4–11)
WBC # BLD AUTO: 8.7 10E3/UL (ref 4–11)
WBC # BLD AUTO: 9.3 10E3/UL (ref 4–11)
WBC # BLD AUTO: 9.4 10E3/UL (ref 4–11)
WBC # BLD AUTO: 9.6 10E3/UL (ref 4–11)
WBC CLUMPS #/AREA URNS HPF: PRESENT /HPF
WBC URINE: 37 /HPF
WBC URINE: 48 /HPF
WBC URINE: >182 /HPF

## 2023-01-01 PROCEDURE — 86850 RBC ANTIBODY SCREEN: CPT | Performed by: INTERNAL MEDICINE

## 2023-01-01 PROCEDURE — 86923 COMPATIBILITY TEST ELECTRIC: CPT | Performed by: INTERNAL MEDICINE

## 2023-01-01 PROCEDURE — 36415 COLL VENOUS BLD VENIPUNCTURE: CPT

## 2023-01-01 PROCEDURE — 250N000013 HC RX MED GY IP 250 OP 250 PS 637: Performed by: EMERGENCY MEDICINE

## 2023-01-01 PROCEDURE — 36415 COLL VENOUS BLD VENIPUNCTURE: CPT | Performed by: EMERGENCY MEDICINE

## 2023-01-01 PROCEDURE — 83010 ASSAY OF HAPTOGLOBIN QUANT: CPT

## 2023-01-01 PROCEDURE — 36415 COLL VENOUS BLD VENIPUNCTURE: CPT | Mod: ORL

## 2023-01-01 PROCEDURE — G0378 HOSPITAL OBSERVATION PER HR: HCPCS

## 2023-01-01 PROCEDURE — 250N000013 HC RX MED GY IP 250 OP 250 PS 637: Performed by: STUDENT IN AN ORGANIZED HEALTH CARE EDUCATION/TRAINING PROGRAM

## 2023-01-01 PROCEDURE — 99232 SBSQ HOSP IP/OBS MODERATE 35: CPT | Performed by: STUDENT IN AN ORGANIZED HEALTH CARE EDUCATION/TRAINING PROGRAM

## 2023-01-01 PROCEDURE — 86901 BLOOD TYPING SEROLOGIC RH(D): CPT | Performed by: INTERNAL MEDICINE

## 2023-01-01 PROCEDURE — 85610 PROTHROMBIN TIME: CPT | Performed by: EMERGENCY MEDICINE

## 2023-01-01 PROCEDURE — 85018 HEMOGLOBIN: CPT | Performed by: NURSE PRACTITIONER

## 2023-01-01 PROCEDURE — 85027 COMPLETE CBC AUTOMATED: CPT | Performed by: EMERGENCY MEDICINE

## 2023-01-01 PROCEDURE — 85379 FIBRIN DEGRADATION QUANT: CPT | Performed by: EMERGENCY MEDICINE

## 2023-01-01 PROCEDURE — 99222 1ST HOSP IP/OBS MODERATE 55: CPT | Performed by: STUDENT IN AN ORGANIZED HEALTH CARE EDUCATION/TRAINING PROGRAM

## 2023-01-01 PROCEDURE — 85027 COMPLETE CBC AUTOMATED: CPT

## 2023-01-01 PROCEDURE — 250N000011 HC RX IP 250 OP 636: Mod: JZ | Performed by: HOSPITALIST

## 2023-01-01 PROCEDURE — 86481 TB AG RESPONSE T-CELL SUSP: CPT | Mod: ORL

## 2023-01-01 PROCEDURE — 258N000003 HC RX IP 258 OP 636

## 2023-01-01 PROCEDURE — 36430 TRANSFUSION BLD/BLD COMPNT: CPT | Mod: 59

## 2023-01-01 PROCEDURE — 99231 SBSQ HOSP IP/OBS SF/LOW 25: CPT | Performed by: INTERNAL MEDICINE

## 2023-01-01 PROCEDURE — 86850 RBC ANTIBODY SCREEN: CPT | Performed by: EMERGENCY MEDICINE

## 2023-01-01 PROCEDURE — 120N000001 HC R&B MED SURG/OB

## 2023-01-01 PROCEDURE — 96376 TX/PRO/DX INJ SAME DRUG ADON: CPT

## 2023-01-01 PROCEDURE — 36430 TRANSFUSION BLD/BLD COMPNT: CPT

## 2023-01-01 PROCEDURE — 87088 URINE BACTERIA CULTURE: CPT | Performed by: NURSE PRACTITIONER

## 2023-01-01 PROCEDURE — 99309 SBSQ NF CARE MODERATE MDM 30: CPT | Mod: GV

## 2023-01-01 PROCEDURE — 85025 COMPLETE CBC W/AUTO DIFF WBC: CPT

## 2023-01-01 PROCEDURE — P9016 RBC LEUKOCYTES REDUCED: HCPCS | Performed by: EMERGENCY MEDICINE

## 2023-01-01 PROCEDURE — 80053 COMPREHEN METABOLIC PANEL: CPT

## 2023-01-01 PROCEDURE — 74177 CT ABD & PELVIS W/CONTRAST: CPT

## 2023-01-01 PROCEDURE — 99239 HOSP IP/OBS DSCHRG MGMT >30: CPT | Performed by: HOSPITALIST

## 2023-01-01 PROCEDURE — 80048 BASIC METABOLIC PNL TOTAL CA: CPT

## 2023-01-01 PROCEDURE — 82607 VITAMIN B-12: CPT

## 2023-01-01 PROCEDURE — 250N000011 HC RX IP 250 OP 636: Mod: JZ | Performed by: EMERGENCY MEDICINE

## 2023-01-01 PROCEDURE — 81001 URINALYSIS AUTO W/SCOPE: CPT | Performed by: EMERGENCY MEDICINE

## 2023-01-01 PROCEDURE — 86901 BLOOD TYPING SEROLOGIC RH(D): CPT | Performed by: EMERGENCY MEDICINE

## 2023-01-01 PROCEDURE — 36415 COLL VENOUS BLD VENIPUNCTURE: CPT | Performed by: INTERNAL MEDICINE

## 2023-01-01 PROCEDURE — 83550 IRON BINDING TEST: CPT

## 2023-01-01 PROCEDURE — 250N000011 HC RX IP 250 OP 636: Mod: JZ | Performed by: STUDENT IN AN ORGANIZED HEALTH CARE EDUCATION/TRAINING PROGRAM

## 2023-01-01 PROCEDURE — 85025 COMPLETE CBC W/AUTO DIFF WBC: CPT | Performed by: EMERGENCY MEDICINE

## 2023-01-01 PROCEDURE — 82728 ASSAY OF FERRITIN: CPT

## 2023-01-01 PROCEDURE — 96360 HYDRATION IV INFUSION INIT: CPT

## 2023-01-01 PROCEDURE — P9016 RBC LEUKOCYTES REDUCED: HCPCS | Performed by: INTERNAL MEDICINE

## 2023-01-01 PROCEDURE — 71275 CT ANGIOGRAPHY CHEST: CPT

## 2023-01-01 PROCEDURE — 86923 COMPATIBILITY TEST ELECTRIC: CPT | Performed by: EMERGENCY MEDICINE

## 2023-01-01 PROCEDURE — 250N000013 HC RX MED GY IP 250 OP 250 PS 637

## 2023-01-01 PROCEDURE — 85004 AUTOMATED DIFF WBC COUNT: CPT

## 2023-01-01 PROCEDURE — 82310 ASSAY OF CALCIUM: CPT

## 2023-01-01 PROCEDURE — 99238 HOSP IP/OBS DSCHRG MGMT 30/<: CPT | Mod: GC

## 2023-01-01 PROCEDURE — 80053 COMPREHEN METABOLIC PANEL: CPT | Performed by: INTERNAL MEDICINE

## 2023-01-01 PROCEDURE — 87635 SARS-COV-2 COVID-19 AMP PRB: CPT | Performed by: EMERGENCY MEDICINE

## 2023-01-01 PROCEDURE — 87086 URINE CULTURE/COLONY COUNT: CPT | Performed by: FAMILY MEDICINE

## 2023-01-01 PROCEDURE — 83690 ASSAY OF LIPASE: CPT | Performed by: EMERGENCY MEDICINE

## 2023-01-01 PROCEDURE — 82746 ASSAY OF FOLIC ACID SERUM: CPT

## 2023-01-01 PROCEDURE — 84484 ASSAY OF TROPONIN QUANT: CPT | Performed by: EMERGENCY MEDICINE

## 2023-01-01 PROCEDURE — 85045 AUTOMATED RETICULOCYTE COUNT: CPT

## 2023-01-01 PROCEDURE — 85018 HEMOGLOBIN: CPT

## 2023-01-01 PROCEDURE — 81001 URINALYSIS AUTO W/SCOPE: CPT

## 2023-01-01 PROCEDURE — 99285 EMERGENCY DEPT VISIT HI MDM: CPT | Mod: 25

## 2023-01-01 PROCEDURE — 76705 ECHO EXAM OF ABDOMEN: CPT

## 2023-01-01 PROCEDURE — 85025 COMPLETE CBC W/AUTO DIFF WBC: CPT | Performed by: INTERNAL MEDICINE

## 2023-01-01 PROCEDURE — 210N000001 HC R&B IMCU HEART CARE

## 2023-01-01 PROCEDURE — 96375 TX/PRO/DX INJ NEW DRUG ADDON: CPT

## 2023-01-01 PROCEDURE — 82248 BILIRUBIN DIRECT: CPT | Performed by: EMERGENCY MEDICINE

## 2023-01-01 PROCEDURE — 80053 COMPREHEN METABOLIC PANEL: CPT | Performed by: HOSPITALIST

## 2023-01-01 PROCEDURE — 87088 URINE BACTERIA CULTURE: CPT

## 2023-01-01 PROCEDURE — 83880 ASSAY OF NATRIURETIC PEPTIDE: CPT | Performed by: EMERGENCY MEDICINE

## 2023-01-01 PROCEDURE — 36415 COLL VENOUS BLD VENIPUNCTURE: CPT | Performed by: STUDENT IN AN ORGANIZED HEALTH CARE EDUCATION/TRAINING PROGRAM

## 2023-01-01 PROCEDURE — 96374 THER/PROPH/DIAG INJ IV PUSH: CPT

## 2023-01-01 PROCEDURE — P9016 RBC LEUKOCYTES REDUCED: HCPCS

## 2023-01-01 PROCEDURE — 250N000013 HC RX MED GY IP 250 OP 250 PS 637: Performed by: HOSPITALIST

## 2023-01-01 PROCEDURE — 85027 COMPLETE CBC AUTOMATED: CPT | Performed by: STUDENT IN AN ORGANIZED HEALTH CARE EDUCATION/TRAINING PROGRAM

## 2023-01-01 PROCEDURE — 85060 BLOOD SMEAR INTERPRETATION: CPT | Performed by: PATHOLOGY

## 2023-01-01 PROCEDURE — 99232 SBSQ HOSP IP/OBS MODERATE 35: CPT | Performed by: INTERNAL MEDICINE

## 2023-01-01 PROCEDURE — 36415 COLL VENOUS BLD VENIPUNCTURE: CPT | Performed by: NURSE PRACTITIONER

## 2023-01-01 PROCEDURE — 99215 OFFICE O/P EST HI 40 MIN: CPT | Performed by: NURSE PRACTITIONER

## 2023-01-01 PROCEDURE — 82310 ASSAY OF CALCIUM: CPT | Performed by: EMERGENCY MEDICINE

## 2023-01-01 PROCEDURE — 87086 URINE CULTURE/COLONY COUNT: CPT | Mod: ORL | Performed by: FAMILY MEDICINE

## 2023-01-01 PROCEDURE — 99284 EMERGENCY DEPT VISIT MOD MDM: CPT | Mod: 25

## 2023-01-01 PROCEDURE — 99215 OFFICE O/P EST HI 40 MIN: CPT | Performed by: INTERNAL MEDICINE

## 2023-01-01 PROCEDURE — 87088 URINE BACTERIA CULTURE: CPT | Performed by: EMERGENCY MEDICINE

## 2023-01-01 PROCEDURE — 99233 SBSQ HOSP IP/OBS HIGH 50: CPT | Mod: AI

## 2023-01-01 PROCEDURE — G0463 HOSPITAL OUTPT CLINIC VISIT: HCPCS | Performed by: INTERNAL MEDICINE

## 2023-01-01 PROCEDURE — 85027 COMPLETE CBC AUTOMATED: CPT | Performed by: HOSPITALIST

## 2023-01-01 PROCEDURE — 71046 X-RAY EXAM CHEST 2 VIEWS: CPT

## 2023-01-01 PROCEDURE — 99233 SBSQ HOSP IP/OBS HIGH 50: CPT | Performed by: HOSPITALIST

## 2023-01-01 PROCEDURE — 87088 URINE BACTERIA CULTURE: CPT | Mod: ORL | Performed by: NURSE PRACTITIONER

## 2023-01-01 PROCEDURE — 99223 1ST HOSP IP/OBS HIGH 75: CPT | Performed by: HOSPITALIST

## 2023-01-01 PROCEDURE — 99223 1ST HOSP IP/OBS HIGH 75: CPT | Performed by: NURSE PRACTITIONER

## 2023-01-01 PROCEDURE — 85041 AUTOMATED RBC COUNT: CPT

## 2023-01-01 PROCEDURE — 36415 COLL VENOUS BLD VENIPUNCTURE: CPT | Performed by: HOSPITALIST

## 2023-01-01 PROCEDURE — 84484 ASSAY OF TROPONIN QUANT: CPT | Performed by: INTERNAL MEDICINE

## 2023-01-01 PROCEDURE — 99239 HOSP IP/OBS DSCHRG MGMT >30: CPT | Performed by: INTERNAL MEDICINE

## 2023-01-01 PROCEDURE — 74178 CT ABD&PLV WO CNTR FLWD CNTR: CPT

## 2023-01-01 PROCEDURE — 93005 ELECTROCARDIOGRAM TRACING: CPT | Performed by: EMERGENCY MEDICINE

## 2023-01-01 PROCEDURE — 87635 SARS-COV-2 COVID-19 AMP PRB: CPT | Mod: ORL

## 2023-01-01 PROCEDURE — 85004 AUTOMATED DIFF WBC COUNT: CPT | Performed by: EMERGENCY MEDICINE

## 2023-01-01 PROCEDURE — 250N000011 HC RX IP 250 OP 636: Mod: JZ | Performed by: INTERNAL MEDICINE

## 2023-01-01 PROCEDURE — 99204 OFFICE O/P NEW MOD 45 MIN: CPT | Performed by: INTERNAL MEDICINE

## 2023-01-01 PROCEDURE — G0463 HOSPITAL OUTPT CLINIC VISIT: HCPCS | Mod: 25 | Performed by: NURSE PRACTITIONER

## 2023-01-01 PROCEDURE — P9604 ONE-WAY ALLOW PRORATED TRIP: HCPCS | Mod: ORL

## 2023-01-01 PROCEDURE — 85730 THROMBOPLASTIN TIME PARTIAL: CPT | Performed by: EMERGENCY MEDICINE

## 2023-01-01 PROCEDURE — 85018 HEMOGLOBIN: CPT | Performed by: EMERGENCY MEDICINE

## 2023-01-01 PROCEDURE — 51702 INSERT TEMP BLADDER CATH: CPT

## 2023-01-01 PROCEDURE — C9113 INJ PANTOPRAZOLE SODIUM, VIA: HCPCS | Performed by: EMERGENCY MEDICINE

## 2023-01-01 PROCEDURE — 82272 OCCULT BLD FECES 1-3 TESTS: CPT | Performed by: EMERGENCY MEDICINE

## 2023-01-01 PROCEDURE — 258N000003 HC RX IP 258 OP 636: Performed by: EMERGENCY MEDICINE

## 2023-01-01 PROCEDURE — 86900 BLOOD TYPING SEROLOGIC ABO: CPT | Performed by: INTERNAL MEDICINE

## 2023-01-01 PROCEDURE — 250N000013 HC RX MED GY IP 250 OP 250 PS 637: Performed by: INTERNAL MEDICINE

## 2023-01-01 PROCEDURE — 99291 CRITICAL CARE FIRST HOUR: CPT | Mod: 25

## 2023-01-01 PROCEDURE — 85004 AUTOMATED DIFF WBC COUNT: CPT | Performed by: INTERNAL MEDICINE

## 2023-01-01 PROCEDURE — 87088 URINE BACTERIA CULTURE: CPT | Performed by: FAMILY MEDICINE

## 2023-01-01 PROCEDURE — 99233 SBSQ HOSP IP/OBS HIGH 50: CPT | Performed by: NURSE PRACTITIONER

## 2023-01-01 PROCEDURE — 250N000009 HC RX 250: Performed by: FAMILY MEDICINE

## 2023-01-01 PROCEDURE — 86923 COMPATIBILITY TEST ELECTRIC: CPT

## 2023-01-01 PROCEDURE — 85014 HEMATOCRIT: CPT

## 2023-01-01 PROCEDURE — 250N000011 HC RX IP 250 OP 636: Performed by: EMERGENCY MEDICINE

## 2023-01-01 PROCEDURE — 85018 HEMOGLOBIN: CPT | Performed by: HOSPITALIST

## 2023-01-01 PROCEDURE — 99223 1ST HOSP IP/OBS HIGH 75: CPT | Performed by: INTERNAL MEDICINE

## 2023-01-01 PROCEDURE — 82248 BILIRUBIN DIRECT: CPT

## 2023-01-01 PROCEDURE — 87086 URINE CULTURE/COLONY COUNT: CPT | Performed by: EMERGENCY MEDICINE

## 2023-01-01 PROCEDURE — 250N000013 HC RX MED GY IP 250 OP 250 PS 637: Performed by: PHYSICIAN ASSISTANT

## 2023-01-01 PROCEDURE — 52005 CYSTO W/URTRL CATHJ: CPT

## 2023-01-01 PROCEDURE — 80053 COMPREHEN METABOLIC PANEL: CPT | Performed by: EMERGENCY MEDICINE

## 2023-01-01 PROCEDURE — 250N000009 HC RX 250: Performed by: EMERGENCY MEDICINE

## 2023-01-01 PROCEDURE — 74176 CT ABD & PELVIS W/O CONTRAST: CPT

## 2023-01-01 RX ORDER — METHYLPREDNISOLONE SODIUM SUCCINATE 125 MG/2ML
125 INJECTION, POWDER, LYOPHILIZED, FOR SOLUTION INTRAMUSCULAR; INTRAVENOUS
Status: DISCONTINUED | OUTPATIENT
Start: 2023-01-01 | End: 2023-01-01 | Stop reason: HOSPADM

## 2023-01-01 RX ORDER — NALOXONE HYDROCHLORIDE 0.4 MG/ML
0.2 INJECTION, SOLUTION INTRAMUSCULAR; INTRAVENOUS; SUBCUTANEOUS
Status: DISCONTINUED | OUTPATIENT
Start: 2023-01-01 | End: 2023-01-01 | Stop reason: HOSPADM

## 2023-01-01 RX ORDER — NITROFURANTOIN 25; 75 MG/1; MG/1
100 CAPSULE ORAL EVERY 12 HOURS
Qty: 12 CAPSULE | Refills: 0 | Status: SHIPPED | OUTPATIENT
Start: 2023-01-01 | End: 2023-01-01

## 2023-01-01 RX ORDER — ALBUTEROL SULFATE 90 UG/1
1-2 AEROSOL, METERED RESPIRATORY (INHALATION)
Status: DISCONTINUED | OUTPATIENT
Start: 2023-01-01 | End: 2023-01-01 | Stop reason: HOSPADM

## 2023-01-01 RX ORDER — NALOXONE HYDROCHLORIDE 0.4 MG/ML
0.1 INJECTION, SOLUTION INTRAMUSCULAR; INTRAVENOUS; SUBCUTANEOUS
Status: DISCONTINUED | OUTPATIENT
Start: 2023-01-01 | End: 2023-01-01 | Stop reason: DRUGHIGH

## 2023-01-01 RX ORDER — AMOXICILLIN 250 MG
1 CAPSULE ORAL 2 TIMES DAILY PRN
Status: DISCONTINUED | OUTPATIENT
Start: 2023-01-01 | End: 2023-01-01 | Stop reason: HOSPADM

## 2023-01-01 RX ORDER — DIPHENHYDRAMINE HYDROCHLORIDE 50 MG/ML
50 INJECTION INTRAMUSCULAR; INTRAVENOUS
Status: CANCELLED
Start: 2023-01-01

## 2023-01-01 RX ORDER — CEFUROXIME AXETIL 250 MG/1
250 TABLET ORAL EVERY 12 HOURS
Qty: 14 TABLET | Refills: 0 | Status: SHIPPED | OUTPATIENT
Start: 2023-01-01 | End: 2023-01-01

## 2023-01-01 RX ORDER — ACETAMINOPHEN 650 MG/1
650 SUPPOSITORY RECTAL EVERY 4 HOURS PRN
Status: DISCONTINUED | OUTPATIENT
Start: 2023-01-01 | End: 2023-01-01 | Stop reason: HOSPADM

## 2023-01-01 RX ORDER — IOPAMIDOL 755 MG/ML
71 INJECTION, SOLUTION INTRAVASCULAR ONCE
Status: COMPLETED | OUTPATIENT
Start: 2023-01-01 | End: 2023-01-01

## 2023-01-01 RX ORDER — AMOXICILLIN 250 MG
2 CAPSULE ORAL 2 TIMES DAILY
Status: DISCONTINUED | OUTPATIENT
Start: 2023-01-01 | End: 2023-01-01 | Stop reason: HOSPADM

## 2023-01-01 RX ORDER — MORPHINE SULFATE 10 MG/5ML
5 SOLUTION ORAL
Qty: 100 ML | Refills: 0 | Status: SHIPPED | OUTPATIENT
Start: 2023-01-01

## 2023-01-01 RX ORDER — ACETAMINOPHEN 325 MG/1
650 TABLET ORAL EVERY 4 HOURS PRN
Status: DISCONTINUED | OUTPATIENT
Start: 2023-01-01 | End: 2023-01-01 | Stop reason: HOSPADM

## 2023-01-01 RX ORDER — LIDOCAINE HYDROCHLORIDE 20 MG/ML
10 JELLY TOPICAL ONCE
Status: COMPLETED | OUTPATIENT
Start: 2023-01-01 | End: 2023-01-01

## 2023-01-01 RX ORDER — MEPERIDINE HYDROCHLORIDE 25 MG/ML
25 INJECTION INTRAMUSCULAR; INTRAVENOUS; SUBCUTANEOUS EVERY 30 MIN PRN
Status: CANCELLED | OUTPATIENT
Start: 2023-01-01

## 2023-01-01 RX ORDER — HEPARIN SODIUM,PORCINE 10 UNIT/ML
5-20 VIAL (ML) INTRAVENOUS DAILY PRN
Status: DISCONTINUED | OUTPATIENT
Start: 2023-01-01 | End: 2023-01-01 | Stop reason: HOSPADM

## 2023-01-01 RX ORDER — LOPERAMIDE HCL 2 MG
4 CAPSULE ORAL ONCE
Status: COMPLETED | OUTPATIENT
Start: 2023-01-01 | End: 2023-01-01

## 2023-01-01 RX ORDER — IOPAMIDOL 755 MG/ML
90 INJECTION, SOLUTION INTRAVASCULAR ONCE
Status: COMPLETED | OUTPATIENT
Start: 2023-01-01 | End: 2023-01-01

## 2023-01-01 RX ORDER — AMOXICILLIN 250 MG
1 CAPSULE ORAL 2 TIMES DAILY
Status: DISCONTINUED | OUTPATIENT
Start: 2023-01-01 | End: 2023-01-01 | Stop reason: HOSPADM

## 2023-01-01 RX ORDER — IOPAMIDOL 755 MG/ML
100 INJECTION, SOLUTION INTRAVASCULAR ONCE
Status: COMPLETED | OUTPATIENT
Start: 2023-01-01 | End: 2023-01-01

## 2023-01-01 RX ORDER — LOPERAMIDE HCL 2 MG
4 CAPSULE ORAL 3 TIMES DAILY PRN
Status: DISCONTINUED | OUTPATIENT
Start: 2023-01-01 | End: 2023-01-01

## 2023-01-01 RX ORDER — HEPARIN SODIUM,PORCINE 10 UNIT/ML
5-20 VIAL (ML) INTRAVENOUS DAILY PRN
Status: CANCELLED | OUTPATIENT
Start: 2023-01-01

## 2023-01-01 RX ORDER — LIDOCAINE HYDROCHLORIDE 20 MG/ML
JELLY TOPICAL ONCE
Status: COMPLETED | OUTPATIENT
Start: 2023-01-01 | End: 2023-01-01

## 2023-01-01 RX ORDER — HEPARIN SODIUM (PORCINE) LOCK FLUSH IV SOLN 100 UNIT/ML 100 UNIT/ML
5 SOLUTION INTRAVENOUS
Status: CANCELLED | OUTPATIENT
Start: 2023-01-01

## 2023-01-01 RX ORDER — FERROUS SULFATE 325(65) MG
325 TABLET ORAL DAILY
Status: DISCONTINUED | OUTPATIENT
Start: 2023-01-01 | End: 2023-01-01

## 2023-01-01 RX ORDER — SODIUM CHLORIDE 9 MG/ML
INJECTION, SOLUTION INTRAVENOUS CONTINUOUS
Status: DISCONTINUED | OUTPATIENT
Start: 2023-01-01 | End: 2023-01-01 | Stop reason: HOSPADM

## 2023-01-01 RX ORDER — PANTOPRAZOLE SODIUM 40 MG/1
40 TABLET, DELAYED RELEASE ORAL
Status: DISCONTINUED | OUTPATIENT
Start: 2023-01-01 | End: 2023-01-01 | Stop reason: HOSPADM

## 2023-01-01 RX ORDER — ACETAMINOPHEN 325 MG/1
650 TABLET ORAL EVERY 6 HOURS PRN
Status: DISCONTINUED | OUTPATIENT
Start: 2023-01-01 | End: 2023-01-01 | Stop reason: HOSPADM

## 2023-01-01 RX ORDER — ACETAMINOPHEN 650 MG/1
650 SUPPOSITORY RECTAL EVERY 6 HOURS PRN
Status: DISCONTINUED | OUTPATIENT
Start: 2023-01-01 | End: 2023-01-01 | Stop reason: HOSPADM

## 2023-01-01 RX ORDER — GLYCOPYRROLATE 1 MG/1
2 TABLET ORAL EVERY 4 HOURS PRN
Status: DISCONTINUED | OUTPATIENT
Start: 2023-01-01 | End: 2023-01-01 | Stop reason: HOSPADM

## 2023-01-01 RX ORDER — ALBUTEROL SULFATE 0.83 MG/ML
2.5 SOLUTION RESPIRATORY (INHALATION)
Status: CANCELLED | OUTPATIENT
Start: 2023-01-01

## 2023-01-01 RX ORDER — PROCHLORPERAZINE 25 MG
12.5 SUPPOSITORY, RECTAL RECTAL EVERY 12 HOURS PRN
Status: DISCONTINUED | OUTPATIENT
Start: 2023-01-01 | End: 2023-01-01 | Stop reason: HOSPADM

## 2023-01-01 RX ORDER — HEPARIN SODIUM (PORCINE) LOCK FLUSH IV SOLN 100 UNIT/ML 100 UNIT/ML
5 SOLUTION INTRAVENOUS
Status: DISCONTINUED | OUTPATIENT
Start: 2023-01-01 | End: 2023-01-01 | Stop reason: HOSPADM

## 2023-01-01 RX ORDER — POLYETHYLENE GLYCOL 3350 17 G/17G
17 POWDER, FOR SOLUTION ORAL DAILY PRN
Status: DISCONTINUED | OUTPATIENT
Start: 2023-01-01 | End: 2023-01-01 | Stop reason: HOSPADM

## 2023-01-01 RX ORDER — ONDANSETRON 4 MG/1
4 TABLET, ORALLY DISINTEGRATING ORAL EVERY 6 HOURS PRN
Qty: 20 TABLET | Refills: 0 | Status: SHIPPED | OUTPATIENT
Start: 2023-01-01

## 2023-01-01 RX ORDER — LOPERAMIDE HCL 2 MG
4 CAPSULE ORAL 3 TIMES DAILY PRN
Status: DISCONTINUED | OUTPATIENT
Start: 2023-01-01 | End: 2023-01-01 | Stop reason: HOSPADM

## 2023-01-01 RX ORDER — NALOXONE HYDROCHLORIDE 0.4 MG/ML
0.4 INJECTION, SOLUTION INTRAMUSCULAR; INTRAVENOUS; SUBCUTANEOUS
Status: DISCONTINUED | OUTPATIENT
Start: 2023-01-01 | End: 2023-01-01 | Stop reason: HOSPADM

## 2023-01-01 RX ORDER — ALBUTEROL SULFATE 90 UG/1
1-2 AEROSOL, METERED RESPIRATORY (INHALATION)
Status: CANCELLED
Start: 2023-01-01

## 2023-01-01 RX ORDER — MEPERIDINE HYDROCHLORIDE 25 MG/ML
25 INJECTION INTRAMUSCULAR; INTRAVENOUS; SUBCUTANEOUS EVERY 30 MIN PRN
Status: DISCONTINUED | OUTPATIENT
Start: 2023-01-01 | End: 2023-01-01 | Stop reason: HOSPADM

## 2023-01-01 RX ORDER — CEFUROXIME AXETIL 250 MG/1
250 TABLET ORAL ONCE
Status: COMPLETED | OUTPATIENT
Start: 2023-01-01 | End: 2023-01-01

## 2023-01-01 RX ORDER — LANOLIN ALCOHOL/MO/W.PET/CERES
3 CREAM (GRAM) TOPICAL
Status: DISCONTINUED | OUTPATIENT
Start: 2023-01-01 | End: 2023-01-01 | Stop reason: HOSPADM

## 2023-01-01 RX ORDER — CARBOXYMETHYLCELLULOSE SODIUM 5 MG/ML
1-2 SOLUTION/ DROPS OPHTHALMIC
Status: DISCONTINUED | OUTPATIENT
Start: 2023-01-01 | End: 2023-01-01 | Stop reason: HOSPADM

## 2023-01-01 RX ORDER — ONDANSETRON 2 MG/ML
4 INJECTION INTRAMUSCULAR; INTRAVENOUS EVERY 6 HOURS PRN
Status: DISCONTINUED | OUTPATIENT
Start: 2023-01-01 | End: 2023-01-01 | Stop reason: HOSPADM

## 2023-01-01 RX ORDER — ALBUTEROL SULFATE 0.83 MG/ML
2.5 SOLUTION RESPIRATORY (INHALATION)
Status: DISCONTINUED | OUTPATIENT
Start: 2023-01-01 | End: 2023-01-01 | Stop reason: HOSPADM

## 2023-01-01 RX ORDER — ATROPINE SULFATE 10 MG/ML
2 SOLUTION/ DROPS OPHTHALMIC EVERY 4 HOURS PRN
Status: DISCONTINUED | OUTPATIENT
Start: 2023-01-01 | End: 2023-01-01 | Stop reason: HOSPADM

## 2023-01-01 RX ORDER — HYDROMORPHONE HYDROCHLORIDE 1 MG/ML
0.5 INJECTION, SOLUTION INTRAMUSCULAR; INTRAVENOUS; SUBCUTANEOUS
Status: DISCONTINUED | OUTPATIENT
Start: 2023-01-01 | End: 2023-01-01

## 2023-01-01 RX ORDER — LANOLIN ALCOHOL/MO/W.PET/CERES
1000 CREAM (GRAM) TOPICAL DAILY
Status: DISCONTINUED | OUTPATIENT
Start: 2023-01-01 | End: 2023-01-01 | Stop reason: HOSPADM

## 2023-01-01 RX ORDER — AMOXICILLIN 250 MG
2 CAPSULE ORAL 2 TIMES DAILY PRN
Status: DISCONTINUED | OUTPATIENT
Start: 2023-01-01 | End: 2023-01-01 | Stop reason: HOSPADM

## 2023-01-01 RX ORDER — LIDOCAINE 40 MG/G
CREAM TOPICAL
Status: DISCONTINUED | OUTPATIENT
Start: 2023-01-01 | End: 2023-01-01 | Stop reason: HOSPADM

## 2023-01-01 RX ORDER — ACETAMINOPHEN 325 MG/1
650 TABLET ORAL EVERY 4 HOURS PRN
Qty: 30 TABLET | Refills: 0 | Status: SHIPPED | OUTPATIENT
Start: 2023-01-01

## 2023-01-01 RX ORDER — NITROFURANTOIN 25; 75 MG/1; MG/1
100 CAPSULE ORAL EVERY 12 HOURS SCHEDULED
Status: DISCONTINUED | OUTPATIENT
Start: 2023-01-01 | End: 2023-01-01 | Stop reason: HOSPADM

## 2023-01-01 RX ORDER — EPINEPHRINE 1 MG/ML
0.3 INJECTION, SOLUTION, CONCENTRATE INTRAVENOUS EVERY 5 MIN PRN
Status: CANCELLED | OUTPATIENT
Start: 2023-01-01

## 2023-01-01 RX ORDER — HYDROMORPHONE HYDROCHLORIDE 1 MG/ML
0.3 INJECTION, SOLUTION INTRAMUSCULAR; INTRAVENOUS; SUBCUTANEOUS
Status: DISCONTINUED | OUTPATIENT
Start: 2023-01-01 | End: 2023-01-01

## 2023-01-01 RX ORDER — GLYCOPYRROLATE 0.2 MG/ML
0.2 INJECTION, SOLUTION INTRAMUSCULAR; INTRAVENOUS EVERY 4 HOURS PRN
Status: DISCONTINUED | OUTPATIENT
Start: 2023-01-01 | End: 2023-01-01 | Stop reason: HOSPADM

## 2023-01-01 RX ORDER — ENOXAPARIN SODIUM 100 MG/ML
40 INJECTION SUBCUTANEOUS EVERY 24 HOURS
Status: DISCONTINUED | OUTPATIENT
Start: 2023-01-01 | End: 2023-01-01

## 2023-01-01 RX ORDER — ASPIRIN 81 MG/1
324 TABLET, CHEWABLE ORAL ONCE
Status: DISCONTINUED | OUTPATIENT
Start: 2023-01-01 | End: 2023-01-01

## 2023-01-01 RX ORDER — MORPHINE SULFATE 20 MG/ML
5 SOLUTION ORAL
Status: DISCONTINUED | OUTPATIENT
Start: 2023-01-01 | End: 2023-01-01 | Stop reason: HOSPADM

## 2023-01-01 RX ORDER — SALIVA STIMULANT COMB. NO.3
1 SPRAY, NON-AEROSOL (ML) MUCOUS MEMBRANE
Status: DISCONTINUED | OUTPATIENT
Start: 2023-01-01 | End: 2023-01-01 | Stop reason: HOSPADM

## 2023-01-01 RX ORDER — FERROUS SULFATE 325(65) MG
1 TABLET ORAL
COMMUNITY
End: 2023-01-01

## 2023-01-01 RX ORDER — DIPHENHYDRAMINE HYDROCHLORIDE 50 MG/ML
50 INJECTION INTRAMUSCULAR; INTRAVENOUS
Status: DISCONTINUED | OUTPATIENT
Start: 2023-01-01 | End: 2023-01-01 | Stop reason: HOSPADM

## 2023-01-01 RX ORDER — ONDANSETRON 4 MG/1
4 TABLET, ORALLY DISINTEGRATING ORAL EVERY 6 HOURS PRN
Status: DISCONTINUED | OUTPATIENT
Start: 2023-01-01 | End: 2023-01-01 | Stop reason: HOSPADM

## 2023-01-01 RX ORDER — FERROUS SULFATE 325(65) MG
325 TABLET ORAL EVERY EVENING
Status: ON HOLD | COMMUNITY
End: 2023-01-01

## 2023-01-01 RX ORDER — MORPHINE SULFATE 10 MG/5ML
10 SOLUTION ORAL
Status: DISCONTINUED | OUTPATIENT
Start: 2023-01-01 | End: 2023-01-01 | Stop reason: HOSPADM

## 2023-01-01 RX ORDER — NITROFURANTOIN 25; 75 MG/1; MG/1
100 CAPSULE ORAL ONCE
Status: DISCONTINUED | OUTPATIENT
Start: 2023-01-01 | End: 2023-01-01

## 2023-01-01 RX ORDER — FUROSEMIDE 10 MG/ML
20 INJECTION INTRAMUSCULAR; INTRAVENOUS EVERY 8 HOURS
Status: DISCONTINUED | OUTPATIENT
Start: 2023-01-01 | End: 2023-01-01 | Stop reason: HOSPADM

## 2023-01-01 RX ORDER — PROCHLORPERAZINE MALEATE 5 MG
5 TABLET ORAL EVERY 6 HOURS PRN
Status: DISCONTINUED | OUTPATIENT
Start: 2023-01-01 | End: 2023-01-01 | Stop reason: HOSPADM

## 2023-01-01 RX ORDER — MORPHINE SULFATE 10 MG/5ML
5 SOLUTION ORAL
Status: DISCONTINUED | OUTPATIENT
Start: 2023-01-01 | End: 2023-01-01 | Stop reason: HOSPADM

## 2023-01-01 RX ORDER — MORPHINE SULFATE 20 MG/ML
10 SOLUTION ORAL
Status: DISCONTINUED | OUTPATIENT
Start: 2023-01-01 | End: 2023-01-01 | Stop reason: HOSPADM

## 2023-01-01 RX ORDER — EPINEPHRINE 1 MG/ML
0.3 INJECTION, SOLUTION INTRAMUSCULAR; SUBCUTANEOUS EVERY 5 MIN PRN
Status: DISCONTINUED | OUTPATIENT
Start: 2023-01-01 | End: 2023-01-01 | Stop reason: HOSPADM

## 2023-01-01 RX ORDER — AMOXICILLIN 250 MG
2 CAPSULE ORAL 2 TIMES DAILY PRN
Qty: 10 TABLET | Refills: 0 | Status: SHIPPED | OUTPATIENT
Start: 2023-01-01

## 2023-01-01 RX ORDER — METHYLPREDNISOLONE SODIUM SUCCINATE 125 MG/2ML
125 INJECTION, POWDER, LYOPHILIZED, FOR SOLUTION INTRAMUSCULAR; INTRAVENOUS
Status: CANCELLED
Start: 2023-01-01

## 2023-01-01 RX ORDER — PANTOPRAZOLE SODIUM 40 MG/1
40 TABLET, DELAYED RELEASE ORAL
Status: DISCONTINUED | OUTPATIENT
Start: 2023-01-01 | End: 2023-01-01

## 2023-01-01 RX ORDER — MINERAL OIL/HYDROPHIL PETROLAT
OINTMENT (GRAM) TOPICAL
Status: DISCONTINUED | OUTPATIENT
Start: 2023-01-01 | End: 2023-01-01 | Stop reason: HOSPADM

## 2023-01-01 RX ORDER — NALOXONE HYDROCHLORIDE 0.4 MG/ML
0.2 INJECTION, SOLUTION INTRAMUSCULAR; INTRAVENOUS; SUBCUTANEOUS
Status: DISCONTINUED | OUTPATIENT
Start: 2023-01-01 | End: 2023-01-01 | Stop reason: DRUGHIGH

## 2023-01-01 RX ORDER — LOPERAMIDE HYDROCHLORIDE 2 MG/1
4 TABLET ORAL 3 TIMES DAILY PRN
Qty: 30 TABLET | Refills: 0 | Status: ON HOLD | OUTPATIENT
Start: 2023-01-01 | End: 2023-01-01

## 2023-01-01 RX ORDER — IOPAMIDOL 755 MG/ML
75 INJECTION, SOLUTION INTRAVASCULAR ONCE
Status: COMPLETED | OUTPATIENT
Start: 2023-01-01 | End: 2023-01-01

## 2023-01-01 RX ORDER — CEFUROXIME AXETIL 250 MG/1
250 TABLET ORAL EVERY 12 HOURS SCHEDULED
Status: DISCONTINUED | OUTPATIENT
Start: 2023-01-01 | End: 2023-01-01 | Stop reason: HOSPADM

## 2023-01-01 RX ORDER — EPINEPHRINE 1 MG/ML
0.3 INJECTION, SOLUTION INTRAMUSCULAR; SUBCUTANEOUS EVERY 5 MIN PRN
Status: CANCELLED | OUTPATIENT
Start: 2023-01-01

## 2023-01-01 RX ADMIN — CYANOCOBALAMIN TAB 1000 MCG 1000 MCG: 1000 TAB at 07:49

## 2023-01-01 RX ADMIN — PANTOPRAZOLE SODIUM 40 MG: 40 TABLET, DELAYED RELEASE ORAL at 15:40

## 2023-01-01 RX ADMIN — PANTOPRAZOLE SODIUM 40 MG: 40 TABLET, DELAYED RELEASE ORAL at 06:40

## 2023-01-01 RX ADMIN — HYDROMORPHONE HYDROCHLORIDE 0.3 MG: 1 INJECTION, SOLUTION INTRAMUSCULAR; INTRAVENOUS; SUBCUTANEOUS at 08:04

## 2023-01-01 RX ADMIN — ONDANSETRON 4 MG: 2 INJECTION INTRAMUSCULAR; INTRAVENOUS at 08:14

## 2023-01-01 RX ADMIN — PANTOPRAZOLE SODIUM 40 MG: 40 INJECTION, POWDER, FOR SOLUTION INTRAVENOUS at 17:29

## 2023-01-01 RX ADMIN — IOPAMIDOL 90 ML: 755 INJECTION, SOLUTION INTRAVENOUS at 11:38

## 2023-01-01 RX ADMIN — PANTOPRAZOLE SODIUM 40 MG: 40 TABLET, DELAYED RELEASE ORAL at 17:53

## 2023-01-01 RX ADMIN — LIDOCAINE HYDROCHLORIDE: 20 JELLY TOPICAL at 21:15

## 2023-01-01 RX ADMIN — NITROFURANTOIN (MONOHYDRATE/MACROCRYSTALS) 100 MG: 75; 25 CAPSULE ORAL at 12:11

## 2023-01-01 RX ADMIN — PANTOPRAZOLE SODIUM 40 MG: 40 TABLET, DELAYED RELEASE ORAL at 08:45

## 2023-01-01 RX ADMIN — PANTOPRAZOLE SODIUM 40 MG: 40 TABLET, DELAYED RELEASE ORAL at 06:43

## 2023-01-01 RX ADMIN — CEFUROXIME AXETIL 250 MG: 250 TABLET ORAL at 19:07

## 2023-01-01 RX ADMIN — PANTOPRAZOLE SODIUM 40 MG: 40 TABLET, DELAYED RELEASE ORAL at 08:57

## 2023-01-01 RX ADMIN — PANTOPRAZOLE SODIUM 40 MG: 40 TABLET, DELAYED RELEASE ORAL at 10:57

## 2023-01-01 RX ADMIN — SODIUM CHLORIDE, PRESERVATIVE FREE: 5 INJECTION INTRAVENOUS at 09:10

## 2023-01-01 RX ADMIN — PANTOPRAZOLE SODIUM 40 MG: 40 TABLET, DELAYED RELEASE ORAL at 06:33

## 2023-01-01 RX ADMIN — PANTOPRAZOLE SODIUM 40 MG: 40 TABLET, DELAYED RELEASE ORAL at 17:31

## 2023-01-01 RX ADMIN — PANTOPRAZOLE SODIUM 40 MG: 40 TABLET, DELAYED RELEASE ORAL at 07:50

## 2023-01-01 RX ADMIN — SODIUM CHLORIDE, PRESERVATIVE FREE: 5 INJECTION INTRAVENOUS at 18:52

## 2023-01-01 RX ADMIN — PANTOPRAZOLE SODIUM 40 MG: 40 TABLET, DELAYED RELEASE ORAL at 16:13

## 2023-01-01 RX ADMIN — CEFUROXIME AXETIL 250 MG: 250 TABLET ORAL at 09:52

## 2023-01-01 RX ADMIN — PANTOPRAZOLE SODIUM 40 MG: 40 TABLET, DELAYED RELEASE ORAL at 15:37

## 2023-01-01 RX ADMIN — IOPAMIDOL 71 ML: 755 INJECTION, SOLUTION INTRAVENOUS at 12:02

## 2023-01-01 RX ADMIN — ONDANSETRON 4 MG: 2 INJECTION INTRAMUSCULAR; INTRAVENOUS at 08:30

## 2023-01-01 RX ADMIN — ONDANSETRON 4 MG: 2 INJECTION INTRAMUSCULAR; INTRAVENOUS at 21:26

## 2023-01-01 RX ADMIN — PANTOPRAZOLE SODIUM 40 MG: 40 TABLET, DELAYED RELEASE ORAL at 15:52

## 2023-01-01 RX ADMIN — HYDROMORPHONE HYDROCHLORIDE 0.3 MG: 1 INJECTION, SOLUTION INTRAMUSCULAR; INTRAVENOUS; SUBCUTANEOUS at 13:10

## 2023-01-01 RX ADMIN — IOPAMIDOL 75 ML: 755 INJECTION, SOLUTION INTRAVENOUS at 13:46

## 2023-01-01 RX ADMIN — SODIUM CHLORIDE 500 ML: 9 INJECTION, SOLUTION INTRAVENOUS at 19:46

## 2023-01-01 RX ADMIN — NITROFURANTOIN (MONOHYDRATE/MACROCRYSTALS) 100 MG: 75; 25 CAPSULE ORAL at 22:05

## 2023-01-01 RX ADMIN — IOPAMIDOL 100 ML: 755 INJECTION, SOLUTION INTRAVENOUS at 07:59

## 2023-01-01 RX ADMIN — ACETAMINOPHEN 650 MG: 325 TABLET ORAL at 06:51

## 2023-01-01 RX ADMIN — LOPERAMIDE HYDROCHLORIDE 4 MG: 2 CAPSULE ORAL at 22:18

## 2023-01-01 RX ADMIN — FERROUS SULFATE TAB 325 MG (65 MG ELEMENTAL FE) 325 MG: 325 (65 FE) TAB at 08:42

## 2023-01-01 RX ADMIN — FERROUS SULFATE TAB 325 MG (65 MG ELEMENTAL FE) 325 MG: 325 (65 FE) TAB at 17:53

## 2023-01-01 RX ADMIN — CEFUROXIME AXETIL 250 MG: 250 TABLET ORAL at 17:41

## 2023-01-01 RX ADMIN — PANTOPRAZOLE SODIUM 40 MG: 40 TABLET, DELAYED RELEASE ORAL at 06:44

## 2023-01-01 RX ADMIN — ACETAMINOPHEN 650 MG: 325 TABLET ORAL at 22:03

## 2023-01-01 RX ADMIN — PANTOPRAZOLE SODIUM 40 MG: 40 TABLET, DELAYED RELEASE ORAL at 16:22

## 2023-01-01 RX ADMIN — HYDROMORPHONE HYDROCHLORIDE 0.3 MG: 1 INJECTION, SOLUTION INTRAMUSCULAR; INTRAVENOUS; SUBCUTANEOUS at 21:26

## 2023-01-01 RX ADMIN — SODIUM CHLORIDE, PRESERVATIVE FREE: 5 INJECTION INTRAVENOUS at 19:25

## 2023-01-01 RX ADMIN — PANTOPRAZOLE SODIUM 40 MG: 40 TABLET, DELAYED RELEASE ORAL at 06:32

## 2023-01-01 RX ADMIN — LOPERAMIDE HYDROCHLORIDE 4 MG: 2 CAPSULE ORAL at 13:01

## 2023-01-01 RX ADMIN — SODIUM CHLORIDE, PRESERVATIVE FREE: 5 INJECTION INTRAVENOUS at 06:44

## 2023-01-01 RX ADMIN — NITROFURANTOIN (MONOHYDRATE/MACROCRYSTALS) 100 MG: 75; 25 CAPSULE ORAL at 07:49

## 2023-01-01 RX ADMIN — CEFUROXIME AXETIL 250 MG: 250 TABLET ORAL at 11:08

## 2023-01-01 RX ADMIN — PANTOPRAZOLE SODIUM 40 MG: 40 TABLET, DELAYED RELEASE ORAL at 08:07

## 2023-01-01 RX ADMIN — CEFUROXIME AXETIL 250 MG: 250 TABLET ORAL at 08:57

## 2023-01-01 RX ADMIN — PANTOPRAZOLE SODIUM 40 MG: 40 TABLET, DELAYED RELEASE ORAL at 16:49

## 2023-01-01 RX ADMIN — PANTOPRAZOLE SODIUM 40 MG: 40 TABLET, DELAYED RELEASE ORAL at 17:41

## 2023-01-01 RX ADMIN — LIDOCAINE HYDROCHLORIDE 10 ML: 20 JELLY TOPICAL at 09:20

## 2023-01-01 RX ADMIN — ONDANSETRON 4 MG: 2 INJECTION INTRAMUSCULAR; INTRAVENOUS at 13:05

## 2023-01-01 RX ADMIN — CYANOCOBALAMIN TAB 1000 MCG 1000 MCG: 1000 TAB at 09:01

## 2023-01-01 ASSESSMENT — ACTIVITIES OF DAILY LIVING (ADL)
ADLS_ACUITY_SCORE: 22
ADLS_ACUITY_SCORE: 37
ADLS_ACUITY_SCORE: 37
CHANGE_IN_FUNCTIONAL_STATUS_SINCE_ONSET_OF_CURRENT_ILLNESS/INJURY: NO
ADLS_ACUITY_SCORE: 35
CONCENTRATING,_REMEMBERING_OR_MAKING_DECISIONS_DIFFICULTY: NO
ADLS_ACUITY_SCORE: 27
ADLS_ACUITY_SCORE: 35
ADLS_ACUITY_SCORE: 22
ADLS_ACUITY_SCORE: 30
ADLS_ACUITY_SCORE: 37
ADLS_ACUITY_SCORE: 30
ADLS_ACUITY_SCORE: 37
ADLS_ACUITY_SCORE: 27
ADLS_ACUITY_SCORE: 24
ADLS_ACUITY_SCORE: 37
ADLS_ACUITY_SCORE: 37
ADLS_ACUITY_SCORE: 20
ADLS_ACUITY_SCORE: 37
ADLS_ACUITY_SCORE: 38
ADLS_ACUITY_SCORE: 37
FALL_HISTORY_WITHIN_LAST_SIX_MONTHS: NO
ADLS_ACUITY_SCORE: 35
ADLS_ACUITY_SCORE: 30
ADLS_ACUITY_SCORE: 35
ADLS_ACUITY_SCORE: 30
ADLS_ACUITY_SCORE: 20
ADLS_ACUITY_SCORE: 27
ADLS_ACUITY_SCORE: 30
ADLS_ACUITY_SCORE: 30
ADLS_ACUITY_SCORE: 29
WEAR_GLASSES_OR_BLIND: NO
ADLS_ACUITY_SCORE: 27
ADLS_ACUITY_SCORE: 27
ADLS_ACUITY_SCORE: 26
ADLS_ACUITY_SCORE: 30
ADLS_ACUITY_SCORE: 22
DEPENDENT_IADLS:: TRANSPORTATION;LAUNDRY;SHOPPING
ADLS_ACUITY_SCORE: 30
ADLS_ACUITY_SCORE: 22
ADLS_ACUITY_SCORE: 30
ADLS_ACUITY_SCORE: 30
ADLS_ACUITY_SCORE: 35
ADLS_ACUITY_SCORE: 38
ADLS_ACUITY_SCORE: 30
ADLS_ACUITY_SCORE: 20
ADLS_ACUITY_SCORE: 35
ADLS_ACUITY_SCORE: 32
ADLS_ACUITY_SCORE: 29
FALL_HISTORY_WITHIN_LAST_SIX_MONTHS: NO
ADLS_ACUITY_SCORE: 37
ADLS_ACUITY_SCORE: 20
ADLS_ACUITY_SCORE: 20
WEAR_GLASSES_OR_BLIND: YES
ADLS_ACUITY_SCORE: 37
ADLS_ACUITY_SCORE: 30
ADLS_ACUITY_SCORE: 35
ADLS_ACUITY_SCORE: 30
ADLS_ACUITY_SCORE: 29
ADLS_ACUITY_SCORE: 37
ADLS_ACUITY_SCORE: 27
ADLS_ACUITY_SCORE: 30
ADLS_ACUITY_SCORE: 29
ADLS_ACUITY_SCORE: 35
ADLS_ACUITY_SCORE: 35
ADLS_ACUITY_SCORE: 37
ADLS_ACUITY_SCORE: 29
ADLS_ACUITY_SCORE: 30
ADLS_ACUITY_SCORE: 35
ADLS_ACUITY_SCORE: 29
ADLS_ACUITY_SCORE: 30
ADLS_ACUITY_SCORE: 29
ADLS_ACUITY_SCORE: 27
ADLS_ACUITY_SCORE: 30
DIFFICULTY_EATING/SWALLOWING: NO
ADLS_ACUITY_SCORE: 35
ADLS_ACUITY_SCORE: 29
ADLS_ACUITY_SCORE: 30
ADLS_ACUITY_SCORE: 37
ADLS_ACUITY_SCORE: 30
ADLS_ACUITY_SCORE: 20
ADLS_ACUITY_SCORE: 20
DIFFICULTY_COMMUNICATING: NO
ADLS_ACUITY_SCORE: 30
WALKING_OR_CLIMBING_STAIRS_DIFFICULTY: NO
ADLS_ACUITY_SCORE: 27
ADLS_ACUITY_SCORE: 29
ADLS_ACUITY_SCORE: 29
ADLS_ACUITY_SCORE: 27
ADLS_ACUITY_SCORE: 24
CHANGE_IN_FUNCTIONAL_STATUS_SINCE_ONSET_OF_CURRENT_ILLNESS/INJURY: NO
DRESSING/BATHING_DIFFICULTY: NO
DIFFICULTY_EATING/SWALLOWING: OTHER (SEE COMMENTS)
ADLS_ACUITY_SCORE: 35
ADLS_ACUITY_SCORE: 37
ADLS_ACUITY_SCORE: 30
ADLS_ACUITY_SCORE: 27
ADLS_ACUITY_SCORE: 30
ADLS_ACUITY_SCORE: 35
ADLS_ACUITY_SCORE: 32
ADLS_ACUITY_SCORE: 30
ADLS_ACUITY_SCORE: 30
ADLS_ACUITY_SCORE: 32
ADLS_ACUITY_SCORE: 26
ADLS_ACUITY_SCORE: 37
ADLS_ACUITY_SCORE: 35
ADLS_ACUITY_SCORE: 35
ADLS_ACUITY_SCORE: 29
ADLS_ACUITY_SCORE: 38
ADLS_ACUITY_SCORE: 30
ADLS_ACUITY_SCORE: 29
ADLS_ACUITY_SCORE: 29
ADLS_ACUITY_SCORE: 32
ADLS_ACUITY_SCORE: 29
ADLS_ACUITY_SCORE: 20
ADLS_ACUITY_SCORE: 38
ADLS_ACUITY_SCORE: 30
ADLS_ACUITY_SCORE: 37
ADLS_ACUITY_SCORE: 37
ADLS_ACUITY_SCORE: 22
ADLS_ACUITY_SCORE: 29
CONCENTRATING,_REMEMBERING_OR_MAKING_DECISIONS_DIFFICULTY: NO
ADLS_ACUITY_SCORE: 35
ADLS_ACUITY_SCORE: 27
ADLS_ACUITY_SCORE: 20
ADLS_ACUITY_SCORE: 37
ADLS_ACUITY_SCORE: 20
ADLS_ACUITY_SCORE: 27
DRESSING/BATHING_DIFFICULTY: NO
ADLS_ACUITY_SCORE: 37
ADLS_ACUITY_SCORE: 30
ADLS_ACUITY_SCORE: 27
ADLS_ACUITY_SCORE: 38
ADLS_ACUITY_SCORE: 32
ADLS_ACUITY_SCORE: 37
ADLS_ACUITY_SCORE: 29
ADLS_ACUITY_SCORE: 37
ADLS_ACUITY_SCORE: 30
ADLS_ACUITY_SCORE: 35
ADLS_ACUITY_SCORE: 29
ADLS_ACUITY_SCORE: 30
VISION_MANAGEMENT: READING GLASSES
ADLS_ACUITY_SCORE: 27
ADLS_ACUITY_SCORE: 22
ADLS_ACUITY_SCORE: 33
ADLS_ACUITY_SCORE: 20
ADLS_ACUITY_SCORE: 27
ADLS_ACUITY_SCORE: 30
ADLS_ACUITY_SCORE: 29
ADLS_ACUITY_SCORE: 37
ADLS_ACUITY_SCORE: 32
ADLS_ACUITY_SCORE: 22
ADLS_ACUITY_SCORE: 37
ADLS_ACUITY_SCORE: 37
ADLS_ACUITY_SCORE: 22
DEPENDENT_IADLS:: CLEANING;SHOPPING;TRANSPORTATION;LAUNDRY
ADLS_ACUITY_SCORE: 29
ADLS_ACUITY_SCORE: 37
ADLS_ACUITY_SCORE: 29
ADLS_ACUITY_SCORE: 29
ADLS_ACUITY_SCORE: 37
ADLS_ACUITY_SCORE: 20
ADLS_ACUITY_SCORE: 27
ADLS_ACUITY_SCORE: 26
TOILETING_ISSUES: NO
ADLS_ACUITY_SCORE: 35
ADLS_ACUITY_SCORE: 38
ADLS_ACUITY_SCORE: 21
ADLS_ACUITY_SCORE: 30
ADLS_ACUITY_SCORE: 27
ADLS_ACUITY_SCORE: 39
ADLS_ACUITY_SCORE: 30
WALKING_OR_CLIMBING_STAIRS_DIFFICULTY: NO
ADLS_ACUITY_SCORE: 26
DOING_ERRANDS_INDEPENDENTLY_DIFFICULTY: YES
ADLS_ACUITY_SCORE: 27
HEARING_DIFFICULTY_OR_DEAF: NO
DOING_ERRANDS_INDEPENDENTLY_DIFFICULTY: YES
ADLS_ACUITY_SCORE: 30
ADLS_ACUITY_SCORE: 30
ADLS_ACUITY_SCORE: 37
ADLS_ACUITY_SCORE: 29
ADLS_ACUITY_SCORE: 27
ADLS_ACUITY_SCORE: 30
ADLS_ACUITY_SCORE: 27
ADLS_ACUITY_SCORE: 26
ADLS_ACUITY_SCORE: 30
ADLS_ACUITY_SCORE: 30
ADLS_ACUITY_SCORE: 27
TOILETING_ISSUES: NO

## 2023-01-01 ASSESSMENT — ENCOUNTER SYMPTOMS
ROS SKIN COMMENTS: JAUNDICE
DYSURIA: 0
DIARRHEA: 0
HEMATURIA: 0
VOMITING: 0
SHORTNESS OF BREATH: 1
FEVER: 0
CHILLS: 0
FEVER: 0
NAUSEA: 0
EYES NEGATIVE: 1
ABDOMINAL PAIN: 0
WEIGHT LOSS: 1
ABDOMINAL PAIN: 1

## 2023-01-01 ASSESSMENT — PAIN SCALES - GENERAL
PAINLEVEL: NO PAIN (0)

## 2023-05-31 PROBLEM — D64.9 ANEMIA, UNSPECIFIED TYPE: Status: ACTIVE | Noted: 2023-01-01

## 2023-05-31 NOTE — ED PROVIDER NOTES
EMERGENCY DEPARTMENT ENCOUNTER            IMPRESSION:  Symptomatic anemia  Presumed GI bleed        MEDICAL DECISION MAKING:  It was my pleasure to provide care for Denton Hobbs who presented for evaluation of symptomatic anemia from presumed GI bleed.  Patient was referred from the clinic.  He does have a history of prior inpatient work-up for GI bleed    On my exam patient is pleasant and cooperative.  No evidence of distress.  Vital signs are normal.  Physical exam notable for patient to appear pale.  He has no abdominal tenderness.  Digital rectal exam did not show any gross blood or melena..     Significant laboratory findings include hemoglobin of 5.9    Patient was consented and RBC transfusion ordered    IV Protonix ordered    ED evaluation is consistent with anemia from gastrointestinal bleed.      Patient was reevaluated and results were discussed.  I recommended admission to the hospital for further inpatient work-up.    I spoke with the [place] resident      Prior to making a final disposition on this patient the results of patient's tests and other diagnostic studies were discussed with the patient. All questions were answered. Patient expressed understanding of the plan and was amenable.   Return precautions and follow-up were discussed.     =================================================================  CHIEF COMPLAINT:  Chief Complaint   Patient presents with     Anemia         HPI  Denton Hobbs is a 87 year old male with a history of CVA, COPD, HTN, who presents to the ED by walk-in for evaluation of melena, shortness of breath.    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    Patient endorses shortness of breath on exertion that has been worsening over the last 2 weeks. He states that he was previously able  "to walk 3 blocks before becoming short of breath, but is now only able to walk 0.5 block before becoming short of breath. Patient also endorses melena that has been ongoing \"since the surgery\" on 9/12/22, and notes that he did have a transfusion at that time. He states he takes \"blood medications\" but does not know what for. Patient lives alone as his wife has been in the hospital for over a year.    Patient denies nausea, vomiting, urinary changes, fever, chills, confusion, and all other complaints at this time.    Per triage note, patient was sent from [facility] after a measured hgb of 6.6 in clinic.          REVIEW OF SYSTEMS   Constitutional: Does not report chills, unintentional weight loss or fatigue   Eyes: Does not report visual changes or discharge    HENT: Does not report sore throat, ear pain or neck pain  Respiratory: Does not report cough. Positive for shortness of breath (on exertion)  Cardiovascular: Does not report chest pain, palpitations or leg swelling  GI: Does not report abdominal pain, nausea, vomiting.  Positive for melena.  : Does not report hematuria, dysuria, or flank pain  Musculoskeletal: Does not report any new musculoskeletal pain or new muscle/joint pains  Skin: Does not report rash or wound  Neurologic: Does not report current headache, new weakness, focal weakness, or sensory changes        Remainder of systems reviewed, unless noted in HPI all others negative.      PAST MEDICAL HISTORY:  Past Medical History:   Diagnosis Date     Arthritis      COPD (chronic obstructive pulmonary disease) (H)      CVA (cerebral vascular accident) (H) 12/4/2018     Hypertension      Prostate hypertrophy        PAST SURGICAL HISTORY:  Past Surgical History:   Procedure Laterality Date     ESOPHAGOSCOPY, GASTROSCOPY, DUODENOSCOPY (EGD), COMBINED N/A 9/12/2022    Procedure: ESOPHAGOGASTRODUODENOSCOPY (EGD);  Surgeon: Conner Navarrete MD;  Location: [facility] OR     GENITOURINARY SURGERY   " "    HERNIORRHAPHY INGUINAL Left 10/11/2021    Procedure: LEFT INGUINAL HERNIA REPAIR WITH MESH;  Surgeon: Puma Preston MD;  Location: [facility] OR     HERNIORRHAPHY INGUINAL Right 8/19/2022    Procedure: HERNIORRHAPHY, INGUINAL, OPEN, RIGHT;  Surgeon: Flo Keyes MD;  [place] [facility] [place]     IR VISCERAL ANGIOGRAM  9/13/2022     TRANSRECTAL ULTRASONIC, TRANSURETHRAL RESECTION (TUR) OF PROSTATE CYST  2004    helped temporarily         CURRENT MEDICATIONS:    cyanocobalamin (VITAMIN B-12) 1000 MCG tablet  FEROSUL 325 (65 Fe) MG tablet  pantoprazole (PROTONIX) 40 MG EC tablet        ALLERGIES:  Allergies   Allergen Reactions     Sulfa Antibiotics Rash     Info obtained off of 10/4/21 [place]ira pre op.       FAMILY HISTORY:  Family History   Problem Relation Age of Onset     No Known Problems Other         No heart, DM, HTN, or CA     No Known Problems Mother      No Known Problems Father        SOCIAL HISTORY:   Social History     Socioeconomic History     Marital status:      Number of children: 6   Tobacco Use     Smoking status: Former     Packs/day: 2.00     Years: 25.00     Pack years: 50.00     Types: Cigarettes     Start date: 1/1/1990     Smokeless tobacco: Never   Substance and Sexual Activity     Alcohol use: No     Comment: Alcoholic Drinks/day: Quit 1990     Drug use: Never       PHYSICAL EXAM:    BP (!) 144/65   Pulse 70   Temp 98.1  F (36.7  C) (Oral)   Resp 19   Ht 1.727 m (5' 8\")   Wt 64.9 kg (143 lb)   SpO2 95%   BMI 21.74 kg/m      Constitutional: Awake, alert, no evidence of distress  Head: Normocephalic, atraumatic.  ENT: Mucous membranes are moist.  No pallor.   Eyes: Pupils are reactive.  No discoloration.  Neck: No lymphadenopathy, no stridor, supple, no soft tissue swelling  Chest: No tenderness   Respiratory: Respirations even, unlabored. Lungs clear to ascultation bilaterally, in no acute respiratory distress.  Cardiovascular: Regular rate and rhythm.  Good " overall perfusion.  Upper and lower extremity pulses are equal.  GI: Abdomen soft, non-tender to palpation.  No guarding or rebound. Bowel sounds present throughout.  DRT did not show gross blood  Back: No CVA tenderness.    Musculoskeletal: Moves all 4 extremities equally, full function and capacity no peripheral edema.   Integument: Pale  Neurologic: Alert & oriented x 3. Normal speech. Grossly normal motor and sensory function. No focal deficits noted.  NIHSS = 0  Psychiatric: Normal mood and affect.  Appropriate judgement.    ED COURSE:  2:15 PM I met with patient for initial encounter.  4:56 PM I spoke to the resident regarding patient for admission.        Medical Decision Making    History:    Supplemental history from: [facility],     External Record(s) reviewed: External medical records including care everywhere reviewed    Work Up:    EKG, laboratory and imaging studies as ordered were independently reviewed by the provider    Broad differential diagnosis considered for symptomatic anemia    The patient's presentation was of high complexity.     External consultation:    Discussion of management with another provider: Admitting physician    Complicating factors:    Patient has a complicated past medical history including multiple medical problem    Care affected by social determinants of health: Access to primary care    Disposition involved shared decision-making with the patient.  The patient's management necessitated high risk regarding consideration for hospitalization for GI bleed    Admission discussed with the admitting provider.      LAB:  Laboratory results were independently reviewed and interpreted  Results for orders placed or performed during the hospital encounter of 05/31/23   CBC (+ platelets, no diff)   Result Value Ref Range    WBC Count 6.8 4.0 - 11.0 10e3/uL    RBC Count 2.49 (L) 4.40 - 5.90 10e6/uL    Hemoglobin 5.9 (LL) 13.3 - 17.7 g/dL    Hematocrit 21.2 (L) 40.0 - 53.0 %     MCV 85 78 - 100 fL    MCH 23.7 (L) 26.5 - 33.0 pg    MCHC 27.8 (L) 31.5 - 36.5 g/dL    RDW 14.0 10.0 - 15.0 %    Platelet Count 412 150 - 450 10e3/uL   Basic metabolic panel   Result Value Ref Range    Sodium 131 (L) 136 - 145 mmol/L    Potassium 4.5 3.5 - 5.0 mmol/L    Chloride 100 98 - 107 mmol/L    Carbon Dioxide (CO2) 27 22 - 31 mmol/L    Anion Gap 4 (L) 5 - 18 mmol/L    Urea Nitrogen 11 8 - 28 mg/dL    Creatinine 0.89 0.70 - 1.30 mg/dL    Calcium 8.1 (L) 8.5 - 10.5 mg/dL    Glucose 88 70 - 125 mg/dL    GFR Estimate 84 >60 mL/min/1.73m2   Occult blood stool   Result Value Ref Range    Occult Blood Negative Negative   Adult Type and Screen   Result Value Ref Range    ABO/RH(D) O POS     Antibody Screen Negative Negative    SPECIMEN EXPIRATION DATE [id number]    Prepare red blood cells (unit)   Result Value Ref Range    Blood Component Type Red Blood Cells     Product Code [id number]     Unit Status Transfused     Unit Number [id number]     CROSSMATCH Compatible     CODING SYSTEM [id number]     ISSUE DATE AND TIME [id number]     UNIT ABO/RH O+     UNIT TYPE ISBT 5100              CRITICAL CARE:  Upon my evaluation this patient had a high probability of imminent or life-threatening deterioration due to anemia and GI bleed, which required my direct attention intervention and personal management.    I have personally provided 60 minutes of critical care time exclusive of time spent on separately billed procedures.  Time includes review of the laboratory data radiology results and discussion with consultants and monitoring for potential decompensation.      MEDICATIONS GIVEN IN THE EMERGENCY:  Medications   pantoprazole (PROTONIX) IV push injection 40 mg (has no administration in time range)           NEW PRESCRIPTIONS STARTED AT TODAY'S ER VISIT:  New Prescriptions    No medications on file                FINAL DIAGNOSIS:    ICD-10-CM    1. Anemia, unspecified type  D64.9                  NAME: Denton RYAN  Anjel  AGE: 87 year old male  YOB: 1935  MRN: [id number]  EVALUATION DATE & TIME: 5/31/2023  1:47 PM    PCP: Jarad Prater    ED PROVIDER: Chaitanya Castillo M.D.      I, Daquan Brown, am serving as a scribe to document services personally performed by Dr. Chaitanya Castillo based on my observation and the provider's statements to me. I, Chaitanya Castillo MD attest that Daquan Brown is acting in a scribe capacity, has observed my performance of the services and has documented them in accordance with my direction.    Chaitanya Castillo M.D.  Emergency Medicine  [facility] EMERGENCY ROOM  [facility] [facility] [address]  [phone]  Dept: [phone]  5/31/2023        Chaitanya Castillo MD  05/31/23 1708

## 2023-05-31 NOTE — PROGRESS NOTES
Pharmacist Admission Medication History    Admission medication history is complete. The information provided in this note is only as accurate as the sources available at the time of the update.    Medication reconciliation/reorder completed by provider prior to medication history? No    Information Source(s): Patient, Family member and CareEverywhere/SureScripts via in-person    Pertinent Information: none    Changes made to PTA medication list:    Added: None    Deleted: None    Changed: acetaminophen     Allergies reviewed with patient and updates made in EHR: yes    Medication History Completed By: Angela Ford RP 5/31/2023 3:04 PM    Prior to Admission medications    Medication Sig Last Dose Taking? Auth Provider Long Term End Date   cyanocobalamin (VITAMIN B-12) 1000 MCG tablet Take 1,000 mcg by mouth daily 5/31/2023 Yes Unknown, Entered By History     FEROSUL 325 (65 Fe) MG tablet TAKE 1 TABLET (325 MG) BY MOUTH DAILY (WITH BREAKFAST) 5/31/2023 Yes Reported, Patient     pantoprazole (PROTONIX) 40 MG EC tablet Take 1 tablet (40 mg) by mouth 2 times daily (before meals) 5/31/2023 at am Yes Derek Ardon MD

## 2023-05-31 NOTE — H&P
[facility]    History and Physical - Hospitalist Service       Date of Admission:  5/31/2023    Assessment & Plan      Denton Hobbs is a 87 year old male who has a history of GI bleed with duodenal adenoma, UGIB s/p emobolization of SMA, COPD, CVA and is admitted for worsening dyspnea, sent over from PCP for low hemoglobin. He is admitted with anemia and concern for potential GI bleed.     Anemia  Suspected GI Bleed  Hx of GI bleed with duodenal ademona, s/p surgical resection and SMA cauterization. Patient presented with 2-3 weeks of worsening dyspnea, fatigue and SOB. Reports black stools which he always has due to ferrous sulfate tabs. Was sent over from PCP for Hgb 6. In ED, Hgb was 5.9.  Hemodynamically stable. FOBT negative in ED. Now s/p 1 unit PRBCs. Likely source is GI bleed, however, FOBT negative and MCV not consistent with microcytic anemia. Need to consider hemolysis, or other source of bleeding as well. .   -Admit to Inpatient  -Cardiac Telemetry  -Consult GI, appreciate recs  -Continue Protonix 40 mg daily  -IVF  ml/hr  -NPO at midnight in case of procedure  -Hgb re-check 2 hours post transfusion  -Add hepatic panel, Retic count, Iron studies (pre transfusion)  -Peripheral smear, haptoglobin (post transfusion, unable to add to prior)  -UA    Hyponatremia  Na 131 on arrival. Unclear etiology. Could be poor oral intake.  -BMP in AM    BPH  Self straight caths at home.  -Monitor I/O    Hx COPD  Hx CVA  -Monitor      Diet: Combination Diet Regular Diet Adult  NPO per Anesthesia Guidelines for Procedure/Surgery Except for: Meds  DVT Prophylaxis: Pneumatic Compression Devices  Cash Catheter: Not present  Fluids:  ml/hr   Lines: None     Cardiac Monitoring: None  Code Status: No CPR- Do NOT Intubate       Patient was discussed with Dr Rivas Velazquez. Dr Duenas will see the patient in the morning.     Tangela Hammond MD  Hospitalist Service  [facility]  [facility]  ______________________________________________________________________    Chief Complaint   SOB, dyspnea, fatigue. Sent from PCP.     History is obtained from the patient    History of Present Illness   Denton Hobbs is a 87 year old male who has a history of GI bleed with duodenal adenoma, UGIB s/p emobolization of SMA, COPD, CVA and is admitted for worsening dyspnea, sent over from PCP for low hemoglobin.   Patient reports that over the last 2 to 3 weeks he has been having worsening dyspnea shortness of breath with with exertion and fatigue.  He normally walks 1 mile per day at least and his dyspnea has gotten to the point that he is unable to walk even a half a block.  Over the last week he has noticed a worsening headache that feels just like pressure in his head.  He has also noticed black stools but this is a chronic thing for him due to his iron pills.  He does not believe there is been a change in the frequency of his stools. he denies bright red blood per rectum.  Patient denies fever, chills, chest pain or pressure, palpitations, dizziness or lightheadedness, abdominal pain, nausea, vomiting, diarrhea or bright red blood per rectum, no urinary symptoms, no lower extremity weakness.  Patient does self straight cath for enlarged prostate, has not noticed blood in his urine.  In the ED, he was hemodynamically stable, labs were checked.  Labs notable for sodium 131, calcium 8.1, hemoglobin 5.9 with an MCV 85.  His fecal occult blood test was negative. He was transfused 1 unit PRBC.         Past Medical History    Past Medical History:   Diagnosis Date     Arthritis      COPD (chronic obstructive pulmonary disease) (H)      CVA (cerebral vascular accident) (H) 12/4/2018     Hypertension      Prostate hypertrophy        Past Surgical History   Past Surgical History:   Procedure Laterality Date     ESOPHAGOSCOPY, GASTROSCOPY, DUODENOSCOPY (EGD), COMBINED N/A 9/12/2022    Procedure:  ESOPHAGOGASTRODUODENOSCOPY (EGD);  Surgeon: Conner Navarrete MD;  [facility]: [facility] [place]     GENITOURINARY SURGERY       HERNIORRHAPHY INGUINAL Left 10/11/2021    Procedure: LEFT INGUINAL HERNIA REPAIR WITH MESH;  Surgeon: Puma Preston MD;  Location: [facility] OR     HERNIORRHAPHY INGUINAL Right 8/19/2022    Procedure: HERNIORRHAPHY, INGUINAL, OPEN, RIGHT;  Surgeon: Flo Keyes MD;  Location: [facility]     IR VISCERAL ANGIOGRAM  9/13/2022     TRANSRECTAL ULTRASONIC, TRANSURETHRAL RESECTION (TUR) OF PROSTATE CYST  2004    helped temporarily       Prior to Admission Medications   Prior to Admission Medications   Prescriptions Last Dose Informant Patient Reported? Taking?   FEROSUL 325 (65 Fe) MG tablet 5/31/2023  Yes Yes   Sig: TAKE 1 TABLET (325 MG) BY MOUTH DAILY (WITH BREAKFAST)   cyanocobalamin (VITAMIN B-12) 1000 MCG tablet 5/31/2023  Yes Yes   Sig: Take 1,000 mcg by mouth daily   pantoprazole (PROTONIX) 40 MG EC tablet 5/31/2023 at am  No Yes   Sig: Take 1 tablet (40 mg) by mouth 2 times daily (before meals)      Facility-Administered Medications: None        Review of Systems    The 10 point Review of Systems is negative other than noted in the HPI or here.     Social History   I have reviewed this patient's social history and updated it with pertinent information if needed.  Social History     Tobacco Use     Smoking status: Former     Packs/day: 2.00     Years: 25.00     Pack years: 50.00     Types: Cigarettes     Start date: 1/1/1990     Smokeless tobacco: Never   Substance Use Topics     Alcohol use: No     Comment: Alcoholic Drinks/day: Quit 1990     Drug use: Never       Family History   I have reviewed this patient's family history and updated it with pertinent information if needed.  Family History   Problem Relation Age of Onset     No Known Problems Other         No heart, DM, HTN, or CA     No Known Problems Mother      No Known Problems Father        Allergies    Allergies   Allergen Reactions     Sulfa Antibiotics Rash     Info obtained off of 10/4/21 [place] pre op.        Physical Exam   Vital Signs: Temp: 98.1  F (36.7  C) Temp src: Oral BP: (!) 141/64 Pulse: 95   Resp: 16 SpO2: 98 % O2 Device: None (Room air)    Weight: 143 lbs 0 oz    Constitutional: awake, alert, cooperative, no apparent distress, and appears stated age  Eyes: Lids and lashes normal, pupils equal, round and reactive to light, extra ocular muscles intact, sclera clear, conjunctiva normal  ENT: Normocephalic, without obvious abnormality, atraumatic  Respiratory: No increased work of breathing, good air exchange, clear to auscultation bilaterally, no crackles or wheezing  Cardiovascular: regular rate and rhythm, normal S1 and S2, murmurs include systolic murmur II/VI located at left upper sternal border without radiation and no edema  GI: Soft, non tender, not distended  Skin: Pale, no bruising or bleeding and no redness, warmth, or swelling  Musculoskeletal: no lower extremity pitting edema present  Neurologic: Awake, alert, oriented to name, place and time.  Cranial nerves II-XII are grossly intact.  Motor is 5 out of 5 bilaterally.  Cerebellar finger to nose, heel to shin intact.  Sensory is intact.  Babinski down going, Romberg negative, and gait is normal.  Neuropsychiatric: General: normal, calm and normal eye contact  Affect: normal and pleasant      Data     I have personally reviewed the following data over the past 24 hrs:    6.8  \   5.9 (LL)   / 412     131 (L) 100 11 /  88   4.5 27 0.89 \       ALT: 32 AST: 13 AP: 39 (L) TBILI: 0.3   ALB: 3.1 (L) TOT PROTEIN: 5.4 (L) LIPASE: N/A       Ferritin:  N/A % Retic:  5.6 (H) LDH:  N/A       Imaging results reviewed over the past 24 hrs:   No results found for this or any previous visit (from the past 24 hour(s)).

## 2023-05-31 NOTE — ED TRIAGE NOTES
Pt has been feeling weak for the last couple weeks so he went to his clinic and they checked his hgb and it was 6.6. Sent here for a transfusion. Here with daughter Kamran Denies pain.

## 2023-06-01 NOTE — PROGRESS NOTES
CHRISTOPHER called to help with straight cath, unablt to pass with 14 Fr cude, or straight cath 14 fr times 2 . Encouraged team to call for urology.    SADAF PACHECO RN

## 2023-06-01 NOTE — PROGRESS NOTES
Progress Note:    Patient has hx of BPH. Normally straight caths at home. Nursing has attempted several times this evening, several different nurses and catheters attempted. Bladder scan >880ml. Patient uncomfortable.   -Paged out to Urology, awaiting response     Dr Cardenas will come in and evaluate.    Erma Hammond MD PGY-2

## 2023-06-01 NOTE — PROGRESS NOTES
Care Management Follow Up    Length of Stay (days): 1    Expected Discharge Date: 06/02/2023     Concerns to be Addressed: no discharge needs identified     Patient plan of care discussed at interdisciplinary rounds: Yes    Anticipated Discharge Disposition: Home     Anticipated Discharge Services: None  Anticipated Discharge DME: None    Patient/family educated on Medicare website which has current facility and service quality ratings: no  Education Provided on the Discharge Plan: No  Patient/Family in Agreement with the Plan: yes    Referrals Placed by CM/SW:    Private pay costs discussed: Not applicable    Additional Information:  Chart reviewed.  No CM needs anticipated.  Family to transport.       LIZETT Oconnell

## 2023-06-01 NOTE — PROGRESS NOTES
Resident/Fellow Attestation   I, Norman Mayberry DO, was present with the medical/ANTONIO student who participated in the service and in the documentation of the note.  I have verified the history and personally performed the physical exam and medical decision making.  I agree with the assessment and plan of care as documented in the note.      Norman Mayberry DO  PGY1  Date of Service (when I saw the patient): 06/01/23    [facility]    Progress Note - Hospitalist Service       Date of Admission:  5/31/2023    Assessment & Plan   Denton Hobbs is a 87 year old male who has a history of GI bleed with duodenal adenoma , UGIB s/p emobolization of SMA, COPD, CVA, BPH admitted for worsening dyspnea, sent over from PCP for low hemoglobin. He is admitted with anemia and concern for potential GI bleed.      Normocytic Anemia  Suspected GI Bleed  Duodenal adenoma  LLQ pain  Hx of GI bleed with duodenal ademona, s/p surgical resection (3/2022) and SMA cauterization (9/13/23). Patient presented with 2-3 weeks of worsening dyspnea, fatigue and SOB with activity. Has had darker stools since he started ferrous sulfate tabs, however no new hx of darkening or bright red blood. Was sent over from PCP for normocytic anemia, Hgb 5. Hemodynamically stable on admission. In ED, Hgb was 5.9, FOBT negative in ED. Now s/p 2 PRBC, Hgb following 2 units - 8.1 with repeat 6 hours later 7.8. Fatigue and dyspnea have both improved following transfusion. Per GI, bleed likely related to slow blood loss from duodenal adenoma, no signs of estrella upper GI bleed given BUN:Cr ratio. Pt would not like to proceed with adenomal resection at this time. Recommend supportive treatment with continued. Appropriately elevated absolute retic count and normal haptoglobin, low concern for RBC production issue or hemolytic processes contributing to his current presentation  -Cardiac Telemetry  -Consulted GI, appreciate recs    -Trend  Hgb   -No plan for EGD   -OK for regular diet   -Continue oral iron and PTA pantoprazole 40 mg   -Continue to monitor Hgb as outpatient and consider iron infusions in future  -Continue to monitor LLQ abdominal pain - if pain worsens or patient becomes hemodynamically unstable, consider abdominal CT  -IVF  ml/hr  -Hgb re-check 1600 - ordered     Hyponatremia  Na 131 on arrival, today 134. Etiology unclear at this time, possibly secondary to poor oral intake.  -BMP in AM     BPH  Possible UTI vs Urogenital colonization  Self straight caths at home. Was unable to be catherterized with hospital catheters however was able to use cather from home. UA on admission positive for pyuria, leuk esterase, nitrates, bacteria. Ucx showing 50-100k  Klebsiella oxytoca. No fevers, complaints of urinary sx. Positive UA and culture may be UTI related or due to urinary tract colonization from self-catheterization. Will plan to treat as UTI and continue to monitor  -7 day course of Macrobid 100 mg  -Continue to monitor I/Os    Hx COPD  Hx CVA  -Monitor        Diet: Regular Diet Adult    DVT Prophylaxis: Low Risk/Ambulatory with no VTE prophylaxis indicated  Cash Catheter: Not present  Fluids:  mL/hr   Lines: None     Cardiac Monitoring: None  Code Status: No CPR- Do NOT Intubate      Clinically Significant Risk Factors Present on Admission              # Hypoalbuminemia: Lowest albumin = 3.1 g/dL at 5/31/2023  2:24 PM, will monitor as appropriate                   Disposition Plan      Expected Discharge Date: 06/02/2023                The patient's care was discussed with the Attending Physician, Dr. Duenas.    Gentry Schumacher  Medical Student  Hospitalist Service  [facility]  Securely message with Pam (more info)  Text page via [facility] Paging/Directory   ______________________________________________________________________    Interval History     NAOE. Feels like energy and dyspnea have improved  since receiving transfusions. Notes no changes in stool outside of the discoloration since he began iron supplementation. Has been cathing well since getting his home supplies.    No fevers, chills, SOB, chest pain, abdominal pain, bladder pain, dysuria or increased urinary freq/urgency    Physical Exam   Vital Signs: Temp: 98.1  F (36.7  C) Temp src: Oral BP: 136/60 Pulse: 71   Resp: 17 SpO2: 95 % O2 Device: None (Room air)    Weight: 143 lbs 0 oz    General Appearance: Resting comfortably in bed  HEENT: normocephalic, atraumatic, external ears normal, moist mucous membranes  Respiratory: CTAB, no adventitious lung sounds  Cardiovascular: RRR, no murmurs. Intact pulses in bilateral upper and lower extremities  GI: Normoactive bowel sounds, tenderness to deep palpation in LLQ, no rebound  Ext: no lower extremity edema, DP and radial pulses 2/4 bilaterally    Medical Decision Making     Please see A&P for additional details of medical decision making.      Data   ------------------------- PAST 24 HR DATA REVIEWED -----------------------------------------------    I have personally reviewed the following data over the past 24 hrs:    9.6  \   7.8 (L)   / 381     134 (L) 104 12 /  95   4.8 26 0.92 \       ALT: 32 AST: 13 AP: 39 (L) TBILI: 0.3   ALB: 3.1 (L) TOT PROTEIN: 5.4 (L) LIPASE: N/A       Ferritin:  14 (L) % Retic:  4.7 (H) LDH:  N/A       Imaging results reviewed over the past 24 hrs:   No results found for this or any previous visit (from the past 24 hour(s)).

## 2023-06-01 NOTE — PLAN OF CARE
Problem: Plan of Care - These are the overarching goals to be used throughout the patient stay.    Goal: Optimal Comfort and Wellbeing  Intervention: Monitor Pain and Promote Comfort    Problem: Anemia  Goal: Anemia Symptom Improvement  Intervention: Monitor and Manage Anemia     Goal Outcome Evaluation:       Pt A/Ox4, able to make needs known. Denying pain over the night. Critical hgb result of 6.7 at 2029, MD notified. 1 unit PRBCs tranfused, pt tolerated well. Hgb 8.1 at 0309. Denying dizziness or lightheadedness. Pt utilizing at home supplies to self straight cath himself, d/t inability to straight cath when utilizing hospital catheters. Having adequate urine output. IV fluids running at 100ml/hr. NPO since midnight for possible GI procedure today. VSS, on room air.

## 2023-06-01 NOTE — CONSULTS
"GI CONSULT NOTE      Name: Denton Hobbs  Medical Record #: [id number]  YOB: 1935  Date of Admission: 5/31/2023  Date/Time: 6/1/2023/8:33 AM     CHIEF COMPLAINT: Weakness, anemia     HISTORY OF PRESENT ILLNESS: We were asked to see Denton Hobbs by Manish for \"history of GI bleeding, hemoglobin 5.9.\"     Denton Hobbs is a 87 year old year old male with history of known ampullary/periampullary adenoma, COPD, CVA, HTN, BPH who presented to the ED with dizziness and lightheadness and hemoglobin of 6.6 noted in clinic. Hemoglobin noted to be 5.9. BUN/Cr ratio 12. Iron studies show low ferritin. He received 2 units PRBCs. Hemoglobin now 8.1. FOBT negative. Hemolysis labs are normal. He is feeling improved today. Wants to eat. No new complaints.     He was diagnosed with duodenal adenoma via EGD 3/2022 after undergoing EGD/colonoscopy for work-up for YASEMIN prior to having inguinal hernia repair. He was seen at the [facility] (Surgeon Dr. Varner and Dr. Doc JEONG) and ultimately it was determined he was not a good candidate for Whipple or endoscopic resection of the adenoma due to associated risks. Supportive treatment with oral iron and PPI was recommended. In September 2022, he was admitted at [facility] with GI bleeding, underwent EGD 9/11 which showed duodenal adenoma with oozing, s/p clip placement. He then underwent mesenteric angiography with embolization of 4 vessels 9/13/22. He reports formed black stools for several months, and reports he is complaint with his oral iron. He continues on pantoprazole. I do not know his baseline hemoglobin since. Last known hemoglobin 8.3 9/16/22.     REVIEW OF SYSTEMS (ROS): Complete review of systems negative other than listed in HPI.    PAST MEDICAL HISTORY:  Past Medical History:   Diagnosis Date     Arthritis      COPD (chronic obstructive pulmonary disease) (H)      CVA (cerebral vascular accident) (H) 12/4/2018     Hypertension      Prostate " "hypertrophy       FAMILY HISTORY:  Family History   Problem Relation Age of Onset     No Known Problems Other         No heart, DM, HTN, or CA     No Known Problems Mother      No Known Problems Father        SOCIAL HISTORY:  Social History     Socioeconomic History     Marital status:      Spouse name: Not on file     Number of children: 6     Years of education: Not on file     Highest education level: Not on file   Occupational History     Not on file   Tobacco Use     Smoking status: Former     Packs/day: 2.00     Years: 25.00     Pack years: 50.00     Types: Cigarettes     Start date: 1/1/1990     Smokeless tobacco: Never   Vaping Use     Vaping status: Not on file   Substance and Sexual Activity     Alcohol use: No     Comment: Alcoholic Drinks/day: Quit 1990     Drug use: Never     Sexual activity: Not on file   Other Topics Concern     Parent/sibling w/ CABG, MI or angioplasty before 65F 55M? Not Asked   Social History Narrative     Not on file     Social Determinants of Health     Financial Resource Strain: Not on file   Food Insecurity: Not on file   Transportation Needs: Not on file   Physical Activity: Not on file   Stress: Not on file   Social Connections: Not on file   Intimate Partner Violence: Not on file   Housing Stability: Not on file     MEDICATIONS PRIOR TO ADMISSION:   Medications Prior to Admission   Medication Sig Dispense Refill Last Dose     cyanocobalamin (VITAMIN B-12) 1000 MCG tablet Take 1,000 mcg by mouth daily   5/31/2023     FEROSUL 325 (65 Fe) MG tablet TAKE 1 TABLET (325 MG) BY MOUTH DAILY (WITH BREAKFAST)   5/31/2023     pantoprazole (PROTONIX) 40 MG EC tablet Take 1 tablet (40 mg) by mouth 2 times daily (before meals) 60 tablet 1 5/31/2023 at am          ALLERGIES: Sulfa antibiotics    PHYSICAL EXAM:    BP (!) 155/73 (BP Location: Right arm)   Pulse 76   Temp 98.3  F (36.8  C) (Oral)   Resp 18   Ht 1.727 m (5' 8\")   Wt 64.9 kg (143 lb)   SpO2 93%   BMI 21.74 kg/m  "     GENERAL: Pleasant, no obvious distress  NECK: Supple without adenopathy  EYES: Pale conjunctiva. No scleral icterus  LUNGS: Clear to auscultation bilaterally  HEART: Regular rate and rhythm, S1 and S2 present, no lower extremity edema  ABDOMEN: Non-distended. Positive bowel sounds. Soft, non-tender, no guarding/rebound/mass, no obvious organomegaly  MUSKULOSKELETAL:  Warm and well perfused, moves all extremities well  SKIN: No jaundice  NEUROLOGIC: Alert and oriented  PSYCHIATRIC: Normal affect    LAB DATA:  CMP Results:   Recent Labs   Lab Test 06/01/23  0309 05/31/23  1424 11/03/22  1103 09/14/22  0618 09/13/22  0531 09/12/22  0617   * 131* 138  --  138 137   POTASSIUM 4.8 4.5 4.9   < > 4.1 4.6   CHLORIDE 104 100 101  --  115* 111*   CO2 26 27 19*  --  22 21*   ANIONGAP 4* 4* 18*  --  1* 5   GLC 95 88 84  --  121 113   BUN 12 11 14.0  --  32* 33*   CR 0.92 0.89 1.03   < > 0.84 0.80   BILITOTAL  --  0.3  --   --  0.5 1.1*   ALKPHOS  --  39*  --   --  29* 35*   ALT  --  32  --   --  <9 <9   AST  --  13  --   --  12 9    < > = values in this interval not displayed.      CBC  Recent Labs   Lab 06/01/23 0309 05/31/23 2029 05/31/23  1424   WBC 9.6 6.3 6.8   RBC 3.16* 2.70* 2.49*   HGB 8.1* 6.7* 5.9*   HCT 26.5* 22.9* 21.2*   MCV 84 85 85   MCH 25.6* 24.8* 23.7*   MCHC 30.6* 29.3* 27.8*   RDW 13.9 14.1 14.0    372 412     INRNo lab results found in last 7 days.   Lipase   Date Value Ref Range Status   09/11/2022 16 0 - 52 U/L Final       PROCEDURES:  MESENTERIC ANGIOGRAPHY WITH EMBOLIZATION 9/13/22  Findings: R CFA 5F sheath. Angiography of the celiac, GDA, SMA, and   inferior pancreaticoduodenal arteries performed. Selective embolization of   4 arteries feeding the area of the endoscopic clips, performed using 2 mm   microNester coils.     EGD 9/11/22  Impression:    Large duodenal adenoma which may be malignant had an                          ulcerated area with oozing and clot. Three clips were                           placed with what is likely minimal effect.                          - Normal esophagus.                          - Normal stomach.                          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 Clips were placed.                          - The examination was otherwise normal.                          - No specimens collected.     EGD 3/28/22  - Esophagus: GE junction at 38 cm. Mild erythematous changes   - Stomach: Small hiatal hernia with Diaphragamatic hiatus at 42 cm. No abnormal mucosa folds.   - Duodenum: In the periampullar region there was a large 2-3 cm pedunculated mass that circumferentially narrowed the duodenal lumen. There was an ulcer at one location. Using a cold forceps the ulcer and surrounding tissue was biopsied. Given inability to locate the ampulla, biopsies were not obtained from every aspect of the mass.   Duodenal biopsy: Adenomatous polyp with focal areas suspicious for high grade dysplasia    ASSESSMENT:  1. Normoctyic Anemia   87 year old male with known duodenal adenoma not amenable to surgical resection, declined endoscopic resection due to risks on oral iron, PPI presenting with anemia and weakness. Hemoglobin improved appropriately from 5.9-->8.1 after 2 units. Iron studies are normal. BUN/Cr ratio is not consistent with a estrella upper GI bleed. Suspect anemia is from slow blood loss from the large adenoma, however this is not typically amenable to endoscopic therapy. Would treat supportively with blood transfusion, iron supplementation and close monitoring of his hemoglobin as outpatient.     PLAN:  1. Trend hemoglobin, transfuse per primary team  2. No plan for EGD   3. Ok for diet  4. Continue oral iron   5. Continue home dose pantoprazole   6. Would monitor hemoglobin closely as outpatient, consider iron infusions in the future    Discussed with Dr. Silva who will also visit with the patient.     TIME SPENT: 45 min including chart review, patient interview and  care coordination.       ADDENDUM:  Patient seen and examined independently.  He notes dizziness and presyncope have resolved with transfusion.  No abd pain.  Formed black bm.    Abd: sntnd, nabs.    Discussed low ferritin, anemia most likely as a result of duodenal adenoma (previous bx with HGD).  He has considered surgical and endoscopic resection and declined.  He again declines this today.  Explained that this means that he will continue to slowly ooze blood from the polyp, and that it may become cancer.  He will need iron support (more than oral I suspect), perhaps intermittent transfusions.  This may be arranged with his PMD as an outpatient.    TIME SPENT: 15 min including chart review, patient interview and care coordination.     Will sign off, please call with questions.    Carlin Silva MD  [facility]  [phone]                                               Ellie Pinto, PA-C  Thank you for the opportunity to participate in the care of this patient.   Please feel free to call me with any questions or concerns.  Phone number [phone].

## 2023-06-01 NOTE — PLAN OF CARE
Problem: Anemia  Goal: Anemia Symptom Improvement  Outcome: Progressing  Intervention: Monitor and Manage Anemia  Recent Flowsheet Documentation  Taken 6/1/2023 1543 by Hui Torres, RN  Safety Promotion/Fall Prevention:   supervised activity   safety round/check completed   room near nurse's station   patient and family education   nonskid shoes/slippers when out of bed   mobility aid in reach  Taken 6/1/2023 0900 by Hui Torres, RN  Safety Promotion/Fall Prevention:   supervised activity   safety round/check completed   room near nurse's station   patient and family education   nonskid shoes/slippers when out of bed   mobility aid in reach     Pt. A&O x4. Denies pain. VSS. Pt. aware that he's staying another night for Hgb monitoring. Pt. has been self-cathing without any problems. PO abx started for UTI (positive urine cultures).

## 2023-06-02 NOTE — PLAN OF CARE
Problem: Plan of Care - These are the overarching goals to be used throughout the patient stay.    Goal: Absence of Hospital-Acquired Illness or Injury  Intervention: Identify and Manage Fall Risk  Recent Flowsheet Documentation  Taken 6/1/2023 2030 by Marcello Faustin RN  Safety Promotion/Fall Prevention:    supervised activity    safety round/check completed    room near nurse's station    patient and family education    nonskid shoes/slippers when out of bed    mobility aid in reach   Goal Outcome Evaluation:      Plan of Care Reviewed With: patient    Overall Patient Progress: improvingOverall Patient Progress: improving  pt alert and oriented x4, VSS on room air. Able to make needs known, denies pain and SOB,  pt performs straight cath himself and his catheters were brought from home because he failed to use the hospital ones .Remains on po abx.

## 2023-06-02 NOTE — DISCHARGE SUMMARY
[facility]  Discharge Summary - Medicine & Pediatrics       Date of Admission:  5/31/2023  Date of Discharge:  6/2/2023  Discharging Provider: Dr. Spike Duenas  Discharge Service: Hospitalist Service    Discharge Diagnoses   Normocytic Anemia  Suspected GI Bleed, in the setting of pre-existing Duodenal adenoma  UTI-klebsiella   LLQ pain, resolved  Hyponatremia   HxBPH  Hx COPD  Hx CVA  Clinically Significant Risk Factors          Follow-ups Needed After Discharge   Follow-up with PCP after discharge for ehoozmpq-puiyhn-qa. Recommend repeat hemoglobin within one week.     Unresulted Labs Ordered in the Past 30 Days of this Admission     Date and Time Order Name Status Description    5/31/2023 11:34 PM Urine Culture Preliminary       These results will be followed up by PCP    Discharge Disposition   Discharged to home  Condition at discharge: Stable    Hospital Course   Denton Hobbs was admitted on 5/31/2023 for low hemoglobin and concern for GI bleed. He received 2 units of PRBCs and his hemoglobin increased appropriately. On discharge his hemoglobin was stable at 8.5. GI was consulted and patient declined resection of duodenal adenoma. GI recommended continued pantoprazole, oral iron, and consideration of iron infusions as outpatient.    During his hospitalization, a urine analysis was obtained and was positive for nitrites and leukocyte esterase. Urine culture grew 50,000-100,000 CFU/mL Klebsiella oxytoca. Resistant to ampicillin and cefazolin, but sensitive to nitrofurantoin. 7 days of nitrofurantoin were prescribed and sent to patient's pharmacy on file. Patient verbalized clear understanding and agreement to treatment plan and had no further questions or concerns.    Consultations This Hospital Stay   GASTROENTEROLOGY IP CONSULT  UROLOGY IP CONSULT    Code Status   No CPR- Do NOT Intubate       The patient was discussed with DO JEANNE Clemente Team Service    [facility] [address] [address]  Phone: [phone]  Fax: [phone]  ______________________________________________________________________    Physical Exam   Vital Signs: Temp: 97.8  F (36.6  C) Temp src: Oral BP: (!) 176/77 Pulse: 73   Resp: 18 SpO2: 96 % O2 Device: None (Room air)    Weight: 143 lbs 0 oz  General Appearance:  Resting comfortably in bed  HEENT: normocephalic, atraumatic, external ears normal, moist mucous membranes  Respiratory: CTAB, no adventitious lung sounds  Cardiovascular: RRR, no murmurs. Intact pulses in bilateral upper and lower extremities  GI: Normoactive bowel sounds, nontender to palpation in all four quadrants, no rebound  Ext: no lower extremity edema, DP and radial pulses 2/4 bilaterally      Primary Care Physician   YOUSIF IBARRA    Discharge Orders      Reason for your hospital stay    Low hemoglobin, suspected GI bleed, concern for UTI     Follow-up and recommended labs and tests     Follow up with primary care provider, YOUSIF IBARRA, within 7 days for hospital follow- up.  The following labs/tests are recommended: repeat hemoglobin with PCP within one week.     Activity    Your activity upon discharge: activity as tolerated     Diet    Follow this diet upon discharge: Orders Placed This Encounter      Regular Diet Adult       Significant Results and Procedures   Most Recent 3 CBC's:  Recent Labs   Lab Test 06/02/23  0648 06/01/23  1652 06/01/23  0923 06/01/23  0309 05/31/23  2029   WBC 9.4  --   --  9.6 6.3   HGB 8.5* 8.0* 7.8* 8.1* 6.7*   MCV 86  --   --  84 85     --   --  381 372     Most Recent 3 BMP's:  Recent Labs   Lab Test 06/02/23  0648 06/01/23  0309 05/31/23  1424   * 134* 131*   POTASSIUM 4.4 4.8 4.5   CHLORIDE 104 104 100   CO2 27 26 27   BUN 11 12 11   CR 0.82 0.92 0.89   ANIONGAP 4* 4* 4*   GINETTE 8.4* 8.0* 8.1*   GLC 94 95 88   ,   Results for orders placed or performed during the hospital  encounter of 09/11/22   XR Chest Port 1 View    Narrative    EXAM: XR CHEST PORT 1 VIEW  LOCATION: [facility]  DATE/TIME: 9/11/2022 12:32 PM    INDICATION: ST elevation myocardial infarction  COMPARISON: CT of the chest 03/29/2022      Impression    IMPRESSION:     Lucent, hyperinflated lungs with flattening of diaphragmatic curvature consistent with obstructive lung disease/air-trapping. No superimposed airspace opacities or interstitial thickening.    The cardiac silhouette is not enlarged. Wall calcification of the aortic arch and descending thoracic aorta as before. No new mediastinal contour abnormality.    No pleural effusion or pneumothorax.   CTA Abdomen Pelvis with Contrast    Narrative    EXAM: CTA ABDOMEN PELVIS WITH CONTRAST  LOCATION: [facility]  DATE/TIME: 9/11/2022 1:58 PM    INDICATION: Bloody emesis, concern for GI bleed  COMPARISON: CT of the chest, abdomen, and pelvis 03/29/2022  TECHNIQUE: CT angiogram abdomen pelvis during arterial phase of injection of IV contrast. 2D and 3D MIP reconstructions were performed by the CT technologist. Dose reduction techniques were used.  CONTRAST: 100ml Isovue 370    FINDINGS:  ANGIOGRAM ABDOMEN/PELVIS: Mixed attenuation atheroma is present in the distal descending thoracic aorta but no plaque ulceration. Tortuous abdominal aorta with diffuse plaque. There is fusiform enlargement of the abdominal aorta which measures up to 2.8   cm in diameter. Atheromatous plaque extends to the iliac arteries but there is no iliac or common femoral aneurysm or flow-limiting stenosis. Celiac axis, SMA, and small caliber IVON are patent. There is predominantly noncalcified atheroma producing   luminal irregularity in the SMA and celiac axis. No proximal mesenteric branch truncation. Single right and left renal arteries. There is mild narrowing of the proximal left renal artery.    Focal endoluminal contrast within the second  portion of the duodenum on the arterial phase images and accumulation on the portal venous phase consistent with a source of acute GI bleed. The wall of the duodenum at this level is thickened.    LOWER CHEST: Emphysema in the imaged lung bases associated with intervening interstitial opacities particularly in the posterior costophrenic sulci. No acute abnormalities in the basal lungs or pleural spaces.    HEPATOBILIARY: Normal.    PANCREAS: There is a 14 x 28 mm cyst arising from the superior pancreas body which has a clear communication with the pancreas duct consistent with an IPMN. The pancreas duct particularly in the body has mild dilation but tapers normally towards the   pancreas tail.    SPLEEN: Normal.    ADRENAL GLANDS: Normal.    KIDNEYS/BLADDER: No significant mass, stones, or hydronephrosis. There are simple or benign cysts. No follow up is needed. Both ureters are patulous. No ureteral calculi. There is diffuse bladder wall thickening. Small left lateral bladder diverticulum.    BOWEL: Nondistended stomach. Duodenal abnormalities as described above. The third and fourth portions of the duodenum are normal caliber without wall thickening. Remainder of the small bowel is normal caliber. Sigmoid colonic diverticula are present. No   colonic wall thickening.    LYMPH NODES: Normal.    PELVIC ORGANS: Severely enlarged prostate gland which is heterogeneous attenuation. Transverse measurement of the prostate is 5.6 cm. Prostate gland indents the bladder base.    MUSCULOSKELETAL: Disc space narrowing is greatest at L2-L3 with there is slight retrolisthesis of L2 on L3. Degenerative osteophytes of the lower thoracic and lumbar spine. Unilateral left L5 pars defect.      Impression    IMPRESSION:    1.  Focal wall thickening of the second portion of the duodenum. Arterial blush within the lumen and accumulation on portal venous phase images in this location consistent with a source of GI bleed likely due to  peptic ulcer disease. GI consultation for   potential upper endoscopy is suggested.  2.  Severe enlargement of the prostate gland which indents the bladder base. Bladder wall thickening and patulous ureters, likely due to chronic outlet obstruction. No hydronephrosis.  3.  Cystic lesion in the superior pancreas body measuring 14 x 28 mm with clear communication to the main pancreas duct, consistent with IPMN. Based on size, recommend evaluation with endoscopic ultrasound in 3-6 months as per guidelines below      REFERENCE:  Revisions of international consensus [place] guidelines for the management of IPMN of the pancreas. Pancreatology 2017;17(5):738-753.    Largest cyst between 20-30 mm: EUS in 3-6 months and then annual surveillance alternating between MRI and EUS as appropriate.     IR Visceral Angiogram    Narrative    LOCATION: [facility]  DATE: 9/13/2022    PROCEDURE: MESENTERIC ANGIOGRAPHY WITH EMBOLIZATION.    INTERVENTIONAL RADIOLOGIST: Amaury Joyce MD.    INDICATION: Upper GI hemorrhage. Proximal duodenal contrast extravasation seen on CTA from 9/11/2022. Large fungating polypoid mass in the second portion of the duodenum with active extravasation seen on endoscopy. Endoscopic clips placed. Recurrent   bleeding following this.    CONSENT: The procedure, risks and benefits of angiography with possible endovascular intervention were discussed with the patient  in detail. All questions were answered. Informed consent was given to proceed with the procedure.    MODERATE SEDATION: Immediately prior to administration of medication, the patient was reassessed for adequacy to receive conscious sedation. Prior to the procedure, the patient was alert and oriented. Versed 0.5 mg and fentanyl 25 mcg were administered   intravenously with continuous vital signs monitoring by the radiology nurse under my direct supervision. Patient was alert and oriented post procedure. Total face to face  moderate sedation time: 60 minutes.    CONTRAST: Omnipaque 350: 80 cc  ANTIBIOTICS: None.  ADDITIONAL MEDICATIONS: None.    FLUOROSCOPIC TIME: 12.4 minutes.  RADIATION DOSE: Air Kerma: 932 mGy    COMPLICATIONS: No immediate complications.    PROCEDURE/TECHNIQUE: After informed consent was obtained, the bilateral groins were prepped and draped in sterile fashion. Using local anesthesia, real-time ultrasound guidance and a micropuncture set, access obtained into the right common femoral   artery. Through the micropuncture sheath, an 0.035 Bentson guidewire was advanced to the abdominal aorta. Over the guidewire, a 5 Macedonian vascular sheath was placed within the common femoral artery. Selective angiography of the celiac artery was performed   using a 5 Macedonian C2 catheter. In a coaxial fashion a 2.4 Macedonian Progreat microcatheter was advanced into the gastroduodenal artery. Gastroduodenal arteriogram was performed. The catheter was then advanced into one of the superior pancreaticoduodenal   branches. Selective angiography confirmed blood supply to the area of the indwelling endoscopic clips. The distal aspect of this branch was embolized with series of 2 mm x 7 cm micro-Corinne coils. Follow-up arteriograms obtained through the microcatheter   within the distal gastroduodenal artery.    Selective angiography of the superior mesenteric artery was performed through the C2 catheter. The Progreat microcatheter was advanced and a coaxial fashion, into the inferior pancreaticoduodenal trunk. Selective angiography performed. A superior branch   of the inferior pancreaticoduodenal artery was selectively catheterized. Selective angiography demonstrated blood supply to the area of the endoscopic clips. This was embolized with a series of 2 mm x 3 cm micro-Corinne coils. The microcatheter was then   advanced into an inferior pancreaticoduodenal branch. Selective angiography also demonstrated blood supply to the area of the endoscopic  coils. Coil embolization was performed by 2, 2 mm x 3 cm micro-Corinne coils. Follow-up arteriograms obtained through   the Progreat microcatheter within the inferior pancreaticoduodenal trunk.    Catheters and sheath removed and hemostasis obtained by direct compression.    FINDINGS:  1. Ultrasound: Patent, mildly ectatic right common femoral artery with mild calcified atherosclerotic changes. Images obtained placed in the patient's medical record.  2. Celiac arteriogram: Normal caliber celiac artery. The common hepatic artery gives rise to the gastroduodenal and left hepatic arteries. No contrast extravasation.  3. Selective arteriogram of the gastroduodenal artery demonstrates a normal caliber gastroduodenal artery with prominent superior pancreaticoduodenal branches. A caudally directed branch supplies the area of the endoscopic clips.  4. Superselective arteriogram of the caudally directed branch confirms blood supply to the area of the endoscopic clips. No contrast extravasation was seen. However based on the findings on CTA and during endoscopy was decided to proceed with   embolization 3, 2 mm x 7 cm micro-Corinne coils were deployed within the distal aspect of the branch. Follow-up arteriogram demonstrates successful occlusion of the distal aspect of this branch.  5. Superior mesenteric arteriogram: Replaced right hepatic artery originating from the proximal superior mesenteric artery. Normal caliber inferior pancreaticoduodenal branches.  6. Selective arteriogram of the inferior pancreaticoduodenal trunk demonstrates 3 distal branches supplying the area of the endoscopic clips. No contrast extravasation.  7. Superselective arteriogram into the cranial inferior pancreaticoduodenal branch confirms blood supply to the area of the clips. Post embolization arteriogram demonstrates successful occlusion.  8. Superselective arteriogram of the caudally directed pancreaticoduodenal branch also confirms blood supply to  the area of the clips. No contrast extravasation. Post embolization arteriogram demonstrates decreased opacification of the distal branches   supplying the area of the endoscopic clips.      Impression    IMPRESSION:    1.  No contrast extravasation. Based on the CTA and endoscopic findings, 4 pancreaticoduodenal branches were superselectively embolized, as described above.    PLAN: Postprocedure observation.       Discharge Medications   Current Discharge Medication List      START taking these medications    Details   nitroFURantoin macrocrystal-monohydrate (MACROBID) 100 MG capsule Take 1 capsule (100 mg) by mouth every 12 hours for 6 days  Qty: 12 capsule, Refills: 0    Associated Diagnoses: Acute cystitis with hematuria         CONTINUE these medications which have NOT CHANGED    Details   cyanocobalamin (VITAMIN B-12) 1000 MCG tablet Take 1,000 mcg by mouth daily      FEROSUL 325 (65 Fe) MG tablet TAKE 1 TABLET (325 MG) BY MOUTH DAILY (WITH BREAKFAST)      pantoprazole (PROTONIX) 40 MG EC tablet Take 1 tablet (40 mg) by mouth 2 times daily (before meals)  Qty: 60 tablet, Refills: 1    Associated Diagnoses: Upper GI bleed           Allergies   Allergies   Allergen Reactions     Sulfa Antibiotics Rash     Info obtained off of 10/4/21 [facility] pre op.

## 2023-06-02 NOTE — DISCHARGE SUMMARY
Discharge instructions and education provided to pt and family. Pt and family acknowledge understanding and had no questions for nurse. Pt is being discharge to home via family transport.

## 2023-06-02 NOTE — PROGRESS NOTES
Pt is A&Ox4. Pt denies pain. VS except for elevated BP. Pt denies headache. Provider notified. Pt tolerated PO intake. Pt is SBA with GB and walker for ambulation. Discharge plan for today to home.

## 2023-06-19 NOTE — ED NOTES
EMERGENCY DEPARTMENT SIGN OUT NOTE        ED COURSE AND MEDICAL DECISION MAKING  3:40 PM Patient was signed out to me by Dr Chaitanya Castillo.  5:30 PM.  Repeat hemoglobin reported at 6.3 which seems highly unlikely given transfusion of full unit of blood.  Situation discussed with nurse.  Repeat specimen will be sent for testing.  6:34 PM.  Repeat hemoglobin 8.3.  We will proceed with plans for discharge.      In brief, Denton Hobbs is a 87 year old male who initially presented for abnormal labs.    Patient was seen in his PCP clinic this morning for follow up during which time repeat labs noted low hemoglobin. So he was sent to the ED for further management. Patient has been more fatigued over the last few days. He also has black stools.    At time of sign out, disposition was pending  FINAL IMPRESSION    1. Anemia, unspecified type        ED MEDS  Medications - No data to display    LAB  Labs Ordered and Resulted from Time of ED Arrival to Time of ED Departure   CBC WITH PLATELETS AND DIFFERENTIAL - Abnormal       Result Value    WBC Count 8.7      RBC Count 2.44 (*)     Hemoglobin 6.2 (*)     Hematocrit 21.2 (*)     MCV 87      MCH 25.4 (*)     MCHC 29.2 (*)     RDW 16.8 (*)     Platelet Count 579 (*)     % Neutrophils 75      % Lymphocytes 11      % Monocytes 11      % Eosinophils 1      % Basophils 1      % Immature Granulocytes 1      NRBCs per 100 WBC 0      Absolute Neutrophils 6.6      Absolute Lymphocytes 1.0      Absolute Monocytes 0.9      Absolute Eosinophils 0.1      Absolute Basophils 0.1      Absolute Immature Granulocytes 0.1      Absolute NRBCs 0.0     TYPE AND SCREEN, ADULT    ABO/RH(D) O POS      Antibody Screen Negative      SPECIMEN EXPIRATION DATE [id number]     PREPARE RED BLOOD CELLS (UNIT)    Blood Component Type Red Blood Cells      Product Code [id number]      Unit Status Transfused      Unit Number [id number]      CROSSMATCH Compatible      CODING SYSTEM [id number]      ISSUE DATE AND  TIME [id number]      UNIT ABO/RH O-      UNIT TYPE ISBT 9500     PREPARE RED BLOOD CELLS (UNIT)   TRANSFUSE RED BLOOD CELLS (UNIT)   ABO/RH TYPE AND SCREEN       RADIOLOGY    No orders to display       DISCHARGE MEDS  New Prescriptions    No medications on file       Jeramie Waddell MD  [facility] EMERGENCY ROOM  [address] [place] [id number]  [phone]     Jeramie Waddell MD  06/19/23 3648

## 2023-06-19 NOTE — ED TRIAGE NOTES
Pt presents to the ED with c/o low hemaglobin (6.8). Pt had a PCP visit this morning and had blood work done. Pt denies being light headed or dizzy. Endorses increased fatigue. Reports black stools. Has had blood transfusions in the past, reports last one about a month ago.      Triage Assessment     Row Name 06/19/23 1244       Triage Assessment (Adult)    Airway WDL WDL       Respiratory WDL    Respiratory WDL WDL       Skin Circulation/Temperature WDL    Skin Circulation/Temperature WDL WDL       Cardiac WDL    Cardiac WDL WDL       Peripheral/Neurovascular WDL    Peripheral Neurovascular WDL WDL       Cognitive/Neuro/Behavioral WDL    Cognitive/Neuro/Behavioral WDL WDL

## 2023-06-19 NOTE — ED PROVIDER NOTES
EMERGENCY DEPARTMENT ENCOUNTER            IMPRESSION:  Chronic anemia        MEDICAL DECISION MAKING:  It was my pleasure to provide care for Denton Hobbs who presented for treatment of anemia.  Patient has a history of GI bleed and has been recently worked up in the hospital.  He was seen in clinic today for symptomatic anemia referred to the emergency department for transfusion    On my exam patient is pleasant and cooperative.  No evidence of distress.  Vital signs are normal.  Physical exam notable for patient appears pale..     Significant laboratory findings include hemoglobin of 6.2    ED evaluation is consistent with chronic anemia.  Consent signed for transfusion    1 unit RBCs transfused.    Patient requested repeat hemoglobin prior to discharge after the transfusion.      Patient was reevaluated and results were discussed.      Prior to making a final disposition on this patient the results of patient's tests and other diagnostic studies were discussed with the patient. All questions were answered. Patient expressed understanding of the plan and was amenable.   Return precautions and follow-up were discussed.     =================================================================  CHIEF COMPLAINT:  Chief Complaint   Patient presents with     Abnormal Labs         HPI  Denton Hobbs is a 87 year old male with a history of normocytic anemia, GI bleed, duodenal adenoma, UTI, hyponatremia, COPD, and CVA who presents to the ED by car for evaluation of low hemoglobin.     Per chart review,   The patient was admitted 5/31-6/2/2023 for low hemoglobin and concern for GI bleed. He received 2 units of PRBCs and his hemoglobin increased appropriately. On discharge his hemoglobin was stable at 8.5. GI was consulted and patient declined resection of duodenal adenoma. GI recommended continued pantoprazole, oral iron, and consideration of iron infusions as outpatient. UA during admission positive for nitrites and  leukocyte esterase. Urine culture grew 50,000-100,000 CFU/mL Klebsiella oxytoca. Resistant to ampicillin and cefazolin, but sensitive to nitrofurantoin. 7 days of nitrofurantoin were prescribed.    Per patient,   Patient was seen in his PCP clinic this morning for follow up during which time repeat labs noted low hemoglobin. So he was sent to the ED for further management. Patient has been more fatigued over the last few days. He also has black stools. No other reported complaints at this time.      REVIEW OF SYSTEMS   Constitutional: Does not report chills, unintentional weight loss. Positive for fatigue   Eyes: Does not report visual changes or discharge    HENT: Does not report sore throat, ear pain or neck pain  Respiratory: Does not report cough or shortness of breath    Cardiovascular: Does not report chest pain, palpitations or leg swelling  GI: Does not report abdominal pain, nausea, vomiting. Positive for black stools.     : Does not report hematuria, dysuria, or flank pain  Musculoskeletal: Does not report any new musculoskeletal pain or new muscle/joint pains  Skin: Does not report rash or wound  Neurologic: Does not report current headache, new weakness, focal weakness, or sensory changes      Remainder of systems reviewed, unless noted in HPI all others negative.      PAST MEDICAL HISTORY:  Past Medical History:   Diagnosis Date     Arthritis      COPD (chronic obstructive pulmonary disease) (H)      CVA (cerebral vascular accident) (H) 12/4/2018     Hypertension      Prostate hypertrophy        PAST SURGICAL HISTORY:  Past Surgical History:   Procedure Laterality Date     ESOPHAGOSCOPY, GASTROSCOPY, DUODENOSCOPY (EGD), COMBINED N/A 9/12/2022    Procedure: ESOPHAGOGASTRODUODENOSCOPY (EGD);  Surgeon: Conner Navarrete MD;  Location: [facility] OR     GENITOURINARY SURGERY       HERNIORRHAPHY INGUINAL Left 10/11/2021    Procedure: LEFT INGUINAL HERNIA REPAIR WITH MESH;  Surgeon: Puma Preston  MD AROLDO;  Location: [facility] OR     HERNIORRHAPHY INGUINAL Right 8/19/2022    Procedure: HERNIORRHAPHY, INGUINAL, OPEN, RIGHT;  Surgeon: Flo Keyes MD;  Location: [facility] OR     IR VISCERAL ANGIOGRAM  9/13/2022     TRANSRECTAL ULTRASONIC, TRANSURETHRAL RESECTION (TUR) OF PROSTATE CYST  2004    helped temporarily         CURRENT MEDICATIONS:    cyanocobalamin (VITAMIN B-12) 1000 MCG tablet  FEROSUL 325 (65 Fe) MG tablet  pantoprazole (PROTONIX) 40 MG EC tablet        ALLERGIES:  Allergies   Allergen Reactions     Sulfa Antibiotics Rash     Info obtained off of 10/4/21 Entira pre op.       FAMILY HISTORY:  Family History   Problem Relation Age of Onset     No Known Problems Other         No heart, DM, HTN, or CA     No Known Problems Mother      No Known Problems Father        SOCIAL HISTORY:   Social History     Socioeconomic History     Marital status:      Number of children: 6   Tobacco Use     Smoking status: Former     Packs/day: 2.00     Years: 25.00     Pack years: 50.00     Types: Cigarettes     Start date: 1/1/1990     Smokeless tobacco: Never   Substance and Sexual Activity     Alcohol use: No     Comment: Alcoholic Drinks/day: Quit 1990     Drug use: Never       PHYSICAL EXAM:    BP (!) 154/65   Pulse 75   Temp 97.9  F (36.6  C) (Oral)   Resp 18   Wt 64.9 kg (143 lb)   SpO2 99%   BMI 21.74 kg/m      Constitutional: Awake, alert, no evidence of distress  Head: Normocephalic, atraumatic.  ENT: Mucous membranes are moist.  No pallor.   Eyes: Pupils are reactive.  No discoloration.  Neck: No lymphadenopathy, no stridor, supple, no soft tissue swelling  Chest: No tenderness   Respiratory: Respirations even, unlabored. Lungs clear to ascultation bilaterally, in no acute respiratory distress.  Cardiovascular: Regular rate and rhythm.  Good overall perfusion.  Upper and lower extremity pulses are equal.  GI: Abdomen soft, non-tender to palpation.  No guarding or rebound. Bowel sounds  present throughout.   Back: No CVA tenderness.    Musculoskeletal: Moves all 4 extremities equally, full function and capacity no peripheral edema.   Integument: Warm, dry. No rash. No bruising or petechiae.  Neurologic: Alert & oriented x 3. Normal speech. Grossly normal motor and sensory function. No focal deficits noted.  NIHSS = 0  Psychiatric: Normal mood and affect.  Appropriate judgement.    ED COURSE:  1:10 PM Went to meet patient. He is in the bathroom  1:22 PM Critical result: Hemoglobin 6.2. Plan to discuss blood transfusion with the patient.   1:28 PM I met with the patient to gather history and perform an initial exam.       Medical Decision Making    History:    Supplemental history from: Family,     External Record(s) reviewed: External medical records including care everywhere reviewed    Work Up:    EKG, laboratory and imaging studies as ordered were independently reviewed by the provider    Broad differential diagnosis considered for anemia    The patient's presentation was of high complexity.       Complicating factors:    Patient has a complicated past medical history including chronic anemia    Care affected by social determinants of health: Access to primary care    Disposition involved shared decision-making with the patient.  Patient otherwise to continue outpatient medications as prescribed.       LAB:  Laboratory results were independently reviewed and interpreted  Results for orders placed or performed during the hospital encounter of 06/19/23   Extra Blue Top Tube   Result Value Ref Range    Hold Specimen JIC    Extra Green Top (Lithium Heparin) Tube   Result Value Ref Range    Hold Specimen JIC    Extra Purple Top Tube   Result Value Ref Range    Hold Specimen JIC    Extra Blood Bank Purple Top Tube   Result Value Ref Range    Hold Specimen JIC    CBC with platelets and differential   Result Value Ref Range    WBC Count 8.7 4.0 - 11.0 10e3/uL    RBC Count 2.44 (L) 4.40 - 5.90 10e6/uL     Hemoglobin 6.2 (LL) 13.3 - 17.7 g/dL    Hematocrit 21.2 (L) 40.0 - 53.0 %    MCV 87 78 - 100 fL    MCH 25.4 (L) 26.5 - 33.0 pg    MCHC 29.2 (L) 31.5 - 36.5 g/dL    RDW 16.8 (H) 10.0 - 15.0 %    Platelet Count 579 (H) 150 - 450 10e3/uL    % Neutrophils 75 %    % Lymphocytes 11 %    % Monocytes 11 %    % Eosinophils 1 %    % Basophils 1 %    % Immature Granulocytes 1 %    NRBCs per 100 WBC 0 <1 /100    Absolute Neutrophils 6.6 1.6 - 8.3 10e3/uL    Absolute Lymphocytes 1.0 0.8 - 5.3 10e3/uL    Absolute Monocytes 0.9 0.0 - 1.3 10e3/uL    Absolute Eosinophils 0.1 0.0 - 0.7 10e3/uL    Absolute Basophils 0.1 0.0 - 0.2 10e3/uL    Absolute Immature Granulocytes 0.1 <=0.4 10e3/uL    Absolute NRBCs 0.0 10e3/uL   Adult Type and Screen   Result Value Ref Range    ABO/RH(D) O POS     Antibody Screen Negative Negative    SPECIMEN EXPIRATION DATE [id number]    Prepare red blood cells (unit)   Result Value Ref Range    Blood Component Type Red Blood Cells     Product Code [id number]     Unit Status Ready for issue     Unit Number [id number]     CROSSMATCH Compatible     CODING SYSTEM [id number]            CRITICAL CARE:  Upon my evaluation this patient had a high probability of imminent or life-threatening deterioration due to symptomatic anemia, which required my direct attention intervention and personal management.    I have personally provided 30 minutes of critical care time exclusive of time spent on separately billed procedures.  Time includes review of the laboratory data radiology results and discussion with consultants and monitoring for potential decompensation.      MEDICATIONS GIVEN IN THE EMERGENCY:  Medications - No data to display        NEW PRESCRIPTIONS STARTED AT TODAY'S ER VISIT:  New Prescriptions    No medications on file                FINAL DIAGNOSIS:    ICD-10-CM    1. Anemia, unspecified type  D64.9                  NAME: Denton Hobbs  AGE: 87 year old male  YOB: 1935  MRN:  [id number]  EVALUATION DATE & TIME: No admission date for patient encounter.    PCP: Jarad Prater    ED PROVIDER: Chaitanya Castillo M.D.      I, Bre Euceda, am serving as a scribe to document services personally performed by Dr. Chaitanya Castillo based on my observation and the provider's statements to me. I, Chaitanya Castillo MD attest that Bre Euceda is acting in a scribe capacity, has observed my performance of the services and has documented them in accordance with my direction.    Chaitanya Castillo M.D.  Emergency Medicine  [facility] EMERGENCY ROOM  [address] [address]  [phone]  Dept: [phone]  6/19/2023         Chaitanya Castillo MD  06/19/23 [id number]

## 2023-06-20 NOTE — PROGRESS NOTES
Referral received for benign heme services, see below.    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

## 2023-06-22 NOTE — PROGRESS NOTES
Denton was in clinic today. Labs were checked. Hgb 6.7. We are unable to accommodate a blood transfusion in our clinic. Dr. Villa indicates that Denton report to the ER no later than tomorrow to get 2 units PRBCs.     I call his daughter, Morena who was with Denton at today's appointment to report the above. She verbalized understanding and says she will bring Denton into the ER either here at [facility] or at [facility] first thing tomorrow morning.    Mirta Beaver RN Care Coordinator  [facility]    Morena: [phone]

## 2023-06-22 NOTE — PROGRESS NOTES
"Oncology Rooming Note    June 22, 2023 2:21 PM   Denton Hobbs is a 87 year old male who presents for:    Chief Complaint   Patient presents with     Hematology     New Patient - YASEMIN     Initial Vitals: BP (!) 145/63 (BP Location: Left arm, Patient Position: Sitting, Cuff Size: Adult Regular)   Pulse 80   Temp 99  F (37.2  C) (Oral)   Resp 28   Ht 1.727 m (5' 7.99\")   Wt 64.9 kg (143 lb)   SpO2 97%   BMI 21.75 kg/m   Estimated body mass index is 21.75 kg/m  as calculated from the following:    Height as of this encounter: 1.727 m (5' 7.99\").    Weight as of this encounter: 64.9 kg (143 lb). Body surface area is 1.76 meters squared.  No Pain (0) Comment: Data Unavailable   No LMP for male patient.  Allergies reviewed: Yes  Medications reviewed: Yes    Medications: Medication refills not needed today.  Pharmacy name entered into [facility]: [id number] - [place], [place] - [address] [place] [address]    Clinical concerns: New Patient - YASEMIN Resendiz CMA            "

## 2023-06-22 NOTE — LETTER
"    6/22/2023         RE: Denton Hobbs  [address]  [address]  [facility] [address]        Dear Colleague,    Thank you for referring your patient, Denton Hobbs, to the [facility]. Please see a copy of my visit note below.    Oncology Rooming Note    June 22, 2023 2:21 PM   Denton Hobbs is a 87 year old male who presents for:    Chief Complaint   Patient presents with     Hematology     New Patient - YASEMIN     Initial Vitals: BP (!) 145/63 (BP Location: Left arm, Patient Position: Sitting, Cuff Size: Adult Regular)   Pulse 80   Temp 99  F (37.2  C) (Oral)   Resp 28   Ht 1.727 m (5' 7.99\")   Wt 64.9 kg (143 lb)   SpO2 97%   BMI 21.75 kg/m   Estimated body mass index is 21.75 kg/m  as calculated from the following:    Height as of this encounter: 1.727 m (5' 7.99\").    Weight as of this encounter: 64.9 kg (143 lb). Body surface area is 1.76 meters squared.  No Pain (0) Comment: Data Unavailable   No LMP for male patient.  Allergies reviewed: Yes  Medications reviewed: Yes    Medications: Medication refills not needed today.  Pharmacy name entered into [employer]: [id number] - [place], [place] - [address] [place] [address]    Clinical concerns: New Patient - ANA Iraheta was in clinic today. Labs were checked. Hgb 6.7. We are unable to accommodate a blood transfusion in our clinic. Dr. Villa indicates that Denton report to the ER no later than tomorrow to get 2 units PRBCs.     I call his daughter, Morena who was with Denton at today's appointment to report the above. She verbalized understanding and says she will bring Denton into the ER either here at [facility] or at [facility] first thing tomorrow morning.    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  Recent hospitalization and ER visit with findings of profound anemia for which he received additional transfusion.  Anemia is presumably due to blood loss from oozing from the duodenal adenoma.    We will do lab work today to evaluate his anemia and rule out any other cause for this.  He has normal white count and platelet count so not felt to be a bone marrow issue.    Based on his CBC today he may need additional transfusions.  Recommend to monitor his labs weekly with transfusions to keep hemoglobin greater than 7 over the next few weeks and then see him back to reassess.    We will review anemia work-up from today and then determine if he will benefit from IV iron therapy.  For now we will continue oral iron replacement 3 times daily.    Explained that if he is having significant bleeding as a cause of his anemia then iron infusions and oral iron will not suffice in terms of treatment.  Patient and daughter's questions were answered.    Plan: Check CBC and anemia work-up labs today  Set up weekly check and transfusions as needed  Follow-up in 4 weeks to review  Continue oral iron therapy    Addendum: Hemoglobin returned at 6.7, he will need ER evaluation for transfusion of up to 2 units of packed cells as we do not have capacity for outpatient transfusion until late next week            ECOG  Performance    2 - Ambulatory and independent in all ADLs; cannot work; up > 50% of the time    Encounter Diagnoses:    Problem List Items Addressed This Visit        Digestive    Ampullary adenoma w Associated UGI Bleed  - Primary    Relevant Orders    Reticulocyte count (Completed)    CBC with Platelets & Differential (Completed)    Vitamin B12    Folate    Comprehensive metabolic panel (Completed)    Ferritin    Iron & Iron Binding Capacity (Completed)    Check Out Appointment Request    CBC with platelets and differential    Check Out Appointment Request   Other Visit Diagnoses     Iron deficiency anemia due to chronic blood loss        Relevant Orders    Reticulocyte count (Completed)    CBC with Platelets & Differential (Completed)    Vitamin B12    Folate    Comprehensive metabolic panel (Completed)    Ferritin    Iron & Iron Binding Capacity (Completed)    Check Out Appointment Request    CBC with platelets and differential    Check Out Appointment Request              ______________________________________________________________________________    Staging History  Cancer Staging   No matching staging information was found for the patient.      History    Mr. Denton Hobbs is a 87 year old with past history of GI bleeding secondary to duodenal adenoma, hyponatremia, COPD and history of CVA who is referred for consideration of parenteral iron infusion therapy.    Patient was hospitalized between May 31 and June 2 with hemoglobin below 6 and transfused 2 units of packed cells.  He was in the ER earlier this week with hemoglobin of 6.2 and was transfused 1 unit again.  He was initially diagnosed in March 2022 with duodenal adenoma and after review was not felt to be a surgical candidate.  He was hospitalized in September 2022 with GI bleeding and required transfusion and also clipping of the bleeding duodenal adenoma and also embolization of 4 vessels.    Patient still feeling weak despite transfusion  earlier this week.  Has been on iron pills 3 times a day for the last week and previously and once a day.  Previous hernia surgery.  Recent tooth extraction without any bleeding.    He lives alone in an apartment in [place].  He has kids close to him.  Wife is in assisted living.  No personal history of cancer.  He has no history of bleeding problems or previous blood transfusions and has had normal colonoscopy in the past.            Review of systems.  No fever or night sweats.  No loss of weight.  No lumps or bumps anywhere.  No unusual headaches or eyesight issues.  No dizziness.  No bleeding from the nose.  No sores in the mouth. No problems with swallowing.  No chest pain. No shortness of breath. No cough.  No abdominal pain. No nausea or vomiting.  No diarrhea or constipation.  No blood in stool or black colored stools.  No problems passing urine.  No numbness or tingling in hands or feet.  No skin rashes.  A 14 point review of systems is otherwise negative.        Past History    Past Medical History:   Diagnosis Date     Arthritis      COPD (chronic obstructive pulmonary disease) (H)      CVA (cerebral vascular accident) (H) 12/4/2018     Hypertension      Prostate hypertrophy      Past Surgical History:   Procedure Laterality Date     ESOPHAGOSCOPY, GASTROSCOPY, DUODENOSCOPY (EGD), COMBINED N/A 9/12/2022    Procedure: ESOPHAGOGASTRODUODENOSCOPY (EGD);  Surgeon: Conner Navarrete MD;  Location: [facility] OR     GENITOURINARY SURGERY       HERNIORRHAPHY INGUINAL Left 10/11/2021    Procedure: LEFT INGUINAL HERNIA REPAIR WITH MESH;  Surgeon: Puma Preston MD;  Location: [facility] OR     HERNIORRHAPHY INGUINAL Right 8/19/2022    Procedure: HERNIORRHAPHY, INGUINAL, OPEN, RIGHT;  Surgeon: Flo Keyes MD;  Location: [facility] OR     IR VISCERAL ANGIOGRAM  9/13/2022     TRANSRECTAL ULTRASONIC, TRANSURETHRAL RESECTION (TUR) OF PROSTATE CYST  2004    helped temporarily     Family  "History   Problem Relation Age of Onset     No Known Problems Other         No heart, DM, HTN, or CA     No Known Problems Mother      No Known Problems Father      Social History     Socioeconomic History     Marital status:      Spouse name: None     Number of children: 6     Years of education: None     Highest education level: None   Tobacco Use     Smoking status: Former     Packs/day: 2.00     Years: 25.00     Pack years: 50.00     Types: Cigarettes     Start date: 1/1/1990     Passive exposure: Past (as a child brothers & Dad pipe & ciggerettes)     Smokeless tobacco: Never   Substance and Sexual Activity     Alcohol use: No     Comment: Alcoholic Drinks/day: Quit 1990     Drug use: Never         Allergies    Allergies   Allergen Reactions     Sulfa Antibiotics Rash     Info obtained off of 10/4/21 [facility] [place] [facility].           Physical Exam    BP (!) 145/63 (BP Location: Left arm, Patient Position: Sitting, Cuff Size: Adult Regular)   Pulse 80   Temp 99  F (37.2  C) (Oral)   Resp 28   Ht 1.727 m (5' 7.99\")   Wt 64.9 kg (143 lb)   SpO2 97%   BMI 21.75 kg/m        GENERAL: Alert and oriented to time place and person. Seated comfortably. In no distress.    HEAD: Atraumatic and normocephalic.    EYES: FELISHA, EOMI.  No pallor.  No icterus.    Oral cavity: no mucosal lesion or tonsillar enlargement.    NECK: supple. JVP normal.  No thyroid enlargement.    LYMPH NODES: No palpable, cervical, axillary or inguinal lymphadenopathy.    CHEST: clear to auscultation bilaterally.  Resonant to percussion throughout bilaterally.  Symmetrical breath movements bilaterally.    CVS: S1 and S2 are heard. Regular rate and rhythm.  No murmur or gallop or rub heard.  No peripheral edema.    ABDOMEN: Soft. Not tender. Not distended.  No palpable hepatomegaly or splenomegaly.  No other mass palpable.  Bowel sounds heard.    EXTREMITIES: Warm.    SKIN: no rash, or bruising or purpura.  Has a full head of hair.    Lab " Results    Recent Results (from the past 168 hour(s))   Extra Blue Top Tube   Result Value Ref Range    Hold Specimen JIC    Extra Green Top (Lithium Heparin) Tube   Result Value Ref Range    Hold Specimen JIC    Extra Purple Top Tube   Result Value Ref Range    Hold Specimen JI    Extra Blood Bank Purple Top Tube   Result Value Ref Range    Hold Specimen JIC    CBC with platelets and differential   Result Value Ref Range    WBC Count 8.7 4.0 - 11.0 10e3/uL    RBC Count 2.44 (L) 4.40 - 5.90 10e6/uL    Hemoglobin 6.2 (LL) 13.3 - 17.7 g/dL    Hematocrit 21.2 (L) 40.0 - 53.0 %    MCV 87 78 - 100 fL    MCH 25.4 (L) 26.5 - 33.0 pg    MCHC 29.2 (L) 31.5 - 36.5 g/dL    RDW 16.8 (H) 10.0 - 15.0 %    Platelet Count 579 (H) 150 - 450 10e3/uL    % Neutrophils 75 %    % Lymphocytes 11 %    % Monocytes 11 %    % Eosinophils 1 %    % Basophils 1 %    % Immature Granulocytes 1 %    NRBCs per 100 WBC 0 <1 /100    Absolute Neutrophils 6.6 1.6 - 8.3 10e3/uL    Absolute Lymphocytes 1.0 0.8 - 5.3 10e3/uL    Absolute Monocytes 0.9 0.0 - 1.3 10e3/uL    Absolute Eosinophils 0.1 0.0 - 0.7 10e3/uL    Absolute Basophils 0.1 0.0 - 0.2 10e3/uL    Absolute Immature Granulocytes 0.1 <=0.4 10e3/uL    Absolute NRBCs 0.0 10e3/uL   Adult Type and Screen   Result Value Ref Range    ABO/RH(D) O POS     Antibody Screen Negative Negative    SPECIMEN EXPIRATION DATE 20230622235900    Prepare red blood cells (unit)   Result Value Ref Range    Blood Component Type Red Blood Cells     Product Code F1991S93     Unit Status Transfused     Unit Number [id number]     CROSSMATCH Compatible     CODING SYSTEM [id number]     ISSUE DATE AND TIME 20230619141900     UNIT ABO/RH O-     UNIT TYPE ISBT 9500    Extra Purple Top Tube   Result Value Ref Range    Hold Specimen JI    Hemoglobin   Result Value Ref Range    Hemoglobin 7.3 (L) 13.3 - 17.7 g/dL   Iron & Iron Binding Capacity   Result Value Ref Range    Iron 20 (L) 61 - 157 ug/dL    Iron Binding Capacity 315 240  - 430 ug/dL    Iron Sat Index 6 (L) 15 - 46 %   Comprehensive metabolic panel   Result Value Ref Range    Sodium 135 (L) 136 - 145 mmol/L    Potassium 4.5 3.4 - 5.3 mmol/L    Chloride 101 98 - 107 mmol/L    Carbon Dioxide (CO2) 26 22 - 29 mmol/L    Anion Gap 8 7 - 15 mmol/L    Urea Nitrogen 15.1 8.0 - 23.0 mg/dL    Creatinine 0.98 0.67 - 1.17 mg/dL    Calcium 8.7 (L) 8.8 - 10.2 mg/dL    Glucose 98 70 - 99 mg/dL    Alkaline Phosphatase 43 40 - 129 U/L    AST 20 0 - 45 U/L    ALT 11 0 - 70 U/L    Protein Total 5.4 (L) 6.4 - 8.3 g/dL    Albumin 3.4 (L) 3.5 - 5.2 g/dL    Bilirubin Total 0.3 <=1.2 mg/dL    GFR Estimate 76 >60 mL/min/1.73m2   Reticulocyte count   Result Value Ref Range    % Reticulocyte 6.1 (H) 0.8 - 2.7 %    Absolute Reticulocyte 0.154 (H) 0.010 - 0.110 10e6/uL   CBC with platelets and differential   Result Value Ref Range    WBC Count 9.3 4.0 - 11.0 10e3/uL    RBC Count 2.52 (L) 4.40 - 5.90 10e6/uL    Hemoglobin 6.7 (LL) 13.3 - 17.7 g/dL    Hematocrit 22.5 (L) 40.0 - 53.0 %    MCV 89 78 - 100 fL    MCH 26.6 26.5 - 33.0 pg    MCHC 29.8 (L) 31.5 - 36.5 g/dL    RDW 17.7 (H) 10.0 - 15.0 %    Platelet Count 516 (H) 150 - 450 10e3/uL    % Neutrophils 75 %    % Lymphocytes 12 %    % Monocytes 10 %    % Eosinophils 1 %    % Basophils 1 %    % Immature Granulocytes 1 %    NRBCs per 100 WBC 0 <1 /100    Absolute Neutrophils 7.0 1.6 - 8.3 10e3/uL    Absolute Lymphocytes 1.1 0.8 - 5.3 10e3/uL    Absolute Monocytes 0.9 0.0 - 1.3 10e3/uL    Absolute Eosinophils 0.1 0.0 - 0.7 10e3/uL    Absolute Basophils 0.1 0.0 - 0.2 10e3/uL    Absolute Immature Granulocytes 0.1 <=0.4 10e3/uL    Absolute NRBCs 0.0 10e3/uL       Imaging Results    No results found.      Signed by: Sandi Villa MD      Again, thank you for allowing me to participate in the care of your patient.        Sincerely,        Sandi Villa MD

## 2023-06-22 NOTE — PROGRESS NOTES
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  Recent hospitalization and ER visit with findings of profound anemia for which he received additional transfusion.  Anemia is presumably due to blood loss from oozing from the duodenal adenoma.    We will do lab work today to evaluate his anemia and rule out any other cause for this.  He has normal white count and platelet count so not felt to be a bone marrow issue.    Based on his CBC today he may need additional transfusions.  Recommend to monitor his labs weekly with transfusions to keep hemoglobin greater than 7 over the next few weeks and then see him back to reassess.    We will review anemia work-up from today and then determine if he will benefit from IV iron therapy.  For now we will continue oral iron replacement 3 times daily.    Explained that if he is having significant bleeding as a cause of his anemia then iron infusions and oral iron will not suffice in terms of treatment.  Patient and daughter's questions were answered.    Plan: Check CBC and anemia work-up labs today  Set up weekly check and transfusions as needed  Follow-up in 4 weeks to review  Continue oral iron therapy    Addendum: Hemoglobin returned at 6.7, he will need ER evaluation for transfusion of up to 2 units  of packed cells as we do not have capacity for outpatient transfusion until late next week            ECOG Performance    2 - Ambulatory and independent in all ADLs; cannot work; up > 50% of the time    Encounter Diagnoses:    Problem List Items Addressed This Visit        Digestive    Ampullary adenoma w Associated UGI Bleed  - Primary    Relevant Orders    Reticulocyte count (Completed)    CBC with Platelets & Differential (Completed)    Vitamin B12    Folate    Comprehensive metabolic panel (Completed)    Ferritin    Iron & Iron Binding Capacity (Completed)    Check Out Appointment Request    CBC with platelets and differential    Check Out Appointment Request   Other Visit Diagnoses     Iron deficiency anemia due to chronic blood loss        Relevant Orders    Reticulocyte count (Completed)    CBC with Platelets & Differential (Completed)    Vitamin B12    Folate    Comprehensive metabolic panel (Completed)    Ferritin    Iron & Iron Binding Capacity (Completed)    Check Out Appointment Request    CBC with platelets and differential    Check Out Appointment Request              ______________________________________________________________________________    Staging History  Cancer Staging   No matching staging information was found for the patient.      History    Mr. Denton Hobbs is a 87 year old with past history of GI bleeding secondary to duodenal adenoma, hyponatremia, COPD and history of CVA who is referred for consideration of parenteral iron infusion therapy.    Patient was hospitalized between May 31 and June 2 with hemoglobin below 6 and transfused 2 units of packed cells.  He was in the ER earlier this week with hemoglobin of 6.2 and was transfused 1 unit again.  He was initially diagnosed in March 2022 with duodenal adenoma and after review was not felt to be a surgical candidate.  He was hospitalized in September 2022 with GI bleeding and required transfusion and also clipping of the bleeding  duodenal adenoma and also embolization of 4 vessels.    Patient still feeling weak despite transfusion earlier this week.  Has been on iron pills 3 times a day for the last week and previously and once a day.  Previous hernia surgery.  Recent tooth extraction without any bleeding.    He lives alone in an apartment in [place].  He has kids close to him.  Wife is in assisted living.  No personal history of cancer.  He has no history of bleeding problems or previous blood transfusions and has had normal colonoscopy in the past.            Review of systems.  No fever or night sweats.  No loss of weight.  No lumps or bumps anywhere.  No unusual headaches or eyesight issues.  No dizziness.  No bleeding from the nose.  No sores in the mouth. No problems with swallowing.  No chest pain. No shortness of breath. No cough.  No abdominal pain. No nausea or vomiting.  No diarrhea or constipation.  No blood in stool or black colored stools.  No problems passing urine.  No numbness or tingling in hands or feet.  No skin rashes.  A 14 point review of systems is otherwise negative.        Past History    Past Medical History:   Diagnosis Date     Arthritis      COPD (chronic obstructive pulmonary disease) (H)      CVA (cerebral vascular accident) (H) 12/4/2018     Hypertension      Prostate hypertrophy      Past Surgical History:   Procedure Laterality Date     ESOPHAGOSCOPY, GASTROSCOPY, DUODENOSCOPY (EGD), COMBINED N/A 9/12/2022    Procedure: ESOPHAGOGASTRODUODENOSCOPY (EGD);  Surgeon: Conner Navarrete MD;  Location: [facility] OR     GENITOURINARY SURGERY       HERNIORRHAPHY INGUINAL Left 10/11/2021    Procedure: LEFT INGUINAL HERNIA REPAIR WITH MESH;  Surgeon: Puma Preston MD;  Location: [facility] OR     HERNIORRHAPHY INGUINAL Right 8/19/2022    Procedure: HERNIORRHAPHY, INGUINAL, OPEN, RIGHT;  Surgeon: Flo Keyes MD;  Location: [facility] OR     IR VISCERAL ANGIOGRAM  9/13/2022      "TRANSRECTAL ULTRASONIC, TRANSURETHRAL RESECTION (TUR) OF PROSTATE CYST  2004    helped temporarily     Family History   Problem Relation Age of Onset     No Known Problems Other         No heart, DM, HTN, or CA     No Known Problems Mother      No Known Problems Father      Social History     Socioeconomic History     Marital status:      Spouse name: None     Number of children: 6     Years of education: None     Highest education level: None   Tobacco Use     Smoking status: Former     Packs/day: 2.00     Years: 25.00     Pack years: 50.00     Types: Cigarettes     Start date: 1/1/1990     Passive exposure: Past (as a child brothers & Dad pipe & ciggerettes)     Smokeless tobacco: Never   Substance and Sexual Activity     Alcohol use: No     Comment: Alcoholic Drinks/day: Quit 1990     Drug use: Never         Allergies    Allergies   Allergen Reactions     Sulfa Antibiotics Rash     Info obtained off of 10/4/21 [facility] pre op.           Physical Exam    BP (!) 145/63 (BP Location: Left arm, Patient Position: Sitting, Cuff Size: Adult Regular)   Pulse 80   Temp 99  F (37.2  C) (Oral)   Resp 28   Ht 1.727 m (5' 7.99\")   Wt 64.9 kg (143 lb)   SpO2 97%   BMI 21.75 kg/m        GENERAL: Alert and oriented to time place and person. Seated comfortably. In no distress.    HEAD: Atraumatic and normocephalic.    EYES: FELISHA, EOMI.  No pallor.  No icterus.    Oral cavity: no mucosal lesion or tonsillar enlargement.    NECK: supple. JVP normal.  No thyroid enlargement.    LYMPH NODES: No palpable, cervical, axillary or inguinal lymphadenopathy.    CHEST: clear to auscultation bilaterally.  Resonant to percussion throughout bilaterally.  Symmetrical breath movements bilaterally.    CVS: S1 and S2 are heard. Regular rate and rhythm.  No murmur or gallop or rub heard.  No peripheral edema.    ABDOMEN: Soft. Not tender. Not distended.  No palpable hepatomegaly or splenomegaly.  No other mass palpable.  Bowel sounds " heard.    EXTREMITIES: Warm.    SKIN: no rash, or bruising or purpura.  Has a full head of hair.    Lab Results    Recent Results (from the past 168 hour(s))   Extra Blue Top Tube   Result Value Ref Range    Hold Specimen JIC    Extra Green Top (Lithium Heparin) Tube   Result Value Ref Range    Hold Specimen JIC    Extra Purple Top Tube   Result Value Ref Range    Hold Specimen JIC    Extra Blood Bank Purple Top Tube   Result Value Ref Range    Hold Specimen JIC    CBC with platelets and differential   Result Value Ref Range    WBC Count 8.7 4.0 - 11.0 10e3/uL    RBC Count 2.44 (L) 4.40 - 5.90 10e6/uL    Hemoglobin 6.2 (LL) 13.3 - 17.7 g/dL    Hematocrit 21.2 (L) 40.0 - 53.0 %    MCV 87 78 - 100 fL    MCH 25.4 (L) 26.5 - 33.0 pg    MCHC 29.2 (L) 31.5 - 36.5 g/dL    RDW 16.8 (H) 10.0 - 15.0 %    Platelet Count 579 (H) 150 - 450 10e3/uL    % Neutrophils 75 %    % Lymphocytes 11 %    % Monocytes 11 %    % Eosinophils 1 %    % Basophils 1 %    % Immature Granulocytes 1 %    NRBCs per 100 WBC 0 <1 /100    Absolute Neutrophils 6.6 1.6 - 8.3 10e3/uL    Absolute Lymphocytes 1.0 0.8 - 5.3 10e3/uL    Absolute Monocytes 0.9 0.0 - 1.3 10e3/uL    Absolute Eosinophils 0.1 0.0 - 0.7 10e3/uL    Absolute Basophils 0.1 0.0 - 0.2 10e3/uL    Absolute Immature Granulocytes 0.1 <=0.4 10e3/uL    Absolute NRBCs 0.0 10e3/uL   Adult Type and Screen   Result Value Ref Range    ABO/RH(D) O POS     Antibody Screen Negative Negative    SPECIMEN EXPIRATION DATE [id number]    Prepare red blood cells (unit)   Result Value Ref Range    Blood Component Type Red Blood Cells     Product Code [id number]     Unit Status Transfused     Unit Number [id number]     CROSSMATCH Compatible     CODING SYSTEM [id number]     ISSUE DATE AND TIME [id number]     UNIT ABO/RH O-     UNIT TYPE ISBT 9500    Extra Purple Top Tube   Result Value Ref Range    Hold Specimen JIC    Hemoglobin   Result Value Ref Range    Hemoglobin 7.3 (L) 13.3 - 17.7 g/dL   Iron & Iron  Binding Capacity   Result Value Ref Range    Iron 20 (L) 61 - 157 ug/dL    Iron Binding Capacity 315 240 - 430 ug/dL    Iron Sat Index 6 (L) 15 - 46 %   Comprehensive metabolic panel   Result Value Ref Range    Sodium 135 (L) 136 - 145 mmol/L    Potassium 4.5 3.4 - 5.3 mmol/L    Chloride 101 98 - 107 mmol/L    Carbon Dioxide (CO2) 26 22 - 29 mmol/L    Anion Gap 8 7 - 15 mmol/L    Urea Nitrogen 15.1 8.0 - 23.0 mg/dL    Creatinine 0.98 0.67 - 1.17 mg/dL    Calcium 8.7 (L) 8.8 - 10.2 mg/dL    Glucose 98 70 - 99 mg/dL    Alkaline Phosphatase 43 40 - 129 U/L    AST 20 0 - 45 U/L    ALT 11 0 - 70 U/L    Protein Total 5.4 (L) 6.4 - 8.3 g/dL    Albumin 3.4 (L) 3.5 - 5.2 g/dL    Bilirubin Total 0.3 <=1.2 mg/dL    GFR Estimate 76 >60 mL/min/1.73m2   Reticulocyte count   Result Value Ref Range    % Reticulocyte 6.1 (H) 0.8 - 2.7 %    Absolute Reticulocyte 0.154 (H) 0.010 - 0.110 10e6/uL   CBC with platelets and differential   Result Value Ref Range    WBC Count 9.3 4.0 - 11.0 10e3/uL    RBC Count 2.52 (L) 4.40 - 5.90 10e6/uL    Hemoglobin 6.7 (LL) 13.3 - 17.7 g/dL    Hematocrit 22.5 (L) 40.0 - 53.0 %    MCV 89 78 - 100 fL    MCH 26.6 26.5 - 33.0 pg    MCHC 29.8 (L) 31.5 - 36.5 g/dL    RDW 17.7 (H) 10.0 - 15.0 %    Platelet Count 516 (H) 150 - 450 10e3/uL    % Neutrophils 75 %    % Lymphocytes 12 %    % Monocytes 10 %    % Eosinophils 1 %    % Basophils 1 %    % Immature Granulocytes 1 %    NRBCs per 100 WBC 0 <1 /100    Absolute Neutrophils 7.0 1.6 - 8.3 10e3/uL    Absolute Lymphocytes 1.1 0.8 - 5.3 10e3/uL    Absolute Monocytes 0.9 0.0 - 1.3 10e3/uL    Absolute Eosinophils 0.1 0.0 - 0.7 10e3/uL    Absolute Basophils 0.1 0.0 - 0.2 10e3/uL    Absolute Immature Granulocytes 0.1 <=0.4 10e3/uL    Absolute NRBCs 0.0 10e3/uL       Imaging Results    No results found.      Signed by: Sandi Villa MD

## 2023-06-23 NOTE — ED PROVIDER NOTES
EMERGENCY DEPARTMENT ENCOUNTER     NAME: Denton Hobbs   AGE: 87 year old male   YOB: 1935   MRN: [id number]   EVALUATION DATE & TIME: 6/23/2023 10:04 AM   PCP: Jarad Prater     Chief Complaint   Patient presents with     Abnormal Labs   :    FINAL IMPRESSION       1. Symptomatic anemia           ED COURSE & MEDICAL DECISION MAKING      Pertinent Labs & Imaging studies reviewed. (See chart for details)   87 year old male  presents to the Emergency Department for evaluation of anemia, noticing that he is being more fatigued.  On chart review, patient was seen in oncology clinic yesterday.  He has a known intestinal adenoma that is suspected as the cause of the slow GI bleed, and hemoglobin in clinic yesterday at oncology was 6.7.  They could not arrange outpatient transfusion so he was sent to the ED. Initial Vitals Reviewed. Initial exam notable for generally well-appearing male who is pale but with otherwise normal vitals.  I repeated his hemoglobin and it 6.3 which is not surprising given that he has a known bleeding adenoma that causes a slow GI bleed.  We discussed the possibility of admission but him and family both prefer to just get transfused and be an outpatient and I think this is very reasonable as they were actually going to set him up for an outpatient transfusion but could not get him in with the infusion center.  He has a known ampullary adenoma with a known GI bleed as the source, so I do not think there is more we would accomplish in the hospital anyways.  I have ordered 2 units of packed red blood cells and will then discharge him home over the weekend and have his labs rechecked with oncology early next week.        10:15 AM I met with the patient and wife and performed my initial physical exam. I spoke with them about the workup going forward including labs and probable blood transfusion.   10:39 AM I spoke with lab about critical results. His hemoglobin in 6.3.  10:42 AM I went  and spoke with the patient about the consent for blood. Consent was granted.      At the conclusion of the encounter I discussed the results of all of the tests and the disposition. The questions were answered. The patient or family acknowledged understanding and was agreeable with the care plan.     35 minutes critical care time, see procedure note below for details if relevant    Medical Decision Making    History:    Supplemental history from: Documented in chart, if applicable, family member    External Record(s) reviewed: Other: [facility] 6/22/23    Work Up:    Chart documentation includes differential considered and any EKGs or imaging independently interpreted by provider, where specified.    In additional to work up documented, I considered the following work up: Documented in chart, if applicable.    External consultation:    Discussion of management with another provider: Documented in chart, if applicable    Complicating factors:    Care impacted by chronic illness: Other: Ampullary adenoma with associated UGI bleed    Care affected by social determinants of health: Access to Medical Care    Disposition considerations: Discharge. No recommendations on prescription strength medication(s). I considered admission, but ultimately discharged patient Using shared decision making as he prefers outpatient management.        MEDICATIONS GIVEN IN THE EMERGENCY:   Medications - No data to display   NEW PRESCRIPTIONS STARTED AT TODAY'S ER VISIT   New Prescriptions    No medications on file     ================================================================   HISTORY OF PRESENT ILLNESS       Patient information was obtained from: Patient   Use of Intrepreter: N/A  Denton Hobbs is a 87 year old male with history of chronic marino due to BPH with retention, sepsis, CVA, aphasia, ampullary adenoma with associated UGI bleed, and anemia who presents with abnormal labs.    Per Chart  Review: Patient was seen at [facility] on 6/22/23 for evaluation of ampullary adenoma with associated UGI bleed. Hemoglobin level 6.7. Send to emergency department for transfusion.     Patient reports that he saw Dr. Livingston in clinic yesterday and was found to have low hemoglobin levels. This AM patient was sent to the emergency department to get a blood transfusion. Patient reports having tarry stools, lightheadedness, and an increased of shortness of breath with exertion over the past few days.     Otherwise in normal state of health. No further concerns at this time.     ================================================================        PAST HISTORY     PAST MEDICAL HISTORY:   Past Medical History:   Diagnosis Date     Arthritis      COPD (chronic obstructive pulmonary disease) (H)      CVA (cerebral vascular accident) (H) 12/4/2018     Hypertension      Prostate hypertrophy       PAST SURGICAL HISTORY:   Past Surgical History:   Procedure Laterality Date     ESOPHAGOSCOPY, GASTROSCOPY, DUODENOSCOPY (EGD), COMBINED N/A 9/12/2022    Procedure: ESOPHAGOGASTRODUODENOSCOPY (EGD);  Surgeon: Conner Navrarete MD;  Location: [facility] OR     GENITOURINARY SURGERY       HERNIORRHAPHY INGUINAL Left 10/11/2021    Procedure: LEFT INGUINAL HERNIA REPAIR WITH MESH;  Surgeon: Puma Preston MD;  Location: [facility] OR     HERNIORRHAPHY INGUINAL Right 8/19/2022    Procedure: HERNIORRHAPHY, INGUINAL, OPEN, RIGHT;  Surgeon: Flo Keyes MD;  Location: [facility] OR     IR VISCERAL ANGIOGRAM  9/13/2022     TRANSRECTAL ULTRASONIC, TRANSURETHRAL RESECTION (TUR) OF PROSTATE CYST  2004    helped temporarily      CURRENT MEDICATIONS:   cyanocobalamin (VITAMIN B-12) 1000 MCG tablet  FEROSUL 325 (65 Fe) MG tablet  pantoprazole (PROTONIX) 40 MG EC tablet      ALLERGIES:   Allergies   Allergen Reactions     Sulfa Antibiotics Rash     Info obtained off of 10/4/21 [facility] pre op.       FAMILY HISTORY:   Family History   Problem Relation Age of Onset     No Known Problems Other         No heart, DM, HTN, or CA     No Known Problems Mother      No Known Problems Father       SOCIAL HISTORY:   Social History     Socioeconomic History     Marital status:      Number of children: 6   Tobacco Use     Smoking status: Former     Packs/day: 2.00     Years: 25.00     Pack years: 50.00     Types: Cigarettes     Start date: 1/1/1990     Passive exposure: Past (as a child brothers & Dad pipe & ciggerettes)     Smokeless tobacco: Never   Substance and Sexual Activity     Alcohol use: No     Comment: Alcoholic Drinks/day: Quit 1990     Drug use: Never        VITALS  Patient Vitals for the past 24 hrs:   BP Temp Temp src Pulse Resp SpO2 Weight   06/23/23 1023 -- -- -- 72 -- 95 % --   06/23/23 1022 -- -- -- 74 -- 96 % --   06/23/23 1021 -- -- -- 76 -- 97 % --   06/23/23 1020 -- -- -- 74 -- 96 % --   06/23/23 1019 -- -- -- 74 -- 95 % --   06/23/23 1018 -- -- -- 73 -- 95 % --   06/23/23 1017 -- -- -- 74 -- 96 % --   06/23/23 1016 (!) 141/60 -- -- 75 -- 96 % --   06/23/23 0950 (!) 144/64 98.6  F (37  C) Oral 79 24 96 % 65.7 kg (144 lb 14.4 oz)        ================================================================    PHYSICAL EXAM     VITAL SIGNS: BP (!) 141/60   Pulse 72   Temp 98.6  F (37  C) (Oral)   Resp 24   Wt 65.7 kg (144 lb 14.4 oz)   SpO2 95%   BMI 22.04 kg/m     Constitutional:  Awake, no acute distress   HENT:  Atraumatic, oropharynx without exudate or erythema, membranes moist  Lymph:  No adenopathy  Eyes: EOM intact, PERRL, no injection  Neck: Supple  Respiratory:  Clear to auscultation bilaterally, no wheezes or crackles   Cardiovascular:  Regular rate and rhythm, single S1 and S2   GI:  Soft, nontender, nondistended, no rebound or guarding   Musculoskeletal:  Moves all extremities, no lower extremity edema, no deformities    Skin:  Warm, dry, pale appearing.   Neurologic:  Alert and  oriented x3, no focal deficits noted       ================================================================  LAB       All pertinent labs reviewed and interpreted.   Labs Ordered and Resulted from Time of ED Arrival to Time of ED Departure   INR - Abnormal       Result Value    INR 1.16 (*)    CBC WITH PLATELETS AND DIFFERENTIAL - Abnormal    WBC Count 7.6      RBC Count 2.42 (*)     Hemoglobin 6.3 (*)     Hematocrit 21.6 (*)     MCV 89      MCH 26.0 (*)     MCHC 29.2 (*)     RDW 17.8 (*)     Platelet Count 512 (*)     % Neutrophils 75      % Lymphocytes 11      % Monocytes 11      % Eosinophils 1      % Basophils 1      % Immature Granulocytes 1      NRBCs per 100 WBC 0      Absolute Neutrophils 5.8      Absolute Lymphocytes 0.8      Absolute Monocytes 0.8      Absolute Eosinophils 0.1      Absolute Basophils 0.1      Absolute Immature Granulocytes 0.1      Absolute NRBCs 0.0     PARTIAL THROMBOPLASTIN TIME - Normal    aPTT 32     TYPE AND SCREEN, ADULT    SPECIMEN EXPIRATION DATE [id number]     PREPARE RED BLOOD CELLS (UNIT)   TRANSFUSE RED BLOOD CELLS (UNIT)   ABO/RH TYPE AND SCREEN        ===============================================================  RADIOLOGY       Reviewed all pertinent imaging. Please see official radiology report.   No orders to display         ================================================================  EKG         I have independently reviewed and interpreted the EKG(s) documented above.     ================================================================  PROCEDURES         I, Malinda Jeramie, am serving as a scribe to document services personally performed by Dr. Brewster based on my observation and the provider's statements to me. I, Grace Brewster MD attest that Malinda Bobo is acting in a scribe capacity, has observed my performance of the services and has documented them in accordance with my direction.   Grace Brewster M.D.   Emergency Medicine   [facility]  [facility] EMERGENCY DEPARTMENT  [address] [place] [id number]  [phone]  Dept: [phone]        Grace Brewster MD  06/23/23 8113

## 2023-06-23 NOTE — DISCHARGE INSTRUCTIONS
You were given 2 units of blood cells today.  Follow-up with your oncology team for repeat labs within the next few days, perhaps at the end of the weekend.

## 2023-06-23 NOTE — ED TRIAGE NOTES
"Presents with low hemoglobin. Patient saw Dr. Livingston in clinic yesterday and was found to have \"a low blood count\" and sent in to ER this morning.     Has been having tarry stools, feels lightheaded at times especially when toileting at night. Also has been noticing increase shortness of breath with exertion.     Denies pain, nausea, vomiting, or chest pain. Appears pale.     A&O x 4.       "

## 2023-06-25 NOTE — PROGRESS NOTES
Problem: At Risk for Falls  Goal: # Patient does not fall  Outcome: Outcome Met, Continue evaluating goal progress toward completion     Problem: Pain  Goal: # Verbalizes understanding of pain management  Description: Documented in Patient Education Activity  Outcome: Outcome Met, Continue evaluating goal progress toward completion      A/P    87 year old male admitted on 9/11/2022 due to hematemesis and lightheadedness.  CT angiogram showed duodenal bleed, severe prostatic enlargement and suspected IPMN.  He was placed on IV fluid and pantoprazole on admission.     Acute upper GI bleed: probable duodenal bleed:  EGD (3/2022) for iron deficiency anemia showed periampullar region there was a large 2-3 cm   pedunculated mass that circumferentially narrowed the duodenal lumen with ulceration.  Pathology of duodenum, biopsy showed adenomatous polyp with focal areas suspicious for high-grade dysplasia.  CT angiogram of the abdomen/pelvis (9/12/22) revealed focal wall thickening of the second portion of the duodenum with arterial blush within the lumen and accumulation on portal venous phase images in this location consistent with a source of GI bleed likely due to peptic ulcer disease. Celiac, SMA, IVON patent.  -EGD now  -PPI gtt  -NPO     Acute blood loss anemia:  Due to #1.  Hemoglobin dropped from 11.3 on 6/29/22 to 9.2 admission->6.5-> s/p 2U PRBC transfusion->Hgb 7.7->6.1.  2 U PRBC's being transfused in O.R.  -serial Hgb q6  -check Hgb after 2 U PRBC given in O.R. completed.  -transfuse for Hgb < 7-8  -hold PO iron and B12 for now     Acute on Chronic urinary retention requiring chronic self intermittent catheterizations, BPH: CT showed severe enlargement of the prostate gland which indents the bladder base with bladder wall thickening and patulous ureters, likely due to chronic outlet obstruction   -Urology placed an 18fr coude catheter at the bedside in ED  - Maintain marino catheter during hospitalization.  Remove marino and resume self cath once patient has recovered from illness and is ready to discharge home.  - Patient not interested in finasteride or Flomax at this time.  - Urology will arrange for follow up in their office in the next couple of weeks to discuss management of very enlarged prostate (seen CT today).     Possible sepsis:  WBC  15->17->18.4.  Normal 6/29/22.  Reactive from acute blood loss vs possible infection from GI bleed vs UTI  -BC's x2  -IV CTX    Probable UTI in setting of chronic recurrent UTI's: prior UC's Klebsiella oxytoca, E. Coli  -await UC  -IV CTX    Suspected IPMN  -CT showed cystic lesion in the superior pancreas body measuring 14 x 28 mm with clear communication to the main pancreas duct, consistent with IPMN for which endoscopic ultrasound in 3-6 months is recommended.  -Endoscopic ultrasound as outpatient in 3-6 months. Will defer to GI.     CVA with residual left-sided weakness  -He uses walker at baseline.  -Hold antiplatelet and prophylactic anticoagulation for now  -PT/OT     Hypomagnesemia:  -Mg 2g IV x1  -Mg a.m.    Acute hyperkalemia: resolved    H/O colon polyps:  Colonoscopy (3/2022) - Cecum, ascending and transverse colon, polyp, biopsy - Sessile serrated lesion, tubular adenoma and fragments of colonic mucosa with prominent lymphoid aggregate.  Descending, sigmoid and rectum, polyp, biopsy - Tubular adenomas. Hyperplastic polyps.    Full code    >2d stay pending clinical status on above acute issues    SCD's          S:  Afebrile. Events overnight noted    O:  Temp: 98  F (36.7  C) Temp src: Oral BP: 116/58 Pulse: 99   Resp: 19 SpO2: 99 % O2 Device: None (Room air)    gen nad  cv rrr  Lungs cta  abd bs+, nd, no significant ttp  Neuro a&o    Lab/imaging reviewed

## 2023-06-30 NOTE — PROGRESS NOTES
HGB today was 7.9.  Pt reports feeling well without side effects of anemia.  No blood products indicated today.  Pt will return in 1 week to be rechecked.

## 2023-07-06 NOTE — PROGRESS NOTES
Pt came to clinic this morning for labs/ possible blood.  Hgb was 7.9 which is what he was last week.  No blood products needed today. Pt was educated on this. Pt left clinic ambulatory accompanied by family.

## 2023-07-12 NOTE — PROGRESS NOTES
"Infusion Nursing Note:  Denton Hobbs presents today for blood transfusion.    Patient seen by provider today: No   present during visit today: Not Applicable.    Note: Patient arrives today via ambulatory using wheeled walker accompanied by his daughter Arminda.  Patient is alert and confused.  Daughter Arminda is his POA.  Patient at first declined having a blood transfusion stating he thinks he \"functions fine at Hgb 7.5\", however he is agreeable after a brief explaination as to how it will help with his fatigue patient, he then agreed.  Patient reviewed blood consent with Natalie Florian CNP and agreed to consent for transfusion. Per Natalie, if patient feels well and blood is over 7.0 he may decline the order to have one unit of blood.  This was discussed with his daughter who prefers the patient  have the blood rather than require a trip to the ER or hospitalization for his low Hgb.  Writer reviewed signs and symptoms of low hgb and advised the daughter she can advocate for infusion at her discretion.   Daughter verbalized understanding.       Intravenous Access:  Peripheral IV placed.    Treatment Conditions:  Results reviewed, labs MET treatment parameters, ok to proceed with treatment.  Blood transfusion consent signed 5-12-23.      Post Infusion Assessment:  Patient tolerated infusion without incident.  Patient observed for 30 minutes post blood transfusion per protocol.  Site patent and intact, free from redness, edema or discomfort.  No evidence of extravasations.  Access discontinued per protocol.       Discharge Plan:   Discharge instructions reviewed with: Patient and Family.  Patient and/or family verbalized understanding of discharge instructions and all questions answered.  Patient daughter declined AVS.  Patient will return 7-20-23 for next appointment.  Patient discharged in stable condition accompanied by: daughter.  Departure Mode: Ambulatory with wheeled walker.      BUCK HORN, " RN

## 2023-07-20 PROBLEM — D50.0 IRON DEFICIENCY ANEMIA DUE TO CHRONIC BLOOD LOSS: Status: ACTIVE | Noted: 2023-01-01

## 2023-07-20 NOTE — PROGRESS NOTES
"Oncology Rooming Note    July 20, 2023 8:58 AM   Denton Hobbs is a 88 year old male who presents for:    Chief Complaint   Patient presents with    Oncology Clinic Visit     1 month return Ampullary adenoma w Associated UGI Bleed, Iron deficiency anemia due to chronic blood loss, review Labs.      Initial Vitals: BP (!) 141/62 (BP Location: Right arm, Patient Position: Sitting, Cuff Size: Adult Regular)   Pulse 83   Temp 98.1  F (36.7  C) (Oral)   Resp 20   Ht 1.727 m (5' 7.99\")   Wt 63 kg (139 lb)   SpO2 96%   BMI 21.14 kg/m   Estimated body mass index is 21.14 kg/m  as calculated from the following:    Height as of this encounter: 1.727 m (5' 7.99\").    Weight as of this encounter: 63 kg (139 lb). Body surface area is 1.74 meters squared.  No Pain (0) Comment: Data Unavailable   No LMP for male patient.  Allergies reviewed: Yes  Medications reviewed: Yes    Medications: Medication refills not needed today.  Pharmacy name entered into [employer]: [id number] [place], [place] - [address]    Clinical concerns:  1 month return Ampullary adenoma w Associated UGI Bleed, Iron deficiency anemia due to chronic blood loss, review Labs.       Maricarmen Resendiz CMA            "

## 2023-07-20 NOTE — PROGRESS NOTES
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  CBC today shows hemoglobin at 8.9 and he is not symptomatic so no transfusion was required.    Lab work previously do show iron deficiency anemia with low ferritin and low iron saturation.    He has been on oral iron for 6 months.    Recommend consideration for iron infusion to replenish iron stores.  Discussed potential side effects of allergic reactions.  Hopefully this can be done in 2 infusions and may again decrease his transfusion requirements.  After discussion he is agreeable to this.  We will schedule infusions in 1 week and 2 weeks from now.  We will recheck ferritin with next follow-up visit.    Otherwise recommend to continue with monitoring of CBC with transfusions as required.  I think we can extend interval between lab checks to 2 weeks.  Between lab checks if she is symptomatic he will call and we can consider earlier evaluation.    We will transfuse to keep hemoglobin greater than 7.  Consider transfusion between 7 and 8 if he is symptomatic.  Questions answered in 40 minutes spent.    Plan: We will schedule iron infusions x2 beginning in 1 week  Continue CBC check every 2 weeks with transfusions as needed to keep hemoglobin greater than 7  Follow-up in a couple of months with recheck of ferritin    Measurable disease: CBC, ferritin,  iron studies, endoscopic evaluation of adenoma    Current therapy: Oral iron twice daily  Transfusions as needed to keep hemoglobin greater than 7  We will schedule II iron infusions in the next 2 weeks              ECOG Performance    2 - Ambulatory and independent in all ADLs; cannot work; up > 50% of the time    Encounter Diagnoses:    Problem List Items Addressed This Visit        Digestive    Ampullary adenoma w Associated UGI Bleed  - Primary    Relevant Orders    Check Out Appointment Request    CBC with platelets and differential    Ferritin       Hematologic    Iron deficiency anemia due to chronic blood loss    Relevant Orders    Check Out Appointment Request    CBC with platelets and differential           ______________________________________________________________________________    Staging History   Cancer Staging   No matching staging information was found for the patient.      History    Jomar is here for recheck.  Seen about a month ago.  Received 2 transfusions right after his first visit and then 1 additional 1 over the last month.  He feels fairly well.  He has been on iron pills for 6 months.  No other new symptoms or problems.  ECOG status is 1.        June 2023:    Mr. Denton Hobbs is a 87 year old with past history of GI bleeding secondary to duodenal adenoma, hyponatremia, COPD and history of CVA who is referred for consideration of parenteral iron infusion therapy.    Patient was hospitalized between May 31 and June 2 with hemoglobin below 6 and transfused 2 units of packed cells.  He was in the ER earlier this week with hemoglobin of 6.2 and was transfused 1 unit again.  He was initially diagnosed in March 2022 with duodenal adenoma and after review was not felt to be a surgical candidate.  He was hospitalized in September 2022 with GI bleeding and required transfusion and also clipping of the bleeding duodenal adenoma and also embolization of 4 vessels.    Patient still feeling weak  despite transfusion earlier this week.  Has been on iron pills 3 times a day for the last week and previously and once a day.  Previous hernia surgery.  Recent tooth extraction without any bleeding.    He lives alone in an apartment in [place].  He has kids close to him.  Wife is in assisted living.  No personal history of cancer.  He has no history of bleeding problems or previous blood transfusions and has had normal colonoscopy in the past.            Review of systems.  No fever or night sweats.  No loss of weight.  No lumps or bumps anywhere.  No unusual headaches or eyesight issues.  No dizziness.  No bleeding from the nose.  No sores in the mouth. No problems with swallowing.  No chest pain. No shortness of breath. No cough.  No abdominal pain. No nausea or vomiting.  No diarrhea or constipation.  No blood in stool or black colored stools.  No problems passing urine.  No numbness or tingling in hands or feet.  No skin rashes.  A 14 point review of systems is otherwise negative.        Past History    Past Medical History:   Diagnosis Date     Arthritis      COPD (chronic obstructive pulmonary disease) (H)      CVA (cerebral vascular accident) (H) 12/4/2018     Hypertension      Prostate hypertrophy      Past Surgical History:   Procedure Laterality Date     ESOPHAGOSCOPY, GASTROSCOPY, DUODENOSCOPY (EGD), COMBINED N/A 9/12/2022    Procedure: ESOPHAGOGASTRODUODENOSCOPY (EGD);  Surgeon: Conner Navarrete MD;  Location: [facility] OR     GENITOURINARY SURGERY       HERNIORRHAPHY INGUINAL Left 10/11/2021    Procedure: LEFT INGUINAL HERNIA REPAIR WITH MESH;  Surgeon: Puma Preston MD;  Location: [facility] OR     HERNIORRHAPHY INGUINAL Right 8/19/2022    Procedure: HERNIORRHAPHY, INGUINAL, OPEN, RIGHT;  Surgeon: Flo Keyes MD;  Location: [facility] OR     IR VISCERAL ANGIOGRAM  9/13/2022     TRANSRECTAL ULTRASONIC, TRANSURETHRAL RESECTION (TUR) OF PROSTATE CYST  2004    helped  "temporarily     Family History   Problem Relation Age of Onset     No Known Problems Other         No heart, DM, HTN, or CA     No Known Problems Mother      No Known Problems Father      Social History     Socioeconomic History     Marital status:      Spouse name: None     Number of children: 6     Years of education: None     Highest education level: None   Tobacco Use     Smoking status: Former     Packs/day: 2.00     Years: 25.00     Pack years: 50.00     Types: Cigarettes     Start date: 1/1/1990     Passive exposure: Past (as a child brothers & Dad pipe & ciggerettes)     Smokeless tobacco: Never   Substance and Sexual Activity     Alcohol use: No     Comment: Alcoholic Drinks/day: Quit 1990     Drug use: Never         Allergies    Allergies   Allergen Reactions     Sulfa Antibiotics Rash     Info obtained off of 10/4/21 [facility] pre op.           Physical Exam    BP (!) 141/62 (BP Location: Right arm, Patient Position: Sitting, Cuff Size: Adult Regular)   Pulse 83   Temp 98.1  F (36.7  C) (Oral)   Resp 20   Ht 1.727 m (5' 7.99\")   Wt 63 kg (139 lb)   SpO2 96%   BMI 21.14 kg/m        GENERAL: Alert and oriented to time place and person. Seated comfortably. In no distress.    HEAD: Atraumatic and normocephalic.    EYES: FELISHA, EOMI.  No pallor.  No icterus.    Oral cavity: no mucosal lesion or tonsillar enlargement.    NECK: supple. JVP normal.  No thyroid enlargement.    LYMPH NODES: No palpable, cervical, axillary or inguinal lymphadenopathy.    CHEST: clear to auscultation bilaterally.  Resonant to percussion throughout bilaterally.  Symmetrical breath movements bilaterally.    CVS: S1 and S2 are heard. Regular rate and rhythm.  No murmur or gallop or rub heard.  No peripheral edema.    ABDOMEN: Soft. Not tender. Not distended.  No palpable hepatomegaly or splenomegaly.  No other mass palpable.  Bowel sounds heard.    EXTREMITIES: Warm.    SKIN: no rash, or bruising or purpura.  Has a full head " of hair.    Lab Results    Recent Results (from the past 168 hour(s))   CBC with platelets and differential   Result Value Ref Range    WBC Count 7.8 4.0 - 11.0 10e3/uL    RBC Count 3.07 (L) 4.40 - 5.90 10e6/uL    Hemoglobin 8.9 (L) 13.3 - 17.7 g/dL    Hematocrit 29.9 (L) 40.0 - 53.0 %    MCV 97 78 - 100 fL    MCH 29.0 26.5 - 33.0 pg    MCHC 29.8 (L) 31.5 - 36.5 g/dL    RDW 15.9 (H) 10.0 - 15.0 %    Platelet Count 409 150 - 450 10e3/uL    % Neutrophils 72 %    % Lymphocytes 12 %    % Monocytes 12 %    % Eosinophils 2 %    % Basophils 1 %    % Immature Granulocytes 1 %    NRBCs per 100 WBC 0 <1 /100    Absolute Neutrophils 5.7 1.6 - 8.3 10e3/uL    Absolute Lymphocytes 0.9 0.8 - 5.3 10e3/uL    Absolute Monocytes 0.9 0.0 - 1.3 10e3/uL    Absolute Eosinophils 0.1 0.0 - 0.7 10e3/uL    Absolute Basophils 0.1 0.0 - 0.2 10e3/uL    Absolute Immature Granulocytes 0.0 <=0.4 10e3/uL    Absolute NRBCs 0.0 10e3/uL       Imaging Results    No results found.      Signed by: Sandi Villa MD

## 2023-07-20 NOTE — LETTER
"    7/20/2023         RE: Denton Hobbs  [address]  [facility] [address]        Dear Colleague,    Thank you for referring your patient, Denton Hobbs, to the [facility] [place]. Please see a copy of my visit note below.    Oncology Rooming Note    July 20, 2023 8:58 AM   Denton Hobbs is a 88 year old male who presents for:    Chief Complaint   Patient presents with     Oncology Clinic Visit     1 month return Ampullary adenoma w Associated UGI Bleed, Iron deficiency anemia due to chronic blood loss, review Labs.      Initial Vitals: BP (!) 141/62 (BP Location: Right arm, Patient Position: Sitting, Cuff Size: Adult Regular)   Pulse 83   Temp 98.1  F (36.7  C) (Oral)   Resp 20   Ht 1.727 m (5' 7.99\")   Wt 63 kg (139 lb)   SpO2 96%   BMI 21.14 kg/m   Estimated body mass index is 21.14 kg/m  as calculated from the following:    Height as of this encounter: 1.727 m (5' 7.99\").    Weight as of this encounter: 63 kg (139 lb). Body surface area is 1.74 meters squared.  No Pain (0) Comment: Data Unavailable   No LMP for male patient.  Allergies reviewed: Yes  Medications reviewed: Yes    Medications: Medication refills not needed today.  Pharmacy name entered into [facility]: [id number] - [place], [place] - [address]    Clinical concerns:  1 month return Ampullary adenoma w Associated UGI Bleed, Iron deficiency anemia due to chronic blood loss, review Labs.       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  CBC today shows hemoglobin at 8.9 and he is not symptomatic so no transfusion was required.    Lab work previously do show iron deficiency anemia with low ferritin and low iron saturation.    He has been on oral iron for 6 months.    Recommend consideration for iron infusion to replenish iron stores.  Discussed potential side effects of allergic reactions.  Hopefully this can be done in 2 infusions and may again decrease his transfusion requirements.  After discussion he is agreeable to this.  We will schedule infusions in 1 week and 2 weeks from now.  We will recheck ferritin with next follow-up visit.    Otherwise recommend to continue with monitoring of CBC with transfusions as required.  I think we can extend interval between lab checks to 2 weeks.  Between lab checks if she is symptomatic he will call and we can consider earlier evaluation.    We will transfuse to keep hemoglobin greater than 7.  Consider transfusion between 7 and 8 if he is symptomatic.  Questions answered in 40 minutes spent.    Plan: We will schedule iron infusions x2 beginning in 1 week  Continue CBC check every 2 weeks with transfusions as needed to keep hemoglobin greater than 7  Follow-up in a couple of months with recheck of ferritin    Measurable disease: CBC, ferritin, iron studies, endoscopic evaluation of adenoma    Current therapy: Oral iron twice daily  Transfusions as needed to keep hemoglobin greater than 7  We will schedule II iron infusions in the next 2 weeks              ECOG Performance    2 - Ambulatory and independent in all ADLs; cannot work; up > 50% of the time    Encounter Diagnoses:    Problem List Items Addressed This Visit        Digestive    Ampullary adenoma w Associated UGI Bleed  - Primary     Relevant Orders    Check Out Appointment Request    CBC with platelets and differential    Ferritin       Hematologic    Iron deficiency anemia due to chronic blood loss    Relevant Orders    Check Out Appointment Request    CBC with platelets and differential           ______________________________________________________________________________    Staging History   Cancer Staging   No matching staging information was found for the patient.      History    Jomar is here for recheck.  Seen about a month ago.  Received 2 transfusions right after his first visit and then 1 additional 1 over the last month.  He feels fairly well.  He has been on iron pills for 6 months.  No other new symptoms or problems.  ECOG status is 1.        June 2023:    Mr. Denton Hobbs is a 87 year old with past history of GI bleeding secondary to duodenal adenoma, hyponatremia, COPD and history of CVA who is referred for consideration of parenteral iron infusion therapy.    Patient was hospitalized between May 31 and June 2 with hemoglobin below 6 and transfused 2 units of packed cells.  He was in the ER earlier this week with hemoglobin of 6.2 and was transfused 1 unit again.  He was initially diagnosed in March 2022 with duodenal adenoma and after review was not felt to be a surgical candidate.  He was hospitalized in September 2022 with GI bleeding and required transfusion and also clipping of the bleeding duodenal adenoma and also embolization of 4 vessels.    Patient still feeling weak despite transfusion earlier this week.  Has been on iron pills 3 times a day for the last week and previously and once a day.  Previous hernia surgery.  Recent tooth extraction without any bleeding.    He lives alone in an apartment in [place].  He has kids close to him.  Wife is in assisted living.  No personal history of cancer.  He has no history of bleeding problems or previous blood transfusions and has had normal colonoscopy in the  past.            Review of systems.  No fever or night sweats.  No loss of weight.  No lumps or bumps anywhere.  No unusual headaches or eyesight issues.  No dizziness.  No bleeding from the nose.  No sores in the mouth. No problems with swallowing.  No chest pain. No shortness of breath. No cough.  No abdominal pain. No nausea or vomiting.  No diarrhea or constipation.  No blood in stool or black colored stools.  No problems passing urine.  No numbness or tingling in hands or feet.  No skin rashes.  A 14 point review of systems is otherwise negative.        Past History    Past Medical History:   Diagnosis Date     Arthritis      COPD (chronic obstructive pulmonary disease) (H)      CVA (cerebral vascular accident) (H) 12/4/2018     Hypertension      Prostate hypertrophy      Past Surgical History:   Procedure Laterality Date     ESOPHAGOSCOPY, GASTROSCOPY, DUODENOSCOPY (EGD), COMBINED N/A 9/12/2022    Procedure: ESOPHAGOGASTRODUODENOSCOPY (EGD);  Surgeon: Conner Navarrete MD;  Location: [facility] OR     GENITOURINARY SURGERY       HERNIORRHAPHY INGUINAL Left 10/11/2021    Procedure: LEFT INGUINAL HERNIA REPAIR WITH MESH;  Surgeon: Puma Preston MD;  Location: [facility] OR     HERNIORRHAPHY INGUINAL Right 8/19/2022    Procedure: HERNIORRHAPHY, INGUINAL, OPEN, RIGHT;  Surgeon: Flo Keyes MD;  Location: [facility] OR     IR VISCERAL ANGIOGRAM  9/13/2022     TRANSRECTAL ULTRASONIC, TRANSURETHRAL RESECTION (TUR) OF PROSTATE CYST  2004    helped temporarily     Family History   Problem Relation Age of Onset     No Known Problems Other         No heart, DM, HTN, or CA     No Known Problems Mother      No Known Problems Father      Social History     Socioeconomic History     Marital status:      Spouse name: None     Number of children: 6     Years of education: None     Highest education level: None   Tobacco Use     Smoking status: Former     Packs/day: 2.00     Years: 25.00     Pack  "years: 50.00     Types: Cigarettes     Start date: 1/1/1990     Passive exposure: Past (as a child brothers & Dad pipe & ciggerettes)     Smokeless tobacco: Never   Substance and Sexual Activity     Alcohol use: No     Comment: Alcoholic Drinks/day: Quit 1990     Drug use: Never         Allergies    Allergies   Allergen Reactions     Sulfa Antibiotics Rash     Info obtained off of 10/4/21 [facility] [place] pre op.           Physical Exam    BP (!) 141/62 (BP Location: Right arm, Patient Position: Sitting, Cuff Size: Adult Regular)   Pulse 83   Temp 98.1  F (36.7  C) (Oral)   Resp 20   Ht 1.727 m (5' 7.99\")   Wt 63 kg (139 lb)   SpO2 96%   BMI 21.14 kg/m        GENERAL: Alert and oriented to time place and person. Seated comfortably. In no distress.    HEAD: Atraumatic and normocephalic.    EYES: FELISHA, EOMI.  No pallor.  No icterus.    Oral cavity: no mucosal lesion or tonsillar enlargement.    NECK: supple. JVP normal.  No thyroid enlargement.    LYMPH NODES: No palpable, cervical, axillary or inguinal lymphadenopathy.    CHEST: clear to auscultation bilaterally.  Resonant to percussion throughout bilaterally.  Symmetrical breath movements bilaterally.    CVS: S1 and S2 are heard. Regular rate and rhythm.  No murmur or gallop or rub heard.  No peripheral edema.    ABDOMEN: Soft. Not tender. Not distended.  No palpable hepatomegaly or splenomegaly.  No other mass palpable.  Bowel sounds heard.    EXTREMITIES: Warm.    SKIN: no rash, or bruising or purpura.  Has a full head of hair.    Lab Results    Recent Results (from the past 168 hour(s))   CBC with platelets and differential   Result Value Ref Range    WBC Count 7.8 4.0 - 11.0 10e3/uL    RBC Count 3.07 (L) 4.40 - 5.90 10e6/uL    Hemoglobin 8.9 (L) 13.3 - 17.7 g/dL    Hematocrit 29.9 (L) 40.0 - 53.0 %    MCV 97 78 - 100 fL    MCH 29.0 26.5 - 33.0 pg    MCHC 29.8 (L) 31.5 - 36.5 g/dL    RDW 15.9 (H) 10.0 - 15.0 %    Platelet Count 409 150 - 450 10e3/uL    % " Neutrophils 72 %    % Lymphocytes 12 %    % Monocytes 12 %    % Eosinophils 2 %    % Basophils 1 %    % Immature Granulocytes 1 %    NRBCs per 100 WBC 0 <1 /100    Absolute Neutrophils 5.7 1.6 - 8.3 10e3/uL    Absolute Lymphocytes 0.9 0.8 - 5.3 10e3/uL    Absolute Monocytes 0.9 0.0 - 1.3 10e3/uL    Absolute Eosinophils 0.1 0.0 - 0.7 10e3/uL    Absolute Basophils 0.1 0.0 - 0.2 10e3/uL    Absolute Immature Granulocytes 0.0 <=0.4 10e3/uL    Absolute NRBCs 0.0 10e3/uL       Imaging Results    No results found.      Signed by: Sandi Villa MD      Again, thank you for allowing me to participate in the care of your patient.        Sincerely,        Sandi Villa MD

## 2023-08-02 NOTE — PROGRESS NOTES
Pt arrived ambulatory to clinic for labs and possible blood products.  Pt was also scheduled for first dose Ferrlecit, but was adamant that he does not want IV iron at this point in time.  Medication had been released from therapy plan, but pharmacy was called prior to medication being made.  Pt did not want IV started, so labs were drawn peripherally instead.  Pt's HGB came back at 8.1, so no blood products were ordered.  Inbasket message was sent to Dr. Villa and Mirta to update them on pt not wanting IV iron, and to fix therapy plan of pt decides to proceed.  Reviewed labs with pt, he will try to eat iron rich foods to help with iron deficiency.  Instructed pt to call clinic with any further questions or concerns.  Pt verbalized understanding of plan of care and return to clinic.

## 2023-08-06 PROBLEM — D13.2 DUODENAL ADENOMA: Status: ACTIVE | Noted: 2022-09-17

## 2023-08-06 PROBLEM — R31.0 GROSS HEMATURIA: Status: ACTIVE | Noted: 2023-01-01

## 2023-08-06 NOTE — PROGRESS NOTES
"Unable to chart \"stopped\" in blood transfusion line since pt was a transfer from another hospital. Unit was stopped at 1615. VSS, no reaction from pt. Line was flushed and saline locked. Blood bag was discarded in biohazard bag. RBC unit tag was placed in patients chart. Hemoglobin recheck timed.   "

## 2023-08-06 NOTE — ED NOTES
Bed: [facility]-10  Expected date:   Expected time:   Means of arrival:   Comments:  MPWD- 87yo M Hematuria x2days

## 2023-08-06 NOTE — PROGRESS NOTES
"At 1405 RN called [facility] ED and spoke to Shelli regarding pt that is transferring. Received RN to RN report. Aware that pt was receiving a unit of blood.     At 1522 RN received a phone call from Eriberto at [facility] that pt left about \"10-15 minutes ago\" and was in route. Pt was being transferred with blood product.     At 1535 pt arrived onto the unit into room 352. Blood product was verified running at 150 ml/hour with 55 mL remaining. Pt was assisted to the restroom and assessed.   "

## 2023-08-06 NOTE — ED PROVIDER NOTES
"EMERGENCY DEPARTMENT ENCOUNTER      NAME: Denton Hobbs  AGE: 88 year old male  YOB: 1935  MRN: [id number]  EVALUATION DATE & TIME: 8/6/2023  8:22 AM    PCP: Jarad Prater    ED PROVIDER: Jeramie Waddell M.D.      Chief Complaint   Patient presents with    Hematuria         FINAL IMPRESSION:  Anemia  Hematuria  DNR/DNI  ED COURSE & MEDICAL DECISION MAKING:    Pertinent Labs & Imaging studies reviewed. (See chart for details)  88 year old male presents to the Emergency Department for evaluation of bloody urine.  Patient reports symptoms started today.  Patient self caths 5 times daily and has done so for more than a year.  Patient reports this morning he started having bloody urine and the urge to urinate more frequently.  Denies any pain or discomfort with catheterization.  Patient reports having similar symptoms when \"I get an infection\".  Denies any fevers or chills.  Review of records indicate patient with recent evaluation for the same resulting in the need for blood transfusion patient had follow-up on 8/2/2023 with hematology oncology.  Recommendations were for iron infusion patient declined.  Hemoglobin at that time was 8.1.  Patient reports chronic dark stools as he is on iron supplementation reports no recent changes.  Denies any abdominal pain.  On exam he is an elderly male in mild distress.  Vital signs unremarkable.  Exam is benign.  No suprapubic tenderness or discomfort.  Plan will be for laboratory evaluation assess for ongoing and the need for potential transfusion.  Patient will be typed and screened out of caution.  Cash catheter will be placed to allow irrigation of the bladder and to collect a specimen for evaluation for potential infection.  Baseline blood work being obtained to assess for anemia and potential renal dysfunction/infectious process.  Presently no indications for imaging given benign exam.. Patient appears non toxic with stable vitals signs.     8:37 AM I met with " the patient for the initial interview and physical examination. Discussed plan for treatment and workup in the ED.    9:40 AM.  Nurse unable to pass three-way nor coudé catheter.  Patient will proceed with self-catheterization to obtain urine specimen patient with minimal hyponatremia sodium 132.  Glucose slightly elevated at 101.  Alkaline phosphatase mildly elevated 230 however AST ALT and total bilirubin normal.  INR normal at 1.13.    10:10 AM.  Patient with grossly bloody urine.  We will consult urology.  Records indicate patient with Klebsiella resistant to cefazolin and ampicillin during hospitalization of 5/31/2023.  Cultures at that time revealed sensitivity to Macrobid.  Dose of Macrobid ordered.  10:30 AM.  Patient discussed with PA on-call for urology.  Given patient without clots or obvious retention plan will be for antibiosis and follow-up as needed if hematuria not clearing or if patient with profound anemia.  Patient also discussed with pharmacy.  Recommends third-generation cephalosporin rather than Macrobid.  11:55 AM.  Nonbloody.  Unable to perform nitrite or leukocyte Estrace.  Laboratory called regarding CBC.  12:28 PM.  Patient with borderline hemoglobin at 7.1.  Given ongoing hematuria we will proceed with transfusion and admission.  This will allow for reevaluation of his hematuria and consultation with urology if needed.  Patient will be consented for transfusion  12:30 PM.  Patient consented for transfusion.  Patient also is agreeable for plan for transfer to another facility if bed is available given ED overcrowding.  Patient placed in the transfer portal  At the conclusion of the encounter I discussed the results of all of the tests and the disposition. The questions were answered and return precautions provided. The patient or family acknowledged understanding and was agreeable with the care plan  All 5 PM.  Patient discussed with urology once again.  They will look for the patient at  [facility].  1:10 PM.  Patient discussed with Dr. Anderson the hospitalist at [facility].  He is agreeable with plan.    MEDICATIONS GIVEN IN THE EMERGENCY:  Medications - No data to display    NEW PRESCRIPTIONS STARTED AT TODAY'S ER VISIT  New Prescriptions    No medications on file        Patient represents critical care situation proximately 45 minutes spent directly involved in patient's care independent of any procedures.      Medical Decision Making    History:  Supplemental history from: Documented in chart, if applicable and N/A  External Record(s) reviewed: Documented in chart, if applicable.    Work Up:  Chart documentation includes differential considered and any EKGs or imaging independently interpreted by provider, where specified.  In additional to work up documented, I considered the following work up: Documented in chart, if applicable.    External consultation:  Discussion of management with another provider:     Complicating factors:  Care impacted by chronic illness: Neck self-catheterization, chronic anemia ,upper GI bleed  Care affected by social determinants of health: N/A    Disposition considerations: Admit.    =================================================================    HPI  Denton Hobbs is a 88 year old male with a pertient medical history of chronic self-catheterization, CVA, recurrent anemia, hematuria who presents to the ED for evaluation of urine.  Patient reports symptoms started this morning.  Denies any discomfort or issues with self-catheterization.  Reports no fevers or chills.  States has had similar blood in his urine when he had infections.      REVIEW OF SYSTEMS   Constitutional:  Denies fever, chills  Respiratory:  Denies productive cough or increased work of breathing  Cardiovascular:  Denies chest pain, palpitations  GI:  Denies abdominal pain, nausea, vomiting, or change in bowel or bladder habits.  Positive hematuria  Musculoskeletal:  Denies any new muscle/joint  swelling  Skin:  Denies rash   Neurologic:  Denies focal weakness  All systems negative except as marked.     PAST MEDICAL HISTORY:  Past Medical History:   Diagnosis Date    Arthritis     COPD (chronic obstructive pulmonary disease) (H)     CVA (cerebral vascular accident) (H) 12/4/2018    Hypertension     Prostate hypertrophy        PAST SURGICAL HISTORY:  Past Surgical History:   Procedure Laterality Date    ESOPHAGOSCOPY, GASTROSCOPY, DUODENOSCOPY (EGD), COMBINED N/A 9/12/2022    Procedure: ESOPHAGOGASTRODUODENOSCOPY (EGD);  Surgeon: Conner Navarrete MD;  Location: [facility] OR    GENITOURINARY SURGERY      HERNIORRHAPHY INGUINAL Left 10/11/2021    Procedure: LEFT INGUINAL HERNIA REPAIR WITH MESH;  Surgeon: Puma Preston MD;  Location: [facility] OR    HERNIORRHAPHY INGUINAL Right 8/19/2022    Procedure: HERNIORRHAPHY, INGUINAL, OPEN, RIGHT;  Surgeon: Flo Keyes MD;  Location: [facility] OR    IR VISCERAL ANGIOGRAM  9/13/2022    TRANSRECTAL ULTRASONIC, TRANSURETHRAL RESECTION (TUR) OF PROSTATE CYST  2004    helped temporarily       CURRENT MEDICATIONS:    No current facility-administered medications for this encounter.    Current Outpatient Medications:     cyanocobalamin (VITAMIN B-12) 1000 MCG tablet, Take 1,000 mcg by mouth daily (Patient not taking: Reported on 7/20/2023), Disp: , Rfl:     FEROSUL 325 (65 Fe) MG tablet, Take 2 tablets by mouth 2 times daily, Disp: , Rfl:     pantoprazole (PROTONIX) 40 MG EC tablet, Take 1 tablet (40 mg) by mouth 2 times daily (before meals), Disp: 60 tablet, Rfl: 1    ALLERGIES:  Allergies   Allergen Reactions    Sulfa Antibiotics Rash     Info obtained off of 10/4/21 [facility] pre op.       FAMILY HISTORY:  Family History   Problem Relation Age of Onset    No Known Problems Other         No heart, DM, HTN, or CA    No Known Problems Mother     No Known Problems Father        SOCIAL HISTORY:   Social History     Socioeconomic History    Marital  "status:      Spouse name: None    Number of children: 6    Years of education: None    Highest education level: None   Tobacco Use    Smoking status: Former     Packs/day: 2.00     Years: 25.00     Pack years: 50.00     Types: Cigarettes     Start date: 1/1/1990     Passive exposure: Past (as a child brothers & Dad pipe & ciggerettes)    Smokeless tobacco: Never   Substance and Sexual Activity    Alcohol use: No     Comment: Alcoholic Drinks/day: Quit 1990    Drug use: Never       VITALS:  Patient Vitals for the past 24 hrs:   BP Temp Temp src Pulse Resp SpO2 Height Weight   08/06/23 0823 (!) 148/66 98.1  F (36.7  C) Oral 82 18 97 % 1.727 m (5' 8\") 65.8 kg (145 lb)        PHYSICAL EXAM    Constitutional:  Awake, alert, in no apparent distress  HENT:  Normocephalic, Atraumatic. Bilateral external ears normal. Oropharynx moist. Nose normal. Neck- Normal range of motion with no guarding,   Eyes:  PERRL, EOMI with no signs of entrapment, Conjunctiva normal, No discharge.   Respiratory:  Normal breath sounds, No respiratory distress, No wheezing.    Cardiovascular:  Normal heart rate, Normal rhythm, No appreciable rubs or gallops.   GI:  Soft, No tenderness, No distension, No palpable masses  Musculoskeletal:  Intact distal pulses, No edema. Good range of motion in all major joints. No tenderness to palpation or major deformities noted.  Integument:  Warm, Dry, No erythema, No rash.   Neurologic:  Alert & oriented, Normal motor function, Normal sensory function, No focal deficits noted.   Psychiatric:  Affect normal, Judgment normal, Mood normal.     LAB:  All pertinent labs reviewed and interpreted.     Results for orders placed or performed during the hospital encounter of 08/06/23   INR     Status: Normal   Result Value Ref Range    INR 1.13 0.85 - 1.15   Comprehensive metabolic panel     Status: Abnormal   Result Value Ref Range    Sodium 132 (L) 136 - 145 mmol/L    Potassium 4.4 3.4 - 5.3 mmol/L    Chloride " 100 98 - 107 mmol/L    Carbon Dioxide (CO2) 27 22 - 29 mmol/L    Anion Gap 5 (L) 7 - 15 mmol/L    Urea Nitrogen 15.8 8.0 - 23.0 mg/dL    Creatinine 1.05 0.67 - 1.17 mg/dL    Calcium 8.3 (L) 8.8 - 10.2 mg/dL    Glucose 101 (H) 70 - 99 mg/dL    Alkaline Phosphatase 230 (H) 40 - 129 U/L    AST 42 0 - 45 U/L    ALT 56 0 - 70 U/L    Protein Total 4.9 (L) 6.4 - 8.3 g/dL    Albumin 2.9 (L) 3.5 - 5.2 g/dL    Bilirubin Total 0.2 <=1.2 mg/dL    GFR Estimate 69 >60 mL/min/1.73m2   UA with Microscopic reflex to Culture     Status: Abnormal    Specimen: Urine, Catheter   Result Value Ref Range    Color Urine Dark Red (A) Colorless, Straw, Light Yellow, Yellow    Appearance Urine Bloody (A) Clear    Glucose Urine      Bilirubin Urine      Ketones Urine      Specific Gravity Urine      Blood Urine      pH Urine      Protein Albumin Urine      Urobilinogen Urine      Nitrite Urine      Leukocyte Esterase Urine      WBC Clumps Urine Present (A) None Seen /HPF    RBC Urine >182 (H) <=2 /HPF    WBC Urine >182 (H) <=5 /HPF    Narrative    Urine Culture ordered based on laboratory criteria   CBC (+ platelets, no diff)     Status: Abnormal   Result Value Ref Range    WBC Count 6.9 4.0 - 11.0 10e3/uL    RBC Count 2.45 (L) 4.40 - 5.90 10e6/uL    Hemoglobin 7.1 (L) 13.3 - 17.7 g/dL    Hematocrit 23.2 (L) 40.0 - 53.0 %    MCV 95 78 - 100 fL    MCH 29.0 26.5 - 33.0 pg    MCHC 30.6 (L) 31.5 - 36.5 g/dL    RDW 13.2 10.0 - 15.0 %    Platelet Count 392 150 - 450 10e3/uL   Adult Type and Screen     Status: None   Result Value Ref Range    ABO/RH(D) O POS     Antibody Screen Negative Negative    SPECIMEN EXPIRATION DATE [id number]    Prepare red blood cells (unit)     Status: None (Preliminary result)   Result Value Ref Range    Blood Component Type Red Blood Cells     Product Code I9859I18     Unit Status Issued     Unit Number [id number]     CROSSMATCH Compatible     CODING SYSTEM [id number]     ISSUE DATE AND TIME [id number]     UNIT  ABO/RH O+     UNIT TYPE ISBT 5100    ABO/Rh type and screen     Status: None    Narrative    The following orders were created for panel order ABO/Rh type and screen.  Procedure                               Abnormality         Status                     ---------                               -----------         ------                     Adult Type and Screen[[id number]]                            Final result                 Please view results for these tests on the individual orders.      RADIOLOGY:  Reviewed all pertinent imaging. Please see official radiology report.  No orders to display           I, Pedro Brown, am serving as a scribe to document services personally performed by Jeramie Waddell MD, based on my observation and the provider's statements to me. I, Jeramie Waddell MD attest that Pedro Brown is acting in a scribe capacity, has observed my performance of the services and has documented them in accordance with my direction.    Jeramie Waddell M.D.  Emergency Medicine  [facility] [place], [place] EMERGENCY DEPARTMENT       Jeramie Waddell MD  08/06/23 1109

## 2023-08-06 NOTE — ED NOTES
RN attempted to pass a 18Fr 3-way cath without resistance until last 3 inches--unable to pass.  RN then tried to pass 14Fr COUDE cath without diff until last couple inches of cath and then meets resistance -unable to pass.  SOPHY Hawkins attempted coude as well with same results.  Pt tolerated well and denied pain during procedure.  Lido was used.  Provider notified.  Pt asst'd to restroom to empty bladder with straight cath.

## 2023-08-06 NOTE — ED TRIAGE NOTES
Mplwd to rm10.  Pt from home-independent and called EMS for blood in urine.  Reports has been having this for 2 days.  Pt self caths about 5x/day.  Hx of low hgb 8.6 on 7/17/23.  Hx of transfusions.  Bg ENROUTE 146.  Here last week for low hgb.  Denies dizzy or weak or pain.  Denies thinners.  Amb to bed from ems stretcher without diff.       Triage Assessment       Row Name 08/06/23 0829       Triage Assessment (Adult)    Airway WDL WDL       Respiratory WDL    Respiratory WDL WDL       Skin Circulation/Temperature WDL    Skin Circulation/Temperature WDL WDL       Cardiac WDL    Cardiac WDL WDL       Peripheral/Neurovascular WDL    Peripheral Neurovascular WDL WDL       Cognitive/Neuro/Behavioral WDL    Cognitive/Neuro/Behavioral WDL WDL

## 2023-08-06 NOTE — H&P
"Hospital Medicine Service History and Physical  [facility]: [facility]    Denton Hobbs is a 88 year old male who  has a past medical history of Arthritis, COPD (chronic obstructive pulmonary disease) (H), CVA (cerebral vascular accident) (H) (12/4/2018), Hypertension, and Prostate hypertrophy.   Chief Complaint: hematuria    Assessment and Plan  Principal Problem:    Gross hematuria  Active Problems:    BPH w Retention -- chronic marino    Duodenal adenoma    Iron deficiency anemia due to chronic blood loss  Iron deficiency anemia  Repeat Hb after transfusion completes. Am CBC, consult hematology  Regular diet ordered    Gross Hematuria  BPH w retention - chronic marino  Consult Urology, cefuroxime    Duodenal adenoma  Dx'ed on EGD March 2022. GI bleeding treated with clip placement and embolization Sept 2022.    COPD  Do not see any recent visits to Pulm. Has 50 pack-year smoking Hx.     Hyponatremia  Alk phos elevation  Am CMP    Hx of CVA    Estimated body mass index is 22.05 kg/m  as calculated from the following:    Height as of an earlier encounter on 8/6/23: 1.727 m (5' 8\").    Weight as of an earlier encounter on 8/6/23: 65.8 kg (145 lb).  DVTP: held  Code Status: Prior  Disposition: Inpatient     History of Present Illness  Denton Hobbs is able to ambulate to the bathroom, still able to cath.     05/31/23 - Hospitalized, transfused 2u for Hb < 6.  06/02/23 - Discharged with Hb 8.5, had declined GI recommended duodenal adenoma resection, which Sebastián declined. Given 7d nitrofurantoin for Klebsiella UTI.  06/23/23 - Seen in ED with Hb 6.3, transfused 2u and discharged.  07/20/23 - Seen in onc office. Hb 8.9 in clinic, diagnosed with iron deficiency anemia.  He declined IV iron.  08/06/23 - Presents to [facility] ED for hematuria. Hb 7.1, given 1u. Transferred to . Received cefuroxime. Urology consulted. Continued on cefuroxime BID empirically for UTI based on prior UrCx    No results " found for this visit on 08/06/23.   Medications - No data to display    Physical exam:  Appears nad, ambulates with walker  Anicteric sclera, PERRL  Poor dentition  Lungs cta b/l  Rrr, no murmur  Abd soft, NT  normal affect, alert  Vital signs reviewed by me    Wt Readings from Last 4 Encounters:   08/06/23 65.8 kg (145 lb)   07/20/23 63 kg (139 lb)   06/23/23 65.8 kg (145 lb)   06/22/23 64.9 kg (143 lb)        reports that he has quit smoking. His smoking use included cigarettes. He started smoking about 33 years ago. He has a 50.00 pack-year smoking history. He has been exposed to tobacco smoke. He has never used smokeless tobacco. He reports that he does not drink alcohol and does not use drugs.  family history includes No Known Problems in his father, mother, and another family member.   has a past surgical history that includes Transrectal Ultrasonic, Transurethral Resection (TUR) Of Prostate Cyst (2004); Abdomen surgery; Herniorrhaphy inguinal (Left, 10/11/2021); Herniorrhaphy inguinal (Right, 8/19/2022); Esophagoscopy, gastroscopy, duodenoscopy (EGD), combined (N/A, 9/12/2022); and IR Visceral Angiogram (9/13/2022).   Allergies   Allergen Reactions    Sulfa Antibiotics Rash     Info obtained off of 10/4/21 [facility] pre op.       Asher Anderson MD, MPH  [facility]   Phone: #[phone]

## 2023-08-06 NOTE — ED NOTES
[facility]    Patient Name:  Chinyere Montaño   YOB: 1948   Gender:  Female   Appt Date:  07/14/21   Provider:  Tessa Rand MD   Insurance:  [id number]  Referring Provider Pt self straight cath with an out put of 200 ml with bloody urine.   rectal bleeding  3/6/2020: colonoscopy with Dr. King Lagos revealed a 5 cm mass from 15 to 20 cm in the sigmoid colon. Biopsies were taken. 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    3/6/2020: CT c/a/p without evidence of Blood Glucose Monitoring Suppl (TRUE METRIX AIR GLUCOSE METER) Does not apply Device, 1 Units by Does not apply route daily. , Disp: 1 Device, Rfl: 0  •  TRUEplus Lancets 33G Does not apply Misc, 1 lancet by Does not apply route 2 am., Disp: 1 Box, Rfl: 3 Prediabetes    • Rectal bleeding     dx- with colon cancer   • Renal disorder     cysts on kidney and liver- told pt. not a concern,just watching   • Visual impairment     glasses      Reviewed:  Past Surgical History:   Procedure Laterality Date   • CO Exam  Constitutional:       General: Krysten Phillips is not in acute distress. Appearance: Krysten Phillips is well-developed. HENT:      Head: Normocephalic and atraumatic. Eyes:      General: No scleral icterus.   Pulmonary:      Effort: No respira involving the serosa.     K.  Lesser omentum, omentectomy:  -Adenocarcinoma involving the omentum.     L.   Portion of right Gerota's fascia, excision:  -No evidence of malignancy          Assessment / Plan:  81year old female with history of Stage IIA sig

## 2023-08-06 NOTE — PHARMACY-ADMISSION MEDICATION HISTORY
Pharmacist Admission Medication History    Admission medication history is complete. The information provided in this note is only as accurate as the sources available at the time of the update.    Medication reconciliation/reorder completed by provider prior to medication history? No    Information Source(s): Patient and CareEverywhere/SureScripts via in-person    Pertinent Information:     Changes made to PTA medication list:  Added: None  Deleted: None  Changed: ferrous sulfate    Medication Affordability:       Allergies reviewed with patient and updates made in EHR: yes    Medication History Completed By: Shelli Mckenna RPH 8/6/2023 1:12 PM    Prior to Admission medications    Medication Sig Last Dose Taking? Auth Provider Long Term End Date   cyanocobalamin (VITAMIN B-12) 1000 MCG tablet Take 1,000 mcg by mouth daily Past Month Yes Unknown, Entered By History     FEROSUL 325 (65 Fe) MG tablet Take 2 tablets by mouth daily (with breakfast) Takes a different dose in the evening 8/5/2023 at am Yes Reported, Patient     ferrous sulfate (FEROSUL) 325 (65 Fe) MG tablet Take 1 tablet by mouth daily (with dinner) Takes a different dose in the morning 8/5/2023 at pm Yes Unknown, Entered By History     pantoprazole (PROTONIX) 40 MG EC tablet Take 1 tablet (40 mg) by mouth 2 times daily (before meals) 8/5/2023 at pm Yes Derek Ardon MD

## 2023-08-06 NOTE — PHARMACY-ADMISSION MEDICATION HISTORY
Pharmacist Admission Medication History    Admission medication history is complete. The information provided in this note is only as accurate as the sources available at the time of the update.    Medication reconciliation/reorder completed by provider prior to medication history? No    Information Source(s):  done by [facility] pharmacist  via N/A    Pertinent Information: n/a    Changes made to PTA medication list:  Added: None  Deleted: None  Changed: None    Medication Affordability:       Allergies reviewed with patient and updates made in EHR: unable to assess    Medication History Completed By: Drea Nichols [facility] 8/6/2023 4:14 PM    Prior to Admission medications    Medication Sig Last Dose Taking? Auth Provider Long Term End Date   cyanocobalamin (VITAMIN B-12) 1000 MCG tablet Take 1,000 mcg by mouth daily Past Month Yes Unknown, Entered By History     FEROSUL 325 (65 Fe) MG tablet Take 2 tablets by mouth daily (with breakfast) Takes a different dose in the evening 8/5/2023 at am Yes Reported, Patient     ferrous sulfate (FEROSUL) 325 (65 Fe) MG tablet Take 1 tablet by mouth daily (with dinner) Takes a different dose in the morning 8/5/2023 at pm Yes Unknown, Entered By History     pantoprazole (PROTONIX) 40 MG EC tablet Take 1 tablet (40 mg) by mouth 2 times daily (before meals) 8/5/2023 at pm Yes Derek Ardon MD

## 2023-08-06 NOTE — ED NOTES
Report to EMS. Left with pt to [facility]   Subjective   Patient ID: Lucita Ren is a 85 y.o. male who presents for No chief complaint on file..    Patient is here today with several complaints.  1.  He complains of generalized fatigue and lethargy.  He describes generalized low-level fatigue with a lack of energy.  He does have a little bit of true sleepiness as well.  He sometimes has difficulty focusing when he is reading and his eyes become very tired.  He did recently see his ophthalmologist who gave him a good report.  He denies any fever chills night sweats.  He has gained about 5 to 10 pounds of weight in the last several months.  2.  He has had increasing difficulty with BPH symptoms.  He has significant nocturia and gets up at least 5 or 6 times at night to go to the bathroom.  He was started on tamsulosin by a family friend who is a nephrologist but he was unable to tolerate it due to excessive dizziness and lightheadedness.  He has not discontinued it and is actually symptoms have worsened.  He did get a good result from the tamsulosin when he took it for the few days.  3.  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  His most recent sodium level was 136.  He is taking 4 g of sodium chloride a day and is on small dose of furosemide.  4.  He has chronic issues with constipation he is now on a bowel regimen including senna and fiber after visiting with his gastroenterologist.  His symptoms are under much better control however.  The patient does however describe generalized abdominal bloating and discomfort usually after eating.  He also has a sensation of incomplete evacuation.  5.  He recently was diagnosed with dry eye and is using lubricating drops which she is tolerating well.         Review of Systems    Objective   /64   Pulse 54   Wt 85.4 kg (188 lb 4.4 oz)   SpO2 99%   BMI 28.63 kg/m²     Physical Exam  HENT:      Mouth/Throat:      Mouth: Mucous membranes are  moist.      Pharynx: Oropharynx is clear.   Eyes:      Extraocular Movements: Extraocular movements intact.      Conjunctiva/sclera: Conjunctivae normal.      Pupils: Pupils are equal, round, and reactive to light.   Neck:      Vascular: No carotid bruit.   Cardiovascular:      Rate and Rhythm: Regular rhythm. Bradycardia present.      Heart sounds: Normal heart sounds.   Pulmonary:      Breath sounds: Rales (Minimal crackles at the bases bilaterally) present.   Abdominal:      General: Abdomen is flat. Bowel sounds are normal. There is no distension.      Palpations: Abdomen is soft. There is no mass.      Hernia: No hernia is present.   Musculoskeletal:      Cervical back: Neck supple.      Right lower leg: No edema.      Left lower leg: No edema.   Lymphadenopathy:      Cervical: No cervical adenopathy.   Neurological:      General: No focal deficit present.         Assessment/Plan   Problem List Items Addressed This Visit          Respiratory    Dyspnea on exertion     Symptoms are mild but the patient does have some minimal crackles in his bases so we will obtain a chest x-ray to look for any pulmonary congestion.            Circulatory    Essential hypertension - Primary     Blood pressure under excellent control with amlodipine and low-dose furosemide.         Relevant Orders    XR chest 2 views       Digestive    Constipation     Symptoms are under much better control with his current senna containing bowel regimen.            Endocrine/Metabolic    Hypothyroidism     We will check a TSH level today.         Relevant Orders    CBC and Auto Differential    Comprehensive Metabolic Panel    TSH with reflex to Free T4 if abnormal    Lipid Panel       Other    Hyponatremia

## 2023-08-06 NOTE — PLAN OF CARE
Problem: Plan of Care - These are the overarching goals to be used throughout the patient stay.    Goal: Absence of Hospital-Acquired Illness or Injury  Outcome: Progressing     Problem: Plan of Care - These are the overarching goals to be used throughout the patient stay.    Goal: Optimal Comfort and Wellbeing  Outcome: Progressing        Pt is A&Ox4 and is very pleasant and cooperative. Pt arrived to the unit as a transfer from [facility] at 1535. CMS intact, VSS on RA. Denies pain. PIV is saline locked. Shell Montgomery was called at 1628 and updated regarding pt situation, new room number, and unit number. Pt received a unit of RBCs that was started at [facility] and ended at 1615, Hgb recheck was 8.0. Pt self caths himself. Pt voided 600 ml's of dark red/bloody urine. Pt prefers to use his own supplies that are located in his room. Pt is a standby assist with a walker and gait belt. Pt is a regular diet and tolerated oral intake. Discharge plan home once medically stable.

## 2023-08-06 NOTE — PROGRESS NOTES
[facility] to [facility]    Reportedly patient with complicated history who at baseline self catheterizes.  Chief complaint today: Hematuria  Provider reports 1 g hemoglobin drop since recent visit  Provider reports discussing case twice with urology who agrees to see patient at [facility]. Provider to initiate transfusion prior to transfer.    Empirically treating with antibiotics based on previous sensitivities    Accepted for [facility]

## 2023-08-07 NOTE — PROGRESS NOTES
Patient A&O x4 and pleasant. VSS on RA. Pain is denied. Patient ambulates SBA with walker. PIV is patent and saline locked. Tolerating oral intake and voiding via self cath, dark red urine. PVR post self cath was 9 mLs. CT completed today, showed metastatic disease, pt accepted information and declines any further treatment for cancer. Wishes to stay overnight and is agreeable to marino insertion and CBI tomorrow morning. Last BM on 8/7. Patient up in chair. Patient is safe, will continue to monitor.      Natalie Mora RN

## 2023-08-07 NOTE — PROGRESS NOTES
[facility] MEDICINE PROGRESS NOTE      Identification/Summary: Denton Hobbs is a 88 year old male former reilly  PMHx: BPH (CIC q6 dependent), former smoker, CVA, COPD, Duodenal adenoma    Hospital Course   05/31/23 - Hospitalized, transfused 2 units for hemoglobin less than 6  06/02/23 -discharged with hemoglobin 8.5 after GI recommended duodenal adenoma resection (which he declined).  Discharged with nitrofurantoin for Klebsiella UTI  06/23/23 - Evaluated in the ED, hemoglobin 6.3, transfused 2 units and discharged  07/20/23 -seen in oncology clinic, hemoglobin 8.9.  Diagnosed iron deficiency anemia and declined IV iron.  ------------  08/06/23 - Presented to ED at [facility] for gross hematuria and increased need to straight cath.  Noted hemoglobin 7.1.  Marino catheter placement was attempted to irrigate bladder, but unsuccessful.  Was transferred to [facility] due to bed availability.  Started on cefuroxime empirically for UTI based on prior urine culture results. Pt was seen by Urology who also struggled to get marino placed. Pt returned dark, cherry red urine via CIC.  08/07/23 - Reports feeling well. Hb 8.3, did have clot in catheter this am. CT urogram ordered per urology which showed new liver masses suggesting metastatic disease, intra-/extrahepatic biliary dilation, gallbladder distention, increasing pancreatic cystic lesion, and diffuse bladder wall thickening, and sclerotic 7mm sacral lesion. Code status clarified as DNR.    Assessment & Plan   Iron deficiency anemia  Duodenal adenoma  Gross hematuria  COPD   Transaminitis  Biliary dilation  Liver mets  History of CVA  Consulted GI, continue 2nd gen cephalosporin, but consider adding oral anaerobic coverage if not improving. Discussed with Rads. AM labs ordered for tomorrow. Paged out to GI to see if this may require transfer to [facility] etc. I discussed code status. Discussed imaging results with ANDRES AJ       Discharge:  Pending GI, Urology consult recs  DVT Prophylaxis:  Low Risk/Ambulatory with no VTE prophylaxis indicated  Code Status: Prior  Diet: Regular Diet Adult  Cardiac monitor: None  There is no height or weight on file to calculate BMI.    Interval History  Hb 8.3, Na 132, LFT's up. No acute complaints. He apparently uses saliva as lubricant for catheter insertions.        7.3  \   8.3 (L)   / 370     135 (L) 103 12 /  91   4.3 27 0.77 \     ALT: 71 (H) AST: 54 (H) AP: 260 (H) TBILI: 0.7   ALB: 2.4 (L) TOT PROTEIN: 4.8 (L) LIPASE: N/A     INR:  1.13 PTT:  N/A   D-dimer:  N/A Fibrinogen:  N/A       No results found for this or any previous visit (from the past 24 hour(s)).  Wt Readings from Last 5 Encounters:   08/06/23 65.8 kg (145 lb)   07/20/23 63 kg (139 lb)   06/23/23 65.8 kg (145 lb)   06/22/23 64.9 kg (143 lb)   06/19/23 64.9 kg (143 lb)       Asher Anderson MD, MPH  Hospitalist,[facility]: [facility]  Phone: #[phone]    Medications:   Personally Reviewed.  Medications      cefuroxime  250 mg Oral Q12H [facility] (08/20)    pantoprazole  40 mg Oral BID AC    sodium chloride (PF)  3 mL Intracatheter Q8H       Clinically Significant Risk Factors Present on Admission              # Hypoalbuminemia: Lowest albumin = 2.4 g/dL at 8/7/2023  6:11 AM, will monitor as appropriate

## 2023-08-07 NOTE — UTILIZATION REVIEW
Admission Status; Secondary Review Determination   Under the authority of the Utilization Management Committee, the utilization review process indicated a secondary review on Denton Hobbs. The review outcome is based on review of the medical records, discussions with staff, and applying clinical experience noted on the date of the review.   (x) Inpatient Status Appropriate - This patient's medical care is consistent with medical management for inpatient care and reasonable inpatient medical practice.     RATIONALE FOR DETERMINATION   Denton Hobbs is an 88 yr old male with BPH who self caths Q6h, former smoker, CVA, COPD and duodenal adenoma who presented with gross hematuria and increase need to cath.  Some clots with the self cath.  Unable to get marino placed after several attempts.  Has had need for frequent transfusions even without hematuria.  HGB 7.1 and transfused.  On abx for possible infection.  CT urogram performed which complicated further as new liver masses with concern for malignancy in addition.  GI/HemeOnc/Urology all consulting.  Trending bleeding/hematuria.  No improvement in gross hematuria despite abx and discussed with Dr. Anderson, may need further transfusions in addition.  Crossing 2nd night.    At the time of admission with the information available to the attending physician more than 2 nights Hospital complex care was anticipated, based on patient risk of adverse outcome if treated as outpatient and complex care required. Inpatient admission is appropriate based on the Medicare guidelines.   The information on this document is developed by the utilization review team in order for the business office to ensure compliance. This only denotes the appropriateness of proper admission status and does not reflect the quality of care rendered.   The definitions of Inpatient Status and Observation Status used in making the determination above are those provided in the CMS Coverage Manual, Chapter  1 and Chapter 6, section 70.4.   Sincerely,   Tere Ramirez MD  Utilization Review  Physician Advisor  [facility]

## 2023-08-07 NOTE — CONSULTS
GI CONSULT NOTE    Name: Denton Hobbs    Medical Record #: [id number]    YOB: 1935    Date: 8/7/2023    CC: We were consulted by Asher Anderson MD to see Denton Hobbs in regards to CT showing possible liver mets with biliary dilation.    HPI: This is a 88 year old male with a history of upper GI bleed in 2022 due to duodenal mass,  HTN, CVA 2018, COPD, prostate hypertrophy who straight caths at home.  He presented with gross hematuria.  Currently being treated for UTI by Urology.  CT urogram done this morning showed irregularity of second portion of the duodenum which could be malignancy, new liver masses concerning for metastases (at least 5 lesions, the largest being 4cm), intra and extrahepatic bile duct dilation, gallbladder distention, pancreatic duct dilation which have increased from his CT done September 2022,  cystic lesion in the pancreas that his increased in size will potential for this being a tumor.  The patient underwent EGD by us in March 2022 with biopsies of the duodenum showing adenomatous tissue but there was concern for an underlying malignancy.  He was recommended to go to [facility] but somehow got referred to colorectal surgery.  He had upper GI bleed September 2022 and had EGD with clips placed in the duodenum.  His bleeding persisted despite endo clips and he underwent mesenteric angiography with embolization September 13, 2022 requiring superselective embolization of 4 pancreaticoduodenal branches to control the bleed.  He has been seen as an outpatient by Dr. Oakley of hematology and oncology for his chronic anemia and received two IV iron infusion followed by oral iron pills.   Reports typically passing dark, tarry stools at home .  He is prescribed Protonix 40 mg twice daily.  Is not prescribed anticoagulants.    Labs notable for hemoglobin 8.3, MCV 92, platelets 370, INR 1.13, AST 54, ALT 71, alk phos 260, normal bilirubin.    Past medical history  Past Medical History:    Diagnosis Date    Arthritis     COPD (chronic obstructive pulmonary disease) (H)     CVA (cerebral vascular accident) (H) 12/4/2018    Hypertension     Prostate hypertrophy         Family history  Family History   Problem Relation Age of Onset    No Known Problems Other         No heart, DM, HTN, or CA    No Known Problems Mother     No Known Problems Father         Social history  Social History     Tobacco Use    Smoking status: Former     Packs/day: 2.00     Years: 25.00     Pack years: 50.00     Types: Cigarettes     Start date: 1/1/1990     Passive exposure: Past (as a child brothers & Dad pipe & ciggerettes)    Smokeless tobacco: Never   Substance Use Topics    Alcohol use: No     Comment: Alcoholic Drinks/day: Quit 1990    Drug use: Never       Medications:   No current outpatient medications on file.          Allergies:    Allergies   Allergen Reactions    Sulfa Antibiotics Rash     [facility]          REVIEW OF SYSTEMS (ROS): Review of systems is as per HPI.  Remainder of complete review of systems is negative.      PHYSICAL EXAMINATION:      BP (!) 150/68 (BP Location: Left arm)   Pulse 70   Temp 97.7  F (36.5  C) (Oral)   Resp 16   SpO2 95%   GEN: Well developed, well nourished 88 year old in no acute distress.  HEENT: sclera anicteric, moist mucous membranes, neck soft and supple.  LYMPH: No cervical lymphadenopathy  PULM: breathing comfortably. No pursed lips breathing.  CARDIO: Regular rate and rhythm  GI: Non-distended. Soft.  Non-tender to palpation.  No guarding.  EXT: warm, no lower extremity edema  NEURO: Alert and oriented.  Speech fluid.    PSYCH: Mental status appropriate, mood and affect normal.      LABS and IMAGING  Recent Labs   Lab 08/07/23  0611 08/06/23  1825 08/06/23  0913 08/02/23  0857   WBC 7.3  --  6.9 5.5   RBC 2.85*  --  2.45* 2.77*   HGB 8.3* 8.0* 7.1* 8.1*   HCT 26.3*  --  23.2* 26.9*   MCV 92  --  95 97   MCH 29.1  --  29.0 29.2   MCHC  31.6  --  30.6* 30.1*   RDW 14.9  --  13.2 13.6     --  392 398      Recent Labs   Lab 08/07/23  0611 08/06/23  0913   * 132*   CO2 27 27   BUN 12 15.8     Recent Labs   Lab 08/07/23  0611 08/06/23  0913   ALKPHOS 260* 230*   AST 54* 42   ALT 71* 56     Lab Results   Component Value Date    INR 1.13 08/06/2023    INR 1.16 (H) 06/23/2023    INR 1.19 (H) 09/14/2022       CT Urogram wo & w Contrast    Result Date: 8/7/2023  EXAM: CT UROGRAM WO and W CONTRAST LOCATION: [facility] DATE: 8/7/2023 INDICATION: Gross hematuria. Duodenal mass. COMPARISON: 9/11/22. TECHNIQUE: CT scan of the abdomen and pelvis using urogram technique with pre contrast, post contrast, and delayed images. Multiplanar reformats were obtained. Dose reduction techniques were used. CONTRAST: ISOVUE 370 100ML FINDINGS: LOWER CHEST: Scarring at the lung bases. Emphysema. HEPATOBILIARY:  There are at least 5 new enhancing liver masses which measure up to 4 cm.  Intrahepatic and extrahepatic bile duct dilatation are new. The gallbladder is slightly distended. PANCREAS: Increased pancreatic duct dilatation. An elongated 3.3 x 1.8 x 2.6 cm cyst arising from the main pancreatic duct is larger than on the comparison study, and exhibits a 12 mm area of possible enhancing high attenuation in the dependent portion (series 6 image 41). A persistent patent duct of Santorini is suspected. There are new surgical clips in the small bowel near the pancreas. SPLEEN: Unchanged 5 mm hypoattenuating focus in the spleen. ADRENAL GLANDS: Normal. RIGHT KIDNEY/URETER: No renal or ureteral calculi. No hydronephrosis. The distal right ureter is mildly dilated near the bladder. Normal calyces. No filling defects in the opacified portions of the collecting system on delayed imaging.  Simple right renal cysts do not require follow-up. LEFT KIDNEY/URETER: No renal or ureteral calculi. No hydronephrosis. There is fullness of the distal left  ureter near the bladder. Normal calyces. No filling defects in the opacified portions of the collecting system on delayed imaging.  A simple left renal cyst does not require follow-up. BLADDER: There is diffuse wall thickening and mucosal enhancement of the urinary bladder. A few bladder diverticula are noted. There is layering blood within the bladder. BOWEL: Normal stomach. Irregular soft tissue in the distal second portion of the duodenum. Normal caliber of the small bowel. Normal appendix. Normal appearance of the colon. LYMPH NODES: Normal. VASCULATURE: Atherosclerotic disease.  Coils in the region of this third portion of the duodenum. PELVIC ORGANS: Enlarged prostate gland. MUSCULOSKELETAL: Small 7 mm sclerotic focus in the sacrum. Degenerative change in the spine.     IMPRESSION: 1.  New liver masses are concerning for metastatic disease. Liver biopsy could be performed if clinically indicated. 2.  There is irregular soft tissue in the second portion of the duodenum, which could be malignancy. Intrahepatic and extrahepatic bile duct dilatation, gallbladder distention, and pancreatic duct dilatation have increased from the comparison study. This  may be from history of intervention with vascular coil placement, though the duodenal mass is a more likely source. 3.  A cystic lesion in the pancreas is larger and appears to contain enhancing material. A cystic pancreatic tumor is possible. 4.  Diffuse wall thickening and mucosal enhancement of the urinary bladder suggests cystitis. There is layering blood within the bladder. Bladder diverticula are related to chronic bladder outlet obstruction. The prostate gland is significantly enlarged.  5.  Sclerotic 7 mm sacral lesion of unclear significance. 6.  Recommend GI consultation. Consider PSA testing. [Urgent Result: Presumed metastatic lesions in the liver, increasing size of a pancreatic cystic lesion, possible duodenal malignancy. ] Finding was identified on  8/7/2023 8:26 AM CDT. Dr. Anderson  was contacted by me on 8/7/2023 10:08 AM CDT and verbalized understanding of the critical result.        ASSESSMENT  This is a 88-year-old male who has a known duodenal mass that was concerning for malignancy.  Now with CT scan showing new liver lesions (at least 5 lesions with largest measuring up to 4 cm) concerning for metastases, irregular soft tissue in the second portion of his duodenum, elongated pancreatic cyst that has increased in size since September 2022 and increase in pancreatic duct size, intra and extrahepatic biliary dilation.  Patient informed this is concerning for underlying malignancy.  He respectfully does not wish to pursue any further diagnostic evaluation at this time nor any procedures either including liver biopsy.  Understandably, he kindly explains that he is 88 years old and accepts the natural course of an underlying life-ending disease process.  However, he is accepting of a PRBC transfusion when indicated.  I explained that we are available for any help or additional input if he may desire after speaking more with his family.    Hematuria.  Urology following.  On antibiotics for UTI.    Patient Active Problem List   Diagnosis    Orchitis and epididymitis    BPH w Retention -- chronic marino    Sepsis (Leukocytosis, Tachycardia)    CVA (cerebral vascular accident) (H)    Left-sided weakness    Indwelling Marino catheter present    Elevated PSA    Cystitis    Balance problems    Aphasia    Non-recurrent unilateral inguinal hernia without obstruction or gangrene    Enlarged prostate    Klebsiella UTI    Duodenal adenoma    Anemia, unspecified type    Iron deficiency anemia due to chronic blood loss    Gross hematuria       PLAN  1.  No further GI work-up at this time as explained above.  2.  If patient were to desire further evaluation after speaking with his family, would then suggest liver biopsy.  3.  Packed red blood cell transfusion as necessary.  4.   "Continue with PPI twice daily.    A total of 50 minutes was spent on today's care.  This included chart review, discussion, examination, evaluation of the patient, discussion with urology, discussion with Dr. Silva, formulation of assessment and plan.    Thank you for asking to consult on this patient.    Anderson Nichols PA-C   8/7/2023 11:49 AM  [facility]  [phone]    Addendum:    Patient seen and examined personally by me on August 7 2023.  Agree with assessment and plan per BRYCE Barron.  I discussed in detail with the patient and his daughter at bedside.  Patient wish no invasive procedure.  No biopsy.  He told me \"let  the God take it course\".I respect patient's wish.  Please contact GI if any further questions.     Dr Lor Silva  GI attending     "

## 2023-08-07 NOTE — PLAN OF CARE
Problem: UTI (Urinary Tract Infection)  Goal: Improved Infection Symptoms  Outcome: Not Progressing     Problem: Anemia  Goal: Anemia Symptom Improvement  Outcome: Progressing   Goal Outcome Evaluation:         VSS. Denies pain. Ambulating well SBA. Denies SOB or dizziness. Self cathing. Dark red bloody output, no clots present. Pt denies any difficulty with cathing. Offered new cath supplies, pt declined, said he had what he needed. Plan for CT this morning.

## 2023-08-07 NOTE — PLAN OF CARE
Alert and oriented, calm and cooperative with cares. Blood pressure somewhat elevated, but vitals remain stable. Standby assist with walker. Self straight caths himself, but urology is consulted and is to see him and place marino tomorrow morning. Urine appears dark red/ bloody with no clots noted. ANASTACIO was removed due to patient complaining of AC placement. Will need new IV before CT tomorrow. Urine cultures pending.      Problem: Plan of Care - These are the overarching goals to be used throughout the patient stay.    Goal: Optimal Comfort and Wellbeing  Outcome: Progressing     Problem: UTI (Urinary Tract Infection)  Goal: Improved Infection Symptoms  Outcome: Progressing

## 2023-08-07 NOTE — PROGRESS NOTES
Place of Service:  [facility]     Reason for follow up: Gross hematuria    SUBJECTIVE:  Events:  Urine dark burgundy per patient report. Pt has been successfully self catheterizing every 3-4 hours. Reports a clot obstructed the CIC but he was able to repeat and empty completely. Denies abdominal and bilateral flank pain, fever, chills.     Pt reports chronic UR, managed by PCP and does not see a urologist. Suspects is 2/2 BPH and currently without interest in further BPH management other than tamsulosin.     CT Urogram shows diffuse bladder wall thickening consistent with cystitis, some laying of blood in the bladder, significantly enlarged prostate, bladder diverticula c/w chronic bladder outlet obstruction and evidence of malignancy with new liver masses, irregular soft tissue in the second portion of the bladder, enlarging cystic lesion in pancreas and a 7 mm sclerotic sacral lesion of unclear significance.       OBJECTIVE:  PHYSICAL EXAM:  Temp: 97.7  F (36.5  C) Temp src: Oral BP: (!) 150/68 Pulse: 70   Resp: 16 SpO2: 95 % O2 Device: None (Room air)    General: NAD, alert, cooperative, sitting up in chair.   Head: normocephalic, without abnormality / atraumatic  Abdomen: soft, non- tender, non- distended. no suprapubic fullness, no suprapubic tenderness. No bilateral CVA tenderness.   Genitourinary: Penis and scrotum without erythema or edema.   Skin: No rashes or lesions  Musculoskeletal: moves all four extremities equally.   Psychological: alert and oriented, answers questions appropriately    LABS:  Creatinine   Date Value Ref Range Status   08/07/2023 0.77 0.70 - 1.30 mg/dL Final     WBC Count   Date Value Ref Range Status   08/07/2023 7.3 4.0 - 11.0 10e3/uL Final     Hemoglobin   Date Value Ref Range Status   08/07/2023 8.3 (L) 13.3 - 17.7 g/dL Final     Platelet Count   Date Value Ref Range Status   08/07/2023 370 150 - 450 10e3/uL Final       UA:  UA RESULTS:  Recent Labs   Lab Test  08/06/23  1004 05/31/23  2314   COLOR Dark Red* Light Yellow   APPEARANCE Bloody* Clear   URINEGLC  --  Negative   URINEBILI  --  Negative   URINEKETONE  --  Negative   SG  --  1.007   UBLD  --  Negative   URINEPH  --  7.0   PROTEIN  --  Negative   NITRITE  --  Positive*   LEUKEST  --  500 Patrice/uL*   RBCU >182* 2   WBCU >182* 48*       Cultures:  Urine 8/6: pending     Blood: none    Lab Results: personally reviewed.     ASSESSMENT/PLAN:  Denton Hobbs is being seen by [facility] for gross hematuria, possible UTI, hx of chronic UR (likely 2/2 BPH) requiring CIC    - CT Urogram shows some laying of blood in bladder as well as bladder diverticula c/w chronic bladder outlet obstruction, also findings concerning for possible liver mets with biliary dilation (has known duodenal mass).   - Urine dark burgundy with intermittent small clots per patient report. RN and [facility] PA-C unable to place marino overnight. Pt is successfully doing CIC with PVR <10 ml and patient wishes to continue this rather than place marino at this time. No abd/flank pain. Continue every 3-4 hour CIC, PVR checks after. If clotting off straight cath or unable to empty bladder, then needs 3 way marino catheter with manual irrigation/CBI. Will also need to reconsider marino/manual irrigation in AM 8/8 if urine is not clearing.   - UA+, UC pending. Continue broad spectrum antibiotic, adjust as needed pending final culture/sensitivities.   - PO fluids encouraged.   - Hgb stable/improved, 8.3 today. Monitor.   - Creatinine 0.77.   - Will continue to follow.   - Will need outpatient follow up with surgeon for cystoscopy, possible urine cytology.       Andrew Louise, APRN, CNP  [facility]   [phone]

## 2023-08-07 NOTE — PROGRESS NOTES
Care Management Follow Up    Length of Stay (days): 1    Expected Discharge Date: 08/07/2023     Concerns to be Addressed:     discharge needs    Additional Information:  CM met briefly with patient. He lives alone in snr apartment building. Is independent, does use walker when he needs it, otherwise feels well. Does not drive anymore, dtr does grocery shopping, and needs out side of apartment.   His wife is living at [facility] currently. He feels he can return back to aparment, denies any needs from CM.   CM can be avail if needs arise.     Ines Dominguez RN
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
## 2023-08-08 NOTE — DISCHARGE SUMMARY
[facility]  Hospitalist Discharge Summary      Date of Admission:  8/6/2023  Date of Discharge:  8/8/2023  Discharging Provider: Asher nAderson MD  Discharge Service: Hospitalist Service    Discharge Diagnoses   Klebsiella UTI  Metastatic cancer    Clinically Significant Risk Factors          Follow-ups Needed After Discharge   Follow-up Appointments     Follow-up and recommended labs and tests       Follow up with primary care provider, YOUSIF IBARRA, within 7 days for   hospital follow- up.  No follow up labs or test are needed.            Discharge Disposition   Discharged to home  Condition at discharge: Stable    Hospital Course   Identification/Summary: Denton Hobbs is a 88 year old male former reilly  PMHx: BPH (CIC q6 dependent), former smoker, CVA, COPD, Duodenal adenoma     05/31/23 - Hospitalized, transfused 2 units for hemoglobin less than 6  06/02/23 - Discharged with hemoglobin 8.5 after GI recommended duodenal adenoma resection (which he declined).  Discharged with nitrofurantoin for Klebsiella UTI  06/23/23 - Evaluated in the ED, hemoglobin 6.3, transfused 2 units and discharged  07/20/23 -seen in oncology clinic, hemoglobin 8.9.  Diagnosed iron deficiency anemia and declined IV iron.  ------------  08/06/23 - Presented to ED at [facility] for gross hematuria and increased need to straight cath.  Noted hemoglobin 7.1.  Marino catheter placement was attempted to irrigate bladder, but unsuccessful.  Was transferred to [facility] due to bed availability.  Started on cefuroxime empirically for UTI based on prior urine culture results. Pt was seen by Urology who also struggled to get marino placed. Pt returned dark, cherry red urine via CIC.  08/07/23 - Reports feeling well. Hb 8.3, did have clot in catheter this am. CT urogram ordered per urology which showed new liver masses suggesting metastatic disease, intra-/extrahepatic biliary dilation, gallbladder distention, increasing  pancreatic cystic lesion, and diffuse bladder wall thickening, and sclerotic 7mm sacral lesion. Code status clarified as DNR. He was seen by GI, but declined Bx or any further workup. He declined a palliative consult. His daughter was updated.  08/08/23 - Hb stable 8.8 and hematuria clearing. Discharged on 1 week of cefuroxime (with sensitivities back). He re-iterated his wish not to pursue Bx or further workup. He was therefore discharged and informed that he may soon develop significant GI symptoms as tumor burden increases.    Consultations This Hospital Stay   UROLOGY IP CONSULT  HEMATOLOGY & ONCOLOGY IP CONSULT  GASTROENTEROLOGY IP CONSULT    Code Status   No CPR- Do NOT Intubate    Time Spent on this Encounter   I, Asher Anderson MD, personally saw the patient today and spent greater than 30 minutes discharging this patient.       Asher Anderson MD  [address] [address]  Phone: [phone]  Fax: [phone]  ______________________________________________________________________    Physical Exam   Vital Signs: Temp: 97.9  F (36.6  C) Temp src: Oral BP: (!) 156/69 Pulse: 66   Resp: 18 SpO2: 97 % O2 Device: None (Room air)    Weight: 0 lbs 0 oz  Nad in the chair, alert, oriented x3  Makes good eye contact  Breathing comfortably on RA  Normal affect       Primary Care Physician   YOUSIF IBARRA    Discharge Orders      Reason for your hospital stay    Gross hematuria     Follow-up and recommended labs and tests     Follow up with primary care provider, YOUSIF IBARRA, within 7 days for hospital follow- up.  No follow up labs or test are needed.     Activity    Your activity upon discharge: activity as tolerated     Diet    Follow this diet upon discharge: Orders Placed This Encounter      Regular Diet Adult       Significant Results and Procedures   Most Recent 3 CBC's:  Recent Labs   Lab Test 08/08/23  0843 08/07/23  0611 08/06/23  7347  08/06/23  0913 08/02/23  0857   WBC  --  7.3  --  6.9 5.5   HGB 8.8* 8.3* 8.0* 7.1* 8.1*   MCV  --  92  --  95 97   PLT  --  370  --  392 398     Most Recent 3 BMP's:  Recent Labs   Lab Test 08/07/23  0611 08/06/23  0913 06/22/23  1521   * 132* 135*   POTASSIUM 4.3 4.4 4.5   CHLORIDE 103 100 101   CO2 27 27 26   BUN 12 15.8 15.1   CR 0.77 1.05 0.98   ANIONGAP 5 5* 8   GINETTE 8.0* 8.3* 8.7*   GLC 91 101* 98     Most Recent 2 LFT's:  Recent Labs   Lab Test 08/07/23 0611 08/06/23  0913   AST 54* 42   ALT 71* 56   ALKPHOS 260* 230*   BILITOTAL 0.7 0.2   ,   Results for orders placed or performed during the hospital encounter of 08/06/23   CT Urogram wo & w Contrast     Value    Radiologist flags (Urgent)     Presumed metastatic lesions in the liver, increasing size of a pancreatic cystic lesion, possible duodenal malignancy.    Narrative    EXAM: CT UROGRAM WO and W CONTRAST  LOCATION: [facility]  DATE: 8/7/2023    INDICATION: Gross hematuria. Duodenal mass.  COMPARISON: 9/11/22.  TECHNIQUE: CT scan of the abdomen and pelvis using urogram technique with pre contrast, post contrast, and delayed images. Multiplanar reformats were obtained. Dose reduction techniques were used.   CONTRAST: ISOVUE 370 100ML    FINDINGS:   LOWER CHEST: Scarring at the lung bases. Emphysema.     HEPATOBILIARY:  There are at least 5 new enhancing liver masses which measure up to 4 cm.  Intrahepatic and extrahepatic bile duct dilatation are new. The gallbladder is slightly distended.    PANCREAS: Increased pancreatic duct dilatation. An elongated 3.3 x 1.8 x 2.6 cm cyst arising from the main pancreatic duct is larger than on the comparison study, and exhibits a 12 mm area of possible enhancing high attenuation in the dependent portion   (series 6 image 41). A persistent patent duct of Santorini is suspected. There are new surgical clips in the small bowel near the pancreas.     SPLEEN: Unchanged 5 mm hypoattenuating  focus in the spleen.     ADRENAL GLANDS: Normal.    RIGHT KIDNEY/URETER: No renal or ureteral calculi. No hydronephrosis. The distal right ureter is mildly dilated near the bladder. Normal calyces. No filling defects in the opacified portions of the collecting system on delayed imaging.  Simple right   renal cysts do not require follow-up.    LEFT KIDNEY/URETER: No renal or ureteral calculi. No hydronephrosis. There is fullness of the distal left ureter near the bladder. Normal calyces. No filling defects in the opacified portions of the collecting system on delayed imaging.  A simple left   renal cyst does not require follow-up.    BLADDER: There is diffuse wall thickening and mucosal enhancement of the urinary bladder. A few bladder diverticula are noted. There is layering blood within the bladder.     BOWEL: Normal stomach. Irregular soft tissue in the distal second portion of the duodenum. Normal caliber of the small bowel. Normal appendix. Normal appearance of the colon.     LYMPH NODES: Normal.    VASCULATURE: Atherosclerotic disease.  Coils in the region of this third portion of the duodenum.    PELVIC ORGANS: Enlarged prostate gland.     MUSCULOSKELETAL: Small 7 mm sclerotic focus in the sacrum. Degenerative change in the spine.       Impression    IMPRESSION:  1.  New liver masses are concerning for metastatic disease. Liver biopsy could be performed if clinically indicated.  2.  There is irregular soft tissue in the second portion of the duodenum, which could be malignancy. Intrahepatic and extrahepatic bile duct dilatation, gallbladder distention, and pancreatic duct dilatation have increased from the comparison study. This   may be from history of intervention with vascular coil placement, though the duodenal mass is a more likely source.  3.  A cystic lesion in the pancreas is larger and appears to contain enhancing material. A cystic pancreatic tumor is possible.  4.  Diffuse wall thickening and  mucosal enhancement of the urinary bladder suggests cystitis. There is layering blood within the bladder. Bladder diverticula are related to chronic bladder outlet obstruction. The prostate gland is significantly enlarged.     5.  Sclerotic 7 mm sacral lesion of unclear significance.   6.  Recommend GI consultation. Consider PSA testing.      [Urgent Result: Presumed metastatic lesions in the liver, increasing size of a pancreatic cystic lesion, possible duodenal malignancy. ]    Finding was identified on 8/7/2023 8:26 AM CDT.     Dr. Anderson  was contacted by me on 8/7/2023 10:08 AM CDT and verbalized understanding of the critical result.         Discharge Medications   Current Discharge Medication List        START taking these medications    Details   cefuroxime (CEFTIN) 250 MG tablet Take 1 tablet (250 mg) by mouth every 12 hours for 7 days  Qty: 14 tablet, Refills: 0    Associated Diagnoses: Gross hematuria           CONTINUE these medications which have NOT CHANGED    Details   cyanocobalamin (VITAMIN B-12) 1000 MCG tablet Take 1,000 mcg by mouth daily      !! FEROSUL 325 (65 Fe) MG tablet Take 2 tablets by mouth daily (with breakfast) Takes a different dose in the evening      !! ferrous sulfate (FEROSUL) 325 (65 Fe) MG tablet Take 1 tablet by mouth daily (with dinner) Takes a different dose in the morning      pantoprazole (PROTONIX) 40 MG EC tablet Take 1 tablet (40 mg) by mouth 2 times daily (before meals)  Qty: 60 tablet, Refills: 1    Associated Diagnoses: Upper GI bleed       !! - Potential duplicate medications found. Please discuss with provider.        Allergies   Allergies   Allergen Reactions    Sulfa Antibiotics Rash     Info obtained off of 10/4/21 [facility] pre op.

## 2023-08-08 NOTE — PLAN OF CARE
Goal Outcome Evaluation:       Pt alert and  oriented X 4. VSS. Denies pain, SOB, or dizziness. Pt Self Catheterizing.       Dark urine output, Pt stated it's clearing it up. Pt SBA with GB, and walker. Anticipating discharge to home today.

## 2023-08-08 NOTE — PLAN OF CARE
Discharge instructions went over with patient and patient was given AVS and script. Questions were answered and pt stated understanding and signed paperwork. Pt is aware of follow up appointment(s). Pt discharging to home with family transport.

## 2023-08-08 NOTE — PROGRESS NOTES
Place of Service:  [facility]     Reason for follow up: Gross hematuria, UTI, hx of chronic UR (likely 2/2 BPH) managed with CIC    SUBJECTIVE:  Events:  Urine is clearing, now translucent light watermelon to pink. No clots. Pt is doing CIC without difficulty.  Denies abdominal and bilateral flank pain, fever, chills.     OBJECTIVE:  PHYSICAL EXAM:  Temp: 97.9  F (36.6  C) Temp src: Oral BP: (!) 156/69 Pulse: 66   Resp: 18 SpO2: 97 % O2 Device: None (Room air)    General: NAD, alert, cooperative, sitting up in chair.   Head: normocephalic, without abnormality / atraumatic  Abdomen: soft, non- tender, non- distended. no suprapubic fullness, no suprapubic tenderness. No bilateral CVA tenderness.   Genitourinary: Penis and scrotum without erythema or edema.   Skin: No rashes or lesions  Musculoskeletal: moves all four extremities equally.   Psychological: alert and oriented, answers questions appropriately    LABS:  Creatinine   Date Value Ref Range Status   08/07/2023 0.77 0.70 - 1.30 mg/dL Final     WBC Count   Date Value Ref Range Status   08/07/2023 7.3 4.0 - 11.0 10e3/uL Final     Hemoglobin   Date Value Ref Range Status   08/08/2023 8.8 (L) 13.3 - 17.7 g/dL Final     Platelet Count   Date Value Ref Range Status   08/07/2023 370 150 - 450 10e3/uL Final       UA:  UA RESULTS:  Recent Labs   Lab Test 08/06/23  1004 05/31/23  2314   COLOR Dark Red* Light Yellow   APPEARANCE Bloody* Clear   URINEGLC  --  Negative   URINEBILI  --  Negative   URINEKETONE  --  Negative   SG  --  1.007   UBLD  --  Negative   URINEPH  --  7.0   PROTEIN  --  Negative   NITRITE  --  Positive*   LEUKEST  --  500 Patrice/uL*   RBCU >182* 2   WBCU >182* 48*       Cultures:  Urine 8/6: 50,000-100,000 CFU/mL Klebsiella oxytoca   Susceptibility     Klebsiella oxytoca     JAMAL     Ampicillin >=32 ug/mL Resistant 1     Ampicillin/ Sulbactam 8 ug/mL Susceptible     Cefazolin 8 ug/mL Susceptible 2     Cefepime <=1 ug/mL Susceptible     Cefoxitin  <=4 ug/mL Susceptible     Ceftazidime <=1 ug/mL Susceptible     Ceftriaxone <=1 ug/mL Susceptible     Ciprofloxacin <=0.25 ug/mL Susceptible     Gentamicin <=1 ug/mL Susceptible     Levofloxacin <=0.12 ug/mL Susceptible     Nitrofurantoin 32 ug/mL Susceptible     Piperacillin/Tazobactam <=4 ug/mL Susceptible     Tobramycin <=1 ug/mL Susceptible     Trimethoprim/Sulfamethoxazole <=1/19 ug/mL Susceptible              Blood: none    Lab Results: personally reviewed.     ASSESSMENT/PLAN:  Denton Hobbs is being seen by [facility] for gross hematuria, UTI, hx of chronic UR (likely 2/2 BPH) requiring CIC    - UC: Klebsiella oxytoca. Afebrile. Continue PO ceftin at discharge, to complete 10 day antibiotic course.   - Hgb improving, 8.8 today.   - Urine appears to be clearing. No clots noted today. Pt performing CIC without difficulty.  Continue CIC every 4-6 hours. D/w patient recommendation for more frequent straight catheter replacement, ideally replacing disposable catheters with each use. Pt will discuss with his primary provider.   - Discussed recommendation for follow up for GH, UR/BPH with Urology with cystoscopy/possible urine cytology and discussion of options for BPH. Pt declines Urology follow up at this time. [facility] contact info added to discharge for patient to arrange appt if he changes his mind.   - [facility] will sign off. Please re-consult with any new or worsening urologic concerns.      Andrew Louise, APRN, CNP  [facility]   [phone]

## 2023-08-08 NOTE — PLAN OF CARE
Patient alert and oriented X 4. Pleasant and cooperative with cares. VSS. Room Air. Saline locked. Tolerating Regular diet. Stand by assist with walker and gait belt. Denies SOB, dizziness and pain. Patient  self caths. Dark red output. Bladder scanned with 0ml. Anticipating discharge to home tomorrow. Will continue to monitor.

## 2023-08-08 NOTE — PROGRESS NOTES
Care Management Discharge Note    Discharge Date: 08/08/2023     Discharge Disposition: Home    Discharge Services:  OP follow-up    Discharge DME:  Per Treatment Team    Discharge Transportation:  family or friend can transport    Private pay costs discussed: Not applicable    Does the patient's insurance plan have a 3 day qualifying hospital stay waiver?  Yes   Will the waiver be used for post-acute placement? No    PAS Confirmation Code:  Not applicable  Patient/family educated on Medicare website which has current facility and service quality ratings:      Education Provided on the Discharge Plan:    Persons Notified of Discharge Plans:   Patient/Family in Agreement with the Plan:  Yes    Handoff Referral Completed: Yes    Additional Information:  Chart reviewed and plan of care discussed with hospitalist.  Plan to discharge home today with OP follow-up with PCP.    Daya Perkins RN

## 2023-08-16 NOTE — PROGRESS NOTES
"Infusion Nursing Note:  Denton Hobbs presents today for labs and possible blood transfusion.    Patient seen by provider today: No   present during visit today: Not Applicable.    Note: Sebastián arrived via wheelchair and in stable condition accompanied by his daughter. States that he is feeling \"wobbly\" today. He does endorse feeling lightheaded and weak. Patient continues to decline iron infusion. MD and RNCC Mirta aware as of last visit on 8/2. Labs collected. Hgb 7.1 today, so he meets criteria for 1 unit PRBCs. PIV placed in left forearm and good blood return noted. 1 unit PRBCs transfused without issue. PIV removed upon completion and site covered with gauze and secured with coban. Will return on 8/23 for next appointment.      Intravenous Access:  Lab draw site left AC, Needle type butterfly, Gauge 23.  Labs drawn without difficulty.  Peripheral IV placed.    Treatment Conditions:  Lab Results   Component Value Date    HGB 7.1 (L) 08/16/2023    WBC 6.4 08/16/2023    ANEUTAUTO 4.5 08/16/2023     (H) 08/16/2023        Results reviewed, labs MET treatment parameters, ok to proceed with treatment.  Blood transfusion consent signed 5/12/2023.      Post Infusion Assessment:  Patient tolerated infusion without incident.  Blood return noted pre and post infusion.  Site patent and intact, free from redness, edema or discomfort.  No evidence of extravasations.  Access discontinued per protocol.       Discharge Plan:   Patient and/or family verbalized understanding of discharge instructions and all questions answered.  AVS to patient via [employer].  Patient will return 8/23 for next appointment.   Patient discharged in stable condition accompanied by: daughter.  Departure Mode: Wheelchair.      Nadege Hill RN    "

## 2023-08-23 NOTE — PROGRESS NOTES
"Infusion Nursing Note:  Denton Hobbs presents today for labs and possible blood transfusion.    Patient seen by provider today: No   present during visit today: Not Applicable.    Note: Sebastián arrived via wheelchair and in stable condition accompanied by his daughter. States that he is feeling \"pretty well\"   Labs collected. Hgb 6.9 today, so he meets criteria for 2 unit PRBCs. PIV placed in left forearm and good blood return noted. 2 units PRBCs transfused without issue. Patient blood pressure did consistently rise with added blood volume and evened out after rate was reduced for the last hour.  PIV removed upon completion and site covered with gauze and secured with coban. Will return on 9/13 for next appointment. Message sent to Dr Villa with update regarding patient critical Hgb today, writer request if Dr Villa would like the patient labs checked sooner that he have his RNCC contact the patient daughter.  Writer also updated patient daughter that the RNCC may call if Dr Villa wants labs sooner than 9/13/233.  Pt daughter verbalized understanding.        Intravenous Access:  Lab draw site left AC, Needle type butterfly, Gauge 23.  Labs drawn without difficulty.  Peripheral IV placed.    Treatment Conditions:  Lab Results   Component Value Date    HGB 6.9 (LL) 08/23/2023    WBC 6.9 08/23/2023    ANEUTAUTO 5.2 08/23/2023     08/23/2023        Results reviewed, labs MET treatment parameters, ok to proceed with treatment.  Blood transfusion consent signed 5/12/2023.      Post Infusion Assessment:  Patient tolerated infusion without incident.  Blood return noted pre and post infusion.  Site patent and intact, free from redness, edema or discomfort.  No evidence of extravasations.  Access discontinued per protocol.       Discharge Plan:   Patient and/or family verbalized understanding of discharge instructions and all questions answered.  AVS to patient via [employer].  Patient will return " 9/13 for next appointment.   Patient discharged in stable condition accompanied by: daughter.  Departure Mode: Wheelchair.      Lelo Soler RN

## 2023-09-10 NOTE — PROGRESS NOTES
"Met with patient and daughter Valentina to discuss home hospice at discharge. Writer provided information on hospice, hospice medicare benefit and the choice of hospice agencies. Patient and daughter asked appropriate questions, verbalized understanding in regards to home hospice. Valentina states the family will discuss when they would like hospice to start \"maybe a bit down the road\". Encouraged them to get hospice involved sooner rather than later to assist with support and needs.   Writer discussed options of hospice agencies, patient and family did not have a preference. Referral sent to [facility].     Tiki Leggett, RNCM  "

## 2023-09-10 NOTE — ED PROVIDER NOTES
EMERGENCY DEPARTMENT ENCOUNTER      NAME: Denton Hobbs  AGE: 88 year old male  YOB: 1935  MRN: [id number]  EVALUATION DATE & TIME: 9/10/2023 10:45 AM    PCP: Jarad Prater    ED PROVIDER: Sis Adams MD    Chief Complaint   Patient presents with    Chest Pain         FINAL IMPRESSION:  1. Acute pancreatitis, unspecified complication status, unspecified pancreatitis type    2. Alkaline phosphatase elevation    3. Liver mass    4. Pancreatic cyst          ED COURSE & MEDICAL DECISION MAKING:    Pertinent Labs & Imaging studies reviewed. (See chart for details)  88 year old male with history of HTN, COPD, previous CVA and duodenal adenoma with chronic GI bleed who presents to the Emergency Department for evaluation of right-sided shoulder pain since yesterday and right-sided chest pain today.  On examination patient also has reproducible right upper quadrant abdominal pain.  Differential includes hepatobiliary pathology with referred pain into the chest, ACS, PE, basilar pneumonia, musculoskeletal etiology.    Patient placed on monitor, IV established and blood obtained.  Twelve-lead EKG shows sinus rhythm, baseline LVH with strain type pattern but unchanged from his previous.  Bedside ultrasound performed, please see procedure note.  Gallbladder looks unremarkable but there are hyperechoic lesions in the liver.  Per review of the chart, patient had a CT urogram in August that showed multiple liver lesions concerning for metastasis.  It does not look like this has been worked up further.  Patient declines anything for analgesia at this time.  CBC, CMP, LFTs, lipase, troponin notable for hemoglobin 8.8 which is up from his baseline.  Steadily climbing alkaline phosphatase over the last month.  Lipase of 1040.  Troponin indeterminate at 40, will repeat in 2 hours.  CT PE study and CT abdomen pelvis negative for PE.  There is increased size to the hepatic metastases that are felt to be related to the  "pain radiating to his shoulder.  He does have some hyperdense material in the gastric lumen suspicious for GI bleed.  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  Duodenal lesion, biliary ductal dilatation stable.  Findings were discussed with patient and daughter at bedside.  Patient knew about these liver lesions previously and chose not to have anything further evaluated.  Denies wanting any testing, biopsy, surgery, chemo, radiation, etc.  Patient states \"I have made my peace with it, and I am ready to die if today is the day\".  Patient informed of the concern for potential ongoing GI bleed, again does not want any endoscopy or surgery further for you for this.  Declines analgesia continuingly after the above discussion.  Does not want hospital admission despite the concern for GIB, pancreatitis, declines inpatient hospice evaluation and wants to go home.  Outpatient hospice referral placed.        ED Course as of 09/10/23 1334   Sun Sep 10, 2023   1132 Hemoglobin(!): 8.8  Up from 6.9   1143 Troponin T, High Sensitivity(!): 40   1143 Albumin(!): 3.1   1143 Alkaline Phosphatase(!): 330  Steadily increasing over the last month   1151 Lipase(!): 1,040   1238 CT Chest Pulmonary Embolism w Contrast  CT PE study independently interpreted by myself without visualized PE       Medical Decision Making    History:  Supplemental history from: Family Member/Significant Other  External Record(s) reviewed: Inpatient Record: Recent hospital discharge from [facility] and Prior Imaging: August CT abdomen pelvis urogram    Work Up:  Chart documentation includes differential considered and any EKGs or imaging independently interpreted by provider, see MDM  In additional to work up documented, I considered the following work up: see MDM    External consultation:  Discussion of management with another provider: ED case manager    Complicating " factors:  Care impacted by chronic illness: Cerebrovascular Disease, Chronic Lung Disease, and Hypertension  Care affected by social determinants of health: Access to Medical Care weekend no access to PCP    Disposition considerations: Discharge. No recommendations on prescription strength medication(s). See documentation for any additional details.        At the conclusion of the encounter I discussed the results of all of the tests and the disposition. The questions were answered. The patient or family acknowledged understanding and was agreeable with the care plan.      MEDICATIONS GIVEN IN THE EMERGENCY:  Medications   iopamidol (ISOVUE-370) solution 71 mL (71 mLs Intravenous $Given 9/10/23 1202)       NEW PRESCRIPTIONS STARTED AT TODAY'S ER VISIT  New Prescriptions    No medications on file          =================================================================    HPI    Patient information was obtained from: Campos daughter    Use of Intrepreter: N/A      Denton Hobbs is a 88 year old male with pertinent medical history of HTN, COPD, previous CVA and duodenal adenoma with chronic GI bleed who presents right sided shoulder/abdominal/chest pain.  Patient stated yesterday evening he noticed that his right shoulder was bothering him somewhat.  This seemed like it was may be slightly worse with movement and so he did not think much of it.  Did not take anything for the pain.  This morning he is also had some right-sided chest pain.  Patient patient points to the low pectoral region.  Nothing seems to make it better or worse but he has not tried anything for the pain.  No associated lightheadedness, dizziness, shortness of breath, cough cold or chest congestion.  Patient states he was recently hospitalized.  Per chart review patient was hospitalized from [address] at [facility] for left-sided facial droop and some slurred speech.  Was not a candidate for TNK due to history of GI bleed.  At that admission his  hemoglobin was 6.5 and he was given a unit of PRBCs.  Patient states the pain is currently 1 out of 10 and declines any analgesia.      PAST MEDICAL HISTORY:  Past Medical History:   Diagnosis Date    Arthritis     COPD (chronic obstructive pulmonary disease) (H)     CVA (cerebral vascular accident) (H) 12/4/2018    Hypertension     Prostate hypertrophy        PAST SURGICAL HISTORY:  Past Surgical History:   Procedure Laterality Date    ESOPHAGOSCOPY, GASTROSCOPY, DUODENOSCOPY (EGD), COMBINED N/A 9/12/2022    Procedure: ESOPHAGOGASTRODUODENOSCOPY (EGD);  Surgeon: Conner Navarrete MD;  [place] [facility] [place]    GENITOURINARY [place]GERY      HERNIORRHAPHY INGUINAL Left 10/11/2021    Procedure: LEFT INGUINAL HERNIA REPAIR WITH MESH;  Surgeon: Puma Preston MD;  [facility] OR    HERNIORRHAPHY INGUINAL Right 8/19/2022    Procedure: HERNIORRHAPHY, INGUINAL, OPEN, RIGHT;  Surgeon: Flo Keyes MD;  [place] [facility] [place]    [place] VISCERAL ANGIOGRAM  9/13/2022    TRANSRECTAL ULTRASONIC, TRANSURETHRAL RESECTION (TUR) OF PROSTATE CYST  2004    helped temporarily       CURRENT MEDICATIONS:    Prior to Admission Medications   Prescriptions Last Dose Informant Patient Reported? Taking?   FEROSUL 325 (65 Fe) MG tablet 9/10/2023 at AM  Yes Yes   Sig: Take 2 tablets by mouth daily (with breakfast) Takes a different dose in the evening   cyanocobalamin (VITAMIN B-12) 1000 MCG tablet Unknown  Yes Yes   Sig: Take 1,000 mcg by mouth daily   ferrous sulfate (FEROSUL) 325 (65 Fe) MG tablet 9/9/2023 at PM  Yes Yes   Sig: Take 1 tablet by mouth daily (with dinner) Takes a different dose in the morning   pantoprazole (PROTONIX) 40 MG EC tablet 9/10/2023 at am  No Yes   Sig: Take 1 tablet (40 mg) by mouth 2 times daily (before meals)      Facility-Administered Medications: None       ALLERGIES:  Allergies   Allergen Reactions    Sulfa Antibiotics Rash     Info obtained off of 10/4/21 [place] pre op.       FAMILY  HISTORY:  Family History   Problem Relation Age of Onset    No Known Problems Other         No heart, DM, HTN, or CA    No Known Problems Mother     No Known Problems Father        SOCIAL HISTORY:  Social History     Tobacco Use    Smoking status: Former     Packs/day: 2.00     Years: 25.00     Pack years: 50.00     Types: Cigarettes     Start date: 1/1/1990     Passive exposure: Past (as a child brothers & Dad pipe & ciggerettes)    Smokeless tobacco: Never   Substance Use Topics    Alcohol use: No     Comment: Alcoholic Drinks/day: Quit 1990    Drug use: Never        VITALS:  Patient Vitals for the past 24 hrs:   BP Temp Temp src Pulse Resp SpO2   09/10/23 1300 (!) 143/62 -- -- 68 -- 95 %   09/10/23 1115 (!) 145/69 -- -- 80 -- 97 %   09/10/23 1052 -- 97.8  F (36.6  C) Oral 87 18 --       PHYSICAL EXAM    General Appearance: Thin chronically ill-appearing male, slightly pale  Head:  Normocephalic  Eyes:  conjunctiva/corneas clear  ENT:  membranes are moist without pallor  Neck:  Supple  Chest:  No tenderness or deformity, no crepitus  Cardio:  Regular rate and rhythm, no murmur/gallop/rub, 2+ pulses symmetric in all extremities  Pulm:  No respiratory distress, clear to auscultation bilaterally  Back:  No CVA tenderness, normal ROM  Abdomen:  Soft, reproducible right upper quadrant tenderness, non distended,no rebound or guarding.  Extremities: Moves extremities normally.  Passive range of motion of the right shoulder specifically does not elicit any pain  Skin:  Skin warm, dry, no rashes  Neuro:  Alert and oriented ×3     RADIOLOGY/LABS:  Reviewed all pertinent imaging. Please see official radiology report. All pertinent labs reviewed and interpreted.    Results for orders placed or performed during the hospital encounter of 09/10/23   POC US ABDOMEN LIMITED    Impression     Right upper quadrant ultrasound performed.  No evidence of cholelithiasis, gallbladder wall thickening or pericholecystic fluid.  There are  hyperechoic lesions in the liver at least 2 that I can see   CT Chest Pulmonary Embolism w Contrast    Impression    IMPRESSION:  1.  Increased size of multiple hepatic masses since 08/07/2023, concerning for progression of hepatic metastases. The largest lesion abuts the right hemidiaphragm and likely accounts for referred pain to the right shoulder.  2.  Hyperdense material within the gastric lumen could represent hemorrhagic material related to gastrointestinal hemorrhage. Correlate clinically and consider endoscopic correlation.  3.  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  4.  Stable nodular wall thickening in the second portion of the duodenum, either due to duodenal adenoma or duodenal malignancy. Stable resultant severe biliary ductal dilatation and diffuse pancreatic ductal dilatation.  5.  Stable prostatomegaly and diffuse urinary bladder wall thickening, likely due to chronic bladder outlet obstruction..  6.  No pulmonary emboli. No acute findings in the chest.  7.  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   Abd/pelvis CT,  IV  contrast only TRAUMA / AAA    Impression    IMPRESSION:  1.  Increased size of multiple hepatic masses since 08/07/2023, concerning for progression of hepatic metastases. The largest lesion abuts the right hemidiaphragm and likely accounts for referred pain to the right shoulder.  2.  Hyperdense material within the gastric lumen could represent hemorrhagic material related to gastrointestinal hemorrhage. Correlate clinically and consider endoscopic correlation.  3.  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  4.  Stable nodular wall thickening in the  second portion of the duodenum, either due to duodenal adenoma or duodenal malignancy. Stable resultant severe biliary ductal dilatation and diffuse pancreatic ductal dilatation.  5.  Stable prostatomegaly and diffuse urinary bladder wall thickening, likely due to chronic bladder outlet obstruction..  6.  No pulmonary emboli. No acute findings in the chest.  7.  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   Basic metabolic panel   Result Value Ref Range    Sodium 132 (L) 136 - 145 mmol/L    Potassium 4.5 3.4 - 5.3 mmol/L    Chloride 99 98 - 107 mmol/L    Carbon Dioxide (CO2) 25 22 - 29 mmol/L    Anion Gap 8 7 - 15 mmol/L    Urea Nitrogen 19.7 8.0 - 23.0 mg/dL    Creatinine 0.97 0.67 - 1.17 mg/dL    Calcium 8.7 (L) 8.8 - 10.2 mg/dL    Glucose 153 (H) 70 - 99 mg/dL    GFR Estimate 76 >60 mL/min/1.73m2   Result Value Ref Range    Troponin T, High Sensitivity 40 (H) <=22 ng/L   Hepatic function panel   Result Value Ref Range    Protein Total 5.6 (L) 6.4 - 8.3 g/dL    Albumin 3.1 (L) 3.5 - 5.2 g/dL    Bilirubin Total 0.8 <=1.2 mg/dL    Alkaline Phosphatase 330 (H) 40 - 129 U/L    AST 34 0 - 45 U/L    ALT 42 0 - 70 U/L    Bilirubin Direct 0.44 (H) 0.00 - 0.30 mg/dL   Result Value Ref Range    Lipase 1,040 (H) 13 - 60 U/L   CBC with platelets and differential   Result Value Ref Range    WBC Count 7.6 4.0 - 11.0 10e3/uL    RBC Count 2.99 (L) 4.40 - 5.90 10e6/uL    Hemoglobin 8.8 (L) 13.3 - 17.7 g/dL    Hematocrit 28.5 (L) 40.0 - 53.0 %    MCV 95 78 - 100 fL    MCH 29.4 26.5 - 33.0 pg    MCHC 30.9 (L) 31.5 - 36.5 g/dL    RDW 13.9 10.0 - 15.0 %    Platelet Count 480 (H) 150 - 450 10e3/uL    % Neutrophils 76 %    % Lymphocytes 10 %    % Monocytes 11 %    % Eosinophils 1 %    % Basophils 1 %    % Immature Granulocytes 1 %    NRBCs per 100 WBC 0 <1 /100    Absolute Neutrophils 5.9 1.6 - 8.3 10e3/uL    Absolute Lymphocytes 0.7 (L) 0.8 - 5.3 10e3/uL    Absolute  Monocytes 0.8 0.0 - 1.3 10e3/uL    Absolute Eosinophils 0.1 0.0 - 0.7 10e3/uL    Absolute Basophils 0.1 0.0 - 0.2 10e3/uL    Absolute Immature Granulocytes 0.0 <=0.4 10e3/uL    Absolute NRBCs 0.0 10e3/uL   Extra Blue Top Tube   Result Value Ref Range    Hold Specimen JIC        EKG:  Performed at: 10:54    Impression: Sinus rhythm with 1st degree A-C block  Left axis deviation  Non-specific intra-ventricular conduction delay  ST & T wave abnormality, consider lateral ischemia    Rate: 82 BPM  Rhythm: Sinus  Axis: -70  CO Interval: 212  QRS Interval: 120  QTc Interval: 460  ST Changes: ST abnormality  Comparison: 11-SEP-2022  ST no longer depressed in lateral leads    I have independently reviewed and interpreted the EKG(s) documented above.    PROCEDURES:  POC US ABDOMEN LIMITED    Date/Time: 9/10/2023 11:06 AM    Performed by: Sis Adams MD  Authorized by: Sis Adams MD    Comments:      Right upper quadrant ultrasound performed.  No evidence of cholelithiasis, gallbladder wall thickening or pericholecystic fluid.  There are hyperechoic lesions in the liver, at least 2 that I can see          Sis Adams MD  Emergency Medicine  [facility] EMERGENCY DEPARTMENT  [address] [facility] [address] [phone]  Dept: [phone]     Ssi Adams MD  09/10/23 7937

## 2023-09-10 NOTE — ED TRIAGE NOTES
Pt arrives with R pectoral chest pain that started Sat night.   Pt also endorses R sided shoulder pain.  Pt has hx of CVA   Pt notes shortness of breath that started at the same time.   Triage Assessment       Row Name 09/10/23 1047       Triage Assessment (Adult)    Airway WDL WDL       Respiratory WDL    Respiratory WDL rhythm/pattern    Rhythm/Pattern, Respiratory shortness of breath       Skin Circulation/Temperature WDL    Skin Circulation/Temperature WDL WDL       Cardiac WDL    Cardiac WDL chest pain       Chest Pain Assessment    Chest Pain Location epigastric;shoulder, right       Peripheral/Neurovascular WDL    Peripheral Neurovascular WDL WDL       Cognitive/Neuro/Behavioral WDL    Cognitive/Neuro/Behavioral WDL WDL

## 2023-09-10 NOTE — PHARMACY-ADMISSION MEDICATION HISTORY
Pharmacist Admission Medication History    Admission medication history is complete. The information provided in this note is only as accurate as the sources available at the time of the update.    Medication reconciliation/reorder completed by provider prior to medication history? No    Information Source(s): Patient, Family member, and CareEverywhere/SureScripts via in-person    Pertinent Information: N/A    Changes made to PTA medication list:  Added: None  Deleted: None  Changed: None    Medication Affordability:  Not including over the counter (OTC) medications, was there a time in the past 3 months when you did not take your medications as prescribed because of cost?: No    Allergies reviewed with patient and updates made in EHR: yes    Medication History Completed By: CHIQUITA UNGER RPH 9/10/2023 12:54 PM    Prior to Admission medications    Medication Sig Last Dose Taking? Auth Provider Long Term End Date   cyanocobalamin (VITAMIN B-12) 1000 MCG tablet Take 1,000 mcg by mouth daily Unknown Yes Unknown, Entered By History     FEROSUL 325 (65 Fe) MG tablet Take 2 tablets by mouth daily (with breakfast) Takes a different dose in the evening 9/10/2023 at AM Yes Reported, Patient     ferrous sulfate (FEROSUL) 325 (65 Fe) MG tablet Take 1 tablet by mouth daily (with dinner) Takes a different dose in the morning 9/9/2023 at PM Yes Unknown, Entered By History     pantoprazole (PROTONIX) 40 MG EC tablet Take 1 tablet (40 mg) by mouth 2 times daily (before meals) 9/10/2023 at am Yes Derek Ardon MD

## 2023-09-12 NOTE — TELEPHONE ENCOUNTER
Patient's daughter Morena calls in to discuss his upcoming appointment whether or not we can push it out by a week.  She tells me that he was in the hospital at [place] for strokelike symptoms starting September 2 for a couple of days.  He did have a blood transfusion there as his hemoglobin was low.  He then ended up in the ER on September 9 with pain.  They did not find a reason for the pain and let him know that his hemoglobin was 8.8.  Since this was just checked on 9/10, they would like to push this out by a week because they think he will need blood in a week versus tomorrow when they have an appointment.  I did let them know that since this is a couple month follow-up we can go ahead and push this out by 1 week.  She was transferred back to the  to make that arrangement.    Of note, consent to communicate with daughter Morena is on file dated 6/22/2023.    Joyce Verduzco RN

## 2023-09-19 NOTE — PROGRESS NOTES
Infusion Nursing Note:  Denton Hobbs presents today for labs and blood transfusion.    Patient seen by provider today: No   present during visit today: Not Applicable.    Note: Patient assessed and vital signs stable. Administered one unit RBC's today per provider orders. Vital signs remained stable throughout and tolerated with no issues.      Intravenous Access:  Labs drawn without difficulty.  Peripheral IV placed.    Treatment Conditions:  Lab Results   Component Value Date    HGB 7.3 (L) 09/19/2023    WBC 7.5 09/19/2023    ANEUTAUTO 5.4 09/19/2023     (H) 09/19/2023        Results reviewed, labs MET treatment parameters, ok to proceed with treatment.  Blood transfusion consent signed 8/6/2023.      Post Infusion Assessment:  Patient tolerated infusion without incident.  Blood return noted pre and post infusion.  Site patent and intact, free from redness, edema or discomfort.  No evidence of extravasations.  Access discontinued per protocol.       Discharge Plan:   Patient discharged in stable condition accompanied by: daughter.  Departure Mode: walker.      MARÍA MENDOZA RN

## 2023-09-27 NOTE — LETTER
9/27/2023         RE: Denton Hobbs  [address]  [facility] [address]        Dear Colleague,    Thank you for referring your patient, Denton Hobbs, to the [facility] [place]. Please see a copy of my visit note below.    [facility] Hematology and Oncology Progress Note    Patient: Denton Hobbs  MRN: [id number]  Date of Service: Sep 27, 2023          Reason for Visit    Chief Complaint   Patient presents with     Oncology Clinic Visit     Return visit with labs/poss blood related to Ampullary adenoma w Associated UGI Bleed; Iron deficiency anemia due to chronic blood loss         Assessment and Plan     Cancer Staging   No matching staging information was found for the patient.    Anemia, secondary to acute blood loss from duodenal adenoma. GI bleeding s/p clip placement and embolization: Patient has continued to have issues with bleeding.  He has been needing blood transfusion intermittently.  At this point patient does not want to be aggressive and does not want to really do any other treatment except checking lab when giving blood transfusions as necessary.  At this time because of a heart history and other issue that we will give him 1 unit of blood if he is less than 8.  Will return every 2 weeks for labs with possible blood transfusion and see Dr. Villa in 2 to 3 months    Likely metastatic cancer with growing liver lesions: I talked to patient about the fact that these are getting bigger and they are likely cancer.  He states he does not want to do any work-up for this.  He is not interested in any treatment and states he does not want to be aggressive about any cares.    Iron deficiency: This is from chronic blood loss.  I talked the patient on getting him some IV iron.  He is taking an oral iron supplement but he likely is not Jacque at all and it is probably not enough due to the amount of blood he is losing.  At this point he is refusing any  iron IV infusions and states he just wants to get the blood intermittently.  I did talk to him about why an iron infusion might help his iron storage which will overall help decrease the amount of blood he may need but he still declines.    ECOG Performance    1 - Can't do physically strenuous work, but fully ambyulatory and can do light sedentary work    Distress Screening (within last 30 days)    No data recorded     Pain  Pain Score: No Pain (0)    Problem List    Patient Active Problem List   Diagnosis     Orchitis and epididymitis     BPH w Retention -- chronic marino     Sepsis (Leukocytosis, Tachycardia)     CVA (cerebral vascular accident) (H)     Left-sided weakness     Indwelling Marino catheter present     Elevated PSA     Cystitis     Balance problems     Aphasia     Non-recurrent unilateral inguinal hernia without obstruction or gangrene     Enlarged prostate     Klebsiella UTI     Duodenal adenoma     Anemia, unspecified type     Iron deficiency anemia due to chronic blood loss     Gross hematuria        ______________________________________________________________________________    History of Present Illness    Measurable disease: CBC, ferritin, iron studies, endoscopic evaluation of adenoma, CT scan     Current therapy: Oral iron twice daily  Transfusions as needed to keep hemoglobin greater than 7  Declined iron IV infusion    Interim History: Patient is here today for follow-up.  He had labs drawn and is waiting to see if he needs a blood transfusion.  Overall he says he has chronic fatigue that is about the same.  He does have some shortness of breath with activity.  He states he still does not want to pursue really any other treatment or aggressive care for anything else.  At this point he said he just wants to get labs and blood transfusions as needed.  He does feel better after he gets a blood transfusion.  Denies any fevers or infectious complaints.  Denies any new bone or back pain.  States  "his stools are dark but that is been going on for a long time since he has been on iron    Review of Systems    Pertinent items are noted in HPI.    Past History    Past Medical History:   Diagnosis Date     Arthritis      COPD (chronic obstructive pulmonary disease) (H)      CVA (cerebral vascular accident) (H) 12/4/2018     Hypertension      Prostate hypertrophy        PHYSICAL EXAM  /49 (BP Location: Left arm, Patient Position: Sitting, Cuff Size: Adult Regular)   Pulse 72   Temp 97.6  F (36.4  C) (Tympanic)   Resp 22   Ht 1.727 m (5' 8\")   Wt 59.9 kg (132 lb)   SpO2 98%   BMI 20.07 kg/m      GENERAL: no acute distress. Cooperative in conversation. Here with daughter  RESP: Regular respiratory rate. No expiratory wheezes   NEURO: non focal. Alert and oriented x3.   PSYCH: within normal limits. No depression or anxiety.  SKIN: exposed skin is dry intact.     Lab Results    Recent Results (from the past 168 hour(s))   Adult Type and Screen   Result Value Ref Range    ABO/RH(D) O POS     Antibody Screen Negative Negative    SPECIMEN EXPIRATION DATE [id number]    CBC with platelets and differential   Result Value Ref Range    WBC Count 7.2 4.0 - 11.0 10e3/uL    RBC Count 2.74 (L) 4.40 - 5.90 10e6/uL    Hemoglobin 7.8 (L) 13.3 - 17.7 g/dL    Hematocrit 26.5 (L) 40.0 - 53.0 %    MCV 97 78 - 100 fL    MCH 28.5 26.5 - 33.0 pg    MCHC 29.4 (L) 31.5 - 36.5 g/dL    RDW 13.8 10.0 - 15.0 %    Platelet Count 394 150 - 450 10e3/uL    % Neutrophils 72 %    % Lymphocytes 11 %    % Monocytes 14 %    % Eosinophils 2 %    % Basophils 1 %    % Immature Granulocytes 0 %    NRBCs per 100 WBC 0 <1 /100    Absolute Neutrophils 5.2 1.6 - 8.3 10e3/uL    Absolute Lymphocytes 0.8 0.8 - 5.3 10e3/uL    Absolute Monocytes 1.0 0.0 - 1.3 10e3/uL    Absolute Eosinophils 0.1 0.0 - 0.7 10e3/uL    Absolute Basophils 0.1 0.0 - 0.2 10e3/uL    Absolute Immature Granulocytes 0.0 <=0.4 10e3/uL    Absolute NRBCs 0.0 10e3/uL   Prepare red " blood cells (unit)   Result Value Ref Range    Blood Component Type Red Blood Cells     Product Code [id number]     Unit Status Transfused     Unit Number [id number]     CROSSMATCH Compatible     CODING SYSTEM [id number]     ISSUE DATE AND TIME [id number]     UNIT ABO/RH O+     UNIT TYPE ISBT 5100        Imaging    CT Chest Pulmonary Embolism w Contrast    Result Date: 9/10/2023  EXAM: CT ABDOMEN PELVIS W CONTRAST, CT CHEST PULMONARY EMBOLISM W CONTRAST LOCATION: [facility] DATE: 9/10/2023 INDICATION: ruq abd pain ? liver masses on bedside us. Right shoulder pain and right-sided chest pain. COMPARISON: CT urogram 08/07/2023. Chest CT on 03/29/2023 and CT angiogram of the abdomen and pelvis on 09/11/2022 TECHNIQUE: CT chest pulmonary angiogram and routine CT abdomen pelvis with IV contrast. Arterial phase through the chest and venous phase through the abdomen and pelvis. Multiplanar reformats and MIP reconstructions were performed. Dose reduction techniques were used. CONTRAST: isovue 370  71ml FINDINGS: ANGIOGRAM CHEST: Pulmonary arteries are normal caliber and negative for pulmonary emboli. Thoracic aorta is negative for dissection. No CT evidence of right heart strain. LUNGS AND PLEURA: Mild centrilobular emphysema. Mild subpleural reticular opacities, likely mild fibrosis. Small focal ectasia/dilation of a segmental branch of the left lower lobe pulmonary artery is stable (series 5, image 219). No infiltrate or pleural effusion. No suspicious pulmonary nodules or masses. Stable small nodules along the left major fissure measuring 2 - 3 mm. MEDIASTINUM/AXILLAE: No lymphadenopathy. Small amount of fluid in the pericardial recesses. No thoracic aortic aneurysm. Mild cardiomegaly. CORONARY ARTERY CALCIFICATION: Severe. HEPATOBILIARY: Since the CT on 08/07/2023, multiple hepatic masses have increased in size, concerning for progression of hepatic metastases. For example, a 4.9 cm mass in the  posterior right lobe previously measured 2.0 cm (3/57), a 5.4 cm mass in the right hepatic lobe, segment VIII at the dome (series 6, image 30) previously measured 3.5 cm and a 3.2 cm mass in the left hepatic lobe, segment IVB previously measured 1.2 cm (3/46). The largest 5.4 cm lesion in the right hepatic lobe at the dome abuts the right hemidiaphragm and may account for referred pain to the right shoulder. Stable moderate intrahepatic and severe extrahepatic biliary ductal dilatation to the level of the duodenal ampulla. PANCREAS: Stable diffuse dilatation of the main pancreatic duct measuring up to 9 mm. A 5.4 x 2.4 cm cystic lesion communicating with the pancreatic duct in the mid body, and extending superior to the pancreas is increased in size, previously measuring 4.3 x 2.6 cm. Nodular mural enhancement along the wall of the cystic lesion measuring about 1.2 cm is stable (3/53). SPLEEN: Stable lobulated spleen with subcapsular calcifications, likely related to prior trauma. Stable subcentimeter hypodensity in the spleen posteriorly, indeterminate but possibly a cyst or hemangioma. ADRENAL GLANDS: Normal. KIDNEYS/BLADDER: Few cysts in the kidneys requiring no specific follow-up. No calculi or hydronephrosis bilaterally. Stable mild focal dilatation of the distal left ureter. Stable diffuse circumferential wall thickening of the urinary bladder with bladder wall trabeculations. BOWEL: Hyperdense contents in the gastric lumen are indeterminate, could represent hemorrhagic material within the stomach. A 1.0 cm linear calcification in the gastric lumen (3/68) could represent ingested bone. Stable irregular wall thickening of the second portion of the duodenum. No small bowel or colonic obstruction. Moderate amount of formed stool in the colon. Normal appendix. Mild sigmoid diverticula cyst. LYMPH NODES: No lymphadenopathy in the abdomen and pelvis. VASCULATURE: Ectatic infrarenal abdominal aorta measuring 2.8 cm.  Extensive aortobiiliac atherosclerotic calcifications. PELVIC ORGANS: Stable marked prostatomegaly. No pelvic free fluid. No fluid collections. MUSCULOSKELETAL: Few stable scattered sclerotic lesions in the spine including a lesion in the right pedicle of T8 and a small sclerotic focus in the T5 vertebral body, likely bone islands. No destructive lesions in the bones.     IMPRESSION: 1.  Increased size of multiple hepatic masses since 08/07/2023, concerning for progression of hepatic metastases. The largest lesion abuts the right hemidiaphragm and likely accounts for referred pain to the right shoulder. 2.  Hyperdense material within the gastric lumen could represent hemorrhagic material related to gastrointestinal hemorrhage. Correlate clinically and consider endoscopic correlation. 3.  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 4.  Stable nodular wall thickening in the second portion of the duodenum, either due to duodenal adenoma or duodenal malignancy. Stable resultant severe biliary ductal dilatation and diffuse pancreatic ductal dilatation. 5.  Stable prostatomegaly and diffuse urinary bladder wall thickening, likely due to chronic bladder outlet obstruction.. 6.  No pulmonary emboli. No acute findings in the chest. 7.  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    Abd/pelvis CT,  IV  contrast only TRAUMA / AAA    Result Date: 9/10/2023  EXAM: CT ABDOMEN PELVIS W CONTRAST, CT CHEST PULMONARY EMBOLISM W CONTRAST LOCATION: [facility] DATE: 9/10/2023 INDICATION: ruq abd pain ? liver masses on bedside us. Right shoulder pain and right-sided chest pain. COMPARISON: CT urogram 08/07/2023. Chest CT on 03/29/2023 and CT angiogram of the abdomen and pelvis on 09/11/2022 TECHNIQUE: CT chest pulmonary  angiogram and routine CT abdomen pelvis with IV contrast. Arterial phase through the chest and venous phase through the abdomen and pelvis. Multiplanar reformats and MIP reconstructions were performed. Dose reduction techniques were used. CONTRAST: isovue 370  71ml FINDINGS: ANGIOGRAM CHEST: Pulmonary arteries are normal caliber and negative for pulmonary emboli. Thoracic aorta is negative for dissection. No CT evidence of right heart strain. LUNGS AND PLEURA: Mild centrilobular emphysema. Mild subpleural reticular opacities, likely mild fibrosis. Small focal ectasia/dilation of a segmental branch of the left lower lobe pulmonary artery is stable (series 5, image 219). No infiltrate or pleural effusion. No suspicious pulmonary nodules or masses. Stable small nodules along the left major fissure measuring 2 - 3 mm. MEDIASTINUM/AXILLAE: No lymphadenopathy. Small amount of fluid in the pericardial recesses. No thoracic aortic aneurysm. Mild cardiomegaly. CORONARY ARTERY CALCIFICATION: Severe. HEPATOBILIARY: Since the CT on 08/07/2023, multiple hepatic masses have increased in size, concerning for progression of hepatic metastases. For example, a 4.9 cm mass in the posterior right lobe previously measured 2.0 cm (3/57), a 5.4 cm mass in the right hepatic lobe, segment VIII at the dome (series 6, image 30) previously measured 3.5 cm and a 3.2 cm mass in the left hepatic lobe, segment IVB previously measured 1.2 cm (3/46). The largest 5.4 cm lesion in the right hepatic lobe at the dome abuts the right hemidiaphragm and may account for referred pain to the right shoulder. Stable moderate intrahepatic and severe extrahepatic biliary ductal dilatation to the level of the duodenal ampulla. PANCREAS: Stable diffuse dilatation of the main pancreatic duct measuring up to 9 mm. A 5.4 x 2.4 cm cystic lesion communicating with the pancreatic duct in the mid body, and extending superior to the pancreas is increased in size,  previously measuring 4.3 x 2.6 cm. Nodular mural enhancement along the wall of the cystic lesion measuring about 1.2 cm is stable (3/53). SPLEEN: Stable lobulated spleen with subcapsular calcifications, likely related to prior trauma. Stable subcentimeter hypodensity in the spleen posteriorly, indeterminate but possibly a cyst or hemangioma. ADRENAL GLANDS: Normal. KIDNEYS/BLADDER: Few cysts in the kidneys requiring no specific follow-up. No calculi or hydronephrosis bilaterally. Stable mild focal dilatation of the distal left ureter. Stable diffuse circumferential wall thickening of the urinary bladder with bladder wall trabeculations. BOWEL: Hyperdense contents in the gastric lumen are indeterminate, could represent hemorrhagic material within the stomach. A 1.0 cm linear calcification in the gastric lumen (3/68) could represent ingested bone. Stable irregular wall thickening of the second portion of the duodenum. No small bowel or colonic obstruction. Moderate amount of formed stool in the colon. Normal appendix. Mild sigmoid diverticula cyst. LYMPH NODES: No lymphadenopathy in the abdomen and pelvis. VASCULATURE: Ectatic infrarenal abdominal aorta measuring 2.8 cm. Extensive aortobiiliac atherosclerotic calcifications. PELVIC ORGANS: Stable marked prostatomegaly. No pelvic free fluid. No fluid collections. MUSCULOSKELETAL: Few stable scattered sclerotic lesions in the spine including a lesion in the right pedicle of T8 and a small sclerotic focus in the T5 vertebral body, likely bone islands. No destructive lesions in the bones.     IMPRESSION: 1.  Increased size of multiple hepatic masses since 08/07/2023, concerning for progression of hepatic metastases. The largest lesion abuts the right hemidiaphragm and likely accounts for referred pain to the right shoulder. 2.  Hyperdense material within the gastric lumen could represent hemorrhagic material related to gastrointestinal hemorrhage. Correlate clinically and  consider endoscopic correlation. 3.  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 4.  Stable nodular wall thickening in the second portion of the duodenum, either due to duodenal adenoma or duodenal malignancy. Stable resultant severe biliary ductal dilatation and diffuse pancreatic ductal dilatation. 5.  Stable prostatomegaly and diffuse urinary bladder wall thickening, likely due to chronic bladder outlet obstruction.. 6.  No pulmonary emboli. No acute findings in the chest. 7.  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    POC US ABDOMEN LIMITED    Result Date: 9/10/2023  Sis Adams MD     9/10/2023  1:34 PM POC US ABDOMEN LIMITED Date/Time: 9/10/2023 11:06 AM Performed by: Sis Adams MD Authorized by: Sis Adams MD  Comments:    Right upper quadrant ultrasound performed.  No evidence of cholelithiasis, gallbladder wall thickening or pericholecystic fluid.  There are hyperechoic lesions in the liver, at least 2 that I can see    ECHO TRANSTHORACIC COMPLETE    Result Date: 9/3/2023   [place] [address]  [phone] [email or web address]                                              Transthoracic Echo Report  JERONIMO SEAY  Vicente ID: [id number] Age: 88 : 1935 Ordering Provider: CEM MARIE  Exam Date: 2023 13:57 Gender: M Sonographer: PRISCILA  Accession #: [id number] Height: 68 in BSA: 1.69 m  BP: 148 / 65  Weight: 127 lbs BMI: 19.3 kg/m  HR: 68  Location: Inpatient (Portable) Rhythm: Normal Sinus Rhythm  Procedure Components: 2D imaging, Color Doppler, Spectral Doppler  Indications: CVA  Technical Quality: Good Contrast: None  Final Conclusion  1. Mild left ventricular chamber  enlargement. Moderately increased basal septal wall thickness superimposed on mild concentric  increased left ventricular wall thickness . Normal left ventricular systolic function. Calculated left ventricular ejection fraction (modified  Cueto technique) is 55 %. No regional wall motion abnormalities.  2. Grade 2 left ventricular diastolic dysfunction consistent with moderately increased left ventricular filling pressure.  3. Normal right ventricular size and systolic function.  4. Biatrial enlargement.  5. Moderate calcific aortic valve stenosis. Aortic valve systolic peak velocity is 3 m/sec. Aortic valve systolic mean gradient is 21  mmHg. Aortic valve dimensionless index is 0.3.  6. Mild mitral valve regurgitation.  7. Mild ascending aorta dilatation (4.3 cm).  8. There were no prior studies available for comparison.  Estimated EF: 55%  FINDINGS  Left Ventricle Mild left ventricular chamber enlargement. Moderately increased basal septal wall thickness superimposed on  mild concentric increased left ventricular wall thickness . Normal left ventricular systolic function. Calculated left  ventricular ejection fraction (modified Cueto technique) is 55 %. No regional wall motion abnormalities.  Diastolic Function Grade 2 left ventricular diastolic dysfunction consistent with moderately increased left ventricular filling  pressure.  Right Ventricle Normal right ventricular chamber size. Normal right ventricular systolic function.  Right ventricular systolic  pressure cannot be estimated due to inability to detect peak tricuspid regurgitation Doppler velocity.  Left Atrium Left atrial volume index is 50.6 ml/m . Severe left atrial enlargement.  Right Atrium Right atrial enlargement.  Atrial Septum No evidence of inter-atrial shunt by color flow Doppler.  Aortic Valve Trileaflet aortic valve. Aortic valve sclerosis.  Moderate calcific aortic valve stenosis. Aortic valve systolic peak  velocity is 3 m/sec. Aortic  valve systolic mean gradient is 21 mmHg.  Aortic valve dimensionless index is 0.3.  Aortic valve area likely overestimated in setting of large LVOT diameter. Mild aortic valve regurgitation.  Mitral Valve Mildly thickened mitral valve. Mild mitral valve regurgitation.  Tricuspid Valve Normal tricuspid valve. Trivial tricuspid valve regurgitation.  Pulmonic Valve Pulmonary valve was not well visualized. No pulmonary valve stenosis. No pulmonary valve regurgitation.  Pericardium Tiny, insignificant pericardial effusion.  Aorta Aortic sinus of Valsalva is normal in size (4 cm, ZScore = 1.1). Mild ascending aorta dilatation (4.3 cm).  Inferior Vena Cava Dilated inferior vena cava with normal inspiratory collapse.  MEASUREMENTS  (Male / Female) Normal Values  2D MEASUREMENTS AND LV FUNCTION  IVS Diastolic Thickness           1.3 cm                < 1.1 cm / < 1.0 cm  LV Diastolic Diameter PLAX        6.1 cm                4.2 - 5.9 / 3.9 - 5.3 cm  LV Diastolic Diameter Index       3.62 cm/m   LVPW Diastolic Thickness          1.23 cm               < 1.1 cm / < 1.0 cm  LV Systolic Diameter PLAX         3.57 cm  LV Systolic Diameter Index        2.12 cm/m   LVOT Diameter                     3 cm  LVOT Cardiac Output               10.1 l/min  LVOT Cardiac Index                6.1 l/min m   LVOT Stroke Volume                148 ml  Stroke Volume Index               89.7 ml/m   LV Ejection Fraction MOD BP       54.6 %                >= 55  %  LV Stroke Volume 2C AL            133 ml  LA Area 4C View                   25.6 cm   LA Length 4C                      6.97 cm  LA Area 2C View                   25.8 cm   LA Length 2C                      6.26 cm  LA Volume                         80.4 ml  LA Volume MOD BP                  85.3 ml  LA Volume Index MOD BP            50.6 ml/m             16 - 34 ml/m   RV Diastolic Basal Diameter       2.84 cm  LV Mass                           347 g  LV Mass Index                      208 g/m   Sinuses of Valsalva Diameter(d)   4 cm  Ascending Aorta Diameter(s)       4.3 cm  IVC Diameter Expiration           2.25 cm  Ascending Aorta Index             2.55 cm/m   M MODE  TAPSE MM                          4.03 cm  DIASTOLOGY  Mitral E Point Velocity           1.16 m/sec            0.70 - 1.02 m/sec  Mitral A Point Velocity           1.59 m/sec            0.06 - 1.06 m/sec  Mitral E to A Ratio               0.73                  1.1 - 2.1  MV Deceleration Time              193 msec              167 - 231 msec  LV E' Lateral Velocity            0.0787 m/sec  Mitral E to LV E' Lateral Ratio   14.7  LV E' Septal Velocity             0.043 m/sec  Mitral E to LV E' Septal Ratio    27  AORTIC VALVE  AV Peak Velocity                  3.02 m/sec            < 2.0 m/sec  AV Peak Gradient                  36.5 mmHg  AV Mean Gradient                  21 mmHg  AV Velocity Time Integral         69.2 cm  LVOT Peak Velocity                0.963 m/sec  LVOT Velocity Time Integral       21 cm  AV Area Cont Eq vti               2.15 cm   AV Area Cont Eq pk                2.25 cm   AV Dimensionless Index            0.303  AI Pressure Half Time             362 msec  MITRAL VALVE  MV Peak Gradient                  8.64 mmHg  MV Mean Gradient                  4 mmHg  MV Velocity Time Integral         47.2 cm  MV Pressure Half Time             56 msec  MV Area PHT                       3.93 cm   TRICUSPID VALVE AND ESTIMATED PRESSURES  Right Atrial Pressure             8 mmHg  HCM DATA  LVOT SHANNAN (r)                      3.71 mmHg  Aortic Root ZScore: 1.11  Sandy Brand MD  [facility] Accredited Site  (Electronically Signed)  Final Date: 03 September 2023 17:05  ICD-10 Codes: 436.0    MR Head without contrast    Result Date: 9/2/2023  For Patients: As a result of the 21st Century Cures Act, medical imaging exams and procedure reports are released immediately into your electronic medical record. You may view this report before  your referring provider. If you have questions, please contact your health care provider. EXAM: MR HEAD BRAIN WO LOCATION: [facility] MEDICAL IMAGING DATE: 9/2/2023 INDICATION: Eval f/u cerebral vascular disease/ischemia. COMPARISON: CT/CTA performed earlier today. TECHNIQUE: Routine multiplanar multisequence head MRI without intravenous contrast. FINDINGS: INTRACRANIAL CONTENTS: Subtle punctate foci of hyperintensity on the diffusion-weighted images within the right centrum semiovale and left parietal corona radiata, which are too small to characterize on the ADC map although likely corresponds to low to intermediate ADC values (series 5.2 images 10 and 14; series 11.2 images 17 and 8, respectively). Corresponding FLAIR hyperintensity. No associated mass effect. No acute intracranial hemorrhage or extra-axial fluid collection. A punctate chronic infarct involving the right caudate head, corresponding to hypodensity described on recent CT (series 10 image 17). Chronic bilateral centrum semiovale, right paramedian pontine, and bilateral cerebellar hemisphere infarcts, the latter greater on the right. Confluent regions in scattered foci of T2 prolongation within the bilateral cerebral white matter, nonspecific although most likely the sequela of advanced chronic microvascular ischemia. Moderate generalized parenchymal volume loss. No hydrocephalus. Normal position of the cerebellar tonsils. OTHER: Within technique limitations, no additional significant finding.    1.  Subtle punctate foci of restricted diffusion in the right centrum semiovale and left parietal corona radiata, suspicious for acute/subacute infarcts. No associated mass effect or acute intracranial hemorrhage. 2.  Additional chronic changes including small chronic infarcts involving the bilateral centrum semiovale, right paramedian pontine, bilateral cerebellar infarcts, and right caudate head, the latter corresponding to questionable hypodensity on recent  CT.    XR CHEST 1 VIEW PORTABLE    Result Date: 9/2/2023  For Patients: As a result of the 21st Century Cures Act, medical imaging exams and procedure reports are released immediately into your electronic medical record. You may view this report before your referring provider. If you have questions, please contact your health care provider. EXAM: XR CHEST 1 VIEW PORTABLE LOCATION: [facility] MEDICAL IMAGING DATE: 9/2/2023 INDICATION: CVA, stroke. Left facial numbness, speech difficulty. COMPARISON: None.    Lungs clear. Heart size and pulmonary vascularity normal. Probable small hiatal hernia.    CT ANGIO HEAD NECK CAROTID STROKE PROTOCOL MANJINDER CANDIDATE    Result Date: 9/2/2023  For Patients: As a result of the 21st Century Cures Act, medical imaging exams and procedure reports are released immediately into your electronic medical record. You may view this report before your referring provider. If you have questions, please contact your health care provider. EXAM: CT ANGIO HEAD NECK CAROTID STROKE PROTOCOL MANJINDER CANDIDAT LOCATION: [facility] MEDICAL IMAGING DATE/TIME: 9/2/2023 2:47 PM CDT INDICATION: Eval and f/u cerebral vascular disease/ischemia COMPARISON: None. CONTRAST: IOHEXOL 350 MG IODINE/ML  ML BOTTLE: 75mL TECHNIQUE: Head and neck CT angiogram with IV contrast. Noncontrast head CT followed by axial helical CT images of the head and neck vessels obtained during the arterial phase of intravenous contrast administration. Axial 2D reconstructed images and multiplanar 3D MIP reconstructed images of the head and neck vessels were performed by the technologist. Dose reduction techniques were used. All stenosis measurements made according to NASCET criteria unless otherwise specified. FINDINGS: NONCONTRAST HEAD CT: INTRACRANIAL CONTENTS: Small hypodensity at the right caudate head compatible with age-indeterminate lacunar infarct (axial series 6 image 27). Additional punctate hypodensity in the right hemipons is  age-indeterminate, but reportedly corresponds with old insult on prior imaging not available at time of interpretation. More conspicuous hypodensity in the right cerebellum suspected to represent chronic lacunar infarct. No hemorrhage or extraaxial collection. No mass effect. Subarachnoid cisterns are patent. Patchy white matter hypodensities are, while nonspecific, most compatible with chronic microvascular ischemic changes. Proportional prominence of the ventricles and sulci is compatible with diffuse cerebral volume loss. VISUALIZED ORBITS/SINUSES/MASTOIDS: No visible intraorbital abnormality. Paranasal sinuses are clear. No middle ear or mastoid effusion. BONES/SOFT TISSUES: No acute abnormality. HEAD CTA: ANTERIOR CIRCULATION: No proximal branch vessel occlusion. Focal severe stenosis at 8 proximal M3 branch of the right MCA (series 10 image 135). Atherosclerotic luminal irregularity and calcifications involving the bilateral carotid siphons most notably at the right greater than left cavernous ICA segments including focal caliber dilation/ectasia. Focal moderate stenosis at the proximal supraclinoid segment of the left ICA No saccular aneurysm or high-flow vascular malformation. POSTERIOR CIRCULATION: No proximal branch vessel occlusion. P1 and P2 segments of the PCAs are patent with mild luminal irregularity. No high-grade stenosis. Vertebrobasilar artery is patent. Mild focal vascular prominence at the basilar tip favored related to confluence of branch vessels. No aneurysm or high-flow vascular malformation. Small posterior communicating artery on the right. DURAL VENOUS SINUSES: Expected enhancement of the major dural venous sinuses. NECK CTA: RIGHT CAROTID: Mild atherosclerosis without hemodynamically significant stenosis by NASCET criteria at the carotid bifurcation. No dissection. LEFT CAROTID: Mild atherosclerosis without hemodynamically significant stenosis by NASCET criteria at the carotid  bifurcation. No dissection. VERTEBRAL ARTERIES: Codominant vertebral arteries. No flow-limiting stenosis or dissection. AORTIC ARCH: Three-vessel aortic arch configuration. Mild atherosclerosis at the visualized arch and proximal great vessels without flow-limiting stenosis. NONVASCULAR STRUCTURES: Lung apices are clear. No neck mass or lymphadenopathy. Thyroid gland is homogenous. No acute or aggressive appearing osseous abnormalities. Sclerotic lesions in C7 and T5 vertebral bodies compatible enostosis in absence of known malignancy. Multilevel degenerative changes in the visualized cervical spine.    HEAD CT: 1.  Question small age-indeterminate lacunar infarct in the right caudate head. Underlying presumed chronic microvascular ischemic white matter change can limit CT sensitivity for detecting acute ischemia. If no contraindication and clinical concern warrants further imaging assessment, consider MRI. 2.  Favor chronic lacunar infarct in the right cerebellum. Additional hypodensity in right hemipons may correspond with old insult reportedly identified on prior imaging at available at time of interpretation. 3.  No acute intracranial hemorrhage or herniation. 4.  Mild-moderate diffuse cerebral volume loss. HEAD CTA: 1.  No proximal branch vessel occlusion, aneurysm, or vascular malformation. 2.  Intracranial atherosclerosis including focal severe stenosis at an M3 branch of the right MCA and moderate stenosis at the proximal supraclinoid segment of the left ICA. NECK CTA: 1.  Mild atherosclerosis at the carotid bifurcations. No flow-limiting stenosis or findings of dissection. Roldan Jean MD notified provider, DR. MARIE, of preliminary CT head and CTA had results via telephone at 9/2/2023 2:46 PM CDT, who acknowledge understanding. Discussed CTA findings at 3:00 PM CDT.     Total time spent with patient in face to face time, chart review and documentation was 30 minutes.        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    Weight as of this encounter: 59.9 kg (132 lb). Body surface area is 1.7 meters squared.  No Pain (0) Comment: Data Unavailable   No LMP for male patient.  Allergies reviewed: Yes  Medications reviewed: Yes    Medications: Medication refills not needed today.  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        Sincerely,        DONALD Stoner CNP  "

## 2023-09-27 NOTE — PROGRESS NOTES
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    Weight as of this encounter: 59.9 kg (132 lb). Body surface area is 1.7 meters squared.  No Pain (0) Comment: Data Unavailable   No LMP for male patient.  Allergies reviewed: Yes  Medications reviewed: Yes    Medications: Medication refills not needed today.  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

## 2023-09-27 NOTE — PROGRESS NOTES
[facility] Hematology and Oncology Progress Note    Patient: Denton Hobbs  MRN: [id number]  Date of Service: Sep 27, 2023          Reason for Visit    Chief Complaint   Patient presents with    Oncology Clinic Visit     Return visit with labs/poss blood related to Ampullary adenoma w Associated UGI Bleed; Iron deficiency anemia due to chronic blood loss         Assessment and Plan     Cancer Staging   No matching staging information was found for the patient.    Anemia, secondary to acute blood loss from duodenal adenoma. GI bleeding s/p clip placement and embolization: Patient has continued to have issues with bleeding.  He has been needing blood transfusion intermittently.  At this point patient does not want to be aggressive and does not want to really do any other treatment except checking lab when giving blood transfusions as necessary.  At this time because of a heart history and other issue that we will give him 1 unit of blood if he is less than 8.  Will return every 2 weeks for labs with possible blood transfusion and see Dr. Villa in 2 to 3 months    Likely metastatic cancer with growing liver lesions: I talked to patient about the fact that these are getting bigger and they are likely cancer.  He states he does not want to do any work-up for this.  He is not interested in any treatment and states he does not want to be aggressive about any cares.    Iron deficiency: This is from chronic blood loss.  I talked the patient on getting him some IV iron.  He is taking an oral iron supplement but he likely is not Jacque at all and it is probably not enough due to the amount of blood he is losing.  At this point he is refusing any iron IV infusions and states he just wants to get the blood intermittently.  I did talk to him about why an iron infusion might help his iron storage which will overall help decrease the amount of blood he may need but he still declines.    ECOG Performance    1 - Can't  do physically strenuous work, but fully ambyulatory and can do light sedentary work    Distress Screening (within last 30 days)    No data recorded     Pain  Pain Score: No Pain (0)    Problem List    Patient Active Problem List   Diagnosis    Orchitis and epididymitis    BPH w Retention -- chronic marino    Sepsis (Leukocytosis, Tachycardia)    CVA (cerebral vascular accident) (H)    Left-sided weakness    Indwelling Marino catheter present    Elevated PSA    Cystitis    Balance problems    Aphasia    Non-recurrent unilateral inguinal hernia without obstruction or gangrene    Enlarged prostate    Klebsiella UTI    Duodenal adenoma    Anemia, unspecified type    Iron deficiency anemia due to chronic blood loss    Gross hematuria        ______________________________________________________________________________    History of Present Illness    Measurable disease: CBC, ferritin, iron studies, endoscopic evaluation of adenoma, CT scan     Current therapy: Oral iron twice daily  Transfusions as needed to keep hemoglobin greater than 7  Declined iron IV infusion    Interim History: Patient is here today for follow-up.  He had labs drawn and is waiting to see if he needs a blood transfusion.  Overall he says he has chronic fatigue that is about the same.  He does have some shortness of breath with activity.  He states he still does not want to pursue really any other treatment or aggressive care for anything else.  At this point he said he just wants to get labs and blood transfusions as needed.  He does feel better after he gets a blood transfusion.  Denies any fevers or infectious complaints.  Denies any new bone or back pain.  States his stools are dark but that is been going on for a long time since he has been on iron    Review of Systems    Pertinent items are noted in HPI.    Past History    Past Medical History:   Diagnosis Date    Arthritis     COPD (chronic obstructive pulmonary disease) (H)     CVA (cerebral  "vascular accident) (H) 12/4/2018    Hypertension     Prostate hypertrophy        PHYSICAL EXAM  /49 (BP Location: Left arm, Patient Position: Sitting, Cuff Size: Adult Regular)   Pulse 72   Temp 97.6  F (36.4  C) (Tympanic)   Resp 22   Ht 1.727 m (5' 8\")   Wt 59.9 kg (132 lb)   SpO2 98%   BMI 20.07 kg/m      GENERAL: no acute distress. Cooperative in conversation. Here with daughter  RESP: Regular respiratory rate. No expiratory wheezes   NEURO: non focal. Alert and oriented x3.   PSYCH: within normal limits. No depression or anxiety.  SKIN: exposed skin is dry intact.     Lab Results    Recent Results (from the past 168 hour(s))   Adult Type and Screen   Result Value Ref Range    ABO/RH(D) O POS     Antibody Screen Negative Negative    SPECIMEN EXPIRATION DATE [id number]    CBC with platelets and differential   Result Value Ref Range    WBC Count 7.2 4.0 - 11.0 10e3/uL    RBC Count 2.74 (L) 4.40 - 5.90 10e6/uL    Hemoglobin 7.8 (L) 13.3 - 17.7 g/dL    Hematocrit 26.5 (L) 40.0 - 53.0 %    MCV 97 78 - 100 fL    MCH 28.5 26.5 - 33.0 pg    MCHC 29.4 (L) 31.5 - 36.5 g/dL    RDW 13.8 10.0 - 15.0 %    Platelet Count 394 150 - 450 10e3/uL    % Neutrophils 72 %    % Lymphocytes 11 %    % Monocytes 14 %    % Eosinophils 2 %    % Basophils 1 %    % Immature Granulocytes 0 %    NRBCs per 100 WBC 0 <1 /100    Absolute Neutrophils 5.2 1.6 - 8.3 10e3/uL    Absolute Lymphocytes 0.8 0.8 - 5.3 10e3/uL    Absolute Monocytes 1.0 0.0 - 1.3 10e3/uL    Absolute Eosinophils 0.1 0.0 - 0.7 10e3/uL    Absolute Basophils 0.1 0.0 - 0.2 10e3/uL    Absolute Immature Granulocytes 0.0 <=0.4 10e3/uL    Absolute NRBCs 0.0 10e3/uL   Prepare red blood cells (unit)   Result Value Ref Range    Blood Component Type Red Blood Cells     Product Code [id number]     Unit Status Transfused     Unit Number [id number]     CROSSMATCH Compatible     CODING SYSTEM [id number]     ISSUE DATE AND TIME [id number]     UNIT ABO/RH O+     UNIT TYPE " ISBT 5100        Imaging    CT Chest Pulmonary Embolism w Contrast    Result Date: 9/10/2023  EXAM: CT ABDOMEN PELVIS W CONTRAST, CT CHEST PULMONARY EMBOLISM W CONTRAST LOCATION: [facility] DATE: 9/10/2023 INDICATION: ruq abd pain ? liver masses on bedside us. Right shoulder pain and right-sided chest pain. COMPARISON: CT urogram 08/07/2023. Chest CT on 03/29/2023 and CT angiogram of the abdomen and pelvis on 09/11/2022 TECHNIQUE: CT chest pulmonary angiogram and routine CT abdomen pelvis with IV contrast. Arterial phase through the chest and venous phase through the abdomen and pelvis. Multiplanar reformats and MIP reconstructions were performed. Dose reduction techniques were used. CONTRAST: isovue 370  71ml FINDINGS: ANGIOGRAM CHEST: Pulmonary arteries are normal caliber and negative for pulmonary emboli. Thoracic aorta is negative for dissection. No CT evidence of right heart strain. LUNGS AND PLEURA: Mild centrilobular emphysema. Mild subpleural reticular opacities, likely mild fibrosis. Small focal ectasia/dilation of a segmental branch of the left lower lobe pulmonary artery is stable (series 5, image 219). No infiltrate or pleural effusion. No suspicious pulmonary nodules or masses. Stable small nodules along the left major fissure measuring 2 - 3 mm. MEDIASTINUM/AXILLAE: No lymphadenopathy. Small amount of fluid in the pericardial recesses. No thoracic aortic aneurysm. Mild cardiomegaly. CORONARY ARTERY CALCIFICATION: Severe. HEPATOBILIARY: Since the CT on 08/07/2023, multiple hepatic masses have increased in size, concerning for progression of hepatic metastases. For example, a 4.9 cm mass in the posterior right lobe previously measured 2.0 cm (3/57), a 5.4 cm mass in the right hepatic lobe, segment VIII at the dome (series 6, image 30) previously measured 3.5 cm and a 3.2 cm mass in the left hepatic lobe, segment IVB previously measured 1.2 cm (3/46). The largest 5.4 cm lesion in  the right hepatic lobe at the dome abuts the right hemidiaphragm and may account for referred pain to the right shoulder. Stable moderate intrahepatic and severe extrahepatic biliary ductal dilatation to the level of the duodenal ampulla. PANCREAS: Stable diffuse dilatation of the main pancreatic duct measuring up to 9 mm. A 5.4 x 2.4 cm cystic lesion communicating with the pancreatic duct in the mid body, and extending superior to the pancreas is increased in size, previously measuring 4.3 x 2.6 cm. Nodular mural enhancement along the wall of the cystic lesion measuring about 1.2 cm is stable (3/53). SPLEEN: Stable lobulated spleen with subcapsular calcifications, likely related to prior trauma. Stable subcentimeter hypodensity in the spleen posteriorly, indeterminate but possibly a cyst or hemangioma. ADRENAL GLANDS: Normal. KIDNEYS/BLADDER: Few cysts in the kidneys requiring no specific follow-up. No calculi or hydronephrosis bilaterally. Stable mild focal dilatation of the distal left ureter. Stable diffuse circumferential wall thickening of the urinary bladder with bladder wall trabeculations. BOWEL: Hyperdense contents in the gastric lumen are indeterminate, could represent hemorrhagic material within the stomach. A 1.0 cm linear calcification in the gastric lumen (3/68) could represent ingested bone. Stable irregular wall thickening of the second portion of the duodenum. No small bowel or colonic obstruction. Moderate amount of formed stool in the colon. Normal appendix. Mild sigmoid diverticula cyst. LYMPH NODES: No lymphadenopathy in the abdomen and pelvis. VASCULATURE: Ectatic infrarenal abdominal aorta measuring 2.8 cm. Extensive aortobiiliac atherosclerotic calcifications. PELVIC ORGANS: Stable marked prostatomegaly. No pelvic free fluid. No fluid collections. MUSCULOSKELETAL: Few stable scattered sclerotic lesions in the spine including a lesion in the right pedicle of T8 and a small sclerotic focus in  the T5 vertebral body, likely bone islands. No destructive lesions in the bones.     IMPRESSION: 1.  Increased size of multiple hepatic masses since 08/07/2023, concerning for progression of hepatic metastases. The largest lesion abuts the right hemidiaphragm and likely accounts for referred pain to the right shoulder. 2.  Hyperdense material within the gastric lumen could represent hemorrhagic material related to gastrointestinal hemorrhage. Correlate clinically and consider endoscopic correlation. 3.  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 4.  Stable nodular wall thickening in the second portion of the duodenum, either due to duodenal adenoma or duodenal malignancy. Stable resultant severe biliary ductal dilatation and diffuse pancreatic ductal dilatation. 5.  Stable prostatomegaly and diffuse urinary bladder wall thickening, likely due to chronic bladder outlet obstruction.. 6.  No pulmonary emboli. No acute findings in the chest. 7.  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    Abd/pelvis CT,  IV  contrast only TRAUMA / AAA    Result Date: 9/10/2023  EXAM: CT ABDOMEN PELVIS W CONTRAST, CT CHEST PULMONARY EMBOLISM W CONTRAST LOCATION: [facility] DATE: 9/10/2023 INDICATION: ruq abd pain ? liver masses on bedside us. Right shoulder pain and right-sided chest pain. COMPARISON: CT urogram 08/07/2023. Chest CT on 03/29/2023 and CT angiogram of the abdomen and pelvis on 09/11/2022 TECHNIQUE: CT chest pulmonary angiogram and routine CT abdomen pelvis with IV contrast. Arterial phase through the chest and venous phase through the abdomen and pelvis. Multiplanar reformats and MIP reconstructions were performed. Dose reduction techniques were used. CONTRAST: isovue 370  71ml FINDINGS: ANGIOGRAM  CHEST: Pulmonary arteries are normal caliber and negative for pulmonary emboli. Thoracic aorta is negative for dissection. No CT evidence of right heart strain. LUNGS AND PLEURA: Mild centrilobular emphysema. Mild subpleural reticular opacities, likely mild fibrosis. Small focal ectasia/dilation of a segmental branch of the left lower lobe pulmonary artery is stable (series 5, image 219). No infiltrate or pleural effusion. No suspicious pulmonary nodules or masses. Stable small nodules along the left major fissure measuring 2 - 3 mm. MEDIASTINUM/AXILLAE: No lymphadenopathy. Small amount of fluid in the pericardial recesses. No thoracic aortic aneurysm. Mild cardiomegaly. CORONARY ARTERY CALCIFICATION: Severe. HEPATOBILIARY: Since the CT on 08/07/2023, multiple hepatic masses have increased in size, concerning for progression of hepatic metastases. For example, a 4.9 cm mass in the posterior right lobe previously measured 2.0 cm (3/57), a 5.4 cm mass in the right hepatic lobe, segment VIII at the dome (series 6, image 30) previously measured 3.5 cm and a 3.2 cm mass in the left hepatic lobe, segment IVB previously measured 1.2 cm (3/46). The largest 5.4 cm lesion in the right hepatic lobe at the dome abuts the right hemidiaphragm and may account for referred pain to the right shoulder. Stable moderate intrahepatic and severe extrahepatic biliary ductal dilatation to the level of the duodenal ampulla. PANCREAS: Stable diffuse dilatation of the main pancreatic duct measuring up to 9 mm. A 5.4 x 2.4 cm cystic lesion communicating with the pancreatic duct in the mid body, and extending superior to the pancreas is increased in size, previously measuring 4.3 x 2.6 cm. Nodular mural enhancement along the wall of the cystic lesion measuring about 1.2 cm is stable (3/53). SPLEEN: Stable lobulated spleen with subcapsular calcifications, likely related to prior trauma. Stable subcentimeter hypodensity in the spleen posteriorly,  indeterminate but possibly a cyst or hemangioma. ADRENAL GLANDS: Normal. KIDNEYS/BLADDER: Few cysts in the kidneys requiring no specific follow-up. No calculi or hydronephrosis bilaterally. Stable mild focal dilatation of the distal left ureter. Stable diffuse circumferential wall thickening of the urinary bladder with bladder wall trabeculations. BOWEL: Hyperdense contents in the gastric lumen are indeterminate, could represent hemorrhagic material within the stomach. A 1.0 cm linear calcification in the gastric lumen (3/68) could represent ingested bone. Stable irregular wall thickening of the second portion of the duodenum. No small bowel or colonic obstruction. Moderate amount of formed stool in the colon. Normal appendix. Mild sigmoid diverticula cyst. LYMPH NODES: No lymphadenopathy in the abdomen and pelvis. VASCULATURE: Ectatic infrarenal abdominal aorta measuring 2.8 cm. Extensive aortobiiliac atherosclerotic calcifications. PELVIC ORGANS: Stable marked prostatomegaly. No pelvic free fluid. No fluid collections. MUSCULOSKELETAL: Few stable scattered sclerotic lesions in the spine including a lesion in the right pedicle of T8 and a small sclerotic focus in the T5 vertebral body, likely bone islands. No destructive lesions in the bones.     IMPRESSION: 1.  Increased size of multiple hepatic masses since 08/07/2023, concerning for progression of hepatic metastases. The largest lesion abuts the right hemidiaphragm and likely accounts for referred pain to the right shoulder. 2.  Hyperdense material within the gastric lumen could represent hemorrhagic material related to gastrointestinal hemorrhage. Correlate clinically and consider endoscopic correlation. 3.  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 4.  Stable nodular wall thickening in the second portion of the duodenum, either due  to duodenal adenoma or duodenal malignancy. Stable resultant severe biliary ductal dilatation and diffuse pancreatic ductal dilatation. 5.  Stable prostatomegaly and diffuse urinary bladder wall thickening, likely due to chronic bladder outlet obstruction.. 6.  No pulmonary emboli. No acute findings in the chest. 7.  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    POC US ABDOMEN LIMITED    Result Date: 9/10/2023  Sis Adams MD     9/10/2023  1:34 PM POC US ABDOMEN LIMITED Date/Time: 9/10/2023 11:06 AM Performed by: Sis Adams MD Authorized by: Sis Adams MD  Comments:    Right upper quadrant ultrasound performed.  No evidence of cholelithiasis, gallbladder wall thickening or pericholecystic fluid.  There are hyperechoic lesions in the liver, at least 2 that I can see    ECHO TRANSTHORACIC COMPLETE    Result Date: 9/3/2023   [place] [address]  [phone] [email or web address]                                              Transthoracic Echo Report  JERONIMO SEAY  Excellian ID: [id number] Age: 88 : 1935 Ordering Provider: CEM MARIE  Exam Date: 2023 13:57 Gender: M Sonographer: PRISCILA  Accession #: [id number] Height: 68 in BSA: 1.69 m  BP: 148 / 65  Weight: 127 lbs BMI: 19.3 kg/m  HR: 68  Location: Inpatient (Portable) Rhythm: Normal Sinus Rhythm  Procedure Components: 2D imaging, Color Doppler, Spectral Doppler  Indications: CVA  Technical Quality: Good Contrast: None  Final Conclusion  1. Mild left ventricular chamber enlargement. Moderately increased basal septal wall thickness superimposed on mild concentric  increased left ventricular wall thickness . Normal left ventricular systolic function. Calculated left ventricular ejection fraction (modified  Cueto technique) is 55 %. No regional wall motion  abnormalities.  2. Grade 2 left ventricular diastolic dysfunction consistent with moderately increased left ventricular filling pressure.  3. Normal right ventricular size and systolic function.  4. Biatrial enlargement.  5. Moderate calcific aortic valve stenosis. Aortic valve systolic peak velocity is 3 m/sec. Aortic valve systolic mean gradient is 21  mmHg. Aortic valve dimensionless index is 0.3.  6. Mild mitral valve regurgitation.  7. Mild ascending aorta dilatation (4.3 cm).  8. There were no prior studies available for comparison.  Estimated EF: 55%  FINDINGS  Left Ventricle Mild left ventricular chamber enlargement. Moderately increased basal septal wall thickness superimposed on  mild concentric increased left ventricular wall thickness . Normal left ventricular systolic function. Calculated left  ventricular ejection fraction (modified Cueto technique) is 55 %. No regional wall motion abnormalities.  Diastolic Function Grade 2 left ventricular diastolic dysfunction consistent with moderately increased left ventricular filling  pressure.  Right Ventricle Normal right ventricular chamber size. Normal right ventricular systolic function.  Right ventricular systolic  pressure cannot be estimated due to inability to detect peak tricuspid regurgitation Doppler velocity.  Left Atrium Left atrial volume index is 50.6 ml/m . Severe left atrial enlargement.  Right Atrium Right atrial enlargement.  Atrial Septum No evidence of inter-atrial shunt by color flow Doppler.  Aortic Valve Trileaflet aortic valve. Aortic valve sclerosis.  Moderate calcific aortic valve stenosis. Aortic valve systolic peak  velocity is 3 m/sec. Aortic valve systolic mean gradient is 21 mmHg.  Aortic valve dimensionless index is 0.3.  Aortic valve area likely overestimated in setting of large LVOT diameter. Mild aortic valve regurgitation.  Mitral Valve Mildly thickened mitral valve. Mild mitral valve regurgitation.  Tricuspid Valve Normal  tricuspid valve. Trivial tricuspid valve regurgitation.  Pulmonic Valve Pulmonary valve was not well visualized. No pulmonary valve stenosis. No pulmonary valve regurgitation.  Pericardium Tiny, insignificant pericardial effusion.  Aorta Aortic sinus of Valsalva is normal in size (4 cm, ZScore = 1.1). Mild ascending aorta dilatation (4.3 cm).  Inferior Vena Cava Dilated inferior vena cava with normal inspiratory collapse.  MEASUREMENTS  (Male / Female) Normal Values  2D MEASUREMENTS AND LV FUNCTION  IVS Diastolic Thickness           1.3 cm                < 1.1 cm / < 1.0 cm  LV Diastolic Diameter PLAX        6.1 cm                4.2 - 5.9 / 3.9 - 5.3 cm  LV Diastolic Diameter Index       3.62 cm/m   LVPW Diastolic Thickness          1.23 cm               < 1.1 cm / < 1.0 cm  LV Systolic Diameter PLAX         3.57 cm  LV Systolic Diameter Index        2.12 cm/m   LVOT Diameter                     3 cm  LVOT Cardiac Output               10.1 l/min  LVOT Cardiac Index                6.1 l/min m   LVOT Stroke Volume                148 ml  Stroke Volume Index               89.7 ml/m   LV Ejection Fraction MOD BP       54.6 %                >= 55  %  LV Stroke Volume 2C AL            133 ml  LA Area 4C View                   25.6 cm   LA Length 4C                      6.97 cm  LA Area 2C View                   25.8 cm   LA Length 2C                      6.26 cm  LA Volume                         80.4 ml  LA Volume MOD BP                  85.3 ml  LA Volume Index MOD BP            50.6 ml/m             16 - 34 ml/m   RV Diastolic Basal Diameter       2.84 cm  LV Mass                           347 g  LV Mass Index                     208 g/m   Sinuses of Valsalva Diameter(d)   4 cm  Ascending Aorta Diameter(s)       4.3 cm  IVC Diameter Expiration           2.25 cm  Ascending Aorta Index             2.55 cm/m   M MODE  TAPSE MM                          4.03 cm  DIASTOLOGY  Mitral E Point Velocity           1.16 m/sec             0.70 - 1.02 m/sec  Mitral A Point Velocity           1.59 m/sec            0.06 - 1.06 m/sec  Mitral E to A Ratio               0.73                  1.1 - 2.1  MV Deceleration Time              193 msec              167 - 231 msec  LV E' Lateral Velocity            0.0787 m/sec  Mitral E to LV E' Lateral Ratio   14.7  LV E' Septal Velocity             0.043 m/sec  Mitral E to LV E' Septal Ratio    27  AORTIC VALVE  AV Peak Velocity                  3.02 m/sec            < 2.0 m/sec  AV Peak Gradient                  36.5 mmHg  AV Mean Gradient                  21 mmHg  AV Velocity Time Integral         69.2 cm  LVOT Peak Velocity                0.963 m/sec  LVOT Velocity Time Integral       21 cm  AV Area Cont Eq vti               2.15 cm   AV Area Cont Eq pk                2.25 cm   AV Dimensionless Index            0.303  AI Pressure Half Time             362 msec  MITRAL VALVE  MV Peak Gradient                  8.64 mmHg  MV Mean Gradient                  4 mmHg  MV Velocity Time Integral         47.2 cm  MV Pressure Half Time             56 msec  MV Area PHT                       3.93 cm   TRICUSPID VALVE AND ESTIMATED PRESSURES  Right Atrial Pressure             8 mmHg  HCM DATA  LVOT SHANNAN (r)                      3.71 mmHg  Aortic Root ZScore: 1.11  Sandy Brand MD  [facility]redited [facility]  (Electronically Signed)  Final Date: 03 September 2023 17:05  ICD-10 Codes: 436.0    MR Head without contrast    Result Date: 9/2/2023  For Patients: As a result of the 21st Century Cures Act, medical imaging exams and procedure reports are released immediately into your electronic medical record. You may view this report before your referring provider. If you have questions, please contact your health care provider. EXAM: MR HEAD BRAIN WO LOCATION: [facility] MEDICAL IMAGING DATE: 9/2/2023 INDICATION: Eval f/u cerebral vascular disease/ischemia. COMPARISON: CT/CTA performed earlier today. TECHNIQUE: Routine multiplanar  multisequence head MRI without intravenous contrast. FINDINGS: INTRACRANIAL CONTENTS: Subtle punctate foci of hyperintensity on the diffusion-weighted images within the right centrum semiovale and left parietal corona radiata, which are too small to characterize on the ADC map although likely corresponds to low to intermediate ADC values (series 5.2 images 10 and 14; series 11.2 images 17 and 8, respectively). Corresponding FLAIR hyperintensity. No associated mass effect. No acute intracranial hemorrhage or extra-axial fluid collection. A punctate chronic infarct involving the right caudate head, corresponding to hypodensity described on recent CT (series 10 image 17). Chronic bilateral centrum semiovale, right paramedian pontine, and bilateral cerebellar hemisphere infarcts, the latter greater on the right. Confluent regions in scattered foci of T2 prolongation within the bilateral cerebral white matter, nonspecific although most likely the sequela of advanced chronic microvascular ischemia. Moderate generalized parenchymal volume loss. No hydrocephalus. Normal position of the cerebellar tonsils. OTHER: Within technique limitations, no additional significant finding.    1.  Subtle punctate foci of restricted diffusion in the right centrum semiovale and left parietal corona radiata, suspicious for acute/subacute infarcts. No associated mass effect or acute intracranial hemorrhage. 2.  Additional chronic changes including small chronic infarcts involving the bilateral centrum semiovale, right paramedian pontine, bilateral cerebellar infarcts, and right caudate head, the latter corresponding to questionable hypodensity on recent CT.    XR CHEST 1 VIEW PORTABLE    Result Date: 9/2/2023  For Patients: As a result of the 21st Century Cures Act, medical imaging exams and procedure reports are released immediately into your electronic medical record. You may view this report before your referring provider. If you have  questions, please contact your health care provider. EXAM: XR CHEST 1 VIEW PORTABLE LOCATION: [facility] MEDICAL IMAGING DATE: 9/2/2023 INDICATION: CVA, stroke. Left facial numbness, speech difficulty. COMPARISON: None.    Lungs clear. Heart size and pulmonary vascularity normal. Probable small hiatal hernia.    CT ANGIO HEAD NECK CAROTID STROKE PROTOCOL MANJINDER CANDIDATE    Result Date: 9/2/2023  For Patients: As a result of the 21st Century Cures Act, medical imaging exams and procedure reports are released immediately into your electronic medical record. You may view this report before your referring provider. If you have questions, please contact your health care provider. EXAM: CT ANGIO HEAD NECK CAROTID STROKE PROTOCOL MANJINDER CANDIDAT LOCATION: [facility] MEDICAL IMAGING DATE/TIME: 9/2/2023 2:47 PM CDT INDICATION: Eval and f/u cerebral vascular disease/ischemia COMPARISON: None. CONTRAST: IOHEXOL 350 MG IODINE/ML  ML BOTTLE: 75mL TECHNIQUE: Head and neck CT angiogram with IV contrast. Noncontrast head CT followed by axial helical CT images of the head and neck vessels obtained during the arterial phase of intravenous contrast administration. Axial 2D reconstructed images and multiplanar 3D MIP reconstructed images of the head and neck vessels were performed by the technologist. Dose reduction techniques were used. All stenosis measurements made according to NASCET criteria unless otherwise specified. FINDINGS: NONCONTRAST HEAD CT: INTRACRANIAL CONTENTS: Small hypodensity at the right caudate head compatible with age-indeterminate lacunar infarct (axial series 6 image 27). Additional punctate hypodensity in the right hemipons is age-indeterminate, but reportedly corresponds with old insult on prior imaging not available at time of interpretation. More conspicuous hypodensity in the right cerebellum suspected to represent chronic lacunar infarct. No hemorrhage or extraaxial collection. No mass effect. Subarachnoid cisterns are  patent. Patchy white matter hypodensities are, while nonspecific, most compatible with chronic microvascular ischemic changes. Proportional prominence of the ventricles and sulci is compatible with diffuse cerebral volume loss. VISUALIZED ORBITS/SINUSES/MASTOIDS: No visible intraorbital abnormality. Paranasal sinuses are clear. No middle ear or mastoid effusion. BONES/SOFT TISSUES: No acute abnormality. HEAD CTA: ANTERIOR CIRCULATION: No proximal branch vessel occlusion. Focal severe stenosis at 8 proximal M3 branch of the right MCA (series 10 image 135). Atherosclerotic luminal irregularity and calcifications involving the bilateral carotid siphons most notably at the right greater than left cavernous ICA segments including focal caliber dilation/ectasia. Focal moderate stenosis at the proximal supraclinoid segment of the left ICA No saccular aneurysm or high-flow vascular malformation. POSTERIOR CIRCULATION: No proximal branch vessel occlusion. P1 and P2 segments of the PCAs are patent with mild luminal irregularity. No high-grade stenosis. Vertebrobasilar artery is patent. Mild focal vascular prominence at the basilar tip favored related to confluence of branch vessels. No aneurysm or high-flow vascular malformation. Small posterior communicating artery on the right. DURAL VENOUS SINUSES: Expected enhancement of the major dural venous sinuses. NECK CTA: RIGHT CAROTID: Mild atherosclerosis without hemodynamically significant stenosis by NASCET criteria at the carotid bifurcation. No dissection. LEFT CAROTID: Mild atherosclerosis without hemodynamically significant stenosis by NASCET criteria at the carotid bifurcation. No dissection. VERTEBRAL ARTERIES: Codominant vertebral arteries. No flow-limiting stenosis or dissection. AORTIC ARCH: Three-vessel aortic arch configuration. Mild atherosclerosis at the visualized arch and proximal great vessels without flow-limiting stenosis. NONVASCULAR STRUCTURES: Lung apices  are clear. No neck mass or lymphadenopathy. Thyroid gland is homogenous. No acute or aggressive appearing osseous abnormalities. Sclerotic lesions in C7 and T5 vertebral bodies compatible enostosis in absence of known malignancy. Multilevel degenerative changes in the visualized cervical spine.    HEAD CT: 1.  Question small age-indeterminate lacunar infarct in the right caudate head. Underlying presumed chronic microvascular ischemic white matter change can limit CT sensitivity for detecting acute ischemia. If no contraindication and clinical concern warrants further imaging assessment, consider MRI. 2.  Favor chronic lacunar infarct in the right cerebellum. Additional hypodensity in right hemipons may correspond with old insult reportedly identified on prior imaging at available at time of interpretation. 3.  No acute intracranial hemorrhage or herniation. 4.  Mild-moderate diffuse cerebral volume loss. HEAD CTA: 1.  No proximal branch vessel occlusion, aneurysm, or vascular malformation. 2.  Intracranial atherosclerosis including focal severe stenosis at an M3 branch of the right MCA and moderate stenosis at the proximal supraclinoid segment of the left ICA. NECK CTA: 1.  Mild atherosclerosis at the carotid bifurcations. No flow-limiting stenosis or findings of dissection. Roldan Jean MD notified provider, DR. MARIE, of preliminary CT head and CTA had results via telephone at 9/2/2023 2:46 PM CDT, who acknowledge understanding. Discussed CTA findings at 3:00 PM CDT.     Total time spent with patient in face to face time, chart review and documentation was 30 minutes.        Signed by: DONALD Stoner CNP

## 2023-09-30 PROBLEM — D64.9 CHRONIC ANEMIA: Status: ACTIVE | Noted: 2023-01-01

## 2023-09-30 PROBLEM — I50.9 NEW ONSET OF CONGESTIVE HEART FAILURE (H): Status: ACTIVE | Noted: 2023-01-01

## 2023-09-30 NOTE — H&P
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  Admitted with CHF    Acute CHF  - Probably triggered by anemia from chronic GI bleeding  - Elevated BNP at 5373  - Patient denies any chest pain  - Continue to monitor on telemetry  - Start IV Lasix  - Check echo  - Consider cardiology consult, patient wants conservative treatment    Chronic blood loss anemia  - Secondary to duodenal adenoma  - Patient is on chronic blood transfusion  - Continue to monitor hemoglobin and keep above 8 because of CHF  - Continue PPI twice daily    Duodenal adenoma  - the lesion was found to be oozing and 3 clips were placed at that time with what was likely minimal effect; recently found to have lesions in the liver and increased size of a pancreatic lesion concerning for metastatic disease   - Hospitalized recently and decided to go for palliative care  - Patient is on chronic blood transfusion for that    History of CVA  - No anticoagulation because of chronic blood loss anemia  - PT/OT evaluation    Elevated D-dimer  - Check CTA rule out PE       Diet: Regular Diet Adult    DVT Prophylaxis: Pneumatic Compression Devices  Cash Catheter: Not present  Lines: None     Cardiac Monitoring: None  Code Status: No CPR- Do NOT Intubate      Clinically Significant Risk Factors Present on Admission                                  Disposition Plan      Expected Discharge Date: 10/02/2023                  Jewel Duran MD  Hospitalist Service  [facility]  Securely message with [employer] (more info)  Text page via [employer] Paging/[employer]  "    ______________________________________________________________________    Chief Complaint   Shortness of breath    History is obtained from the patient    History of Present Illness   Denton Hobbs is a 88 year old male with a past medical history of CVA with left lower extremity weakness, 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  Basically, Patient reports acute onset of shortness of breath while eating breakfast earlier today. However, he notes that he felt \"okay\" while walking around with his walker. At present, he endorses minimal shortness of breath. Also complains of chronic leg swelling secondary to a previous CVA, left worse than right.  No chronic anticoagulation.  Denies chest pain, cough, fever, chills, ENT symptoms, abdominal pain, nausea, vomiting, diarrhea, or pain or blood with urination.  Per daughter, patient had a successful infusion 3 days ago, typically gets about them every 2 weeks or so.      Past Medical History    Past Medical History:   Diagnosis Date    Arthritis     COPD (chronic obstructive pulmonary disease) (H)     CVA (cerebral vascular accident) (H) 12/4/2018    Hypertension     Prostate hypertrophy        Past Surgical History   Past Surgical History:   Procedure Laterality Date    ESOPHAGOSCOPY, GASTROSCOPY, DUODENOSCOPY (EGD), COMBINED N/A 9/12/2022    Procedure: ESOPHAGOGASTRODUODENOSCOPY (EGD);  Surgeon: Conner Navarrete MD;  Location: [facility] OR    GENITOURINARY SURGERY      HERNIORRHAPHY INGUINAL Left 10/11/2021    Procedure: LEFT INGUINAL HERNIA REPAIR WITH MESH;  Surgeon: Puma Preston MD;  Location: [facility] OR    HERNIORRHAPHY INGUINAL Right 8/19/2022    Procedure: HERNIORRHAPHY, INGUINAL, OPEN, RIGHT;  Surgeon: Flo Keyes MD;  Location: [facility] OR    IR VISCERAL ANGIOGRAM  9/13/2022    TRANSRECTAL ULTRASONIC, TRANSURETHRAL " RESECTION (TUR) OF PROSTATE CYST  2004    helped temporarily       Prior to Admission Medications   Prior to Admission Medications   Prescriptions Last Dose Informant Patient Reported? Taking?   FEROSUL 325 (65 Fe) MG tablet 9/29/2023 at am  Yes Yes   Sig: Take 2 tablets by mouth daily (with breakfast)   ferrous sulfate (FEROSUL) 325 (65 Fe) MG tablet 9/29/2023 at pm  Yes Yes   Sig: Take 325 mg by mouth every evening   pantoprazole (PROTONIX) 40 MG EC tablet 9/29/2023 at pm  No Yes   Sig: Take 1 tablet (40 mg) by mouth 2 times daily (before meals)      Facility-Administered Medications: None        Review of Systems    The 10 point Review of Systems is negative other than noted in the HPI or here.     Social History   I have reviewed this patient's social history and updated it with pertinent information if needed.  Social History     Tobacco Use    Smoking status: Former     Packs/day: 2.00     Years: 25.00     Pack years: 50.00     Types: Cigarettes     Start date: 1/1/1990     Passive exposure: Past (as a child brothers & Dad pipe & ciggerettes)    Smokeless tobacco: Never   Substance Use Topics    Alcohol use: No     Comment: Alcoholic Drinks/day: Quit 1990    Drug use: Never         Family History   I have reviewed this patient's family history and updated it with pertinent information if needed.  Family History   Problem Relation Age of Onset    No Known Problems Other         No heart, DM, HTN, or CA    No Known Problems Mother     No Known Problems Father          Allergies   Allergies   Allergen Reactions    Sulfa Antibiotics Rash     Info obtained off of 10/4/21 Entira pre op.        Physical Exam   Vital Signs: Temp: 98.2  F (36.8  C) Temp src: Oral BP: (!) 160/67 Pulse: 73   Resp: 16 SpO2: 98 %      Weight: 132 lbs 0 oz    General Appearance: Sleeping comfortable in bed in no acute respiratory distress  Respiratory: Decreased breath sounds on lung base with scattered rhonchi, no rales.  Cardiovascular:  Normal heart sound, soft systolic murmur in aortic area.  GI: Soft, no tenderness, normal bowel sounds.  Skin: Dry, warm, no rash.  Bilateral +1 legs edema  Other: Grossly intact, no focal deficits    Medical Decision Making       75 MINUTES SPENT BY ME on the date of service doing chart review, history, exam, documentation & further activities per the note.      Data     I have personally reviewed the following data over the past 24 hrs:    6.8  \   8.3 (L)   / 361     133 (L) 99 17.7 /  114 (H)   4.5 26 0.91 \     Trop: 34 (H) BNP: 5,373 (H)     INR:  N/A PTT:  N/A   D-dimer:  1.24 (H) Fibrinogen:  N/A       Imaging results reviewed over the past 24 hrs:   Recent Results (from the past 24 hour(s))   Chest XR,  PA & LAT    Narrative    EXAM: XR CHEST 2 VIEWS  LOCATION: [facility]  DATE: 9/30/2023    INDICATION: sob  COMPARISON: CT 9/10/2023      Impression    IMPRESSION: Negative chest.   CT Chest Pulmonary Embolism w Contrast    Narrative    EXAM: CT CHEST PULMONARY EMBOLISM W CONTRAST  LOCATION: [facility]  DATE: 9/30/2023    INDICATION: elevated ddimer evaluate for PE  COMPARISON: 9/10/2023  TECHNIQUE: CT chest pulmonary angiogram during arterial phase injection of IV contrast. Multiplanar reformats and MIP reconstructions were performed. Dose reduction techniques were used.   CONTRAST: [id number] 75ml    FINDINGS:  ANGIOGRAM CHEST: Pulmonary arteries are normal caliber and negative for pulmonary emboli. Thoracic aorta is negative for dissection. No CT evidence of right heart strain.    LUNGS AND PLEURA: Mild centrilobular emphysema. Subpleural reticular abnormality, likely mild fibrosis. Small focal ectasia/dilation of a segmental branch of the left lower lobe pulmonary artery is stable (4/208).  No suspicious nodules or masses. Stable   nodules along the left major fissure measuring 2 - 3 mm.    MEDIASTINUM/AXILLAE: No lymphadenopathy. Small amount of fluid in  the pericardial recesses. No thoracic aortic aneurysm. Mild cardiomegaly.    CORONARY ARTERY CALCIFICATION: Severe.    UPPER ABDOMEN: Hepatic metastases are suboptimally visualized due to contrast bolus timing. Refer to CT 9/10/2023 for full details. Partially imaged pancreatic duct dilation and cystic lesion, which is unchanged.     MUSCULOSKELETAL: Unchanged well-defined sclerotic lesions in the thoracic spine, likely benign bone islands. No aggressive or destructive osseous lesions.      Impression    IMPRESSION:  No acute pulmonary emboli

## 2023-09-30 NOTE — PLAN OF CARE
Problem: Heart Failure  Goal: Stable Heart Rate and Rhythm  Outcome: Progressing     Problem: Heart Failure  Goal: Effective Oxygenation and Ventilation  Outcome: Progressing     Problem: Plan of Care - These are the overarching goals to be used throughout the patient stay.    Goal: Optimal Comfort and Wellbeing  9/30/2023 1827 by Antonio Mayes, RN  Outcome: Progressing  9/30/2023 1827 by Antonio Mayes, RN  Outcome: Progressing     A&Ox4. SBA. SpO2 96% on RA. Lung sounds clear/diminished at bases. No SOB (reports that this has resolved). +1 edema of ankles/feet. Denies symptoms. HgB 8.3. VSS. Sinus rhythm on tele.    Pt to have echocardiogram and potential cardiac consult. Refusing IV lasix (MD updated). Straight-catheterizes using home materials w/SBA.

## 2023-09-30 NOTE — PROGRESS NOTES
"Pt refusing IV lasix. RN provided education on purpose of medications and benefits. Pt straight-catheterizes at home and is somewhat concerned about taking diuretics. However, in general, he does not want to take new medications. Pt states, \"I feel fine now and I don't feel that I need to take anything new now.\" Dr. Duran was notified.   "

## 2023-09-30 NOTE — ED PROVIDER NOTES
EMERGENCY DEPARTMENT ENCOUNTER      NAME: Denton Hobbs  AGE: 88 year old male  YOB: 1935  MRN: [id number]  EVALUATION DATE & TIME: No admission date for patient encounter.    PCP: Jarad Prater    ED PROVIDER: Carmen Thao M.D.      CHIEF COMPLAINT     Chief Complaint   Patient presents with    Shortness of Breath         FINAL IMPRESSION:     1. New onset of congestive heart failure (H)    2. Chronic anemia    3. Adenoma of ampulla of Vater    4. Hx of known metastasis to liver          MEDICAL DECISION MAKING:       Pertinent Labs & Imaging studies reviewed. (See chart for details)    88 year old male presents to the Emergency Department for evaluation of sob    History  Supplemental history from Daughter   External Record(s) review as documented below    Exam  Nontoxic pale cooperative and pleasant.  He has a 2/6 systolic ejection murmur.  Bibasilar crackles.  Low extremity edema graft greater than right.    Differential Diagnosis include but not limited to anemia, PE, congestive heart failure, pneumothorax, pneumonia, electrolyte abnormalities, among others.      Vital Signs: hypertension  EKG: Sinus rhythm first-degree AV block poor anterior progression left axis deviation there is ST segment elevation in V2 and ST segment depression in V5 V6  Imaging: I independently interpreted chest x-ray coronary calcification no pneumothorax..Formal read by radiologist.  Home meds: none  ED meds: none  Fluids: tko  Labs:  K 4.5  Cr 0.91  Wbc 6.8  Hgb 8.3 globin 3 days ago 7.8  platelets 361  Troponin 34  BNP 5373    Clinical Impression and Decision Making  89 yo male presents with exertional sob  New not related to his chronic anemia  No chest pain  Pale  No hypoxia at rest  Found to have elevated BNP concerning for new onset CHF  No evidence of ACS or PE  Admitted to telemetry for further evaluation and ECHO    In addition to the work out documented, I considered the following work up CT abdomen and  pelvis. Patient denies abdominal pain. Known history of liver mets           Medical Decision Making    History:  Supplemental history from: Family Member/Significant Other  External Record(s) reviewed: Documented in chart, if applicable.    Work Up:  Chart documentation includes differential considered and any EKGs or imaging independently interpreted by provider, where specified.  In additional to work up documented, I considered the following work up: Documented in chart, if applicable.    External consultation:  Discussion of management with another provider: Documented in chart, if applicable    Complicating factors:  Care impacted by chronic illness: Cerebrovascular Disease, Chronic Lung Disease, Hypertension, and Other: chronic anemia  Care affected by social determinants of health: Access to Medical Care    Disposition considerations: Admit.    Review of External Records  Hospital/Clinic: Seen at [facility]. Hgb 7.8, given 1 unit PRBCs. Next appointment on 10//11.  Date: 9/27/23    External Consultation  Hospitalist, Dr. Chadwick    ED COURSE   9:26 AM I met with patient for initial interview and encounter. We also discussed plan for treatment and diagnostic interventions.   9:55 AM Revaluated patient.  10:43 AM Updated and rechecked patient.  11:27 AM Spoke to hospitalist, Dr. Duran who accepts patient for admission.  2:35 PM reevaluated and updated.    At the conclusion of the encounter I discussed the results of all of the tests and the disposition. The questions were answered. The patient and daughter acknowledged understanding and was agreeable with the care plan.       MEDICATIONS GIVEN IN THE EMERGENCY:     Medications   pantoprazole (PROTONIX) EC tablet 40 mg (40 mg Oral $Given 9/30/23 4363)   melatonin tablet 1 mg (has no administration in time range)   acetaminophen (TYLENOL) tablet 650 mg (has no administration in time range)     Or   acetaminophen (TYLENOL) Suppository  "650 mg (has no administration in time range)   senna-docusate (SENOKOT-S/PERICOLACE) 8.6-50 MG per tablet 1 tablet (has no administration in time range)     Or   senna-docusate (SENOKOT-S/PERICOLACE) 8.6-50 MG per tablet 2 tablet (has no administration in time range)   ondansetron (ZOFRAN ODT) ODT tab 4 mg (has no administration in time range)     Or   ondansetron (ZOFRAN) injection 4 mg (has no administration in time range)   furosemide (LASIX) injection 20 mg (0 mg Intravenous Hold 9/30/23 6985)   iopamidol (ISOVUE-370) solution 75 mL (75 mLs Intravenous $Given 9/30/23 1346)       NEW PRESCRIPTIONS STARTED AT TODAY'S ER VISIT     Current Discharge Medication List        =================================================================    HPI     Patient information was obtained from: Patient, Daughter    Use of : N/A     Denton Hobbs is a 88 year old male who presents by private car for evaluation of shortness of breath.    Patient reports acute onset of shortness of breath while eating breakfast earlier today. However, he notes that he felt \"okay\" while walking around with his walker. At present, he endorses minimal shortness of breath. Also complains of chronic leg swelling secondary to a previous CVA, left worse than right.  No chronic anticoagulation.    Denies chest pain, cough, fever, chills, ENT symptoms, abdominal pain, nausea, vomiting, diarrhea, or pain or blood with urination.    Per daughter, patient had a successful infusion 3 days ago, typically gets about them every 2 weeks or so.     REVIEW OF SYSTEMS   Review of Systems   Constitutional:  Negative for chills and fever.   HENT: Negative.     Eyes: Negative.    Respiratory:  Positive for shortness of breath (persistent).    Cardiovascular:  Positive for leg swelling (chronic, L>R). Negative for chest pain.   Gastrointestinal:  Negative for abdominal pain, diarrhea, nausea and vomiting.   Genitourinary:  Negative for dysuria and " hematuria.   All other systems reviewed and are negative.     PAST MEDICAL HISTORY:     Past Medical History:   Diagnosis Date    Arthritis     COPD (chronic obstructive pulmonary disease) (H)     CVA (cerebral vascular accident) (H) 12/4/2018    Hypertension     Prostate hypertrophy        PAST SURGICAL HISTORY:     Past Surgical History:   Procedure Laterality Date    ESOPHAGOSCOPY, GASTROSCOPY, DUODENOSCOPY (EGD), COMBINED N/A 9/12/2022    Procedure: ESOPHAGOGASTRODUODENOSCOPY (EGD);  Surgeon: Conner Navarrete MD;  Location: [facility]    GENITOURINARY SURGERY      HERNIORRHAPHY INGUINAL Left 10/11/2021    Procedure: LEFT INGUINAL HERNIA REPAIR WITH MESH;  Surgeon: Puma Preston MD;  Location: [facility] OR    HERNIORRHAPHY INGUINAL Right 8/19/2022    Procedure: HERNIORRHAPHY, INGUINAL, OPEN, RIGHT;  Surgeon: Flo Keyes MD;  Location: [facility] OR    IR VISCERAL ANGIOGRAM  9/13/2022    TRANSRECTAL ULTRASONIC, TRANSURETHRAL RESECTION (TUR) OF PROSTATE CYST  2004    helped temporarily       CURRENT MEDICATIONS:   No current outpatient medications on file.     ALLERGIES:     Allergies   Allergen Reactions    Sulfa Antibiotics Rash     Info obtained off of 10/4/21 Entira pre op.       FAMILY HISTORY:     Family History   Problem Relation Age of Onset    No Known Problems Other         No heart, DM, HTN, or CA    No Known Problems Mother     No Known Problems Father        SOCIAL HISTORY:     Social History     Socioeconomic History    Marital status:     Number of children: 6   Tobacco Use    Smoking status: Former     Packs/day: 2.00     Years: 25.00     Pack years: 50.00     Types: Cigarettes     Start date: 1/1/1990     Passive exposure: Past (as a child brothers & Dad pipe & ciggerettes)    Smokeless tobacco: Never   Substance and Sexual Activity    Alcohol use: No     Comment: Alcoholic Drinks/day: Quit 1990    Drug use: Never       VITALS:   BP (!) 157/67   Pulse 86   Temp  "97.4  F (36.3  C) (Oral)   Resp 18   Ht 1.727 m (5' 8\")   Wt 59.9 kg (132 lb)   SpO2 96%   BMI 20.07 kg/m      PHYSICAL EXAM     Physical Exam  Vitals and nursing note reviewed.   Constitutional:       Appearance: He is well-developed.      Comments: Chronically ill-appearing nontoxic   Skin:     General: Skin is warm.      Capillary Refill: Capillary refill takes less than 2 seconds.      Coloration: Skin is pale.   Neurological:      Mental Status: He is alert.         Physical Exam   Constitutional: Pale nontoxic cooperative and pleasant.  Head: Atraumatic.     Nose: Nose normal.     Mouth/Throat: Oropharynx is clear and moist.     Eyes: EOM are normal. Pupils are equal, round, and reactive to light.     Ears: Bilateral pearly white tympanic membranes.    Neck: Normal range of motion. Neck supple.     Cardiovascular: Normal rate, regular rhythm 3/6 BLUE    Pulmonary/Chest: Normal effort creased breath sounds bilaterally.    Abdominal: soft nontender.    Musculoskeletal: Normal range of motion.     Neurological: Moves upper and lower extremities equally.    Lymphatics: Ankle edema compared to the right per patient this is after his previous CVA.    : NA    Skin: Skin is warm and dry.  Nondiaphoretic pale    Psychiatric: Normal mood and affect. Behavior is normal.       LAB:     All pertinent labs reviewed and interpreted.  Labs Ordered and Resulted from Time of ED Arrival to Time of ED Departure   BASIC METABOLIC PANEL - Abnormal       Result Value    Sodium 133 (*)     Potassium 4.5      Chloride 99      Carbon Dioxide (CO2) 26      Anion Gap 8      Urea Nitrogen 17.7      Creatinine 0.91      GFR Estimate 82      Calcium 8.2 (*)     Glucose 114 (*)    TROPONIN T, HIGH SENSITIVITY - Abnormal    Troponin T, High Sensitivity 43 (*)    NT PROBNP INPATIENT - Abnormal    N terminal Pro BNP Inpatient 5,373 (*)    CBC WITH PLATELETS AND DIFFERENTIAL - Abnormal    WBC Count 6.8      RBC Count 3.00 (*)     " Hemoglobin 8.3 (*)     Hematocrit 27.5 (*)     MCV 92      MCH 27.7      MCHC 30.2 (*)     RDW 14.6      Platelet Count 361      % Neutrophils 74      % Lymphocytes 11      % Monocytes 13      % Eosinophils 1      % Basophils 1      % Immature Granulocytes 0      NRBCs per 100 WBC 0      Absolute Neutrophils 4.9      Absolute Lymphocytes 0.8      Absolute Monocytes 0.9      Absolute Eosinophils 0.1      Absolute Basophils 0.1      Absolute Immature Granulocytes 0.0      Absolute NRBCs 0.0     D DIMER QUANTITATIVE - Abnormal    D-Dimer Quantitative 1.24 (*)    TROPONIN T, HIGH SENSITIVITY - Abnormal    Troponin T, High Sensitivity 34 (*)    COVID-19 VIRUS (CORONAVIRUS) BY PCR - Normal    SARS CoV2 PCR Negative     TYPE AND SCREEN, ADULT    ABO/RH(D) O POS      Antibody Screen Negative      SPECIMEN EXPIRATION DATE [id number]     ABO/RH TYPE AND SCREEN        RADIOLOGY:     Reviewed all pertinent imaging. Please see official radiology report.  CT Chest Pulmonary Embolism w Contrast   Final Result   IMPRESSION:   No acute pulmonary emboli      Chest XR,  PA & LAT   Final Result   IMPRESSION: Negative chest.      Echocardiogram Complete    (Results Pending)        EKG:     EKG #1  Sinus rhythm poor anterior progression left axis deviation first-degree AV block anterior fascicular block LVH    Time:[id number]    Ventricular rate 74 bmp  Axis LAD  MN interval 210 ms  QRS duration 120 ms  QT/QTC ms    Compared to previous EKG on   I have independently reviewed and interpreted the EKG(s) documented above.      PROCEDURES:     Procedures      I, Samy Goodwin, am serving as a scribe to document services personally performed by Dr. Thao based on my observation and the provider's statements to me. I, Carmen Thao MD attest that Samy Goodwin is acting in a scribe capacity, has observed my performance of the services and has documented them in accordance with my direction.    Carmen Thao M.D.  Emergency  [facility] EMERGENCY DEPARTMENT  [facility] [facility] [address]  [phone]  Dept: [phone]       Carmen Thao MD  09/30/23 3922

## 2023-09-30 NOTE — PHARMACY-ADMISSION MEDICATION HISTORY
Pharmacist Admission Medication History    Admission medication history is complete. The information provided in this note is only as accurate as the sources available at the time of the update.    Medication reconciliation/reorder completed by provider prior to medication history? No    Information Source(s): Patient, Family member, and CareEverywhere/SureScripts via in-person    Pertinent Information: N/A    Changes made to PTA medication list:  Added: None  Deleted: Vitamin B12  Changed: None    Allergies reviewed with patient and updates made in EHR: yes    Medication History Completed By: JOSE ALFREDO CALDERON RPH 9/30/2023 12:02 PM    Prior to Admission medications    Medication Sig Last Dose Taking? Auth Provider Long Term End Date   FEROSUL 325 (65 Fe) MG tablet Take 2 tablets by mouth daily (with breakfast) 9/29/2023 at am Yes Reported, Patient     ferrous sulfate (FEROSUL) 325 (65 Fe) MG tablet Take 325 mg by mouth every evening 9/29/2023 at pm Yes Unknown, Entered By History     pantoprazole (PROTONIX) 40 MG EC tablet Take 1 tablet (40 mg) by mouth 2 times daily (before meals) 9/29/2023 at pm Yes Derek Ardon MD

## 2023-09-30 NOTE — CONSULTS
Care Management Initial Consult    General Information  Assessment completed with: Patient,    Type of CM/SW Visit: Initial Assessment    Primary Care Provider verified and updated as needed: Yes   Readmission within the last 30 days: no previous admission in last 30 days      Reason for Consult: discharge planning  Advance Care Planning: Advance Care Planning Reviewed: no concerns identified          Communication Assessment  Patient's communication style: spoken language (English or Bilingual)             Cognitive  Cognitive/Neuro/Behavioral: WDL                      Living Environment:   People in home: alone (spouse currently at [facility])     Current living Arrangements: apartment      Able to return to prior arrangements: yes       Family/Social Support:  Care provided by: self  Provides care for: no one  Marital Status:   Children          Description of Support System: Supportive, Involved    Support Assessment: Patient communicates needs well met, Adequate family and caregiver support    Current Resources:   Patient receiving home care services: No     Community Resources: None  Equipment currently used at home:    Supplies currently used at home: Incontinence Supplies    Employment/Financial:  Employment Status: retired        Financial Concerns:             Does the patient's insurance plan have a 3 day qualifying hospital stay waiver?  No    Lifestyle & Psychosocial Needs:  Social Determinants of Health     Food Insecurity: Not on file   Depression: Not on file   Housing Stability: Not on file   Tobacco Use: Medium Risk (9/30/2023)    Patient History     Smoking Tobacco Use: Former     Smokeless Tobacco Use: Never     Passive Exposure: Past   Financial Resource Strain: Not on file   Alcohol Use: Not on file   Transportation Needs: Not on file   Physical Activity: Not on file   Interpersonal Safety: Not on file   Stress: Not on file   Social Connections: Not on file       Functional  Status:  Prior to admission patient needed assistance:   Dependent ADLs:: Ambulation-walker  Dependent IADLs:: Transportation, Laundry, Shopping (daughters assist)       Mental Health Status:          Chemical Dependency Status:                Values/Beliefs:  Spiritual, Cultural Beliefs, Moravian Practices, Values that affect care:                 Additional Information:  Met with patient at bedside, introduced self and care management role.   He currently lives alone in senior apartment building, spouse is at [facility] for rehab services. Patient is independent, does use a walker with all ambulation. Does not drive anymore, has 2 daughters that  does grocery shopping and helps with needs out side of apartment.      CM to follow hospital progression, treatment team recommendations and assist with discharge planning as needed. Family to transport at discharge.    Monae Leggett RN

## 2023-09-30 NOTE — ED NOTES
"[facility] ED Handoff Report    ED Chief Complaint: SOB, weak    ED Diagnosis:  (I50.9) New onset of congestive heart failure (H)  Comment:   Plan: meds and obs    (D64.9) Chronic anemia  Comment:   Plan: obs and transfuse if needed       PMH:    Past Medical History:   Diagnosis Date    Arthritis     COPD (chronic obstructive pulmonary disease) (H)     CVA (cerebral vascular accident) (H) 12/4/2018    Hypertension     Prostate hypertrophy         Code Status:  No CPR- Do NOT Intubate     Falls Risk: Yes Band: Applied    Current Living Situation/Residence: lives with their son or daughter     Elimination Status: Continent: Yes     Activity Level: SBA    Patients Preferred Language:  English     Needed: No    Vital Signs:  BP (!) 160/67   Pulse 73   Temp 98.2  F (36.8  C) (Oral)   Resp 16   Ht 1.727 m (5' 8\")   Wt 59.9 kg (132 lb)   SpO2 98%   BMI 20.07 kg/m       Cardiac Rhythm: NSR    Pain Score: 2/10    Is the Patient Confused:  No    Last Food or Drink: 09/30/23 at     Focused Assessment:      Tests Performed: Done: Labs and Imaging    Treatments Provided:  meds    Family Dynamics/Concerns: No    Family Updated On Visitor Policy: Yes    Plan of Care Communicated to Family: Yes    Who Was Updated about Plan of Care: self    Belongings Checklist Done and Signed by Patient: Yes    Medications sent with patient:     Covid: asymptomatic , negative    Additional Information:     RN: Brenda Acosta RN   9/30/2023 2:30 PM      "

## 2023-10-01 PROBLEM — I50.9 NEW ONSET OF CONGESTIVE HEART FAILURE (H): Status: ACTIVE | Noted: 2023-01-01

## 2023-10-01 PROBLEM — I50.9 NEW ONSET OF CONGESTIVE HEART FAILURE (H): Status: RESOLVED | Noted: 2023-01-01 | Resolved: 2023-01-01

## 2023-10-01 PROBLEM — Z85.89: Status: ACTIVE | Noted: 2023-01-01

## 2023-10-01 PROBLEM — D13.5 ADENOMA OF AMPULLA OF VATER: Status: ACTIVE | Noted: 2023-01-01

## 2023-10-01 NOTE — UTILIZATION REVIEW
"Inpatient to Observation note:    Admission Status; Secondary Review Determination     Under the authority of the Utilization Management Committee, the utilization review process indicated a secondary review on the above patient.  The review outcome is based on review of the medical records, discussions with staff, and applying clinical experience noted on the date of the review.          (x) Observation Status Appropriate - This patient does not meet hospital inpatient criteria and is placed in observation status. If this patient's primary payer is Medicare and was admitted as an inpatient, Condition Code 44 should be used and patient status changed to \"observation\".     RATIONALE FOR DETERMINATION      88-year-old male who presented to the emergency department after noticing an episode of shortness of breath.  Patient was admitted for acute heart failure and given a dose of IV Lasix on arrival to floors.  Patient has been stable overnight but patient refused further treatment and work-up.  He is discharged today.      The severity of illness, intensity of service provided, expected LOS and risk for adverse outcome make the care appropriate for further observation; however, doesn't meet criteria for hospital inpatient admission. Dr Manuel notified of this determination.        The information on this document is developed by the utilization review team in order for the business office to ensure compliance.  This only denotes the appropriateness of proper admission status and does not reflect the quality of care rendered.         The definitions of Inpatient Status and Observation Status used in making the determination above are those provided in the CMS Coverage Manual, Chapter 1 and Chapter 6, section 70.4.      Sincerely,  Kulwinder Can MD  Utilization Review  Physician Advisor  [facility].   "

## 2023-10-01 NOTE — PROGRESS NOTES
"Care Management Discharge Note    Discharge Date: 10/01/2023       Discharge Disposition: Home    Discharge Services: None    Discharge DME: None    Discharge Transportation: family or friend will provide    Private pay costs discussed: Not applicable    Does the patient's insurance plan have a 3 day qualifying hospital stay waiver?  No    PAS Confirmation Code: N/A  Patient/family educated on Medicare website which has current facility and service quality ratings: no    Education Provided on the Discharge Plan:  (N/A)  Persons Notified of Discharge Plans: N/A  Patient/Family in Agreement with the Plan: unable to assess    Handoff Referral Completed: Yes    Additional Information:  12:09 PM  Chart reviewed. Pt declining any additional treatment at this time. Plan is for Pt to discharge home. Family to transport. No CM needs requested or identified for discharge.    Social Hx:  \"He currently lives alone in senior apartment building, spouse is at [facility] for rehab services. Patient is independent, does use a walker with all ambulation. Does not drive anymore, has 2 daughters that  does grocery shopping and helps with needs out side of apartment.\"    LIZETT Abreu      "

## 2023-10-01 NOTE — PLAN OF CARE
"Time    /65 (BP Location: Right arm)   Pulse 74   Temp 97.4  F (36.3  C) (Oral)   Resp 18   Ht 1.727 m (5' 8\")   Wt 59.6 kg (131 lb 6.4 oz)   SpO2 97%   BMI 19.98 kg/m      Reason for admission: SOB and lightheaded   Activity: Assist of one   Pain: Denies pain  Neuro: A/O x 4  Cardiac: SR, + 1 edema bilateral legs  Respiratory: RA- Bronchi diminished   GI/: Pt straight cath self, only has one straight cath catheter left- Pt verbalized that he cleans it and reuses the same catheter.  Diet: Reg diet  Lines: R- PIV  Labs/imaging:       New changes this shift:   Pt is refusing medications. Pt allowed nurse to flush his line but wants nothing like medication in line or any intervention for his health. /70 Pt want no intervention for it. Provider notified.      Continue to monitor and follow POC   "

## 2023-10-01 NOTE — PROGRESS NOTES
Pt self-catheterizing using coude-tip straight catheters of his preference from home. Per hospitalist okay for pt to self-catheterize with preferred home supplies. Refused hospital stock.

## 2023-10-01 NOTE — DISCHARGE SUMMARY
[facility]  Hospitalist Discharge Summary      Date of Admission:  9/30/2023  Date of Discharge:  10/1/2023  Discharging Provider: Rojas Manuel DO  Discharge Service: Hospitalist Service    Discharge Diagnoses   Chronic anemia, shortness of breath    Clinically Significant Risk Factors          Follow-ups Needed After Discharge   Follow-up Appointments     Follow-up and recommended labs and tests       Follow up with PCP to discuss hospital stay and additional goals of care              Discharge Disposition   Discharged to home  Condition at discharge: Stable    Hospital Course   Denton Hobbs is an 88-year-old male who presented to the emergency department after noticing an episode of shortness of breath.  Patient was admitted for acute heart failure and given a dose of IV Lasix on arrival to floors.  He remained stable overnight, and plans were made for an echocardiogram on hospital day 1.  However, on hospital day 1, patient voiced his concern with ongoing treatments and stated that he clearly would not want any additional treatment.  It was discussed that he may have heart failure for an unknown reason and the risks of foregoing further diagnostic testing and treatment were discussed.  Patient continued to state that his goals of care were in line with no further treatment at this time.  Specific treatments were discussed including Lasix, other medications, conservative treatments and invasive treatments, all of which patient does not want to undergo.  The echocardiogram was canceled due to the above reasons.  Patient's hemoglobin remained stable and additional labs showed improvement.  His vitals remained stable throughout his hospital stay.  Given patient's goals of care, patient will be discharged home to follow-up with PCP to discuss any ongoing symptoms and further discussion on goals of care.  It was discussed that if he had ongoing symptoms and his goals of care were to  change to present to the emergency department if things were to worsen.    Consultations This Hospital Stay   CARE MANAGEMENT / SOCIAL WORK IP CONSULT    Code Status   No CPR- Do NOT Intubate    Time Spent on this Encounter   I, Rojas Manuel DO, personally saw the patient today and spent greater than 30 minutes discharging this patient.       Rojas Manuel DO  [facility]  [address] [place] [id number]  Phone: [phone]  Fax: [phone]  ______________________________________________________________________    Physical Exam   Vital Signs: Temp: 97.7  F (36.5  C) Temp src: Oral BP: (!) 141/63 Pulse: 77   Resp: 20 SpO2: 97 % O2 Device: None (Room air)    Weight: 130 lbs 11.72 oz    General Appearance: Alert, non-toxic  Respiratory: isolated wheeze left base, breathing comfortably on room air   Cardiovascular: RRR, occasional ectopy without murmurs. No edema of legs   GI: No pain  Skin: Thin, no rashes       Primary Care Physician   YOUSIF IBARRA    Discharge Orders      Reason for your hospital stay    Shortness of breath     Follow-up and recommended labs and tests     Follow up with PCP to discuss hospital stay and additional goals of care     Activity    Your activity upon discharge: activity as tolerated     Diet    Follow this diet upon discharge: Orders Placed This Encounter      Regular Diet Adult     PRIMARY CARE FOLLOW-UP SCHEDULING    Please see details below            Significant Results and Procedures   Results for orders placed or performed during the hospital encounter of 09/30/23   Chest XR,  PA & LAT    Narrative    EXAM: XR CHEST 2 VIEWS  LOCATION: [facility]  DATE: 9/30/2023    INDICATION: sob  COMPARISON: CT 9/10/2023      Impression    IMPRESSION: Negative chest.   CT Chest Pulmonary Embolism w Contrast    Narrative    EXAM: CT CHEST PULMONARY EMBOLISM W CONTRAST  LOCATION: [facility]  DATE:  9/30/2023    INDICATION: elevated ddimer evaluate for PE  COMPARISON: 9/10/2023  TECHNIQUE: CT chest pulmonary angiogram during arterial phase injection of IV contrast. Multiplanar reformats and MIP reconstructions were performed. Dose reduction techniques were used.   CONTRAST: [id number] 75ml    FINDINGS:  ANGIOGRAM CHEST: Pulmonary arteries are normal caliber and negative for pulmonary emboli. Thoracic aorta is negative for dissection. No CT evidence of right heart strain.    LUNGS AND PLEURA: Mild centrilobular emphysema. Subpleural reticular abnormality, likely mild fibrosis. Small focal ectasia/dilation of a segmental branch of the left lower lobe pulmonary artery is stable (4/208).  No suspicious nodules or masses. Stable   nodules along the left major fissure measuring 2 - 3 mm.    MEDIASTINUM/AXILLAE: No lymphadenopathy. Small amount of fluid in the pericardial recesses. No thoracic aortic aneurysm. Mild cardiomegaly.    CORONARY ARTERY CALCIFICATION: Severe.    UPPER ABDOMEN: Hepatic metastases are suboptimally visualized due to contrast bolus timing. Refer to CT 9/10/2023 for full details. Partially imaged pancreatic duct dilation and cystic lesion, which is unchanged.     MUSCULOSKELETAL: Unchanged well-defined sclerotic lesions in the thoracic spine, likely benign bone islands. No aggressive or destructive osseous lesions.      Impression    IMPRESSION:  No acute pulmonary emboli       Discharge Medications   Current Discharge Medication List        CONTINUE these medications which have NOT CHANGED    Details   !! FEROSUL 325 (65 Fe) MG tablet Take 2 tablets by mouth daily (with breakfast)      !! ferrous sulfate (FEROSUL) 325 (65 Fe) MG tablet Take 325 mg by mouth every evening      pantoprazole (PROTONIX) 40 MG EC tablet Take 1 tablet (40 mg) by mouth 2 times daily (before meals)  Qty: 60 tablet, Refills: 1    Associated Diagnoses: Upper GI bleed       !! - Potential duplicate medications found. Please  discuss with provider.        Allergies   Allergies   Allergen Reactions    Sulfa Antibiotics Rash     Info obtained off of 10/4/21 Merlyn pre op.

## 2023-10-01 NOTE — DISCHARGE SUMMARY
Pt discharged at 1307. AVS reviewed with pt and daughter Arminda. No questions or concerns. Escorted out in a wheelchair with RN.

## 2023-10-01 NOTE — PROGRESS NOTES
"Patient is alert and oriented, he refused lasix, he stated that \"I have a leaking mass in my gut that cannot be fixed, I don't want any medication to treat anything.\" He also refused Senna. Staff educated patient why he was getting lasix and his symptoms that are associated with CHF, he sad that he is aware and still refused it. Dr. Roque Updated.   "

## 2023-10-09 NOTE — PROGRESS NOTES
Infusion Nursing Note:  Denton Hobbs presents today for labs/possible blood.    Patient seen by provider today: No   present during visit today: Not Applicable.    Note: Sebastián comes to infusion ambulatory and in stable condition. Here for labs/possible blood. States has been feeling well. Lab collected perpherially and reviewed. No pRBC needed today. Reviewed upcoming appointments. Left infusion ambulatory and in stable condition. Will return in two weeks.     Intravenous Access:  Lab draw site left AC, Needle type butterfly, Gauge 23.  Labs drawn without difficulty.    Treatment Conditions:  Lab Results   Component Value Date    HGB 8.4 (L) 10/09/2023    WBC 7.9 10/09/2023    ANEUTAUTO 5.9 10/09/2023     10/09/2023     Results reviewed, labs did NOT meet treatment parameters: Hgb above 8.    Post Infusion Assessment:  No infusion needed today.     Discharge Plan:   Discharge instructions reviewed with: Patient.  Patient and/or family verbalized understanding of discharge instructions and all questions answered.  Copy of AVS reviewed with patient and/or family.  Patient will return on 10/25 for next appointment.  Patient discharged in stable condition accompanied by: daughter.  Departure Mode: Ambulatory.    Lara Carl RN

## 2023-10-25 NOTE — PROGRESS NOTES
Infusion Nursing Note:  Denton Hobbs presents today for blood transfusion.    Patient seen by provider today: No   present during visit today: Not Applicable.    Note: Sebastián arrived ambulatory and in stable condition. Hgb 8.7 today, so patient did NOT met parameters for PRBCs. Will return on 11/8 for next appointment.      Intravenous Access:  Peripheral lab draw from right AC without difficulty.     Treatment Conditions:  Lab Results   Component Value Date    HGB 8.7 (L) 10/25/2023    WBC 8.6 10/25/2023    ANEUTAUTO 6.8 10/25/2023     (H) 10/25/2023        Results reviewed, labs did NOT meet  treatment parameters, ok to proceed with treatment.  Hgb = 8.7      Post Infusion Assessment:  N/A    Discharge Plan:   Patient and/or family verbalized understanding of discharge instructions and all questions answered.  AVS to patient via [employer].  Patient will return 10/11 for next appointment.   Patient discharged in stable condition accompanied by: daughter.  Departure Mode: Ambulatory.    Lelo Soler RN

## 2023-10-30 NOTE — ED TRIAGE NOTES
Patient arrived to ER via [facility].  Has had diarrhea for last 2 weeks. Denies seeing any blood. Denies vomiting. Also endorses intermittent dull, aching, right mid abdominal pain.     Also self-caths about Y3zlnby and states it's orange in color but notes that more recently it's been about 10 hours and he barely gets any output. Denies any urinary symptoms.     Suspected cancer but he does not want any treatment done per daughter.  Jaundice looking here in triage.      Triage Assessment (Adult)       Row Name 10/30/23 0035          Triage Assessment    Airway WDL WDL        Respiratory WDL    Respiratory WDL WDL        Skin Circulation/Temperature WDL    Skin Circulation/Temperature WDL WDL        Cardiac WDL    Cardiac WDL WDL        Peripheral/Neurovascular WDL    Peripheral Neurovascular WDL WDL        Cognitive/Neuro/Behavioral WDL    Cognitive/Neuro/Behavioral WDL WDL

## 2023-10-31 NOTE — ED PROVIDER NOTES
Emergency Department Encounter     Evaluation Date & Time:   No admission date for patient encounter.    CHIEF COMPLAINT:  Diarrhea      Triage Note:Patient arrived to ER via [facility].  Has had diarrhea for last 2 weeks. Denies seeing any blood. Denies vomiting. Also endorses intermittent dull, aching, right mid abdominal pain.     Also self-caths about [id number] and states it's orange in color but notes that more recently it's been about 10 hours and he barely gets any output. Denies any urinary symptoms.     Suspected cancer but he does not want any treatment done per daughter.  Jaundice looking here in triage.                  ED COURSE & MEDICAL DECISION MAKING:     ED Course as of 10/30/23 2335   Mon Oct 30, 2023   1856 Met with the patient and performed my initial exam.   2011 Hgb stable at 9.  Normal WBC, unlikely Cdiff.   2011 LFT and bili elevated more than previous, which is not shocking given underlying history. Awaiting Lipase and CT.    2022 Lipase down to 360 from 1040 most recently.     2159 Updated patient.  We discussed results. Pt and family understand he is dying and he does not want to proceed with care regarding his cancer.  Pt does not want to prolong dying process.  I discussed with him palliative care/hospice.  Pt ultimately agreeable to me sending referral to help with outpatient management of his symptoms related to dying.  Will give a dose of imodium here and Rx for home. Pt refuses any other medication at this time.       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  Pt refuses further biopsy or intervention for likely cancer other than blood transfusions.  Pt here today for ongoing loose/watery stools up to 3-4 times a day for the past 3 weeks, family worried about dehydration as his urine is darker/orange.  Pt afebrile, nontoxic, but chronically ill appearing. Will get labs, CT, hydrate and  reassess.  Pt taking nothing for symptoms at home.  No prior C-diff history or obvious risk factors known.  Pt was last transfused blood beginning of this month.      Medical Decision Making    History:  Supplemental history from: Family Member/Significant Other  External Record(s) reviewed: Documented in chart, if applicable. and Outpatient Record: ED admission at [facility] for abdominal pain on 10/10/23    Work Up:  Chart documentation includes differential considered and any EKGs or imaging independently interpreted by provider, where specified.  In additional to work up documented, I considered the following work up: Documented in chart, if applicable.    External consultation:  Discussion of management with another provider: Documented in chart, if applicable    Complicating factors:  Care impacted by chronic illness: Chronic Lung Disease and Hypertension  Care affected by social determinants of health: N/A    Disposition considerations: Discharge. I prescribed additional prescription strength medication(s) as charted. I considered admission, but ultimately discharged patient as he has no desire to proceed with intervention/treatment and will continue symptomatic cares at home.      At the conclusion of the encounter I discussed the results of all the tests and the disposition. The questions were answered. The patient or family acknowledged understanding and was agreeable with the care plan.      MEDICATIONS GIVEN IN THE EMERGENCY DEPARTMENT:  Medications   sodium chloride 0.9% BOLUS 500 mL (0 mLs Intravenous Stopped 10/30/23 2102)   loperamide (IMODIUM) capsule 4 mg (4 mg Oral $Given 10/30/23 2218)       NEW PRESCRIPTIONS STARTED AT TODAY'S ED VISIT:  Discharge Medication List as of 10/30/2023 10:14 PM        START taking these medications    Details   loperamide (IMODIUM A-D) 2 MG tablet Take 2 tablets (4 mg) by mouth 3 times daily as needed for diarrhea, Disp-30 tablet, R-0, E-Prescribe             HPI  "  The history is provided by the patient and a relative. No  was used.        Denton Hobbs is a 88 year old male with a pertinent history of COPD, hypertension, CHF, prostate hypertrophy, orchitis and epididymitis, chronic anemia, klebsiella UTI, and sepsis, who presents to this ED via ambulance for evaluation of diarrhea.    Per chart review, the patient was admitted to [facility] for evaluation of abdominal pain on 10/10/23. Evaluated by [facility], noted patient could be discharge home and recommended to continue with outpatient transfusions as needed/palliative care. Patient declined any further diagnostic procedures. Community palliative care was ordered. Hemoglobin was 7.8 upon discharge. Recommended community palliative care. Follow-up with PCP within 1-5 days. No new medications upon discharge.    Per patient, he has been having ongoing watery diarrhea and middle lower to right lower abdominal pain for about 3 weeks, stating recently he has been feeling dehydrated. Reports he has about 3-4 watery stools a day, noting he has abdominal pain localized a the RLQ. Denies nausea, vomiting, blood in stool, and urinary symptoms. No known fever. He is self-catheterized and per patient's daughter, he hasn't been urinating as much, stating his urine is \"bright orange\". No known C-diff. States he usually has bilateral lower extremity edema, noting he had a stroke on his left leg. He uses a walker at baseline. He was found to have mass in his duodenum and notes he doesn't want it treated because he has abdominal pain and wants to treat it before he treats his other symptoms.    Per patient's daughter, patient has mass in his duodenum and notes he doesn't want it treated. No known recent antibiotics use, noting patient has had a bladder infection a while ago, and might have taken antibiotics. No known C-diff. She believes the last time he had a blood transfusion was sometime early October, between " 10/1-10/7, stating he didn't need a blood transfusion the week of October 9th and week of October 25th. No other reported complaints or concerns at this time.    REVIEW OF SYSTEMS:  See HPI      Medical History     Past Medical History:   Diagnosis Date    Arthritis     COPD (chronic obstructive pulmonary disease) (H)     CVA (cerebral vascular accident) (H) 12/4/2018    Hypertension     Prostate hypertrophy        Past Surgical History:   Procedure Laterality Date    ESOPHAGOSCOPY, GASTROSCOPY, DUODENOSCOPY (EGD), COMBINED N/A 9/12/2022    Procedure: ESOPHAGOGASTRODUODENOSCOPY (EGD);  Surgeon: Conner Navarrete MD;  Location: [facility] OR    GENITOURINARY SURGERY      HERNIORRHAPHY INGUINAL Left 10/11/2021    Procedure: LEFT INGUINAL HERNIA REPAIR WITH MESH;  Surgeon: Puma Preston MD;  Location: [facility] OR    HERNIORRHAPHY INGUINAL Right 8/19/2022    Procedure: HERNIORRHAPHY, INGUINAL, OPEN, RIGHT;  Surgeon: Flo Keyes MD;  Location: [facility] OR    IR VISCERAL ANGIOGRAM  9/13/2022    TRANSRECTAL ULTRASONIC, TRANSURETHRAL RESECTION (TUR) OF PROSTATE CYST  2004    helped temporarily       Family History   Problem Relation Age of Onset    No Known Problems Other         No heart, DM, HTN, or CA    No Known Problems Mother     No Known Problems Father        Social History     Tobacco Use    Smoking status: Former     Packs/day: 2.00     Years: 25.00     Additional pack years: 0.00     Total pack years: 50.00     Types: Cigarettes     Start date: 1/1/1990     Passive exposure: Past (as a child brothers & Dad pipe & ciggerettes)    Smokeless tobacco: Never   Substance Use Topics    Alcohol use: No     Comment: Alcoholic Drinks/day: Quit 1990    Drug use: Never       loperamide (IMODIUM A-D) 2 MG tablet  FEROSUL 325 (65 Fe) MG tablet  ferrous sulfate (FEROSUL) 325 (65 Fe) MG tablet  pantoprazole (PROTONIX) 40 MG EC tablet        Physical Exam     Vitals:  BP (!) 144/65   Pulse 72   Temp  "97.5  F (36.4  C) (Temporal)   Resp 18   Ht 1.727 m (5' 8\")   Wt 59 kg (130 lb)   SpO2 97%   BMI 19.77 kg/m      PHYSICAL EXAM:   Physical Exam  Vitals and nursing note reviewed.   Constitutional:       General: He is not in acute distress.     Appearance: Normal appearance. He is ill-appearing (chronically).      Comments: Pale mucosa   HENT:      Head: Normocephalic and atraumatic.      Nose: Nose normal.      Mouth/Throat:      Mouth: Mucous membranes are moist.   Eyes:      General: Scleral icterus (mild) present.      Pupils: Pupils are equal, round, and reactive to light.   Cardiovascular:      Rate and Rhythm: Normal rate and regular rhythm.      Pulses: Normal pulses.           Radial pulses are 2+ on the right side and 2+ on the left side.        Dorsalis pedis pulses are 2+ on the right side and 2+ on the left side.   Pulmonary:      Effort: Pulmonary effort is normal. No respiratory distress.      Breath sounds: Normal breath sounds.   Abdominal:      Palpations: Abdomen is soft.      Tenderness: There is abdominal tenderness (Mildly diffusely tender abdomen).   Musculoskeletal:      Cervical back: Full passive range of motion without pain and neck supple.      Comments: No calf tenderness or swelling b/l   Skin:     General: Skin is warm.      Findings: No rash.      Comments: 2+ bilateral lower extremity edema   Neurological:      General: No focal deficit present.      Mental Status: He is alert. Mental status is at baseline.      Comments: Fluent speech, no acute lateralizing deficits   Psychiatric:         Mood and Affect: Mood normal.         Behavior: Behavior normal.         Results     LAB:  All pertinent labs reviewed and interpreted  Labs Ordered and Resulted from Time of ED Arrival to Time of ED Departure   BASIC METABOLIC PANEL - Abnormal       Result Value    Sodium 128 (*)     Potassium 4.0      Chloride 95 (*)     Carbon Dioxide (CO2) 19 (*)     Anion Gap 14      Urea Nitrogen 24.5 " (*)     Creatinine 1.36 (*)     GFR Estimate 51 (*)     Calcium 8.3 (*)     Glucose 109 (*)    HEPATIC FUNCTION PANEL - Abnormal    Protein Total 5.4 (*)     Albumin 2.9 (*)     Bilirubin Total 3.4 (*)     Alkaline Phosphatase 787 (*)      (*)      (*)     Bilirubin Direct 3.01 (*)    LIPASE - Abnormal    Lipase 360 (*)    CBC WITH PLATELETS AND DIFFERENTIAL - Abnormal    WBC Count 7.6      RBC Count 3.36 (*)     Hemoglobin 9.0 (*)     Hematocrit 28.5 (*)     MCV 85      MCH 26.8      MCHC 31.6      RDW 15.5 (*)     Platelet Count 467 (*)     % Neutrophils 72      % Lymphocytes 9      % Monocytes 17      % Eosinophils 0      % Basophils 1      % Immature Granulocytes 1      NRBCs per 100 WBC 0      Absolute Neutrophils 5.5      Absolute Lymphocytes 0.6 (*)     Absolute Monocytes 1.3      Absolute Eosinophils 0.0      Absolute Basophils 0.0      Absolute Immature Granulocytes 0.0      Absolute NRBCs 0.0     ROUTINE UA WITH MICROSCOPIC REFLEX TO CULTURE   ENTERIC BACTERIA AND VIRUS PANEL BY PCR       RADIOLOGY:  CT Abdomen Pelvis w/o Contrast   Final Result   IMPRESSION:    1.  Grossly stable size and appearance of known duodenal mass and hepatic metastases.   2.  Persistent biliary and pancreatic ductal obstructions.   3.  No evidence of bowel obstruction or perforation.   4.  Sequela of chronic bladder outlet obstruction. No hydronephrosis.                      ECG:  none    PROCEDURES:  Procedures:  none      FINAL IMPRESSION:    ICD-10-CM    1. 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 Bart Tovar, based on my observations and the provider's statements to me. I, Bart Tovar, DO attest that Ariana Sanford is acting in a scribe capacity, has observed my performance of the services and has documented them in accordance with my  direction.      Bart Tovar DO  Emergency Medicine  [facility] EMERGENCY DEPARTMENT  10/30/2023  7:03 PM         Bart Tovar MD  10/30/23 7799

## 2023-10-31 NOTE — DISCHARGE INSTRUCTIONS
Respiratory failure Take imodium for diarrhea as needed.  Hydrate well with electrolyte fluids, including Gatorade/water.  Follow up with primary clinic and palliative/hospice care to discuss options to keep you comfortable during the dying process.

## 2023-11-06 PROBLEM — E80.6 HYPERBILIRUBINEMIA: Status: ACTIVE | Noted: 2023-01-01

## 2023-11-06 PROBLEM — K83.1 BILIARY OBSTRUCTION (H): Status: ACTIVE | Noted: 2023-01-01

## 2023-11-06 NOTE — PROCEDURES
Date of Procedure: 11/6/23    Preoperative diagnosis Urinary retention  Postoperative diagnosis Urinary retention    Procedure performed   1. Flexible cystoscopy   2.  Urethral dilation  3. Catheter placement over wire    Attending surgeon   Ellie Levine MD    Anesthesia   None    EBL   2 mL     Complications   None     Specimens   None    Findings   Cystourethroscopy revealed 5+ false passages with a large tortuous prostatic urethra. Large bladder unabel to be fully visualized with flexible scope. Closplint catheter placed over a wire.       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 He agreed to undergo the above named procedure after discussion of the alternatives, risks and benefits.    Informed consent was obtained and risks and benefits of the procedure were discussed.     Procedure   The patient was positioned supine. The procedure area was sterilely prepped and draped. Verbal consent ws obtained and a time out performed.      A 24 [place] flexible cystoscope was introduced into the urethra under direct vision with findings above. The true lumen was able to be scoped. A rao wire was placed into the bladder under direct vision. A 16F [place] catheter was placed over the wire with 10 ml in the balloon. Good return of urine was seen. The patient tolerated the procedure well, was awakened, extubated and transferred to the recovery room in stable condition.     Ellie Levine MD was present in the procedure room the entire duration of the case.

## 2023-11-06 NOTE — ED TRIAGE NOTES
"Patient arrives with [facility] complaining of dehydration, nausea, and poor appetite. Reports he has an untreated GI bleed that has been going on for \"a couple years\". Reports black stools which are not new. Skin is jaundiced on arrival.      Triage Assessment (Adult)       Row Name 11/06/23 0900          Triage Assessment    Airway WDL WDL        Respiratory WDL    Respiratory WDL WDL        Skin Circulation/Temperature WDL    Skin Circulation/Temperature WDL WDL        Cardiac WDL    Cardiac WDL WDL        Peripheral/Neurovascular WDL    Peripheral Neurovascular WDL WDL        Cognitive/Neuro/Behavioral WDL    Cognitive/Neuro/Behavioral WDL WDL                     "

## 2023-11-06 NOTE — PHARMACY-ADMISSION MEDICATION HISTORY
Pharmacist Admission Medication History    Admission medication history is complete. The information provided in this note is only as accurate as the sources available at the time of the update.    Information Source(s): Patient via in-person    Pertinent Information: None    Changes made to PTA medication list:  Added: None  Deleted: None  Changed: None    Medication Affordability:unable to assess       Allergies reviewed with patient and updates made in EHR: yes    Medication History Completed By: Puma Ydoer RPH 11/6/2023 11:45 AM    PTA Med List   Medication Sig Last Dose    FEROSUL 325 (65 Fe) MG tablet Take 2 tablets by mouth daily (with breakfast) 11/6/2023 at AM    ferrous sulfate (FEROSUL) 325 (65 Fe) MG tablet Take 325 mg by mouth every evening 11/5/2023 at PM    loperamide (IMODIUM A-D) 2 MG tablet Take 2 tablets (4 mg) by mouth 3 times daily as needed for diarrhea Past Month at PRN    pantoprazole (PROTONIX) 40 MG EC tablet Take 1 tablet (40 mg) by mouth 2 times daily (before meals) 11/5/2023 at PM

## 2023-11-06 NOTE — ED PROVIDER NOTES
EMERGENCY DEPARTMENT ENCOUNTER      NAME: Denton Hobbs  AGE: 88 year old male  YOB: 1935  MRN: [id number]  EVALUATION DATE & TIME: 2023  8:59 AM    PCP: Jarad Prater    ED PROVIDER: Jarad West D.O.      Chief Complaint   Patient presents with    Dehydration    Nausea       FINAL IMPRESSION:  1. Biliary obstruction (H28)    2. Hyperbilirubinemia        ED COURSE & MEDICAL DECISION MAKIN:09 AM I met with the patient to gather history and to perform my initial exam. I discussed the plan for care while in the Emergency Department.  11:24 AM I spoke with Dr. Aguilar, gastroenterology, regarding the patient.  11:43 AM I discussed the case with hospitalist, Dr Hall, who accepts the patient    11:44 AM I rechecked and updated the patient.         Pertinent Labs & Imaging studies reviewed. (See chart for details)  88 year old male presents to the Emergency Department for evaluation of abdominal pain with jaundice.  Initial differential did include biliary obstruction, cholecystitis, appendicitis, cholelithiasis, other acute process.  Patient history was limited due to his generalized weakness and fatigue, but was able to give a reasonable history.  Did not believe him to be altered mental status, and believe him to have capacity and competence to make decisions.  He did have previous imaging that did show a duodenal area mass that would likely cause obstruction which I believe is the underlying cause of the patient's worsening  bilirubin and jaundice.  He was evaluated by GI in the emergency department, and was determined that the patient did not wish to undergo excessive management or stent placement.  Therefore plan at this time is to admit for palliative care.  Discussed with the hospitalist who agreed to the admission.    Medical Decision Making    History:  Supplemental history from: Documented in chart, if applicable  External Record(s) reviewed: Documented in chart, if  applicable.    Work Up:  Chart documentation includes differential considered and any EKGs or imaging independently interpreted by provider, where specified.  In additional to work up documented, I considered the following work up: Documented in chart, if applicable.    External consultation:  Discussion of management with another provider: Gastroenterology and Hospitalist    Complicating factors:  Care impacted by chronic illness: Cancer/Chemotherapy  Care affected by social determinants of health: N/A    Disposition considerations: Admit.        At the conclusion of the encounter I discussed the results of all of the tests and the disposition. The questions were answered. The patient or family acknowledged understanding and was agreeable with the care plan.        HPI    Patient information was obtained from: the patient      Denton Hobbs is a 88 year old male who presents to the emergency department with complaint of right-sided abdominal pain.  Patient denies any fever.  Reports that he was told 5 years ago that he has had a GI bleed that he has never intervened on.  Denies any nausea or vomiting.  Has not had any significant diarrhea but does report black-colored stools.  Denies dysuria but does report some difficulty with urination.  Denies additional plaints at this time.    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      Per [facility] ED note dated 10 October 2023  CT was performed that shows obvious mass with concern for involvement of the pancreas        PAST MEDICAL HISTORY:  Past Medical History:   Diagnosis Date    Arthritis     COPD (chronic obstructive pulmonary disease) (H)     CVA (cerebral vascular accident) (H) 12/4/2018    Hypertension     Prostate hypertrophy        PAST SURGICAL HISTORY:  Past Surgical History:   Procedure Laterality Date    ESOPHAGOSCOPY, GASTROSCOPY, DUODENOSCOPY  (EGD), COMBINED N/A 9/12/2022    Procedure: ESOPHAGOGASTRODUODENOSCOPY (EGD);  Surgeon: Conner Navarrete MD;  Location: [facility] OR    GENITOURINARY SURGERY      HERNIORRHAPHY INGUINAL Left 10/11/2021    Procedure: LEFT INGUINAL HERNIA REPAIR WITH MESH;  Surgeon: Puma Preston MD;  Location: [facility] OR    HERNIORRHAPHY INGUINAL Right 8/19/2022    Procedure: HERNIORRHAPHY, INGUINAL, OPEN, RIGHT;  Surgeon: Flo Keyes MD;  Location: [facility] OR    IR VISCERAL ANGIOGRAM  9/13/2022    TRANSRECTAL ULTRASONIC, TRANSURETHRAL RESECTION (TUR) OF PROSTATE CYST  2004    helped temporarily         CURRENT MEDICATIONS:    No current facility-administered medications for this encounter.     Current Outpatient Medications   Medication    FEROSUL 325 (65 Fe) MG tablet    ferrous sulfate (FEROSUL) 325 (65 Fe) MG tablet    loperamide (IMODIUM A-D) 2 MG tablet    pantoprazole (PROTONIX) 40 MG EC tablet         ALLERGIES:  Allergies   Allergen Reactions    Sulfa Antibiotics Rash     Info obtained off of 10/4/21 [facility] pre op.       FAMILY HISTORY:  Family History   Problem Relation Age of Onset    No Known Problems Other         No heart, DM, HTN, or CA    No Known Problems Mother     No Known Problems Father        SOCIAL HISTORY:  Social History     Socioeconomic History    Marital status:     Number of children: 6   Tobacco Use    Smoking status: Former     Packs/day: 2.00     Years: 25.00     Additional pack years: 0.00     Total pack years: 50.00     Types: Cigarettes     Start date: 1/1/1990     Passive exposure: Past (as a child brothers & Dad pipe & ciggerettes)    Smokeless tobacco: Never   Substance and Sexual Activity    Alcohol use: No     Comment: Alcoholic Drinks/day: Quit 1990    Drug use: Never       VITALS:  Patient Vitals for the past 24 hrs:   BP Temp Temp src Pulse Resp SpO2 Height Weight   11/06/23 1345 (!) 143/66 98  F (36.7  C) Oral 82 19 98 % -- --   11/06/23 1330 (!)  "146/67 -- -- 80 19 97 % -- --   11/06/23 1315 (!) 146/65 -- -- 92 30 (!) 87 % -- --   11/06/23 1300 (!) 154/71 -- -- 79 12 97 % -- --   11/06/23 1245 (!) 143/58 -- -- 76 (!) 9 97 % -- --   11/06/23 1230 (!) 150/67 -- -- 76 15 97 % -- --   11/06/23 1215 (!) 143/63 -- -- 76 11 96 % -- --   11/06/23 1115 (!) 151/64 -- -- 83 25 97 % -- --   11/06/23 1100 137/57 -- -- 77 14 96 % -- --   11/06/23 1045 (!) 152/66 -- -- 77 16 96 % -- --   11/06/23 0930 125/62 -- -- 82 15 95 % -- --   11/06/23 0916 135/62 -- -- 81 16 95 % -- --   11/06/23 0903 -- -- -- -- -- -- 1.702 m (5' 7\") 59 kg (130 lb)   11/06/23 0901 131/58 -- -- 84 24 97 % -- --   11/06/23 0859 131/58 97.6  F (36.4  C) Oral 84 16 95 % -- --       PHYSICAL EXAM    VITAL SIGNS: BP (!) 143/66 (BP Location: Left arm, Patient Position: Semi-Hilton's, Cuff Size: Adult Small)   Pulse 82   Temp 98  F (36.7  C) (Oral)   Resp 19   Ht 1.702 m (5' 7\")   Wt 59 kg (130 lb)   SpO2 98%   BMI 20.36 kg/m      General Appearance: Cachectic, no acute distress   Head:  Normocephalic, without obvious abnormality, atraumatic  Eyes:  PERRL, conjunctiva/corneas clear, EOM's intact,  ENT:  Lips, mucosa, and tongue normal, membranes are moist without pallor  Neck:  Normal ROM, symmetrical, trachea midline    Cardio:  Regular rate and rhythm, no murmur, rub or gallop, 2+ pulses symmetric in all extremities  Pulm:  Clear to auscultation bilaterally, respirations unlabored,  Abdomen:  Soft, RLQ abdominal tenderness and guarding  Musculoskeletal: Full ROM, no edema, no cyanosis, good ROM of major joints  Integument:  Warm, Dry, No erythema, Jaundiced   Neurologic:  Alert & oriented.  No focal deficits appreciated    LABS  Results for orders placed or performed during the hospital encounter of 11/06/23 (from the past 24 hour(s))   CBC with platelets + differential    Narrative    The following orders were created for panel order CBC with platelets + differential.  Procedure                   "             Abnormality         Status                     ---------                               -----------         ------                     CBC with platelets and d...[[id number]]  Abnormal            Final result                 Please view results for these tests on the individual orders.   Basic metabolic panel   Result Value Ref Range    Sodium 133 (L) 135 - 145 mmol/L    Potassium 4.1 3.4 - 5.3 mmol/L    Chloride 97 (L) 98 - 107 mmol/L    Carbon Dioxide (CO2) 24 22 - 29 mmol/L    Anion Gap 12 7 - 15 mmol/L    Urea Nitrogen 20.3 8.0 - 23.0 mg/dL    Creatinine 0.94 0.67 - 1.17 mg/dL    GFR Estimate 79 >60 mL/min/1.73m2    Calcium 8.1 (L) 8.8 - 10.2 mg/dL    Glucose 73 70 - 99 mg/dL   Hepatic function panel   Result Value Ref Range    Protein Total 4.8 (L) 6.4 - 8.3 g/dL    Albumin 2.5 (L) 3.5 - 5.2 g/dL    Bilirubin Total 8.1 (H) <=1.2 mg/dL    Alkaline Phosphatase 849 (H) 40 - 129 U/L     (H) 0 - 45 U/L     (H) 0 - 70 U/L    Bilirubin Direct 6.69 (H) 0.00 - 0.30 mg/dL   Lipase   Result Value Ref Range    Lipase 210 (H) 13 - 60 U/L   CBC with platelets and differential   Result Value Ref Range    WBC Count 12.9 (H) 4.0 - 11.0 10e3/uL    RBC Count 3.47 (L) 4.40 - 5.90 10e6/uL    Hemoglobin 9.2 (L) 13.3 - 17.7 g/dL    Hematocrit 28.3 (L) 40.0 - 53.0 %    MCV 82 78 - 100 fL    MCH 26.5 26.5 - 33.0 pg    MCHC 32.5 31.5 - 36.5 g/dL    RDW 18.1 (H) 10.0 - 15.0 %    Platelet Count 487 (H) 150 - 450 10e3/uL    % Neutrophils 82 %    % Lymphocytes 5 %    % Monocytes 12 %    % Eosinophils 0 %    % Basophils 0 %    % Immature Granulocytes 1 %    NRBCs per 100 WBC 0 <1 /100    Absolute Neutrophils 10.6 (H) 1.6 - 8.3 10e3/uL    Absolute Lymphocytes 0.6 (L) 0.8 - 5.3 10e3/uL    Absolute Monocytes 1.6 (H) 0.0 - 1.3 10e3/uL    Absolute Eosinophils 0.0 0.0 - 0.7 10e3/uL    Absolute Basophils 0.0 0.0 - 0.2 10e3/uL    Absolute Immature Granulocytes 0.1 <=0.4 10e3/uL    Absolute NRBCs 0.0 10e3/uL   UA with  Microscopic reflex to Culture    Specimen: Urine, Catheter   Result Value Ref Range    Color Urine Ellyn (A) Colorless, Straw, Light Yellow, Yellow    Appearance Urine Turbid (A) Clear    Glucose Urine Negative Negative mg/dL    Bilirubin Urine 2.0 mg/dL (A) Negative    Ketones Urine 10 (A) Negative mg/dL    Specific Gravity Urine 1.019 1.001 - 1.030    Blood Urine Negative Negative    pH Urine 6.0 5.0 - 7.0    Protein Albumin Urine 30 (A) Negative mg/dL    Urobilinogen Urine 2.0 (A) <2.0 mg/dL    Nitrite Urine Negative Negative    Leukocyte Esterase Urine 250 Patrice/uL (A) Negative    Bacteria Urine Many (A) None Seen /HPF    RBC Urine 0 <=2 /HPF    WBC Urine 37 (H) <=5 /HPF    Hyaline Casts Urine 6 (H) <=2 /LPF    Narrative    Urine Culture ordered based on laboratory criteria   CT Abdomen Pelvis w Contrast    Narrative    EXAM: CT ABDOMEN PELVIS W CONTRAST  LOCATION: [facility]  DATE: 11/6/2023    INDICATION: RLQ abdominal pain. Dehydration and nausea. Duodenal mass with liver metastases.  COMPARISON: 10/30/2023   TECHNIQUE: CT scan of the abdomen and pelvis was performed following injection of IV contrast. Multiplanar reformats were obtained. Dose reduction techniques were used.  CONTRAST: IsoVue 370 90mL    FINDINGS:   LOWER CHEST: Trace pleural fluid. Emphysema. Aortic leaflet calcifications.     HEPATOBILIARY: Multiple large liver masses are again seen. The gallbladder is distended. There is intrahepatic and extrahepatic bile duct dilatation which have increased from the comparison study.     PANCREAS: Pancreatic duct dilatation is again seen. A dilated side branch in the pancreas measuring 5 cm x 3.5 cm has not significantly changed in size.     SPLEEN: Normal.    ADRENAL GLANDS: Normal.    KIDNEYS/BLADDER: Simple right renal cysts do not require follow-up. Bladder wall thickening is present. Low-attenuation along the periphery of the bladder is likely related to diverticula.     BOWEL:   Normal nondistended stomach. There is ill-defined duodenal mass which has not changed in appearance from the comparison study. Mild colonic diverticulosis. Wall thickening of the ascending colon.     LYMPH NODES: Normal.    VASCULATURE: Atherosclerotic disease.  The right common iliac artery is 1.7 cm in diameter. The right internal iliac artery is 16 mm in diameter. The left common iliac artery is 18 mm in diameter.     PELVIC ORGANS: Enlarged prostate gland. The prostate gland is heterogeneous.     OTHER: Small amount of presacral stranding and fluid. There is diffuse retroperitoneal and intraperitoneal haziness in the abdomen and pelvis.    MUSCULOSKELETAL: Anasarca.       Impression    IMPRESSION:   1.  The ill-defined duodenal mass has not changed in appearance from the comparison study. The liver masses have not changed. Intrahepatic and extrahepatic bile duct dilatation have slightly increased from the comparison study. The gallbladder is   distended.  2.  Bladder wall thickening and bladder diverticula, presumably from chronic outlet obstruction. Cannot assess for cystitis on this study.   3.  The prostate gland is heterogeneous and enlarged; cannot assess for a prostate process of on this study.  4.  Wall thickening of the ascending colon is likely from hepatic dysfunction or edema.  5.  Anasarca. Haziness of the intraperitoneal and retroperitoneal spaces is likely edema.           RADIOLOGY  CT Abdomen Pelvis w Contrast   Final Result   IMPRESSION:    1.  The ill-defined duodenal mass has not changed in appearance from the comparison study. The liver masses have not changed. Intrahepatic and extrahepatic bile duct dilatation have slightly increased from the comparison study. The gallbladder is    distended.   2.  Bladder wall thickening and bladder diverticula, presumably from chronic outlet obstruction. Cannot assess for cystitis on this study.    3.  The prostate gland is heterogeneous and enlarged;  cannot assess for a prostate process of on this study.   4.  Wall thickening of the ascending colon is likely from hepatic dysfunction or edema.   5.  Anasarca. Haziness of the intraperitoneal and retroperitoneal spaces is likely edema.              EKG:    Rate: 82 bpm  Rhythm: Normal Sinus Rhythm  Axis: Left  Interval: Normal  Conduction: Normal  QRS: Normal  ST: Normal  T-wave: Normal  QT: Not prolonged  Comparison EKG: no significant change compared to 30 Sept 2023  Impression:  No acute ischemic change   I have independently reviewed and interpreted today's EKG, pending Cardiologist read            MEDICATIONS GIVEN IN THE EMERGENCY:  Medications   iopamidol (ISOVUE-370) solution 90 mL (90 mLs Intravenous $Given 11/6/23 9220)       NEW PRESCRIPTIONS STARTED AT TODAY'S ER VISIT  New Prescriptions    No medications on file        Jarad West D.O.  Emergency Medicine  [facility] EMERGENCY DEPARTMENT  [address] [place] [id number]  [phone]  Dept: [phone]       Jarad West DO  11/06/23 2871

## 2023-11-06 NOTE — CONSULTS
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 He was seen at the [facility] (Surgeon Dr. Varner and Dr. Doc JEONG) and ultimately it was determined he was not a good candidate for Whipple or endoscopic resection of the adenoma due to associated risks. Supportive treatment with oral iron and PPI was recommended. In September 2022, he was admitted at [facility] with GI bleeding, underwent EGD 9/11 which showed duodenal adenoma with oozing, s/p clip placement. Patient has not been interested in any invasive testing with his multiple presentations over the past few months.   Patient reports that he has not been able to tolerate appropriate PO intake and therefore he presented to the ED this time. Reports same abdominal pain that he has had for 5 years with no recent change.       REVIEW OF SYSTEMS:  Review of Systems   Constitutional:  Positive for weight loss. Negative for fever.   Gastrointestinal:  Positive for abdominal pain.   Skin:         jaundice   All other systems reviewed and are negative.      PAST MEDICAL HISTORY:  Past Medical History:   Diagnosis Date    Arthritis     COPD (chronic obstructive pulmonary disease) (H)     CVA (cerebral vascular accident) (H) 12/4/2018    Hypertension     Prostate hypertrophy        PAST SURGICAL HISTORY:  Past Surgical History:   Procedure Laterality Date    ESOPHAGOSCOPY, GASTROSCOPY, DUODENOSCOPY (EGD), COMBINED N/A 9/12/2022    Procedure: ESOPHAGOGASTRODUODENOSCOPY (EGD);  Surgeon:  "Conner Navarrete MD;  Location: [facility] [place]    GENITOURINARY SURGERY      HERNIORRHAPHY INGUINAL Left 10/11/2021    Procedure: LEFT INGUINAL HERNIA REPAIR WITH MESH;  Surgeon: Puma Preston MD;  Location: [facility] OR    HERNIORRHAPHY INGUINAL Right 8/19/2022    Procedure: HERNIORRHAPHY, INGUINAL, OPEN, RIGHT;  Surgeon: Flo Keyes MD;  Location: [facility] [place]    IR VISCERAL ANGIOGRAM  9/13/2022    TRANSRECTAL ULTRASONIC, TRANSURETHRAL RESECTION (TUR) OF PROSTATE CYST  2004    helped temporarily       FAMILY HISTORY:  Family History   Problem Relation Age of Onset    No Known Problems Other         No heart, DM, HTN, or CA    No Known Problems Mother     No Known Problems Father        SOCIAL HISTORY:  Social History     Tobacco Use    Smoking status: Former     Packs/day: 2.00     Years: 25.00     Additional pack years: 0.00     Total pack years: 50.00     Types: Cigarettes     Start date: 1/1/1990     Passive exposure: Past (as a child brothers & Dad pipe & ciggerettes)    Smokeless tobacco: Never   Substance Use Topics    Alcohol use: No     Comment: Alcoholic Drinks/day: Quit 1990       ALLERGIES/SENSITIVITIES:  Sulfa antibiotics    MEDICATIONS:  Current Outpatient Medications   Medication Sig Dispense Refill    FEROSUL 325 (65 Fe) MG tablet Take 2 tablets by mouth daily (with breakfast)      ferrous sulfate (FEROSUL) 325 (65 Fe) MG tablet Take 325 mg by mouth every evening      loperamide (IMODIUM A-D) 2 MG tablet Take 2 tablets (4 mg) by mouth 3 times daily as needed for diarrhea 30 tablet 0    pantoprazole (PROTONIX) 40 MG EC tablet Take 1 tablet (40 mg) by mouth 2 times daily (before meals) 60 tablet 1         PHYSICAL EXAM:  BP (!) 151/64   Pulse 83   Temp 97.6  F (36.4  C) (Oral)   Resp 25   Ht 1.702 m (5' 7\")   Wt 59 kg (130 lb)   SpO2 97%   BMI 20.36 kg/m    Body mass index is 20.36 kg/m .  General: A&O, NAD, cachectic   Eyes: +ve icterus   ENT: oropharynx clear without " ulcerations  Neck/Thyroid: supple, no masses  Pulmonary: CTA B  Cardiovascular: RR, S1, S2  Gastrointestinal: Soft, NTTP, no masses  Skin: +ve jaundice  Lymph nodes: No cervical or supraclavicular lymphadenopathy  Extremities: No edema      LABORATORY DATA:  CBC:  Recent Labs   Lab Test 11/06/23 0914   WBC 12.9*   RBC 3.47*   HGB 9.2*   HCT 28.3*   MCV 82   MCH 26.5   MCHC 32.5   RDW 18.1*   *        BMP:  Recent Labs   Lab 11/06/23  0914 10/30/23  1944   * 128*   POTASSIUM 4.1 4.0   CHLORIDE 97* 95*   CO2 24 19*   GLC 73 109*   CR 0.94 1.36*   BUN 20.3 24.5*       INR:  Recent Labs   Lab Test 08/06/23 0913   INR 1.13       Liver and Pancreas panel:  Recent Labs   Lab 11/06/23  0914 10/30/23  1944   * 142*   * 128*   ALKPHOS 849* 787*   BILITOTAL 8.1* 3.4*   LIPASE 210* 360*         IMAGING:    No results found.    [facility]    ASSESSMENT AND RECOMMENDATIONS:   Denton Hobbs  is a 88 year old male with obstructive jaundice likely due to known duodenal mass which was thought to be malignant based on appearance along with possible metastatic malignancy based on liver lesions noted on imaging. CT scan done today showed known stable duodenal mass with intrahepatic and extrahepatic bile duct dilation with slightly increased compared to prior. We discussed the case with our on call advanced endoscopy provider (Dr. Mcclure) who thought that endoscopic palliative stenting is something that can be attempted. We discussed this approach with the patient and he was very clear that he is not interested in endoscopic stenting and he is not interested in any approach to prolong his life.     Patient is agreeable to be seen by the palliative team to discuss comfort measures.   I discussed that with the ED team. Palliative care consultation is reasonable.     Gi will sign off. Please contact us if you have any questions or concerns.             Jame Kahn MD  Thank you for  the opportunity to participate in the care of this patient.   Please feel free to call me with any questions or concerns.  Phone number [phone] or if after 5PM [phone].

## 2023-11-06 NOTE — ED NOTES
Bed: JNED-05  Expected date: 11/6/23  Expected time: 8:42 AM  Means of arrival:   Comments:  Sana ibarra/[place]
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
## 2023-11-06 NOTE — H&P
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 CT on admission with stable duodenal mass with intra- and extrahepatic bile duct dilation and liver masses but worsening bilirubin on labs. AST/ALT elevated but stable. Lipase 210. Given obstruction, seems likely patient will inevitably develop ascending infection.  - GI consulted; offered palliative stenting but patient declined  - Palliative consulted; patient is comfort-focused except willing to accept transfusions if needed. Otherwise not interested in medications of treatments aimed at prolonging life.  - Palliative and CM to assist with hospice  - Labs focused on transfusion care only; will check CBC tomorrow  - DNR/DNI per patient wishes    #Chronic GI bleeding  Due to above  - Transfuse for Hgb <7    #Anasarca  Due to hepatic dysfunction  - No diuresis for now; could provide for comfort if needed    #BPH  Intermittently straight caths at home. Patient requesting chronic catheter placement on admission for comfort.  - Difficult Cash placement; Urology consulted for help          Diet: Combination Diet Regular Diet Adult    DVT Prophylaxis: Pneumatic Compression Devices  Cash Catheter: Not present  Lines: None     Cardiac Monitoring: ACTIVE order. Indication: Electrolyte Imbalance (24 hours)- Magnesium <1.3 mg/ml; Potassium < =2.8 or > 5.5 mg/ml  Code Status: No CPR- Do NOT Intubate      Clinically  Significant Risk Factors Present on Admission              # Hypoalbuminemia: Lowest albumin = 2.5 g/dL at 11/6/2023  9:14 AM, will monitor as appropriate                     Disposition Plan      Expected Discharge Date: 11/08/2023                  JANNA GOODMAN MD  Hospitalist Service  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 He has been in the ED several times recently with various complaints related to his known abdominal pathology including GI bleeding, jaundice, and nausea but always declining further work-up or treatment. Today he presents with poor appetite and dehydration with intermittent nausea. Also with worsening jaundice.    Hemodynamically stable in the ED with signs of biliary obstruction on labs. Interested in seeing palliative. GI consulted and offered palliative stenting but he declined.      Past Medical History    Past Medical History:   Diagnosis Date    Arthritis     COPD (chronic obstructive pulmonary disease) (H)     CVA (cerebral vascular accident) (H) 12/4/2018    Hypertension     Prostate hypertrophy        Past Surgical History   Past Surgical History:   Procedure Laterality Date    ESOPHAGOSCOPY, GASTROSCOPY, DUODENOSCOPY (EGD), COMBINED N/A 9/12/2022    Procedure: ESOPHAGOGASTRODUODENOSCOPY (EGD);  Surgeon: Conner Navarrete MD;  Location: [facility] OR    GENITOURINARY SURGERY      HERNIORRHAPHY INGUINAL Left 10/11/2021    Procedure: LEFT INGUINAL HERNIA REPAIR WITH MESH;  Surgeon: Puma Preston MD;   Location: [facility] OR    HERNIORRHAPHY INGUINAL Right 8/19/2022    Procedure: HERNIORRHAPHY, INGUINAL, OPEN, RIGHT;  Surgeon: Flo Keyes MD;  Location: [facility] OR    IR VISCERAL ANGIOGRAM  9/13/2022    TRANSRECTAL ULTRASONIC, TRANSURETHRAL RESECTION (TUR) OF PROSTATE CYST  2004    helped temporarily       Prior to Admission Medications   Prior to Admission Medications   Prescriptions Last Dose Informant Patient Reported? Taking?   FEROSUL 325 (65 Fe) MG tablet 11/6/2023 at AM  Yes Yes   Sig: Take 2 tablets by mouth daily (with breakfast)   ferrous sulfate (FEROSUL) 325 (65 Fe) MG tablet 11/5/2023 at PM  Yes Yes   Sig: Take 325 mg by mouth every evening   loperamide (IMODIUM A-D) 2 MG tablet Past Month at PRN  No Yes   Sig: Take 2 tablets (4 mg) by mouth 3 times daily as needed for diarrhea   pantoprazole (PROTONIX) 40 MG EC tablet 11/5/2023 at PM  No Yes   Sig: Take 1 tablet (40 mg) by mouth 2 times daily (before meals)      Facility-Administered Medications: None           Physical Exam   Vital Signs: Temp: 98.2  F (36.8  C) Temp src: Oral BP: 136/63 Pulse: 81   Resp: 20 SpO2: 96 % O2 Device: None (Room air)    Weight: 130 lbs 0 oz    General Appearance: NAD but ill appearing  Respiratory: CTAB  Cardiovascular: RRR  GI: Soft. RUQ and epigastric TTP; non-distended  Skin: Jaundiced  Other: 2+ BLE edema    Medical Decision Making       65 MINUTES SPENT BY ME on the date of service doing chart review, history, exam, documentation & further activities per the note.      Data     I have personally reviewed the following data over the past 24 hrs:    12.9 (H)  \   9.2 (L)   / 487 (H)     133 (L) 97 (L) 20.3 /  73   4.1 24 0.94 \     ALT: 117 (H) AST: 144 (H) AP: 849 (H) TBILI: 8.1 (H)   ALB: 2.5 (L) TOT PROTEIN: 4.8 (L) LIPASE: 210 (H)       Imaging results reviewed over the past 24 hrs:   Recent Results (from the past 24 hour(s))   CT Abdomen Pelvis w Contrast    Narrative    EXAM: CT ABDOMEN PELVIS W  CONTRAST  LOCATION: [facility]  DATE: 11/6/2023    INDICATION: RLQ abdominal pain. Dehydration and nausea. Duodenal mass with liver metastases.  COMPARISON: 10/30/2023   TECHNIQUE: CT scan of the abdomen and pelvis was performed following injection of IV contrast. Multiplanar reformats were obtained. Dose reduction techniques were used.  CONTRAST: IsoVue 370 90mL    FINDINGS:   LOWER CHEST: Trace pleural fluid. Emphysema. Aortic leaflet calcifications.     HEPATOBILIARY: Multiple large liver masses are again seen. The gallbladder is distended. There is intrahepatic and extrahepatic bile duct dilatation which have increased from the comparison study.     PANCREAS: Pancreatic duct dilatation is again seen. A dilated side branch in the pancreas measuring 5 cm x 3.5 cm has not significantly changed in size.     SPLEEN: Normal.    ADRENAL GLANDS: Normal.    KIDNEYS/BLADDER: Simple right renal cysts do not require follow-up. Bladder wall thickening is present. Low-attenuation along the periphery of the bladder is likely related to diverticula.     BOWEL:  Normal nondistended stomach. There is ill-defined duodenal mass which has not changed in appearance from the comparison study. Mild colonic diverticulosis. Wall thickening of the ascending colon.     LYMPH NODES: Normal.    VASCULATURE: Atherosclerotic disease.  The right common iliac artery is 1.7 cm in diameter. The right internal iliac artery is 16 mm in diameter. The left common iliac artery is 18 mm in diameter.     PELVIC ORGANS: Enlarged prostate gland. The prostate gland is heterogeneous.     OTHER: Small amount of presacral stranding and fluid. There is diffuse retroperitoneal and intraperitoneal haziness in the abdomen and pelvis.    MUSCULOSKELETAL: Anasarca.       Impression    IMPRESSION:   1.  The ill-defined duodenal mass has not changed in appearance from the comparison study. The liver masses have not changed. Intrahepatic and  extrahepatic bile duct dilatation have slightly increased from the comparison study. The gallbladder is   distended.  2.  Bladder wall thickening and bladder diverticula, presumably from chronic outlet obstruction. Cannot assess for cystitis on this study.   3.  The prostate gland is heterogeneous and enlarged; cannot assess for a prostate process of on this study.  4.  Wall thickening of the ascending colon is likely from hepatic dysfunction or edema.  5.  Anasarca. Haziness of the intraperitoneal and retroperitoneal spaces is likely edema.

## 2023-11-06 NOTE — PROGRESS NOTES
Attempted to place marino per MD order and bladder scan greater than 477.  Unable to place, attempted x2 by RN.  Dr Hall notified.

## 2023-11-06 NOTE — CONSULTS
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 Urology was requested to place an indwelling catheter after failed attempt by patient to straight cath and failed attempts at marino placement by nursing. Plan is to maintain marino indefinitely as is getting difficult for patient to maintain QID CIC. >400 ml on bladder scan.     Procedure:  The patient's urethra was prepped with Betadine solution. Attempts made unsuccessfully with 18 fr coude tip, 16 Fr coude tip, 16 Fr catheter, 14 Fr coude tip to after catheter tip liberally lubricated and urojet instilled into the urethra. Dr. Levine was notified and came to scope in a catheter (see procedure note per Dr. Levine).       Plan for Catheter:  Email sent to [facility] to arrange for 1 month exchange over a wire.  [facility] contact info added to discharge orders.     [facility] will sign off.       Please contact [facility] with any questions or concerns [phone].      DONALD Cheek, CNP  [facility]   [phone]

## 2023-11-06 NOTE — ED NOTES
Bed: JNED-35  Expected date:   Expected time:   Means of arrival:   Comments:  Male boarder, has inpt bed

## 2023-11-07 NOTE — TELEPHONE ENCOUNTER
Received call from Morena, patients daughter, who states that she has a questions regarding patients HGB levels. Upon review, it was noted that patient is currently in the ED. Will hold off on returning call at this time.

## 2023-11-07 NOTE — PLAN OF CARE
Problem: Adult Inpatient Plan of Care  Goal: Optimal Comfort and Wellbeing  Outcome: Progressing     Problem: Palliative Care  Goal: Enhanced Quality of Life  Outcome: Progressing   Goal Outcome Evaluation:       Endorses pain to abd, but declined medications.   Cash catheter intact. UOP dark getachew.   Awaiting palliative to see pt.   Encouraged PO fluids overnight.

## 2023-11-07 NOTE — PROGRESS NOTES
Pt alert and oriented. Vital signs stable. Pt would like palliative care with plan for hospice. He is very pleasant and cooperative. Skin and eyes are jaundiced. Complains of some abdominal pain, but declined pain medication. No new complaints this shift. Report called to P1 and patient transferred.

## 2023-11-07 NOTE — PROGRESS NOTES
"PALLIATIVE CARE PROGRESS NOTE  [facility]     Patient Name: Denton Hobbs  Date of Admission: 11/6/2023   Today the patient was seen for: [facility] support      Recommendations & Counseling     GOALS OF CARE:   Comfort focused   Not interested in medications or treatments that prolong his life  However, he would like to continue with blood transfusions for symptomatic treatment of fatigue and weakness. Hbg stable and this is no current indication for transfusion.   He would like to continue with vital sign checks and lab draws for blood transfusions only  Per his daughter, blood transfusions have the been the limiting factor to enrolling in hospice.  We will continue to explore his overall goals and provide education on the utility of transfusions.  Plan to explore hospice options with care management and family to see if there is a hospice agency that can allow for a time-limited trial of blood transfusions.     ADVANCE CARE PLANNING:  No health care directive on file. Per  informed consent policy, next of kin should be involved if patient becomes unable.  He has elected his daughter Valentina as primary advocate/surrogate decision maker he also stated that his other daughter, Morena can act as a secondary  There is no POLST form on file, defer to patient and/or next of kin for decisions   Code status: No CPR- Do NOT Intubate     DECISION MAKING:  Patient's decision making preferences: Independently  Patient has decision-making capacity today for complex decisions:Intact      MEDICAL MANAGEMENT:      We are not actively managing symptoms.  Based on our discussion today, Sebastián would only like to continue taking the 2 medications he takes at home which is Protonix and iron.  He is not interested in any other medications or treatments.  Speaking with his daughter Valentina over the phone, his reluctance to medications is that he feels that he does not want to introduce \"toxins \"into his body.      "      Below are common symptoms routinely seen in patients with comfort focused goals and at the end of life. This patient may not currently exhibit all of these symptoms; however, if these were to occur our recommendations are as follows:      #Pain  -  Recommend to hold on Tylenol given liver dysfunction   - 1st choice: hydromorphone PO 1-2 mg q 1 hour as needed.   - 2nd choice:hydromorphone IV 0.3-0.5 mg q 15 minutes as needed      If unable to take PO and morphine, consider oxycodone SL 5-10 q 1 hour as needed      #Air hunger/Dyspnea   - 1st choice:Morphine PO/SL 5-10 mg q 1 hour as needed.   - 2nd choice:Morphine IV 1-2 mg q 15 minutes as needed                                             or   - 1st choice: hydromorphone PO/SL 1-2 mg q 1 hour as needed.   - 2nd choice:hydromorphone IV 0.3-0.5 mg q 15 minutes as needed      #Anxiety    - 1st choice: Lorazepam PO/SL q 1 mg  q 3 hour as needed.   - 2nd choice: Lorazepam IV q 1 mg  q 3 hour as needed     #Secretion burden   -Robinul 0.1 mg  q 4 hours as needed. If ineffective, increase up to 0.3 mg   -Consider atropine if Robinul ineffective after dose increase      #Nausea   -Zofran 4 mg q 6 hours as needed. Can increase to 8 mg   -Zyprexa 5 mg q 6 hours as needed      #Agitation  -Aggressive symptoms control first, then  -1st choice: Zyprexa 5 mg ODT or IM   -2nd choice: Increase dose of Zyprexa to 10 mg or consider switch to Haldol   -3rd choice: Lorazepam as above      PSYCHOSOCIAL/SPIRITUAL SUPPORT:  Family   Jessica community: No Buddhism listed he states that he is Caodaism, but has declined spiritual health services     Palliative Care will continue to follow. Thank you for the consult and allowing us to aid in the care of Denton Hobbs.     These recommendations have been discussed with bedside and primary team.     John Preston NP  Securely message with [employer] (more info)  Text page via [employer] Paging/Directory             Interval History:    Course reviewed. Complaining of abdominal pain, but has declined treatment. He has no other acute complaints or concerns. 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  He has been receiving recurrent transfusions. He has refused additional biopsies and treatment except for the blood transfusion.                   Review of Systems:     Besides above, ROS was reviewed and is unremarkable               Physical Exam:   Temp:  [97.9  F (36.6  C)-98.2  F (36.8  C)] 98  F (36.7  C)  Pulse:  [76-92] 88  Resp:  [9-30] 19  BP: (136-154)/(58-71) 137/63  SpO2:  [87 %-98 %] 97 %  130 lbs 0 oz    Physical Exam  Vitals and nursing note reviewed.   Constitutional:       General: He is not in acute distress.  HENT:      Head: Normocephalic.      Mouth/Throat:      Mouth: Mucous membranes are moist.      Pharynx: Oropharynx is clear.   Eyes:      Extraocular Movements: Extraocular movements intact.      Conjunctiva/sclera: Conjunctivae normal.      Pupils: Pupils are equal, round, and reactive to light.   Cardiovascular:      Rate and Rhythm: Normal rate and regular rhythm.      Pulses: Normal pulses.   Pulmonary:      Effort: Pulmonary effort is normal. No respiratory distress.      Breath sounds: No wheezing.   Abdominal:      General: Abdomen is flat. There is no distension.      Tenderness: There is no abdominal tenderness.   Musculoskeletal:         General: Normal range of motion.   Skin:     General: Skin is warm and dry.      Capillary Refill: Capillary refill takes less than 2 seconds.      Coloration: Skin is jaundiced.   Neurological:       General: No focal deficit present.      Mental Status: He is alert. Mental status is at baseline.   Psychiatric:         Mood and Affect: Mood normal.         Thought Content: Thought content normal.                        Current Problem List:   Principal Problem:    Hyperbilirubinemia  Active Problems:    Biliary obstruction (H28)      Allergies   Allergen Reactions    Sulfa Antibiotics Rash     Info obtained off of 10/4/21 Entira pre op.            Data Reviewed:     Results for orders placed or performed during the hospital encounter of 11/06/23 (from the past 24 hour(s))   CT Abdomen Pelvis w Contrast    Narrative    EXAM: CT ABDOMEN PELVIS W CONTRAST  LOCATION: [facility]  DATE: 11/6/2023    INDICATION: RLQ abdominal pain. Dehydration and nausea. Duodenal mass with liver metastases.  COMPARISON: 10/30/2023   TECHNIQUE: CT scan of the abdomen and pelvis was performed following injection of IV contrast. Multiplanar reformats were obtained. Dose reduction techniques were used.  CONTRAST: IsoVue 370 90mL    FINDINGS:   LOWER CHEST: Trace pleural fluid. Emphysema. Aortic leaflet calcifications.     HEPATOBILIARY: Multiple large liver masses are again seen. The gallbladder is distended. There is intrahepatic and extrahepatic bile duct dilatation which have increased from the comparison study.     PANCREAS: Pancreatic duct dilatation is again seen. A dilated side branch in the pancreas measuring 5 cm x 3.5 cm has not significantly changed in size.     SPLEEN: Normal.    ADRENAL GLANDS: Normal.    KIDNEYS/BLADDER: Simple right renal cysts do not require follow-up. Bladder wall thickening is present. Low-attenuation along the periphery of the bladder is likely related to diverticula.     BOWEL:  Normal nondistended stomach. There is ill-defined duodenal mass which has not changed in appearance from the comparison study. Mild colonic diverticulosis. Wall thickening of the ascending colon.      LYMPH NODES: Normal.    VASCULATURE: Atherosclerotic disease.  The right common iliac artery is 1.7 cm in diameter. The right internal iliac artery is 16 mm in diameter. The left common iliac artery is 18 mm in diameter.     PELVIC ORGANS: Enlarged prostate gland. The prostate gland is heterogeneous.     OTHER: Small amount of presacral stranding and fluid. There is diffuse retroperitoneal and intraperitoneal haziness in the abdomen and pelvis.    MUSCULOSKELETAL: Anasarca.       Impression    IMPRESSION:   1.  The ill-defined duodenal mass has not changed in appearance from the comparison study. The liver masses have not changed. Intrahepatic and extrahepatic bile duct dilatation have slightly increased from the comparison study. The gallbladder is   distended.  2.  Bladder wall thickening and bladder diverticula, presumably from chronic outlet obstruction. Cannot assess for cystitis on this study.   3.  The prostate gland is heterogeneous and enlarged; cannot assess for a prostate process of on this study.  4.  Wall thickening of the ascending colon is likely from hepatic dysfunction or edema.  5.  Anasarca. Haziness of the intraperitoneal and retroperitoneal spaces is likely edema.     CBC with platelets   Result Value Ref Range    WBC Count 16.5 (H) 4.0 - 11.0 10e3/uL    RBC Count 3.51 (L) 4.40 - 5.90 10e6/uL    Hemoglobin 9.2 (L) 13.3 - 17.7 g/dL    Hematocrit 28.6 (L) 40.0 - 53.0 %    MCV 82 78 - 100 fL    MCH 26.2 (L) 26.5 - 33.0 pg    MCHC 32.2 31.5 - 36.5 g/dL    RDW 18.4 (H) 10.0 - 15.0 %    Platelet Count 507 (H) 150 - 450 10e3/uL         Medical Decision Making       57 MINUTES SPENT BY ME on the date of service doing chart review, history, exam, documentation & further activities per the note.

## 2023-11-07 NOTE — PROGRESS NOTES
Met with patient  to review role of care management, progression of care and possible need for services at discharge, including OP services, home care, or skilled nursing care. Patient alert, oriented and engaged in the conversation.     Assessed, lives w/spouse at [facility] lv apt at [facility], no svcs currently, self cathed prior to arrival, has indwelling cathether now, independent w/walker and CM to follow for discharge needs.

## 2023-11-07 NOTE — PLAN OF CARE
Goal Outcome Evaluation:      Plan of Care Reviewed With: patient, child    Overall Patient Progress: improvingOverall Patient Progress: improving    Outcome Evaluation: comfortable after marino palced by urology, denies need for pain medication at this time.

## 2023-11-07 NOTE — UTILIZATION REVIEW
"Admission Status; Secondary Review Determination     Under the authority of the Utilization Management Committee, the utilization review process indicated a secondary review on the above patient.  The review outcome is based on review of the medical records, discussions with staff, and applying clinical experience noted on the date of the review.          (x) Observation Status Appropriate - This patient does not meet hospital inpatient criteria and is placed in observation status. If this patient's primary payer is Medicare and was admitted as an inpatient, Condition Code 44 should be used and patient status changed to \"observation\".       RATIONALE FOR DETERMINATION     Mr. Hobbs is a 89 yo male pt with known PMH of duodenal adenoma with suspected abd mets, obstructive jaundice, chronic GI bleeding requiring intermittent PRBC transfusions and chronic urinary retention.  He requests no treatment, labs or further evaluation except for marino cath for comfort and PRBCs if indicative.  Urology consulted for marino placement.  Palliative care and SW consulted for further disposition.  Hgb stable this am and no PRBCs needed.  Noted to have possible UTI but no antibiotics desired.  He is remaining in the hospital today for further disposition.     The severity of illness, intensity of service provided, expected LOS and risk for adverse outcome make the care appropriate for further observation; however, doesn't meet criteria for hospital inpatient admission. Dr Hall notified of this determination, awaiting message back.        The information on this document is developed by the utilization review team in order for the business office to ensure compliance.  This only denotes the appropriateness of proper admission status and does not reflect the quality of care rendered.         The definitions of Inpatient Status and Observation Status used in making the determination above are those provided in the CMS Coverage " Manual, Chapter 1 and Chapter 6, section 70.4.        Sincerely,    Annie Yin, DO  Utilization Review  Physician Advisor  [facility]

## 2023-11-07 NOTE — PROGRESS NOTES
[facility]    Medicine Progress Note - Hospitalist Service    Date of Admission:  11/6/2023    Assessment & Plan   Denton Hobbs is a 88 year old male with known duodenal adenoma with concern for abdominal metastasis and currently declining further work-up and treatment who presents with nausea and poor appetite.     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 CT on admission with stable duodenal mass with intra- and extrahepatic bile duct dilation and liver masses but worsening bilirubin on labs and leukocytosis. AST/ALT elevated but stable. Lipase 210. Given obstruction, seems likely patient will inevitably develop ascending infection.  - GI consulted; offered palliative stenting but patient declined  - Palliative consulted; patient is comfort-focused except willing to accept transfusions if needed. Otherwise not interested in medications of treatments aimed at prolonging life.  - Palliative and CM to assist with hospice  - Labs focused on transfusion care only; will check CBC tomorrow  - DNR/DNI per patient wishes  - Patient has been declining all pain medications, but if his likely abdominal infection becomes more apparent he may need pain medications very soon.    #Chronic GI bleeding  Due to above  - Transfuse for Hgb <7    #Anasarca  Due to hepatic dysfunction  - No diuresis for now; could provide for comfort if needed    #BPH  Intermittently straight caths at home. Patient requesting chronic catheter placement on admission for comfort.  - Difficult Cash placement; Urology consulted for help          Diet: Combination Diet Regular Diet Adult    DVT Prophylaxis: Patient declined mechanical and pharmaceutical VTE ppx  Cash Catheter: PRESENT, indication: End of  Life  Lines: None     Cardiac Monitoring: ACTIVE order. Indication: Electrolyte Imbalance (24 hours)- Magnesium <1.3 mg/ml; Potassium < =2.8 or > 5.5 mg/ml  Code Status: No CPR- Do NOT Intubate      Clinically Significant Risk Factors              # Hypoalbuminemia: Lowest albumin = 2.5 g/dL at 11/6/2023  9:14 AM, will monitor as appropriate                       Disposition Plan      Expected Discharge Date: 11/09/2023                    JANNA GOODMAN MD  Hospitalist Service  [facility]  Securely message with [employer] (more info)  Text page via [employer] Paging/Directory   ______________________________________________________________________    Interval History   Patient remains stable. Has abdominal pain but declining pain meds. Agrees with hospice and discussing the setting with family and palliative care.    Physical Exam   Vital Signs: Temp: 98.3  F (36.8  C) Temp src: Oral BP: (!) 156/67 (RN notified) Pulse: 88   Resp: 20 SpO2: 94 % O2 Device: None (Room air)    Weight: 132 lbs .89 oz    General Appearance: NAD but ill appearing  Respiratory: CTAB  Cardiovascular: RRR  GI: Soft. RUQ and epigastric TTP; non-distended  Skin: Jaundiced  Other:  2+ BLE edema    Medical Decision Making       45 MINUTES SPENT BY ME on the date of service doing chart review, history, exam, documentation & further activities per the note.      Data     I have personally reviewed the following data over the past 24 hrs:    16.5 (H)  \   9.2 (L)   / 507 (H)     N/A N/A N/A /  N/A   N/A N/A N/A \       Imaging results reviewed over the past 24 hrs:   No results found for this or any previous visit (from the past 24 hour(s)).

## 2023-11-07 NOTE — PROGRESS NOTES
PALLIATIVE CARE SOCIAL WORK Progress Note   Location: [facility]      Joint visit with Palliative NP John Prestno. Met with Pt for check in and support. Introduced self and role of Palliative SW. Pt was awake, quiet, denied concerns. Talked with him about plans and wishes moving forward. Pt does not want to be in the hospital long and wants minimal intervention. He and family have looked into hospice in the past, but did not elect because he wanted to continue to have blood transfusions as needed. He did not meet with a hospice agency that he can remember, he just received that information. Discussed that at this time he is not needing a transfusion and that hospice would really be the best option for support for him and his family at home. We also discussed that there are hospice agencies who are willing to take transfusion option on a case by case basis. Pt is open to care management working with him and his daughter on next steps.   Checked in with Pt about how he is coping. He said he is fine and did not seem to want to elaborate or talk much further. He is a Denominational person, but did not want a visit from spiritual care.     Telephone visit with NP John Preston and Pts she [place]. Updated her on visit today. Discussed that Pt is going to need more care and support at home and she is aware of this. She is planning to move in with him and provide care when he is discharged. Talked about option of hospice and she is open to having care management explore agencies who may be willing to consider transfusion if needed. We did discuss that Pt may not need them going forward, or possibility that he could decline too much for a transfusion to make a difference in symptom management in the near future. Provided listening and support.     Updated care management on need for hospice consultation.     Plan: PCSW continues to follow for support.     Clinical Social Work Interventions:   Facilitation of processing of  thoughts/feelings  Goals of care discussion/facilitation    Trinidad Lemus, [facility]  [facility], Palliative Care  Securely message with the [employer] (learn more here) or  Text page via [facility] Paging/Directory

## 2023-11-08 NOTE — PLAN OF CARE
PRIMARY DIAGNOSIS: GENERALIZED WEAKNESS    OUTPATIENT/OBSERVATION GOALS TO BE MET BEFORE DISCHARGE  1. Orthostatic performed: N/A    2. Tolerating PO medications: Yes    3. Return to near baseline physical activity: No    4. Cleared for discharge by consultants (if involved): No    Discharge Planner Nurse   Safe discharge environment identified: No  Barriers to discharge: Yes       Entered by: Ines Malave RN 11/07/2023 7:14 PM     Please review provider order for any additional goals.   Nurse to notify provider when observation goals have been met and patient is ready for discharge.Goal Outcome Evaluation:       Pt coming in for comfort care and palliative care consult which is not ordered yet.  Pt alert and oriented, appreciative.  Just wants the lights off and door closed, resting most of the shift.  Taking pills fine.  Catheter in place, urine is brown, skin is yellow.  IV saline locked, telemetry Dc'd.  Ines Malave RN

## 2023-11-08 NOTE — PLAN OF CARE
"PRIMARY DIAGNOSIS: \"GENERIC\" NURSING  OUTPATIENT/OBSERVATION GOALS TO BE MET BEFORE DISCHARGE:  ADLs back to baseline: No    Activity and level of assistance: Up with standby assistance.    Pain status: States back pain 2/10, refusing pain medication. Pt just wants to rest at this time.    Return to near baseline physical activity: Yes     Discharge Planner Nurse   Safe discharge environment identified: No  Barriers to discharge: Yes       Entered by: Ulises Hoyt RN 11/08/2023 2:53 AM     Please review provider order for any additional goals.   Nurse to notify provider when observation goals have been met and patient is ready for discharge.Goal Outcome Evaluation:                        "

## 2023-11-08 NOTE — PROGRESS NOTES
Care Management Follow Up    Length of Stay (days): 1    Expected Discharge Date: 11/09/2023     Concerns to be Addressed:     Finding hospice agency that can allow for a time-limited trial of blood transfusions.   Patient plan of care discussed at interdisciplinary rounds: Yes    Anticipated Discharge Disposition:  home with hospice     Anticipated Discharge Services:  per hospice  Anticipated Discharge DME:  per hospice    Patient/family educated on Medicare website which has current facility and service quality ratings:    Education Provided on the Discharge Plan:  yes  Patient/Family in Agreement with the Plan:  yes    Referrals Placed by CM/SW:  hospice  Private pay costs discussed: Not applicable    Additional Information:  CM reviewed chart. Patient and family are seeking hospice agency that can allow for a time-limited trial of blood transfusions.   CM sent referral to [facility]. [facility] is reviewing referral.   CM will meet with patient and family to discuss.  [facility] would be able to do blood transfusions 2x/month for symptom management though need to get a contract with [facility] for blood transfusions. [facility] recommended finding an agency with a contract so it does not delay hospice start.    CM met with patient, patient has been going to blood transfusions at [facility].  Appears per chart review he has been going to [facility] about 2x/month.  Patient would like to do hospice at home if it works out with his daughter Valentina.     CM submitted referral to [facility].   CM called Brittaney from [facility] ([phone]) as they have an affiliation with [facility].  [facility] will review referral and call CM back.  Brittaney will call daughter Valentina to discuss hospice and arranging nurse visit.  Sounds like a nurse will be able to visit patient to assess for blood transfusion.  Brittaney will come in tomorrow at 0900 to meet  with patient and daughter.     Anticipate discharge tomorrow on home hospice.       Brook Waterman RN

## 2023-11-08 NOTE — PROGRESS NOTES
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 CT on admission with stable duodenal mass with intra- and extrahepatic bile duct dilation and liver masses but worsening bilirubin on labs and leukocytosis. AST/ALT elevated but stable. Lipase 210. Given obstruction, seems likely patient will inevitably develop ascending infection.  - GI consulted; offered palliative stenting but patient declined  - Palliative consulted; patient is comfort-focused except willing to accept transfusions if needed. Otherwise not interested in medications of treatments aimed at prolonging life.  - Palliative and CM to assist with hospice  - Labs focused on transfusion care only; will check CBC tomorrow  - DNR/DNI per patient wishes  - Patient has been declining all pain medications, but if his likely abdominal infection becomes more apparent he may need pain medications very soon.    #Chronic GI bleeding  Due to above  - Transfuse for Hgb <7    #Anasarca  Due to hepatic dysfunction  - No diuresis for now; could provide for comfort if needed    #BPH  Intermittently straight caths at home. Patient requesting chronic catheter placement on admission for comfort.  - Difficult Cash placement; Urology consulted for help          Diet: Combination Diet Regular Diet Adult    DVT Prophylaxis: Patient declined mechanical and pharmaceutical VTE ppx  Cash Catheter: PRESENT, indication: End of  Life  Lines: None     Cardiac Monitoring: None  Code Status: No CPR- Do NOT Intubate      Clinically Significant Risk Factors              # Hypoalbuminemia: Lowest albumin = 2.5 g/dL at 11/6/2023  9:14 AM, will monitor as appropriate                       Disposition Plan     Expected Discharge Date: 11/09/2023                    JANNA GOODMAN MD  Hospitalist Service  [facility]  Securely message with [employer] (more info)  Text page via [employer] Paging/Directory   ______________________________________________________________________    Interval History   Patient remains stable. Has abdominal pain but declining pain meds. Still agreeable to hospice.    Physical Exam   Vital Signs: Temp: 97.9  F (36.6  C) Temp src: Oral BP: (!) 152/70 Pulse: 101   Resp: 18 SpO2: 93 % O2 Device: None (Room air)    Weight: 132 lbs .89 oz    General Appearance: NAD but ill appearing  Respiratory: CTAB  Cardiovascular: RRR  GI: Soft. RUQ and epigastric TTP; non-distended  Skin: Jaundiced  Other:  2+ BLE edema    Medical Decision Making       40 MINUTES SPENT BY ME on the date of service doing chart review, history, exam, documentation & further activities per the note.      Data         Imaging results reviewed over the past 24 hrs:   No results found for this or any previous visit (from the past 24 hour(s)).

## 2023-11-08 NOTE — UTILIZATION REVIEW
Concurrent stay review; Secondary Review Determination - [facility]        Under the authority of the Utilization Management Committee, the utilization review process indicated a secondary review on the above patient.  The review outcome is based on review of the medical records, discussions with staff, and applying clinical experience noted on the date of the review.        (x) Observation/outpatient Status Appropriate - Concurrent stay review       RATIONALE FOR DETERMINATION:     Mr. Hobbs is a 87 yo male pt with known PMH of duodenal adenoma with suspected abd mets, obstructive jaundice, chronic GI bleeding requiring intermittent PRBC transfusions and chronic urinary retention.  He requests no treatment, labs or further evaluation except for marino cath for comfort and PRBCs if indicative.  Urology consulted for marino placement.  Palliative care and SW consulted for further disposition.  Hgb continues to remain stable and no PRBCs have been given this admission.  He is remaining in the hospital for hospice meeting in the am.  It is anticipated that he will be discharging home with hospice 11/9/23.      Patient delayed discharge is related to disposition, there is no medical necessity for inpatient admission at the time of this review. If there is a change in patient status, please resend for review.    The information on this document is developed by the utilization review team in order for the business office to ensure compliance.  This only denotes the appropriateness of proper admission status and does not reflect the quality of care rendered.       The definitions of Inpatient Status and Observation Status used in making the determination above are those provided in the CMS Coverage Manual, Chapter 1 and Chapter 6, section 70.4.       Sincerely,    Annie Yin, DO

## 2023-11-08 NOTE — PLAN OF CARE
"PRIMARY DIAGNOSIS: \"GENERIC\" NURSING  OUTPATIENT/OBSERVATION GOALS TO BE MET BEFORE DISCHARGE:  ADLs back to baseline: No    Activity and level of assistance: Up with standby assistance.    Pain status: Complained of pain 2/10. Refused pain medication.    Return to near baseline physical activity: Yes     Discharge Planner Nurse   Safe discharge environment identified: No  Barriers to discharge: Yes       Entered by: Ulises Hoyt RN 11/08/2023 6:05 AM     Please review provider order for any additional goals.   Nurse to notify provider when observation goals have been met and patient is ready for discharge.Goal Outcome Evaluation:  Alert and orientedx4. Complained of back pain 2/10 but refused any interventions. Stated that he just wanted to rest overnight. Slept in between cares.    "

## 2023-11-08 NOTE — PLAN OF CARE
"PRIMARY DIAGNOSIS: \"GENERIC\" NURSING  OUTPATIENT/OBSERVATION GOALS TO BE MET BEFORE DISCHARGE:  ADLs back to baseline: No    Activity and level of assistance: Up with standby assistance.    Pain status: States 1/10 pain, refuses pain medication.    Return to near baseline physical activity: Yes     Discharge Planner Nurse   Safe discharge environment identified: No  Barriers to discharge: Yes       Entered by: Ulises Hoyt RN 11/07/2023 11:03 PM     Please review provider order for any additional goals.   Nurse to notify provider when observation goals have been met and patient is ready for discharge.Goal Outcome Evaluation:                        "

## 2023-11-09 NOTE — PLAN OF CARE
"PRIMARY DIAGNOSIS: \"GENERIC\" NURSING  OUTPATIENT/OBSERVATION GOALS TO BE MET BEFORE DISCHARGE:  ADLs back to baseline: No    Activity and level of assistance: Up with standby assistance.    Pain status: No improvement noted. Consider adjustment in pain regimen.    Return to near baseline physical activity: No     Discharge Planner Nurse   Safe discharge environment identified: No  Barriers to discharge: yes       Entered by: Olivia Diana RN 11/09/2023 7:07 AM     Please review provider order for any additional goals.   Nurse to notify provider when observation goals have been met and patient is ready for discharge.                    "

## 2023-11-09 NOTE — PROGRESS NOTES
[facility]    Consult Note - [facility]  _________________________________________________________________    [facility] 24/7 Contact Number: [phone]    - Providers: Please contact [facility] with questions for orders   - Nursing: Please contact [facility] with report when discharged    Hospice will admit to routine hospice care when discharged from the hospital.     ______________________________________________________________________        Hospice Diagnosis: duodenal adenoma (cancer with met likely)    Indication for Inpatient Hospice: None, admit to routine care when in community    Goals for Hospital Discharge: placement in care facility with funding through medicaid    Plan of Care Discussed with the Following:   - Nurse: Brook Waterman, RN case manager  - Hospitalist/Rounding Provider: Dr. Antonio Hall    - Denton's Family/Preferred Contact: Daughter Valentina Smith  - Hospice Provider: Dr. Valery Bryant    Summary of Visit (includes assessment, medications and any new orders):   Met with Sebastián and Arminda in Sebastián's hospital room at [facility] room 119. Sebastián was concerned he would run out of blood and die if he doesn't have further transfusions for low hemoglobin. Discussed his symptoms of low hemoglobin, he reports he can't walk far. He currently can only walk to the bathroom eventhough his hemoglobin is ok. Discussed that he likely is so sick he isn't benefiting from transfusions. He was agreeable to not do them with the reassurance he would not run out of blood.   He needs a 24 hour care facility. Brook was updated and is looking for Assisted Living Facility but there is funding issues. Now looking into hospice facilities and long term care nursing facilities.    Spoke to Dr. Hall who estimates his life expectancy is likely a month or so. His white count is going up, he has an obstruction from the tumor and will likely end up with an infection if  he doesn't have one already. He gave the verbal order to admit to hospice.   Left voicemail for Brook to update estimated life expectancy.  Spoke to Hospice Dr. Valery Bryant, agrees to admit to hospice.  Please send 3 days supply of meds at discharge unless facility prefers other. Please do not send scripts for family to  at local pharmacy. We can order DME (Hospital bed, wheelchair, commode, etc.) for delivery today, just call as soon as you know where to send it.   Normal hospice comfort pack when admit to a facility includes:   Bisacodyl 10MG SUPP - GIVE ONE SUPP AL QD PRN FOR CONSTIPATION #1  HYOSCYAMINE 0.125 MG TAB - GIVE 1 TAB PO Q4H PRN FOR SECRETIONS #12  HALOPERIDOL 1 MG TAB - GIVE 1 TAB PO Q6H PRN FOR AGITATION/NAUSEA #6  PROCHLOROPERAZINE 10 MG TAB - GIVE 1 TAB PO Q6H PRN FOR NAUSEA/VOMITING #6    ACETAMINOPHEN 650 MG SUPP - GIVE ONE SUPP AL Q4H PRN FOR TEMP > 100 DEGREES F #4   LORAZEPAM 0.5 MG TAB - GIVE 1 TAB PO Q4H PRN FOR ANXIETY/DYSPNEA #12    OPIOID:  dilaudid 0.5mg tab every 3 hours as needed for pain or dyspnea #12    Thank you for your great care of this patient and helping us with a successful discharge!    Rosanna Clifton RN direct dial  if you need help today

## 2023-11-09 NOTE — PLAN OF CARE
"PRIMARY DIAGNOSIS: \"GENERIC\" NURSING  OUTPATIENT/OBSERVATION GOALS TO BE MET BEFORE DISCHARGE:  ADLs back to baseline: No    Activity and level of assistance: Up with standby assistance.    Pain status: No improvement noted. Consider adjustment in pain regimen.    Return to near baseline physical activity: No     Discharge Planner Nurse   Safe discharge environment identified: No  Barriers to discharge: yes       Entered by: Olivia Diana RN 11/09/2023 7:06 AM     Please review provider order for any additional goals.   Nurse to notify provider when observation goals have been met and patient is ready for discharge.                      "

## 2023-11-09 NOTE — PLAN OF CARE
Problem: Adult Inpatient Plan of Care  Goal: Absence of Hospital-Acquired Illness or Injury  Intervention: Identify and Manage Fall Risk  Recent Flowsheet Documentation  Taken 11/9/2023 0000 by Olivia Vásquez RN  Safety Promotion/Fall Prevention:   activity supervised   clutter free environment maintained   lighting adjusted   mobility aid in reach   nonskid shoes/slippers when out of bed   patient and family education  Taken 11/8/2023 2200 by Olivia Vásquez RN  Safety Promotion/Fall Prevention:   activity supervised   clutter free environment maintained   lighting adjusted   mobility aid in reach   nonskid shoes/slippers when out of bed   patient and family education     Problem: Adult Inpatient Plan of Care  Goal: Absence of Hospital-Acquired Illness or Injury  Intervention: Identify and Manage Fall Risk  Recent Flowsheet Documentation  Taken 11/9/2023 0000 by Olivia Vásquez RN  Safety Promotion/Fall Prevention:   activity supervised   clutter free environment maintained   lighting adjusted   mobility aid in reach   nonskid shoes/slippers when out of bed   patient and family education  Taken 11/8/2023 2200 by Olivia Vásquez RN  Safety Promotion/Fall Prevention:   activity supervised   clutter free environment maintained   lighting adjusted   mobility aid in reach   nonskid shoes/slippers when out of bed   patient and family education  Intervention: Prevent Skin Injury  Recent Flowsheet Documentation  Taken 11/9/2023 0000 by Olivia Vásquez RN  Body Position: position changed independently  Taken 11/8/2023 2200 by Olivia Vásquez RN  Body Position: position changed independently  Intervention: Prevent and Manage VTE (Venous Thromboembolism) Risk  Recent Flowsheet Documentation  Taken 11/9/2023 0000 by Olivia Vásquez RN  VTE Prevention/Management: patient refused intervention  Taken 11/8/2023 2200 by Olivia Vásquez RN  VTE Prevention/Management: patient refused  intervention  Intervention: Prevent Infection  Recent Flowsheet Documentation  Taken 11/9/2023 0000 by Olivia Vásquez, RN  Infection Prevention:   rest/sleep promoted   single patient room provided   hand hygiene promoted  Taken 11/8/2023 2200 by Olivia Vásquez, RN  Infection Prevention:   rest/sleep promoted   single patient room provided   hand hygiene promoted  Alert and oriented to himself, he is very depressed, poor appetite, Patient remains stable. Has abdominal pain but declining pain meds. Still agreeable to hospice. He doesn't want treatment for the cancer. Will continue to mon

## 2023-11-09 NOTE — PROGRESS NOTES
PRIMARY DIAGNOSIS: ACUTE PAIN  OUTPATIENT/OBSERVATION GOALS TO BE MET BEFORE DISCHARGE:  1. Pain Status: Improved-controlled with oral pain medications.    2. Return to near baseline physical activity: Yes    3. Cleared for discharge by consultants (if involved): No    Discharge Planner Nurse   Safe discharge environment identified: No  Barriers to discharge: Yes       Entered by: Brenda Esparza RN 11/09/2023 12:04 PM     Please review provider order for any additional goals.   Nurse to notify provider when observation goals have been met and patient is ready for discharge.

## 2023-11-09 NOTE — PROGRESS NOTES
Care Management Follow Up    Length of Stay (days): 1    Expected Discharge Date: 11/09/2023     Concerns to be Addressed:     Discharge planning  Patient plan of care discussed at interdisciplinary rounds: Yes    Anticipated Discharge Disposition:  LTC with hospice     Anticipated Discharge Services:  per hospice  Anticipated Discharge DME:  per hospice    Patient/family educated on Medicare website which has current facility and service quality ratings:    Education Provided on the Discharge Plan:  yes  Patient/Family in Agreement with the Plan:  yes    Referrals Placed by CM/SW:  LTC, hospice  Private pay costs discussed: Not applicable    Additional Information:  CM reviewed chart. Patient and family are seeking hospice agency that can allow for a time-limited trial of blood transfusions.     ELIO met with patient, patient has been going to blood transfusions at [facility].  Appears per chart review he has been going to [facility] about 2x/month.    Rosanna from [facility] discussed blood transfusions on hospice, patient and family have decided to not continue with blood transfusions once discharged on hospice.     Brittaney from [facility] Hosp[facility] liaison ([phone]) met with patient and daughter today, signed onto hospice.  Nurse assessment done today.   Rosanna from [facility] spoke with MD, MD believes patient has 1 month, maybe less, to live.     Daughter would like patient in a LTC facility with 24hr care in the [place] area.     Patient has Medicaid to cover LTC cost.  CM emailed daughter medicare website and list of closest LTC facilities to her.   Daughter, Arminda, would also like patient's wife to be in the same LTC facility.  ELIO explained to Arminda that she would need to coordinate this with facility.   Daughter, Arminda: [email or web address]    Daughter emailed back, [facility], [facility]. She is also open to places in [place], [place], [place], [place], [place], and, [place]. ELIO emailed Arminad back encouraging her to review the website for additional choices.   ELIO placed referrals to [facility], [facility] [place], and [facility]  ELIO left voicemail for admissions at [facility].   ELIO left voicemail for admissions at [facility].  ELIO left voicemail for admissions at [facility].  [facility] called back, said they are shelter not LTC though will check to see if patient would qualify.  [facility] replied, no LTC bed available.     Rosanna [facility] RN: [phone]    Brook Waterman RN

## 2023-11-09 NOTE — PLAN OF CARE
"PRIMARY DIAGNOSIS: \"GENERIC\" NURSING  OUTPATIENT/OBSERVATION GOALS TO BE MET BEFORE DISCHARGE:  ADLs back to baseline: No    Activity and level of assistance: Up with standby assistance.    Pain status: No improvement noted. Consider adjustment in pain regimen.    Return to near baseline physical activity: No     Discharge Planner Nurse   Safe discharge environment identified: No  Barriers to discharge: yes       Entered by: Olivia Diana RN 11/09/2023 7:04 AM     Please review provider order for any additional goals.   Nurse to notify provider when observation goals have been met and patient is ready for discharge.  "

## 2023-11-09 NOTE — PROGRESS NOTES
PRIMARY DIAGNOSIS: ACUTE PAIN  OUTPATIENT/OBSERVATION GOALS TO BE MET BEFORE DISCHARGE:  1. Pain Status: Improved-controlled with oral pain medications.    2. Return to near baseline physical activity: Yes    3. Cleared for discharge by consultants (if involved): No    Discharge Planner Nurse   Safe discharge environment identified: No  Barriers to discharge: Yes       Entered by: Brenda Esparza RN 11/09/2023 8:07 AM     Please review provider order for any additional goals.   Nurse to notify provider when observation goals have been met and patient is ready for discharge.

## 2023-11-09 NOTE — PROGRESS NOTES
PRIMARY DIAGNOSIS: ACUTE PAIN  OUTPATIENT/OBSERVATION GOALS TO BE MET BEFORE DISCHARGE:  1. Pain Status: Improved-controlled with oral pain medications.    2. Return to near baseline physical activity: Yes    3. Cleared for discharge by consultants (if involved): No    Discharge Planner Nurse   Safe discharge environment identified: No  Barriers to discharge: Yes       Entered by: Brenda Esparza RN 11/09/2023 4:47 PM     Please review provider order for any additional goals.   Nurse to notify provider when observation goals have been met and patient is ready for discharge.

## 2023-11-09 NOTE — PLAN OF CARE
"Goal Outcome Evaluation:       Pt. Refusing to eat for the most part. Says, \"I want to die.\" He is talking about his wife who is in the nursing home. Refusing to take anything for pain or for nausea, or for comfort. Wants to sleep. Took bites of chocolate icecream.                 "

## 2023-11-09 NOTE — PROGRESS NOTES
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 CT on admission with stable duodenal mass with intra- and extrahepatic bile duct dilation and liver masses but worsening bilirubin on labs and leukocytosis. AST/ALT elevated but stable. Initial lipase 210. Given obstruction, seems likely patient will inevitably develop ascending infection.  - GI consulted; offered palliative stenting but patient declined  - Palliative consulted; patient is comfort-focused except willing to accept transfusions if needed. Otherwise not interested in medications of treatments aimed at prolonging life.  - Palliative and CM to assist with hospice  - Labs focused on transfusion care only; check CBC intermittently  - DNR/DNI per patient wishes  - Patient declining most comfort meds as well, but we can provide him with whatever he wants    #Chronic GI bleeding  Due to above, Hgb stable  - Transfuse for Hgb <7    #Anasarca  Due to hepatic dysfunction  - No diuresis for now; could provide for comfort if needed    #BPH  Intermittently straight caths at home. Patient requesting chronic catheter placement on admission for comfort.  - Difficult Cash placement; Urology consulted for help and they placed with scope assistance          Diet: Combination Diet Regular Diet Adult    DVT Prophylaxis: Patient declined mechanical and pharmaceutical VTE ppx  Cash Catheter: PRESENT,  "indication: Obstruction;End of Life  Lines: None     Cardiac Monitoring: None  Code Status: No CPR- Do NOT Intubate      Clinically Significant Risk Factors              # Hypoalbuminemia: Lowest albumin = 2.5 g/dL at 11/6/2023  9:14 AM, will monitor as appropriate                       Disposition Plan     Expected Discharge Date: 11/09/2023        Discharge Comments: palliative following   monitoring Hgb            JANNA GOODMAN MD  Hospitalist Service  [facility]  Securely message with [employer] (more info)  Text page via [employer] Paging/Directory   ______________________________________________________________________    Interval History   Patient remains stable. Still amenable to go to hospice. He said this morning \"I'm ready to die\"    Physical Exam   Vital Signs: Temp: 98.2  F (36.8  C) Temp src: Oral BP: 139/64 Pulse: 81   Resp: 18 SpO2: 93 % O2 Device: None (Room air)    Weight: 132 lbs .89 oz    General Appearance: NAD but ill appearing  Respiratory: CTAB  Cardiovascular: RRR  GI: Soft. RUQ and epigastric TTP; non-distended  Skin: Jaundiced  Other:  2+ BLE edema    Medical Decision Making       40 MINUTES SPENT BY ME on the date of service doing chart review, history, exam, documentation & further activities per the note.      Data     I have personally reviewed the following data over the past 24 hrs:    19.6 (H)  \   8.9 (L)   / 537 (H)     N/A N/A N/A /  N/A   N/A N/A N/A \       Imaging results reviewed over the past 24 hrs:   No results found for this or any previous visit (from the past 24 hour(s)).  "

## 2023-11-10 NOTE — PLAN OF CARE
"PRIMARY DIAGNOSIS: \"GENERIC\" NURSING  OUTPATIENT/OBSERVATION GOALS TO BE MET BEFORE DISCHARGE:  ADLs back to baseline: No    Activity and level of assistance: Up with standby assistance.    Pain status: Pain free.    Return to near baseline physical activity: Yes     Discharge Planner Nurse   Safe discharge environment identified: No  Barriers to discharge: Yes       Entered by: Ulises Hoyt RN 11/10/2023 5:03 AM     Please review provider order for any additional goals.   Nurse to notify provider when observation goals have been met and patient is ready for discharge.Goal Outcome Evaluation:  A&Ox4. One assist with a walker. Complained of pain earlier in the shift 2/10 relieved with PRN tylenol. Slept well overnight.    "

## 2023-11-10 NOTE — PROGRESS NOTES
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  Obstructive jaundice  Poor appetite  -- Patient presents with request to defer further work-up  -- CT on admission with stable duodenal mass with intra and extrahepatic bile ductal dilation and liver masses  -- GI consulted and offered palliative stenting but patient declined  -- Palliative care consulted, initially patient open to excepting transfusions however now currently wishing to stop oral iron and forego any further transfusions  -- 11/10/2023: Patient placed on full comfort measures  -- Care management to assist with hospice placement      Chronic GI bleeding:  -- Comfort cares, stop trending hemoglobin  -- continue PPI, ok for patient to refuse      Anasarca: Likely secondary to hepatic dysfunction, currently holding diuretics given patient request for conservative goals of care.      History of BPH:  Intermittently straight caths at home.  Patient requested chronic catheter placement on admission for comfort  -- Urology consulted for difficult Cash placement  -- Keep Cash in place indefinitely       Diet: Combination Diet Regular Diet Adult    DVT Prophylaxis: Comfort cares  Cash Catheter: PRESENT, indication: End of Life  Lines: None     Cardiac Monitoring: None  Code Status: No CPR- Do NOT Intubate      Clinically Significant Risk Factors              # Hypoalbuminemia: Lowest albumin = 2.5 g/dL at 11/6/2023  9:14 AM, will monitor as appropriate                       Disposition Plan      Expected Discharge Date: 11/11/2023    Discharge Delays: Other (Add Comment)  Comfort Care/Hospice    Discharge  Comments: palliative following   monitoring Hgb  working on LTC with hospice          Rojas Nguyen, DO  Hospitalist Service  [facility]  Securely message with [employer] (more info)  Text page via [employer] Paging/Directory   ______________________________________________________________________    Interval History   NAD. Denies any complaints. Asking to die.     Physical Exam   Vital Signs: Temp: 98.1  F (36.7  C) Temp src: Oral BP: (!) 140/63 Pulse: 86   Resp: 18 SpO2: 94 % O2 Device: None (Room air)    Weight: 132 lbs .89 oz  General: NAD  RESPIRATORY: Breathing nonlabored  CARDIOVASCULAR: + le edema bilat.   NEUROLOGIC: Alert, speech clear         Medical Decision Making       >45 MINUTES SPENT BY ME on the date of service doing chart review, history, exam, documentation & further activities per the note.      Data

## 2023-11-10 NOTE — PLAN OF CARE
"PRIMARY DIAGNOSIS: \"GENERIC\" NURSING  OUTPATIENT/OBSERVATION GOALS TO BE MET BEFORE DISCHARGE:  ADLs back to baseline: No    Activity and level of assistance: Up with standby assistance.    Pain status: Improved-controlled with oral pain medications.    Return to near baseline physical activity: Yes     Discharge Planner Nurse   Safe discharge environment identified: No  Barriers to discharge: Yes       Entered by: Ulises Hoyt RN 11/10/2023 3:17 AM     Please review provider order for any additional goals.   Nurse to notify provider when observation goals have been met and patient is ready for discharge.Goal Outcome Evaluation:                        "

## 2023-11-10 NOTE — PROGRESS NOTES
Care Management Follow Up    Length of Stay (days): 1    Expected Discharge Date: 11/10/2023     Concerns to be Addressed:     Discharge planning  Patient plan of care discussed at interdisciplinary rounds: Yes    Anticipated Discharge Disposition:  LTC with hospice     Anticipated Discharge Services:  per hospice  Anticipated Discharge DME:  per hospice    Patient/family educated on Medicare website which has current facility and service quality ratings:    Education Provided on the Discharge Plan:  yes  Patient/Family in Agreement with the Plan:  yes    Referrals Placed by CM/SW:  LTC, hospice  Private pay costs discussed: Not applicable    Additional Information:  Rosanna from [facility] discussed blood transfusions on hospice, patient and family have decided to not continue with blood transfusions once discharged on hospice.     Patient signed onto [facility].  MD believes patient has 1 month, maybe less, to live.     Daughter would like patient in a LTC facility with 24hr care in the [facility].     Patient has Medicaid to cover LTC cost.  Daughter, Arminda, would also like patient's wife to be in the same LTC facility.  ELIO explained to Arminda that she would need to coordinate this with facility.   Daughter, Arminda: [email or web address]    Current LTC choices: [facility], [facility], and [facility].   Would be interested in LTC in [facility], [place], [place], [place], and, [place].     [facility]-  no LTC bed available.   11/10- ELIO left voicemail for admissions at [facility] to check on status of request.   CM called [facility], no LTC beds though will review to potentially place in TCU room.  [facility] has no bed availability     CM placed more LTC referrals to [facility], [facility], [facility], and [facility].  [facility]- declined, no bed available  [facility], no bed available   [facility]- declined, no bed available  [facility]- declined, no bed available    Placed referral to  [facility]  - pending      PLAN: LTC (medicaid payor) with hospice.   Will need more choices.   CM sent daughter an email and SOM Wolf CM this weekend.       Ros[facility] RN: [phone]  Brittaney [facility] Hospice liaison ([phone])    Brook Waterman RN

## 2023-11-11 NOTE — PLAN OF CARE
"PRIMARY DIAGNOSIS: \"GENERIC\" NURSING  OUTPATIENT/OBSERVATION GOALS TO BE MET BEFORE DISCHARGE:  ADLs back to baseline: No    Activity and level of assistance: bedrest/comfort    Pain status: Pt reports pain 2/10, declines medication for pain at this time.    Return to near baseline physical activity: No     Discharge Planner Nurse   Safe discharge environment identified: Yes, LTC with hospice  Barriers to discharge: Yes, needs placement       Entered by: Noé Francisco RN 11/10/2023 6:32 PM     Pt is alert and oriented x4, able to communicate needs, bedrest/comfort care. Pt reports 2/10 abdominal pain this morning, declines pain management at this time stating he is comfortable.   "

## 2023-11-11 NOTE — PLAN OF CARE
"PRIMARY DIAGNOSIS: \"GENERIC\" NURSING  OUTPATIENT/OBSERVATION GOALS TO BE MET BEFORE DISCHARGE:  ADLs back to baseline: No    Activity and level of assistance: bedrest/comfort care    Pain status: Pt states some abdominal discomfort but declining medication at this time.    Return to near baseline physical activity: No     Discharge Planner Nurse   Safe discharge environment identified: Yes, LTC with hospice  Barriers to discharge: Yes, needs placement       Entered by: Noé Francisco RN 11/10/2023 6:39 PM     Pt declining meals, likes fresh cool water, laying comfortably in bed.   "

## 2023-11-11 NOTE — PLAN OF CARE
"PRIMARY DIAGNOSIS: \"GENERIC\" NURSING  OUTPATIENT/OBSERVATION GOALS TO BE MET BEFORE DISCHARGE:  ADLs back to baseline: No    Activity and level of assistance: bedrest/comfort care    Pain status: Pain free.    Return to near baseline physical activity: No     Discharge Planner Nurse   Safe discharge environment identified: Yes, LTC with Hospice  Barriers to discharge: Yes, waiting for placement       Entered by: Antonio Darling RN 11/11/2023 4:05 AM   Alert and oriented x4. Denies any pain or discomfort. No significant changes noted this shift.                        "

## 2023-11-11 NOTE — PROGRESS NOTES
"PRIMARY DIAGNOSIS: \"GENERIC\" NURSING  OUTPATIENT/OBSERVATION GOALS TO BE MET BEFORE DISCHARGE:  ADLs back to baseline: No    Activity and level of assistance: Bedrest/comfort    Pain status: Pt. Has pain but refused meds offered    Return to near baseline physical activity: No     Discharge Planner Nurse   Safe discharge environment identified: Yes, LTC with hospice   Barriers to discharge: Yes, waiting for placement       Entered by: John Phillips RN 11/11/2023 12:36 PM  Pt. A&O x4 and on room air. Pt. Has pain in leg but refused meds. RN  offered to reposition. Pt. Refused.   Please review provider order for any additional goals.   Nurse to notify provider when observation goals have been met and patient is ready for discharge.  "

## 2023-11-11 NOTE — CARE PLAN
"PRIMARY DIAGNOSIS: \"GENERIC\" NURSING  OUTPATIENT/OBSERVATION GOALS TO BE MET BEFORE DISCHARGE:  ADLs back to baseline: No    Activity and level of assistance: Up with standby assistance.    Pain status: Pain free.    Return to near baseline physical activity: Yes     Discharge Planner Nurse   Safe discharge environment identified: No  Barriers to discharge: Yes       Entered by: Antonio Darling RN 11/10/2023 10:48 PM     Please review provider order for any additional goals.   Nurse to notify provider when observation goals have been met and patient is ready for discharge.  "

## 2023-11-11 NOTE — PROGRESS NOTES
"PRIMARY DIAGNOSIS: \"GENERIC\" NURSING  OUTPATIENT/OBSERVATION GOALS TO BE MET BEFORE DISCHARGE:  ADLs back to baseline: No    Activity and level of assistance: Bedrest/comfort    Pain status: Pt. Has pain. Refused meds offered    Return to near baseline physical activity: No     Discharge Planner Nurse   Safe discharge environment identified: Yes, TCU with hospice  Barriers to discharge: Yes, waiting for placement       Entered by: John Phillips RN 11/11/2023 3:48 PM  Please review provider order for any additional goals.   Nurse to notify provider when observation goals have been met and patient is ready for discharge.  "

## 2023-11-11 NOTE — PLAN OF CARE
"PRIMARY DIAGNOSIS: \"GENERIC\" NURSING  OUTPATIENT/OBSERVATION GOALS TO BE MET BEFORE DISCHARGE:  ADLs back to baseline: No    Activity and level of assistance: bedrest/comfort    Pain status: occasional abdominal discomfort, declines pain management at this time.    Return to near baseline physical activity: No     Discharge Planner Nurse   Safe discharge environment identified: Yes, LTC with hospice  Barriers to discharge: Yes, needs placement       Entered by: Noé Francisco RN 11/10/2023 6:37 PM     Pt resting well in bed, states that he is comfortable and ready to die.  "

## 2023-11-11 NOTE — PROGRESS NOTES
"PRIMARY DIAGNOSIS: \"GENERIC\" NURSING  OUTPATIENT/OBSERVATION GOALS TO BE MET BEFORE DISCHARGE:  ADLs back to baseline: No    Activity and level of assistance: bedrest/comfort cares    Pain status: Pt. Has pain but refused meds offered.    Return to near baseline physical activity: No     Discharge Planner Nurse   Safe discharge environment identified: Yes, LTC with hospice   Barriers to discharge: Yes, waiting for placement       Entered by: John Phillips RN 11/11/2023 10:31 AM    Please review provider order for any additional goals.   Nurse to notify provider when observation goals have been met and patient is ready for discharge.  "

## 2023-11-11 NOTE — PROGRESS NOTES
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  Obstructive jaundice  Poor appetite  -- Patient presents with request to defer further work-up  -- CT on admission with stable duodenal mass with intra and extrahepatic bile ductal dilation and liver masses  -- GI consulted and offered palliative stenting but patient declined  -- Palliative care consulted, initially patient open to excepting transfusions however now currently wishing to stop oral iron and forego any further transfusions  -- 11/10/2023: Patient placed on full comfort measures  -- Care management to assist with hospice placement      Chronic GI bleeding:  -- Comfort cares, stopped trending hemoglobin  -- continue PPI, ok for patient to refuse      Anasarca: Likely secondary to hepatic dysfunction, currently holding diuretics given patient request for conservative goals of care.      History of BPH:  Intermittently straight caths at home.  Patient requested chronic catheter placement on admission for comfort  -- Urology consulted for difficult Cash placement  -- Keep Cash in place indefinitely       Diet: Combination Diet Regular Diet Adult    DVT Prophylaxis: Comfort cares  Cash Catheter: PRESENT, indication: Retention;End of Life  Lines: None     Cardiac Monitoring: None  Code Status: No CPR- Do NOT Intubate      Clinically Significant Risk Factors              # Hypoalbuminemia: Lowest albumin = 2.5 g/dL at 11/6/2023  9:14 AM, will monitor as appropriate                       Disposition Plan      Expected Discharge Date: 11/13/2023    Discharge Delays: Other (Add Comment)  Comfort Care/Hospice     Discharge Comments: palliative following   monitoring Hgb  working on LTC with hospice          Rojas Nguyen, DO  Hospitalist Service  [facility]  Securely message with Pam (more info)  Text page via [employer] Paging/Directory   ______________________________________________________________________    Interval History   NAD. Denies any complaints. Asking to be given something so he can die    Physical Exam   Vital Signs: Temp: 97.8  F (36.6  C) Temp src: Oral BP: 132/67 Pulse: 90   Resp: 18 SpO2: 95 % O2 Device: None (Room air)    Weight: 132 lbs .89 oz  General: NAD  RESPIRATORY: Breathing nonlabored  CARDIOVASCULAR: No le edema bilat.   NEUROLOGIC: Somnolent, speech clear         Medical Decision Making       >40 MINUTES SPENT BY ME on the date of service doing chart review, history, exam, documentation & further activities per the note.      Data

## 2023-11-11 NOTE — CARE PLAN
"PRIMARY DIAGNOSIS: \"GENERIC\" NURSING  OUTPATIENT/OBSERVATION GOALS TO BE MET BEFORE DISCHARGE:  ADLs back to baseline: No    Activity and level of assistance:  bedrest/ comfort care    Pain status: Pain free.    Return to near baseline physical activity: No     Discharge Planner Nurse   Safe discharge environment identified: No  Barriers to discharge: Yes       Entered by: Antonio Darling RN 11/11/2023 12:51 AM       "

## 2023-11-12 NOTE — PLAN OF CARE
"PRIMARY DIAGNOSIS: \"GENERIC\" NURSING  OUTPATIENT/OBSERVATION GOALS TO BE MET BEFORE DISCHARGE:  ADLs back to baseline: No    Activity and level of assistance: Bedrest/comfort care    Pain status: Pain free.    Return to near baseline physical activity: No     Discharge Planner Nurse   Safe discharge environment identified: Yes, LTC with Hospice  Barriers to discharge: Yes, waiting for placement       Entered by: Antonio Darling RN 11/12/2023 2:48 AM     P                        "

## 2023-11-12 NOTE — PROGRESS NOTES
"PRIMARY DIAGNOSIS: \"GENERIC\" NURSING  OUTPATIENT/OBSERVATION GOALS TO BE MET BEFORE DISCHARGE:  ADLs back to baseline: No    Activity and level of assistance: up with 1 assist w/ walker    Pain status: declined prn pain meds    Return to near baseline physical activity: No     Discharge Planner Nurse   Safe discharge environment identified: No  Barriers to discharge: Yes       Entered by: Brittaney Rubin RN 11/12/2023 5:10 PM     Please review provider order for any additional goals.   Nurse to notify provider when observation goals have been met and patient is ready for discharge.      Pt a/o x4, cooperative. Resting quietly in bed most of day. Cash patent. Pt walked to bathroom w/ 1 assist w/ walker this am, had watery dark brown stool Reports soreness in back, declined prn pain meds. Pt declined lunch.  Pt said is wants to die. Said his daughters are aware. \"I guess I have to wait until the Lord's timing\". Told writer he would like me to give him some meds to help him to die. Said he is tired of getting up and having watery bowel movements and  then feeling pain. Writer encouraged pt to try imodium, zofran and dilaudid. Pt declined.Reminded pt to use call light for needs. Continue to monitor pt. bed alarm in use.            "

## 2023-11-12 NOTE — PROGRESS NOTES
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  Obstructive jaundice  Poor appetite  Patient presents with request to defer further work-up  CT on admission with stable duodenal mass with intra and extrahepatic bile ductal dilation and liver masses  GI consulted and offered palliative stenting but patient declined  Palliative care consulted, initially patient open to excepting transfusions however now currently wishing to stop oral iron and forego any further transfusions  -- 11/10/2023: Patient placed on full comfort measures  -- Care management to assist with hospice placement  -- remains medically stable for transfer      Chronic GI bleeding:  -- Comfort cares, stopped trending hemoglobin  -- continue PPI, ok for patient to refuse      Anasarca: Likely secondary to hepatic dysfunction, currently holding diuretics given patient request for conservative goals of care.      History of BPH:  Intermittently straight caths at home.  Patient requested chronic catheter placement on admission for comfort  Urology consulted for difficult Cash placement  -- Keep Cash in place indefinitely       Diet: Combination Diet Regular Diet Adult    DVT Prophylaxis: Comfort cares  Cash Catheter: PRESENT, indication: Retention;End of Life  Lines: None     Cardiac Monitoring: None  Code Status: No CPR- Do NOT Intubate      Clinically Significant Risk Factors              # Hypoalbuminemia: Lowest albumin = 2.5 g/dL at 11/6/2023  9:14 AM, will monitor as appropriate                       Disposition Plan     Expected Discharge Date: 11/13/2023    Discharge Delays: Other (Add  Comment)  Comfort Care/Hospice    Discharge Comments: palliative following   monitoring Hgb  working on LTC with hospice          Rojas Nguyen,   Hospitalist Service  [facility]  Securely message with [employer] (more info)  Text page via [employer] Paging/Directory   ______________________________________________________________________    Interval History   NAD. Denies any complaints     Physical Exam   Vital Signs: Temp: 97.4  F (36.3  C) Temp src: Oral BP: (!) 146/67 Pulse: 90   Resp: 18 SpO2: 94 % O2 Device: None (Room air)    Weight: 132 lbs .89 oz  General: NAD  RESPIRATORY: Breathing nonlabored  CARDIOVASCULAR: No le edema bilat. No cyanosis  NEUROLOGIC: Alert, speech clear         Medical Decision Making       >40 MINUTES SPENT BY ME on the date of service doing chart review, history, exam, documentation & further activities per the note.      Data

## 2023-11-12 NOTE — PLAN OF CARE
"PRIMARY DIAGNOSIS: \"GENERIC\" NURSING  OUTPATIENT/OBSERVATION GOALS TO BE MET BEFORE DISCHARGE:  ADLs back to baseline: No    Activity and level of assistance: bedrest/comfort care    Pain status: Pain free.    Return to near baseline physical activity: No     Discharge Planner Nurse   Safe discharge environment identified: Yes, LTC with Hospice  Barriers to discharge: Yes, waiting for placement       Entered by: Antonio Darling RN 11/11/2023 8:59 PM                             "

## 2023-11-12 NOTE — PROGRESS NOTES
"PRIMARY DIAGNOSIS: \"GENERIC\" NURSING  OUTPATIENT/OBSERVATION GOALS TO BE MET BEFORE DISCHARGE:  ADLs back to baseline: No    Activity and level of assistance: up  w/ 1 assist w/ walker      Pain status: declines prn pain meds      Return to near baseline physical activity: No     Discharge Planner Nurse   Safe discharge environment identified: No  Barriers to discharge: Yes       Entered by: Brittaney Rubin RN 11/12/2023 5:07 PM     Please review provider order for any additional goals.   Nurse to notify provider when observation goals have been met and patient is ready for discharge.    Pt a/o x4, cooperative. Reports soreness in back, declined prn pain meds. Pt ate a few bites of breakfast. Resting in bed, quietly. Reminded pt to use call light for needs. Continue to monitor pt.   "

## 2023-11-12 NOTE — PLAN OF CARE
"PRIMARY DIAGNOSIS: \"GENERIC\" NURSING  OUTPATIENT/OBSERVATION GOALS TO BE MET BEFORE DISCHARGE:  ADLs back to baseline: No    Activity and level of assistance: bedrest/ comfort care    Pain status: has abdominal discomfort but refused pain meds    Return to near baseline physical activity: No     Discharge Planner Nurse   Safe discharge environment identified: Yes, LTC with Hospice  Barriers to discharge: Yes, waiting for placement       Entered by: Antonio Darling RN 11/12/2023 4:49 AM                             "

## 2023-11-12 NOTE — PROGRESS NOTES
Pt agreed to try dilaudid iv, zofran iv, and po imodium for comfort. Writer gave to pt--see mar. Pt states he hopes it helps him to pass on. Writer reminded pt we are not allowed to give meds for this purpose, but just to keep him comfortable in the process. Pt noted to be resting quietly in bed after prn meds, no complaints. Continue to monitor pt.

## 2023-11-12 NOTE — PROGRESS NOTES
Care Management Follow Up    Length of Stay (days): 1    Expected Discharge Date: 11/13/2023     Concerns to be Addressed: discharge planning     Patient plan of care discussed at interdisciplinary rounds: Yes    Anticipated Discharge Disposition: Hospice, Skilled Nursing Facility, Long Term Care     Anticipated Discharge Services: None  Anticipated Discharge DME: None    Patient/family educated on Medicare website which has current facility and service quality ratings: yes  Education Provided on the Discharge Plan: Yes  Patient/Family in Agreement with the Plan: yes    Referrals Placed by CM/SW: Post Acute Facilities  Private pay costs discussed: Not applicable    Additional Information:  OYLIE reviewed chart. [facility]- per  message for admissions they do not have any availability in the coming week. CM can check back with them the following week. YOLIE attempted to call pts daughter, Arminda, to discuss additional options for placement. SW left  requesting call back.    LANE Fisher

## 2023-11-13 NOTE — PROGRESS NOTES
"PRIMARY DIAGNOSIS: \"GENERIC\" NURSING  OUTPATIENT/OBSERVATION GOALS TO BE MET BEFORE DISCHARGE:  ADLs back to baseline: No    Activity and level of assistance: Up with maximum assistance. Consider SW and/or PT evaluation.     Pain status: Improved but still requiring IV narcotics.    Return to near baseline physical activity: No     Discharge Planner Nurse   Safe discharge environment identified: No  Barriers to discharge: Yes       Entered by: Brittaney Amaro RN 11/12/2023 9:41 PM     Please review provider order for any additional goals.   Nurse to notify provider when observation goals have been met and patient is ready for discharge.  "

## 2023-11-13 NOTE — PROGRESS NOTES
PRIMARY DIAGNOSIS: ACUTE PAIN  OUTPATIENT/OBSERVATION GOALS TO BE MET BEFORE DISCHARGE:  1. Pain Status: Improved but still requiring IV narcotics.    2. Return to near baseline physical activity: No    3. Cleared for discharge by consultants (if involved): Yes    Discharge Planner Nurse   Safe discharge environment identified: No  Barriers to discharge: Yes       Entered by: Brenda Esparza RN 11/13/2023 4:55 PM     Please review provider order for any additional goals.   Nurse to notify provider when observation goals have been met and patient is ready for discharge.

## 2023-11-13 NOTE — PROGRESS NOTES
"PRIMARY DIAGNOSIS: \"GENERIC\" NURSING  OUTPATIENT/OBSERVATION GOALS TO BE MET BEFORE DISCHARGE:  ADLs back to baseline: No    Activity and level of assistance: Up with maximum assistance. Consider SW and/or PT evaluation.     Pain status: Improved but still requiring IV narcotics.    Return to near baseline physical activity: No     Discharge Planner Nurse   Safe discharge environment identified: Yes  Barriers to discharge:  placement        Entered by: Kristina Jason RN 11/13/2023 5:43 AM     Please review provider order for any additional goals.   Nurse to notify provider when observation goals have been met and patient is ready for discharge.  "

## 2023-11-13 NOTE — PROGRESS NOTES
PRIMARY DIAGNOSIS: ACUTE PAIN  OUTPATIENT/OBSERVATION GOALS TO BE MET BEFORE DISCHARGE:  1. Pain Status: Improved but still requiring IV narcotics.    2. Return to near baseline physical activity: No    3. Cleared for discharge by consultants (if involved): No    Discharge Planner Nurse   Safe discharge environment identified: No  Barriers to discharge: Yes       Entered by: Brenda Esparza RN 11/13/2023 8:18 AM     Please review provider order for any additional goals.   Nurse to notify provider when observation goals have been met and patient is ready for discharge.

## 2023-11-13 NOTE — PROGRESS NOTES
"PRIMARY DIAGNOSIS: \"GENERIC\" NURSING  OUTPATIENT/OBSERVATION GOALS TO BE MET BEFORE DISCHARGE:  ADLs back to baseline: No    Activity and level of assistance: Up with maximum assistance. Consider SW and/or PT evaluation.     Pain status: Improved but still requiring IV narcotics.    Return to near baseline physical activity: No     Discharge Planner Nurse   Safe discharge environment identified: No  Barriers to discharge: Yes       Entered by: Brittaney Amaro RN 11/12/2023 9:42 PM   Patient refused to eat tonight. Medicated with IV dilaudid and zofran x1 tonight. Refused repositioning. Cash in place.  Please review provider order for any additional goals.   Nurse to notify provider when observation goals have been met and patient is ready for discharge.  "

## 2023-11-13 NOTE — PROGRESS NOTES
PRIMARY DIAGNOSIS: ACUTE PAIN  OUTPATIENT/OBSERVATION GOALS TO BE MET BEFORE DISCHARGE:  1. Pain Status: Improved but still requiring IV narcotics.    2. Return to near baseline physical activity: No    3. Cleared for discharge by consultants (if involved): Yes    Discharge Planner Nurse   Safe discharge environment identified: No  Barriers to discharge: Yes       Entered by: Brenda Esparza RN 11/13/2023 12:30 PM     Please review provider order for any additional goals.   Nurse to notify provider when observation goals have been met and patient is ready for discharge.

## 2023-11-13 NOTE — PROGRESS NOTES
Care Management Follow Up    Length of Stay (days): 1    Expected Discharge Date: 11/13/2023     Concerns to be Addressed: discharge planning     Patient plan of care discussed at interdisciplinary rounds: Yes    Anticipated Discharge Disposition: Hospice, Skilled Nursing Facility, Long Term Care     Anticipated Discharge Services: None  Anticipated Discharge DME: None    Patient/family educated on Medicare website which has current facility and service quality ratings: yes  Education Provided on the Discharge Plan: Yes  Patient/Family in Agreement with the Plan: yes    Referrals Placed by CM/SW: Post Acute Facilities  Private pay costs discussed: Not applicable    Additional Information:  SW reviewed Pt's chart. Per chart review, Pt seeking LTC with hospice. Pt plans to use [facility] at discharge. YOLIE called Pt's daughter, Arminda to discuss additional facility choices.  Arminda reports she toured at [facility] and there is a shared room available for Pt.  YOLIE called and left voice mail for admissions requesting call back to confirm this bed availability.  Arminda reports she is discussing with her sister to ensure this is the facility family would like to select.     1:29 PM  Arminda reports family would like to proceed with placement at [facility].  YOLIE awaiting call back from admissions team to coordinate discharge. Will need [facility] transport.     LIZETT Winkler

## 2023-11-13 NOTE — PROGRESS NOTES
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  Obstructive jaundice  Poor appetite  Patient presents with request to defer further work-up  CT on admission with stable duodenal mass with intra and extrahepatic bile ductal dilation and liver masses  GI consulted and offered palliative stenting but patient declined  Palliative care consulted, initially patient open to excepting transfusions however now currently wishing to stop oral iron and forego any further transfusions  -- 11/10/2023: Patient placed on full comfort measures  -- Care management to assist with hospice placement  -- remains medically stable for transfer      Chronic GI bleeding:  -- Comfort cares, stopped trending hemoglobin  -- continue PPI, ok for patient to refuse      Anasarca: Likely secondary to hepatic dysfunction, currently holding diuretics given patient request for conservative goals of care.      History of BPH:  Intermittently straight caths at home.  Patient requested chronic catheter placement on admission for comfort  Urology consulted for difficult Cash placement  -- Keep Cash in place indefinitely       Diet: Combination Diet Regular Diet Adult    DVT Prophylaxis: Comfort cares  Cash Catheter: PRESENT, indication: Retention;End of Life  Lines: None     Cardiac Monitoring: None  Code Status: No CPR- Do NOT Intubate      Clinically Significant Risk Factors              # Hypoalbuminemia: Lowest albumin = 2.5 g/dL at 11/6/2023  9:14 AM, will monitor as appropriate                       Disposition Plan      Expected Discharge Date: 11/14/2023    Discharge Delays: Other (Add  Comment)  Comfort Care/Hospice  Destination: long-term care facility  Discharge Comments: palliative following   monitoring Hgb  working on LTC with hospice          Rojas Nguyen DO  Hospitalist Service  [facility]  Securely message with [employer] (more info)  Text page via [employer] Paging/Directory   ______________________________________________________________________    Interval History   NAD. Denies any complaints     Physical Exam   Vital Signs: Temp: 97.7  F (36.5  C) Temp src: Oral BP: 126/68 Pulse: 93   Resp: 17 SpO2: 94 % O2 Device: None (Room air)    Weight: 132 lbs .89 oz  General: NAD  RESPIRATORY: Breathing nonlabored  CARDIOVASCULAR: No le edema bilat. No cyanosis  NEUROLOGIC: Alert, speech clear         Medical Decision Making       >30 MINUTES SPENT BY ME on the date of service doing chart review, history, exam, documentation & further activities per the note.      Data

## 2023-11-13 NOTE — PROGRESS NOTES
"PRIMARY DIAGNOSIS: \"GENERIC\" NURSING  OUTPATIENT/OBSERVATION GOALS TO BE MET BEFORE DISCHARGE:  ADLs back to baseline: No    Activity and level of assistance: Up with maximum assistance. Consider SW and/or PT evaluation.     Pain status: Improved but still requiring IV narcotics.    Return to near baseline physical activity: No     Discharge Planner Nurse   Safe discharge environment identified: Yes  Barriers to discharge:  placement        Entered by: Kristina Jason RN 11/13/2023 5:44 AM   Alert and oriented x4. Denies pain or discomfort. No significant changes noted over night, marino intact and patent.      Please review provider order for any additional goals.   Nurse to notify provider when observation goals have been met and patient is ready for discharge.  "

## 2023-11-14 NOTE — DISCHARGE SUMMARY
[facility]  Hospitalist Discharge Summary      Date of Admission:  11/6/2023  Date of Discharge:  11/14/2023  3:25 PM  Discharging Provider: Rojas Nguyen,   Discharge Service: Hospitalist Service        Follow-ups Needed After Discharge   Follow-up Appointments     Follow-up and recommended labs and tests       [facility] will call you to arrange for marino catheter exchange in 1   month at [facility] as will need to be exchanged over a wire.        Follow-up and recommended labs and tests       Follow up with PCP as needed            Unresulted Labs Ordered in the Past 30 Days of this Admission       No orders found from 10/7/2023 to 11/7/2023.        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  Obstructive jaundice  Poor appetite  Patient presents with request to defer further work-up  CT on admission with stable duodenal mass with intra and extrahepatic bile ductal dilation and liver masses  GI consulted and offered palliative stenting but patient declined  Palliative care consulted, initially patient open to excepting transfusions however now currently wishing to stop oral iron and forego any further transfusions  -- 11/10/2023: Patient placed on full comfort measures  -- remains medically stable for transfer  -- 3 days of comfort medications ordered per hospice request        Chronic GI bleeding:  -- Comfort cares, stopped trending hemoglobin  -- continue PPI, ok for patient to refuse        Anasarca: Likely secondary to hepatic dysfunction, currently holding diuretics given patient  request for conservative goals of care.        History of BPH:  Intermittently straight caths at home.  Patient requested chronic catheter placement on admission for comfort  Urology consulted for difficult Marino placement  -- Keep Marino in place indefinitely          Consultations This Hospital Stay   PALLIATIVE CARE ADULT IP CONSULT  UROLOGY IP CONSULT    Code Status   No CPR- Do NOT Intubate    Time Spent on this Encounter   I, Rojas Nguyen DO, personally saw the patient today and spent greater than 30 minutes discharging this patient.       Rojas Nguyen DO  [address] [address]  Phone: [phone]  Fax: [phone]  ______________________________________________________________________    Physical Exam   Vital Signs: Temp: 98.5  F (36.9  C) Temp src: Oral BP: 134/60 Pulse: 83   Resp: 16 SpO2: 96 % O2 Device: None (Room air)    Weight: 132 lbs .89 oz    General: NAD  RESPIRATORY: Respirations nonlabored  CARDIOVASCULAR: No le edema bilat.  NEUROLOGIC: Alert, speech is clear    Primary Care Physician   YOUSIF IBARRA    Discharge Orders      Primary Care - Care Coordination Referral      Reason for your hospital stay    You had a marino catheter placed for chronic urinary retention, difficult catheter placement.     Follow-up and recommended labs and tests     [facility] will call you to arrange for marino catheter exchange in 1 month at [facility] as will need to be exchanged over a wire.     Tubes and drains    You are going home with the following tubes or drains: Marino catheter    CATHETER CARE   You are being discharged with a marino catheter. You may choose to alternate between a leg (day) bag and large (night) bag. Drain urine from the bag when it is about 2/3rds full. Use plain soap and water to wash urethral/groin area daily. Males - you may apply a small amount of Bacitracin or Neosporin ointment to the tip of the penis  three times a day for comfort (decreases friction).     ACTIVITY   Having a marino catheter does not limit your activities, however, it is recommended you do not drive with a marino catheter in place.     Seek medial evaluation if:  - You are feeling chilled or feverish, temperature >100.5.    - You develop thick cherry colored urine, clots or marino catheter is not draining well.   - You develop purulent drainage from the urethra.     FOLLOW UP:   [facility] will call you to arrange for your catheter removed in our office, typically 1-2 weeks after discharge from the hospital. Alternatively, this may be completed at a transitional care unit if a bladder scanner is available.     Activity    Your activity upon discharge: Activity as tolerated, keep marino in place indefinately     Reason for your hospital stay    GI cancer     Follow-up and recommended labs and tests     Follow up with PCP as needed     Diet    Follow this diet upon discharge: Orders Placed This Encounter      Combination Diet Regular Diet Adult     \    Discharge Medications   Discharge Medication List as of 11/14/2023  3:08 PM        START taking these medications    Details   acetaminophen (TYLENOL) 325 MG tablet Take 2 tablets (650 mg) by mouth every 4 hours as needed for mild pain, fever, headaches or other (pain relief per patient request), Disp-30 tablet, R-0, E-Prescribe      morphine 10 MG/5ML solution Take 2.5 mLs (5 mg) by mouth every hour as needed for moderate to severe pain, Disp-100 mL, R-0, E-Prescribe      ondansetron (ZOFRAN ODT) 4 MG ODT tab Take 1 tablet (4 mg) by mouth every 6 hours as needed for nausea or vomiting, Disp-20 tablet, R-0, E-Prescribe      senna-docusate (SENOKOT-S/PERICOLACE) 8.6-50 MG tablet Take 2 tablets by mouth 2 times daily as needed for constipation, Disp-10 tablet, R-0, E-Prescribe           CONTINUE these medications which have NOT CHANGED    Details   pantoprazole (PROTONIX) 40 MG EC tablet Take 1 tablet (40  mg) by mouth 2 times daily (before meals), Disp-60 tablet, R-1, E-Prescribe           STOP taking these medications       FEROSUL 325 (65 Fe) MG tablet Comments:   Reason for Stopping:         ferrous sulfate (FEROSUL) 325 (65 Fe) MG tablet Comments:   Reason for Stopping:         loperamide (IMODIUM A-D) 2 MG tablet Comments:   Reason for Stopping:             Allergies   Allergies   Allergen Reactions    Sulfa Antibiotics Rash     Info obtained off of 10/4/21 [facility] pre op.

## 2023-11-14 NOTE — PLAN OF CARE
"Goal Outcome Evaluation:         Problem: Adult Inpatient Plan of Care  Goal: Patient-Specific Goal (Individualized)  Description: You can add care plan individualizations to a care plan. Examples of Individualization might be:  \"Parent requests to be called daily at 9am for status\", \"I have a hard time hearing out of my right ear\", or \"Do not touch me to wake me up as it startles  me\".  Outcome: Progressing     Problem: Adult Inpatient Plan of Care  Goal: Plan of Care Review  Description: The Plan of Care Review/Shift note should be completed every shift.  The Outcome Evaluation is a brief statement about your assessment that the patient is improving, declining, or no change.  This information will be displayed automatically on your shift  note.  Outcome: Progressing     Problem: Adult Inpatient Plan of Care  Goal: Optimal Comfort and Wellbeing  Outcome: Progressing  Intervention: Monitor Pain and Promote Comfort  Recent Flowsheet Documentation  Taken 11/13/2023 2100 by Alejo Atkinson RN  Pain Management Interventions: (Declined medication, ice/warm pack and essential oil.)   declines   repositioned     Pt reported pain, however, declined both pharmacology and non-pharmacology therapy.                "

## 2023-11-14 NOTE — PLAN OF CARE
"Goal Outcome Evaluation: Alert/oriented. Reported lower back pain, declined pain medication, /non-pharmacology regimen                      PRIMARY DIAGNOSIS: \"GENERIC\" NURSING  OUTPATIENT/OBSERVATION GOALS TO BE MET BEFORE DISCHARGE:  ADLs back to baseline: No    Activity and level of assistance: Up with maximum assistance. Consider SW and/or PT evaluation.     Pain status: Improved with use of alternative comfort measures i.e.: positioning    Return to near baseline physical activity: No     Discharge Planner Nurse   Safe discharge environment identified: No  Barriers to discharge: Yes       Entered by: Alejo Atkinson RN 11/13/2023 10:40 PM     Please review provider order for any additional goals.   Nurse to notify provider when observation goals have been met and patient is ready for discharge.  "

## 2023-11-14 NOTE — PLAN OF CARE
"PRIMARY DIAGNOSIS: \"GENERIC\" NURSING  OUTPATIENT/OBSERVATION GOALS TO BE MET BEFORE DISCHARGE:  ADLs back to baseline: No    Activity and level of assistance: Up with standby assistance.    Pain status: Improved with use of alternative comfort measures i.e.: distraction using reposition, emotional support, declined pain medication. Prayed with pt per request    Return to near baseline physical activity: No     Discharge Planner Nurse   Safe discharge environment identified: Yes  Barriers to discharge: Yes       Entered by: Alejo Atkinson RN 11/14/2023 12:23 AM     Please review provider order for any additional goals.   Nurse to notify provider when observation goals have been met and patient is ready for discharge.Goal Outcome Evaluation:                        "

## 2023-11-14 NOTE — PLAN OF CARE
"PRIMARY DIAGNOSIS: \"GENERIC\" NURSING  OUTPATIENT/OBSERVATION GOALS TO BE MET BEFORE DISCHARGE:  ADLs back to baseline: No    Activity and level of assistance: Up with maximum assistance. Consider SW and/or PT evaluation.     Pain status: Improved with use of alternative comfort measures i.e.: positioning    Return to near baseline physical activity: No     Discharge Planner Nurse   Safe discharge environment identified: Yes  Barriers to discharge: Yes       Entered by: Alejo Atkinson RN 11/14/2023 3:56 AM     Please review provider order for any additional goals.   Nurse to notify provider when observation goals have been met and patient is ready for discharge.Goal Outcome Evaluation:                        "

## 2023-11-14 NOTE — PROGRESS NOTES
Care Management Discharge Note    Discharge Date: 11/14/2023       Discharge Disposition: Hospice, Long Term Care ([facility])    Discharge Services: None    Discharge DME: None    Discharge Transportation: [facility] Stretcher transport set up for [id number].    Private pay costs discussed: transportation costs    Does the patient's insurance plan have a 3 day qualifying hospital stay waiver?  No    PAS Confirmation Code: [id number]  Patient/family educated on Medicare website which has current facility and service quality ratings: yes    Education Provided on the Discharge Plan: Yes  Persons Notified of Discharge Plans: yes  Patient/Family in Agreement with the Plan: yes    Handoff Referral Completed: Yes    Additional Information:  Pt is adequate to discharge to LTC with [facility]. CM spoke to pt daughter and she is accepting of plan for pt to go [facility], she is also accepting of CM setting up stretcher transport and agrees with the possible out of pocket cost for transport. [facility] hospice will meet out at the facility and are aware of the discharge plans. Orders faxed to facility Nursing is aware of discharge plan/time and to look out for hospice meds that will be sent with pt. No further CM needs. CM will sign off.    Sera Maddox RN

## 2023-11-14 NOTE — PROGRESS NOTES
PRIMARY DIAGNOSIS: ACUTE PAIN  OUTPATIENT/OBSERVATION GOALS TO BE MET BEFORE DISCHARGE:  1. Pain Status: Improved-controlled with oral pain medications.    2. Return to near baseline physical activity: No    3. Cleared for discharge by consultants (if involved): Yes    Discharge Planner Nurse   Safe discharge environment identified: No  Barriers to discharge: Yes       Entered by: Brenda Esparza RN 11/14/2023 8:06 AM     Please review provider order for any additional goals.   Nurse to notify provider when observation goals have been met and patient is ready for discharge.

## 2023-11-14 NOTE — PROGRESS NOTES
PRIMARY DIAGNOSIS: GENERALIZED WEAKNESS    OUTPATIENT/OBSERVATION GOALS TO BE MET BEFORE DISCHARGE  1. Orthostatic performed: N/A    2. Tolerating PO medications: Yes    3. Return to near baseline physical activity: No    4. Cleared for discharge by consultants (if involved): Yes    Discharge Planner Nurse   Safe discharge environment identified: Yes  Barriers to discharge: No       Entered by: Brenda Esparza RN 11/14/2023 1:06 PM   Pt is waiting for his ride to [facility] hospice.  Please review provider order for any additional goals.   Nurse to notify provider when observation goals have been met and patient is ready for discharge.

## 2023-11-17 NOTE — LETTER
11/17/2023        RE: Denton Hobbs  [address]  [facility] [id number]        No notes on file      Sincerely,        DONALD Medina CNP

## 2023-12-19 NOTE — TELEPHONE ENCOUNTER
Reached out to Denton and his daughter Valentina.    Surgery r/s for 3/23/22 at [facility] with Dr. Wali Feng [facility]  On 3/19 At 10:30 a.m.  Post op by Telephone on 4/7/22 @ 11:30 a.m.    Needs to have Pre-op within 30 days of surgery.    Valentina said she would try to find the zuuka![id number] info sent to the cell phone listed which is hers.    Letter for surgery and MyChart Proxie sent to patient by mail.   Detail Level: Detailed Detail Level: Simple

## 2024-01-30 ENCOUNTER — PATIENT OUTREACH (OUTPATIENT)
Dept: ONCOLOGY | Facility: HOSPITAL | Age: 89
End: 2024-01-30
Payer: OTHER MISCELLANEOUS

## (undated) DEVICE — SUTURE VICRYL+ 4-0 UNDYED PS-2 VCP496H

## (undated) DEVICE — ESU PENCIL SMOKE EVAC W/ROCKER SWITCH 0703-047-000

## (undated) DEVICE — PREP DURAPREP 26ML APL 8630

## (undated) DEVICE — SOL WATER IRRIG 1000ML BOTTLE 2F7114

## (undated) DEVICE — SUCTION MANIFOLD NEPTUNE 2 SYS 1 PORT 702-025-000

## (undated) DEVICE — SYR 30ML LL W/O NDL 302832

## (undated) DEVICE — PREP CHLORAPREP 26ML TINTED HI-LITE ORANGE 930815

## (undated) DEVICE — DRAPE OR LAPAROTOMY KC 89228*

## (undated) DEVICE — DRAPE TRANSVERSE LAPAROTOMY 120X100X72" 89281

## (undated) DEVICE — GLOVE BIOGEL PI SZ 7.0 40870

## (undated) DEVICE — CUSTOM PACK MINOR SBA5BMNHEA

## (undated) DEVICE — SU ETHIBOND 0 MO-7 CR 8X18" CX41D

## (undated) DEVICE — SU PROLENE 2-0 SHDA 36" 8523H

## (undated) DEVICE — CLIP HEMOSTATIC ASSURANCE W11 MM 00711882

## (undated) DEVICE — PLATE GROUNDING ADULT W/CORD 9165L

## (undated) DEVICE — TUBING SUCTION MEDI-VAC 1/4"X20' N620A - HE

## (undated) DEVICE — GOWN IMPERVIOUS BREATHABLE SMART LG 89015

## (undated) DEVICE — DRSG TEGADERM 4X4 3/4" 1626W

## (undated) DEVICE — SOL NACL 0.9% IRRIG 1000ML BOTTLE 2F7124

## (undated) DEVICE — DRSG STERI STRIP 1X5" R1548

## (undated) DEVICE — NEEDLE HYPO 22X1-1/2 SAFETY 305900

## (undated) DEVICE — GLOVE UNDER INDICATOR PI SZ 7.0 LF 41670

## (undated) DEVICE — CLIP HEMOSTASIS ASSURANCE W16 MM BX00711884

## (undated) DEVICE — DRSG GAUZE 4X4" 3033

## (undated) DEVICE — SUTURE VICRYL+ 2-0 27IN SH UND VCP417H

## (undated) DEVICE — TUBE PENROSE 1/4 X 12 STRL 30414-025

## (undated) DEVICE — DRSG TELFA 3X4" 1050

## (undated) DEVICE — SU VICRYL+ 3-0 27IN SH UND VCP416H

## (undated) DEVICE — TUBE PENROSE 3/8 X 12 STRL LTX 0912020

## (undated) DEVICE — DRESSING TEGADERM IV 3 1/2 X 4 1/4 1635

## (undated) DEVICE — GLOVE SURG PI ULTRA TOUCH M SZ 7-1/2 LF

## (undated) DEVICE — CUSTOM PACK GEN MAJOR SBA5BGMHEA

## (undated) DEVICE — GLOVE BIOGEL PI SZ 8.0 40880

## (undated) DEVICE — DECANTER VIAL 2006S

## (undated) DEVICE — SU CHROMIC 3-0 SH 27" G122H

## (undated) RX ORDER — FENTANYL CITRATE 50 UG/ML
INJECTION, SOLUTION INTRAMUSCULAR; INTRAVENOUS
Status: DISPENSED
Start: 2022-08-19

## (undated) RX ORDER — FENTANYL CITRATE 50 UG/ML
INJECTION, SOLUTION INTRAMUSCULAR; INTRAVENOUS
Status: DISPENSED
Start: 2022-09-12

## (undated) RX ORDER — KETAMINE HYDROCHLORIDE 10 MG/ML
INJECTION INTRAMUSCULAR; INTRAVENOUS
Status: DISPENSED
Start: 2022-09-12

## (undated) RX ORDER — BUPIVACAINE HYDROCHLORIDE AND EPINEPHRINE 2.5; 5 MG/ML; UG/ML
INJECTION, SOLUTION EPIDURAL; INFILTRATION; INTRACAUDAL; PERINEURAL
Status: DISPENSED
Start: 2022-08-19

## (undated) RX ORDER — GLYCOPYRROLATE 0.2 MG/ML
INJECTION INTRAMUSCULAR; INTRAVENOUS
Status: DISPENSED
Start: 2022-09-12

## (undated) RX ORDER — ETOMIDATE 2 MG/ML
INJECTION INTRAVENOUS
Status: DISPENSED
Start: 2022-09-12

## (undated) RX ORDER — LIDOCAINE HYDROCHLORIDE 10 MG/ML
INJECTION, SOLUTION EPIDURAL; INFILTRATION; INTRACAUDAL; PERINEURAL
Status: DISPENSED
Start: 2022-08-19

## (undated) RX ORDER — BUPIVACAINE HYDROCHLORIDE 2.5 MG/ML
INJECTION, SOLUTION EPIDURAL; INFILTRATION; INTRACAUDAL
Status: DISPENSED
Start: 2022-08-19

## (undated) RX ORDER — CALCIUM CHLORIDE 100 MG/ML
INJECTION INTRAVENOUS; INTRAVENTRICULAR
Status: DISPENSED
Start: 2022-09-12

## (undated) RX ORDER — LIDOCAINE HYDROCHLORIDE 10 MG/ML
INJECTION, SOLUTION INFILTRATION; PERINEURAL
Status: DISPENSED
Start: 2022-09-13

## (undated) RX ORDER — DEXAMETHASONE SODIUM PHOSPHATE 10 MG/ML
INJECTION, SOLUTION INTRAMUSCULAR; INTRAVENOUS
Status: DISPENSED
Start: 2022-09-12

## (undated) RX ORDER — FENTANYL CITRATE 50 UG/ML
INJECTION, SOLUTION INTRAMUSCULAR; INTRAVENOUS
Status: DISPENSED
Start: 2022-09-13